# Patient Record
Sex: FEMALE | Race: WHITE | NOT HISPANIC OR LATINO | Employment: OTHER | URBAN - METROPOLITAN AREA
[De-identification: names, ages, dates, MRNs, and addresses within clinical notes are randomized per-mention and may not be internally consistent; named-entity substitution may affect disease eponyms.]

---

## 2020-07-15 DIAGNOSIS — N18.9 CHRONIC KIDNEY DISEASE, UNSPECIFIED: Primary | ICD-10-CM

## 2020-09-30 ENCOUNTER — HOSPITAL ENCOUNTER (OUTPATIENT)
Dept: RADIOLOGY | Facility: HOSPITAL | Age: 85
Discharge: HOME | End: 2020-09-30
Attending: INTERNAL MEDICINE
Payer: MEDICARE

## 2020-09-30 DIAGNOSIS — N18.9 CHRONIC KIDNEY DISEASE, UNSPECIFIED: ICD-10-CM

## 2020-09-30 PROCEDURE — 76770 US EXAM ABDO BACK WALL COMP: CPT | Mod: TC,PO

## 2020-10-05 ENCOUNTER — TELEPHONE (OUTPATIENT)
Dept: CARDIOLOGY | Facility: CLINIC | Age: 85
End: 2020-10-05

## 2020-10-05 NOTE — TELEPHONE ENCOUNTER
----- Message from Kamille Garrido sent at 10/5/2020  2:54 PM CDT -----  Regarding: medication refill  Patient said Arlen fax over form to get prescription refill and nobody responded patient is out of medicine please get in contact with patient [phone] or [facility] pharmacy [phone] regarding this

## 2020-10-30 ENCOUNTER — TELEPHONE (OUTPATIENT)
Dept: CARDIOLOGY | Facility: CLINIC | Age: 85
End: 2020-10-30

## 2020-10-30 NOTE — TELEPHONE ENCOUNTER
----- Message from Cristin Jean MA sent at 10/29/2020  3:20 PM CDT -----  PT is requesting a call back to have her appts R/S to December . Pt is without power   Call back # [phone]  Echo test / 4 month follow up

## 2020-10-30 NOTE — TELEPHONE ENCOUNTER
Left message for patient to call back to reschedule to first available, which will be in January.

## 2020-12-23 RX ORDER — METOPROLOL SUCCINATE 25 MG/1
TABLET, EXTENDED RELEASE ORAL
COMMUNITY
Start: 2020-09-24 | End: 2020-12-23 | Stop reason: SDUPTHER

## 2020-12-23 NOTE — TELEPHONE ENCOUNTER
----- Message from Steve Cabrera sent at 12/23/2020  2:41 PM CST -----  Regarding: refill  metoprolol succinate (TOPROL-XL) 25 MG 24 hr tablet    [facility] #[id number] - [place], [place] - [address] AT [facility] [address]

## 2020-12-23 NOTE — TELEPHONE ENCOUNTER
----- Message from Saadia Sadler MA sent at 12/23/2020  1:26 PM CST -----  Type: Needs Medical Advice  Who Called:  Vickie Nam Call Back Number: [phone]  Additional Information: patient is requesting a refill of her Toprol xl - 25mg tablet.  She has one pill left.  She is requesting a same day refill.  Please call patient when called in.

## 2020-12-25 RX ORDER — METOPROLOL SUCCINATE 25 MG/1
25 TABLET, EXTENDED RELEASE ORAL DAILY
Qty: 45 TABLET | Refills: 2 | Status: SHIPPED | OUTPATIENT
Start: 2020-12-25 | End: 2021-04-12 | Stop reason: SDUPTHER

## 2021-02-18 ENCOUNTER — TELEPHONE (OUTPATIENT)
Dept: CARDIOLOGY | Facility: CLINIC | Age: 86
End: 2021-02-18

## 2021-02-18 DIAGNOSIS — R21 RASH: Primary | ICD-10-CM

## 2021-04-08 LAB
BNP SERPL-MCNC: 75.5 PG/ML (ref 0–100)
BUN SERPL-MCNC: 37 MG/DL (ref 8–27)
BUN/CREAT SERPL: 25 (ref 12–28)
CALCIUM SERPL-MCNC: 9.8 MG/DL (ref 8.7–10.3)
CHLORIDE SERPL-SCNC: 103 MMOL/L (ref 96–106)
CO2 SERPL-SCNC: 25 MMOL/L (ref 20–29)
CREAT SERPL-MCNC: 1.48 MG/DL (ref 0.57–1)
GLUCOSE SERPL-MCNC: 127 MG/DL (ref 65–99)
MAGNESIUM SERPL-MCNC: 2 MG/DL (ref 1.6–2.3)
POTASSIUM SERPL-SCNC: 4.6 MMOL/L (ref 3.5–5.2)
SODIUM SERPL-SCNC: 140 MMOL/L (ref 134–144)

## 2021-04-09 RX ORDER — PROPAFENONE HYDROCHLORIDE 225 MG/1
225 TABLET, FILM COATED ORAL 2 TIMES DAILY
COMMUNITY
Start: 2021-01-12 | End: 2021-04-09 | Stop reason: SDUPTHER

## 2021-04-09 RX ORDER — PROPAFENONE HYDROCHLORIDE 225 MG/1
225 TABLET, FILM COATED ORAL 2 TIMES DAILY
Qty: 180 TABLET | Refills: 3 | Status: SHIPPED | OUTPATIENT
Start: 2021-04-09 | End: 2022-04-09 | Stop reason: SDUPTHER

## 2021-04-12 ENCOUNTER — OFFICE VISIT (OUTPATIENT)
Dept: CARDIOLOGY | Facility: CLINIC | Age: 86
End: 2021-04-12
Payer: MEDICARE

## 2021-04-12 VITALS
SYSTOLIC BLOOD PRESSURE: 120 MMHG | DIASTOLIC BLOOD PRESSURE: 70 MMHG | HEART RATE: 54 BPM | WEIGHT: 181 LBS | BODY MASS INDEX: 33.31 KG/M2 | HEIGHT: 62 IN | OXYGEN SATURATION: 92 %

## 2021-04-12 DIAGNOSIS — Z86.79 HISTORY OF ATRIAL FIBRILLATION: Chronic | ICD-10-CM

## 2021-04-12 DIAGNOSIS — I10 ESSENTIAL HYPERTENSION: Chronic | ICD-10-CM

## 2021-04-12 DIAGNOSIS — E89.0 POSTABLATIVE HYPOTHYROIDISM: Chronic | ICD-10-CM

## 2021-04-12 DIAGNOSIS — L40.9 PSORIASIS: Chronic | ICD-10-CM

## 2021-04-12 DIAGNOSIS — N18.32 STAGE 3B CHRONIC KIDNEY DISEASE: Chronic | ICD-10-CM

## 2021-04-12 PROCEDURE — 99213 OFFICE O/P EST LOW 20 MIN: CPT | Mod: S$GLB,,, | Performed by: INTERNAL MEDICINE

## 2021-04-12 PROCEDURE — 99213 PR OFFICE/OUTPT VISIT, EST, LEVL III, 20-29 MIN: ICD-10-PCS | Mod: S$GLB,,, | Performed by: INTERNAL MEDICINE

## 2021-04-12 RX ORDER — TRIAMCINOLONE ACETONIDE 1 MG/G
CREAM TOPICAL
COMMUNITY
Start: 2021-04-08 | End: 2021-06-02 | Stop reason: SDUPTHER

## 2021-04-12 RX ORDER — BETAXOLOL HYDROCHLORIDE 5 MG/ML
1 SOLUTION/ DROPS OPHTHALMIC 2 TIMES DAILY
COMMUNITY
Start: 2021-01-12 | End: 2023-08-21

## 2021-04-12 RX ORDER — LATANOPROST 50 UG/ML
1 SOLUTION/ DROPS OPHTHALMIC NIGHTLY
COMMUNITY
Start: 2021-03-15 | End: 2023-08-21

## 2021-04-21 ENCOUNTER — TELEPHONE (OUTPATIENT)
Dept: FAMILY MEDICINE | Facility: CLINIC | Age: 86
End: 2021-04-21

## 2021-04-28 PROBLEM — E89.0 POSTABLATIVE HYPOTHYROIDISM: Chronic | Status: ACTIVE | Noted: 2021-04-28

## 2021-04-28 PROBLEM — I10 ESSENTIAL HYPERTENSION: Chronic | Status: ACTIVE | Noted: 2021-04-28

## 2021-04-28 PROBLEM — Z86.79 HISTORY OF ATRIAL FIBRILLATION: Chronic | Status: ACTIVE | Noted: 2021-04-28

## 2021-04-28 PROBLEM — L40.9 PSORIASIS: Chronic | Status: ACTIVE | Noted: 2021-04-28

## 2021-05-29 LAB
BASOPHILS # BLD AUTO: 0.1 X10E3/UL (ref 0–0.2)
BASOPHILS NFR BLD AUTO: 1 %
BUN SERPL-MCNC: 31 MG/DL (ref 8–27)
BUN/CREAT SERPL: 20 (ref 12–28)
CALCIUM SERPL-MCNC: 9.6 MG/DL (ref 8.7–10.3)
CHLORIDE SERPL-SCNC: 102 MMOL/L (ref 96–106)
CO2 SERPL-SCNC: 26 MMOL/L (ref 20–29)
CREAT SERPL-MCNC: 1.54 MG/DL (ref 0.57–1)
EOSINOPHIL # BLD AUTO: 0.3 X10E3/UL (ref 0–0.4)
EOSINOPHIL NFR BLD AUTO: 4 %
ERYTHROCYTE [DISTWIDTH] IN BLOOD BY AUTOMATED COUNT: 12.6 % (ref 11.7–15.4)
GLUCOSE SERPL-MCNC: 113 MG/DL (ref 65–99)
HCT VFR BLD AUTO: 39.9 % (ref 34–46.6)
HGB BLD-MCNC: 12.6 G/DL (ref 11.1–15.9)
IMM GRANULOCYTES # BLD AUTO: 0 X10E3/UL (ref 0–0.1)
IMM GRANULOCYTES NFR BLD AUTO: 0 %
LYMPHOCYTES # BLD AUTO: 2.1 X10E3/UL (ref 0.7–3.1)
LYMPHOCYTES NFR BLD AUTO: 28 %
MCH RBC QN AUTO: 31.6 PG (ref 26.6–33)
MCHC RBC AUTO-ENTMCNC: 31.6 G/DL (ref 31.5–35.7)
MCV RBC AUTO: 100 FL (ref 79–97)
MONOCYTES # BLD AUTO: 0.7 X10E3/UL (ref 0.1–0.9)
MONOCYTES NFR BLD AUTO: 10 %
NEUTROPHILS # BLD AUTO: 4.2 X10E3/UL (ref 1.4–7)
NEUTROPHILS NFR BLD AUTO: 57 %
PLATELET # BLD AUTO: 241 X10E3/UL (ref 150–450)
POTASSIUM SERPL-SCNC: 5.2 MMOL/L (ref 3.5–5.2)
RBC # BLD AUTO: 3.99 X10E6/UL (ref 3.77–5.28)
SODIUM SERPL-SCNC: 141 MMOL/L (ref 134–144)
WBC # BLD AUTO: 7.5 X10E3/UL (ref 3.4–10.8)

## 2021-06-02 ENCOUNTER — OFFICE VISIT (OUTPATIENT)
Dept: CARDIOLOGY | Facility: CLINIC | Age: 86
End: 2021-06-02
Payer: MEDICARE

## 2021-06-02 VITALS
BODY MASS INDEX: 33.68 KG/M2 | HEART RATE: 68 BPM | DIASTOLIC BLOOD PRESSURE: 70 MMHG | WEIGHT: 183 LBS | SYSTOLIC BLOOD PRESSURE: 130 MMHG | OXYGEN SATURATION: 90 % | HEIGHT: 62 IN

## 2021-06-02 DIAGNOSIS — N18.32 STAGE 3B CHRONIC KIDNEY DISEASE: ICD-10-CM

## 2021-06-02 DIAGNOSIS — I10 ESSENTIAL HYPERTENSION: Primary | ICD-10-CM

## 2021-06-02 DIAGNOSIS — L40.9 PSORIASIS: ICD-10-CM

## 2021-06-02 DIAGNOSIS — Z86.79 HISTORY OF ATRIAL FIBRILLATION: Chronic | ICD-10-CM

## 2021-06-02 PROCEDURE — 99213 OFFICE O/P EST LOW 20 MIN: CPT | Mod: S$GLB,,, | Performed by: INTERNAL MEDICINE

## 2021-06-02 PROCEDURE — 99213 PR OFFICE/OUTPT VISIT, EST, LEVL III, 20-29 MIN: ICD-10-PCS | Mod: S$GLB,,, | Performed by: INTERNAL MEDICINE

## 2021-06-02 RX ORDER — TRIAMCINOLONE ACETONIDE 1 MG/G
CREAM TOPICAL 2 TIMES DAILY
Qty: 80 G | Refills: 1 | Status: SHIPPED | OUTPATIENT
Start: 2021-06-02 | End: 2021-08-12

## 2021-12-06 ENCOUNTER — TELEPHONE (OUTPATIENT)
Dept: CARDIOLOGY | Facility: CLINIC | Age: 86
End: 2021-12-06

## 2021-12-06 ENCOUNTER — OFFICE VISIT (OUTPATIENT)
Dept: CARDIOLOGY | Facility: CLINIC | Age: 86
End: 2021-12-06
Payer: MEDICARE

## 2021-12-06 VITALS
SYSTOLIC BLOOD PRESSURE: 140 MMHG | WEIGHT: 178 LBS | DIASTOLIC BLOOD PRESSURE: 76 MMHG | HEIGHT: 62 IN | BODY MASS INDEX: 32.76 KG/M2 | OXYGEN SATURATION: 89 % | HEART RATE: 65 BPM

## 2021-12-06 DIAGNOSIS — E89.0 POSTABLATIVE HYPOTHYROIDISM: ICD-10-CM

## 2021-12-06 DIAGNOSIS — N18.32 STAGE 3B CHRONIC KIDNEY DISEASE: Chronic | ICD-10-CM

## 2021-12-06 DIAGNOSIS — Z86.79 HISTORY OF ATRIAL FIBRILLATION: ICD-10-CM

## 2021-12-06 DIAGNOSIS — I10 ESSENTIAL HYPERTENSION: Primary | ICD-10-CM

## 2021-12-06 PROCEDURE — 99213 OFFICE O/P EST LOW 20 MIN: CPT | Mod: S$GLB,,, | Performed by: INTERNAL MEDICINE

## 2021-12-06 PROCEDURE — 99213 PR OFFICE/OUTPT VISIT, EST, LEVL III, 20-29 MIN: ICD-10-PCS | Mod: S$GLB,,, | Performed by: INTERNAL MEDICINE

## 2022-06-03 LAB
ALBUMIN SERPL-MCNC: 4.3 G/DL (ref 3.6–4.6)
ALBUMIN/GLOB SERPL: 1.3 {RATIO} (ref 1.2–2.2)
ALP SERPL-CCNC: 81 IU/L (ref 44–121)
ALT SERPL-CCNC: 10 IU/L (ref 0–32)
AST SERPL-CCNC: 14 IU/L (ref 0–40)
BILIRUB SERPL-MCNC: 0.4 MG/DL (ref 0–1.2)
BUN SERPL-MCNC: 30 MG/DL (ref 8–27)
BUN/CREAT SERPL: 23 (ref 12–28)
CALCIUM SERPL-MCNC: 9.8 MG/DL (ref 8.7–10.3)
CHLORIDE SERPL-SCNC: 103 MMOL/L (ref 96–106)
CHOLEST SERPL-MCNC: 152 MG/DL (ref 100–199)
CO2 SERPL-SCNC: 20 MMOL/L (ref 20–29)
CREAT SERPL-MCNC: 1.3 MG/DL (ref 0.57–1)
ERYTHROCYTE [DISTWIDTH] IN BLOOD BY AUTOMATED COUNT: 12.3 % (ref 11.7–15.4)
EST. GFR  (NO RACE VARIABLE): 40 ML/MIN/1.73
GLOBULIN SER CALC-MCNC: 3.3 G/DL (ref 1.5–4.5)
GLUCOSE SERPL-MCNC: 136 MG/DL (ref 65–99)
HCT VFR BLD AUTO: 40.2 % (ref 34–46.6)
HDLC SERPL-MCNC: 51 MG/DL
HGB BLD-MCNC: 13.3 G/DL (ref 11.1–15.9)
LDLC SERPL CALC-MCNC: 88 MG/DL (ref 0–99)
MCH RBC QN AUTO: 33.3 PG (ref 26.6–33)
MCHC RBC AUTO-ENTMCNC: 33.1 G/DL (ref 31.5–35.7)
MCV RBC AUTO: 101 FL (ref 79–97)
PLATELET # BLD AUTO: 214 X10E3/UL (ref 150–450)
POTASSIUM SERPL-SCNC: 5 MMOL/L (ref 3.5–5.2)
PROT SERPL-MCNC: 7.6 G/DL (ref 6–8.5)
RBC # BLD AUTO: 4 X10E6/UL (ref 3.77–5.28)
SODIUM SERPL-SCNC: 139 MMOL/L (ref 134–144)
TRIGL SERPL-MCNC: 66 MG/DL (ref 0–149)
TSH SERPL DL<=0.005 MIU/L-ACNC: 2.87 UIU/ML (ref 0.45–4.5)
VLDLC SERPL CALC-MCNC: 13 MG/DL (ref 5–40)
WBC # BLD AUTO: 6.8 X10E3/UL (ref 3.4–10.8)

## 2022-07-20 ENCOUNTER — TELEPHONE (OUTPATIENT)
Dept: CARDIOLOGY | Facility: CLINIC | Age: 87
End: 2022-07-20
Payer: MEDICAID

## 2022-07-20 NOTE — TELEPHONE ENCOUNTER
----- Message from Shirlene Oneal sent at 7/20/2022  2:11 PM CDT -----  Type: Needs Medical Advice  Who Called: Pt   Symptoms (please be specific):   How long has patient had these symptoms:    Pharmacy name and phone #:    Best Call Back Number: [phone]  Additional Information: Pt requesting a call back to schedule an appt.

## 2022-07-21 ENCOUNTER — TELEPHONE (OUTPATIENT)
Dept: CARDIOLOGY | Facility: CLINIC | Age: 87
End: 2022-07-21
Payer: MEDICAID

## 2022-07-21 NOTE — TELEPHONE ENCOUNTER
----- Message from Haydee Kearns sent at 7/21/2022  1:03 PM CDT -----  Regarding: sooner appointment  Contact: Gabino Clayton  Type:  Patient Returning Call    Who Called:  Patient   Who Left Message for Patient:  Nurse  Does the patient know what this is regarding?:  appointment  Best Call Back Number:  [phone] (home)       Patient only want to see Dr Morocho please call so back at [phone]

## 2022-07-21 NOTE — TELEPHONE ENCOUNTER
Spoke with patient son and advised next available with Dr. Morocho is October and at this time we cannot schedule due to not knowing call schedule. Pt son understood was a little agitated. Asked pt if she would see NATALIA she refused and would wait til October. Did tell them will send 2 month recall and soon as they receive it to give us a call to schedule. Pt son understood.

## 2022-07-21 NOTE — TELEPHONE ENCOUNTER
Appt made for NATALIA on 8/8 9:20am. Did call and leave vm with son that appt is made and to let us know if time and date doesn't work.

## 2022-09-15 RX ORDER — TRIAMTERENE/HYDROCHLOROTHIAZID 37.5-25 MG
1 TABLET ORAL DAILY
Qty: 90 TABLET | Refills: 3 | Status: SHIPPED | OUTPATIENT
Start: 2022-09-15 | End: 2023-04-03

## 2022-10-04 RX ORDER — AMLODIPINE BESYLATE 10 MG/1
10 TABLET ORAL DAILY
Qty: 90 TABLET | Refills: 3 | Status: SHIPPED | OUTPATIENT
Start: 2022-10-04 | End: 2023-04-10 | Stop reason: SDUPTHER

## 2022-10-04 NOTE — TELEPHONE ENCOUNTER
----- Message from Otilio Anderson sent at 10/4/2022  3:47 PM CDT -----  Type:  RX Refill Request    Who Called:  Pt  Refill or New Rx:  refill  RX Name and Strength:  amLODIPine (NORVASC) 10 MG tablet    How is the patient currently taking it? (ex. 1XDay):  as directed  Is this a 30 day or 90 day RX:  30  Preferred Pharmacy with phone number:    [facility] #[id number] - [place], [place] - [address] [facility] [address]  [address] [address]  Phone: [phone] Fax: [phone]      Local or Mail Order:  Local  Ordering Provider:  Rashawn Nam Call Back Number:  [phone]  Additional Information:  Please advise -- Thank you

## 2022-10-04 NOTE — TELEPHONE ENCOUNTER
----- Message from Otilio Anderson sent at 10/4/2022  3:45 PM CDT -----  Type:  Sooner Appointment Request    Caller is requesting a sooner appointment.  Caller declined first available appointment listed below.  Caller will not accept being placed on the waitlist and is requesting a message be sent to doctor.    Name of Caller:  Pt  When is the first available appointment?      Symptoms:  letter for follow up    Best Call Back Number:  [phone] Adarsh son    Additional Information:  sts shes been waiting since June. Please advise == Thank you

## 2023-03-06 ENCOUNTER — TELEPHONE (OUTPATIENT)
Dept: CARDIOLOGY | Facility: CLINIC | Age: 88
End: 2023-03-06
Payer: MEDICARE

## 2023-03-06 NOTE — TELEPHONE ENCOUNTER
----- Message from Pietro Brooke sent at 3/6/2023  3:04 PM CST -----  Regarding: sooner appt  Type:  Sooner Appointment Request    Caller is requesting a sooner appointment.  Caller declined first available appointment listed below.  Caller will not accept being placed on the waitlist and is requesting a message be sent to doctor.    Name of Caller:  Vickie  When is the first available appointment?  None through end of cal // ins  Symptoms:  f/u  Best Call Back Number:  [phone] // son tara  Additional Information:

## 2023-03-07 NOTE — TELEPHONE ENCOUNTER
Patient son was very rude when I gave appt date and time. Said patient has been trying for a year and finally gets an appt. Appt made.

## 2023-04-03 ENCOUNTER — OFFICE VISIT (OUTPATIENT)
Dept: CARDIOLOGY | Facility: CLINIC | Age: 88
End: 2023-04-03
Payer: MEDICARE

## 2023-04-03 VITALS
BODY MASS INDEX: 30.91 KG/M2 | OXYGEN SATURATION: 91 % | DIASTOLIC BLOOD PRESSURE: 70 MMHG | HEIGHT: 62 IN | WEIGHT: 168 LBS | SYSTOLIC BLOOD PRESSURE: 122 MMHG | HEART RATE: 66 BPM

## 2023-04-03 DIAGNOSIS — E89.0 POSTABLATIVE HYPOTHYROIDISM: ICD-10-CM

## 2023-04-03 DIAGNOSIS — N18.32 STAGE 3B CHRONIC KIDNEY DISEASE: ICD-10-CM

## 2023-04-03 DIAGNOSIS — I10 ESSENTIAL HYPERTENSION: Primary | ICD-10-CM

## 2023-04-03 DIAGNOSIS — Z86.79 HISTORY OF ATRIAL FIBRILLATION: ICD-10-CM

## 2023-04-03 PROCEDURE — 1101F PT FALLS ASSESS-DOCD LE1/YR: CPT | Mod: CPTII,S$GLB,, | Performed by: INTERNAL MEDICINE

## 2023-04-03 PROCEDURE — 99213 PR OFFICE/OUTPT VISIT, EST, LEVL III, 20-29 MIN: ICD-10-PCS | Mod: S$GLB,,, | Performed by: INTERNAL MEDICINE

## 2023-04-03 PROCEDURE — 99213 OFFICE O/P EST LOW 20 MIN: CPT | Mod: PBBFAC,PN | Performed by: INTERNAL MEDICINE

## 2023-04-03 PROCEDURE — 1126F AMNT PAIN NOTED NONE PRSNT: CPT | Mod: CPTII,S$GLB,, | Performed by: INTERNAL MEDICINE

## 2023-04-03 PROCEDURE — 1159F PR MEDICATION LIST DOCUMENTED IN MEDICAL RECORD: ICD-10-PCS | Mod: CPTII,S$GLB,, | Performed by: INTERNAL MEDICINE

## 2023-04-03 PROCEDURE — 1101F PR PT FALLS ASSESS DOC 0-1 FALLS W/OUT INJ PAST YR: ICD-10-PCS | Mod: CPTII,S$GLB,, | Performed by: INTERNAL MEDICINE

## 2023-04-03 PROCEDURE — 3288F FALL RISK ASSESSMENT DOCD: CPT | Mod: CPTII,S$GLB,, | Performed by: INTERNAL MEDICINE

## 2023-04-03 PROCEDURE — 99213 OFFICE O/P EST LOW 20 MIN: CPT | Mod: S$GLB,,, | Performed by: INTERNAL MEDICINE

## 2023-04-03 PROCEDURE — 1159F MED LIST DOCD IN RCRD: CPT | Mod: CPTII,S$GLB,, | Performed by: INTERNAL MEDICINE

## 2023-04-03 PROCEDURE — 3288F PR FALLS RISK ASSESSMENT DOCUMENTED: ICD-10-PCS | Mod: CPTII,S$GLB,, | Performed by: INTERNAL MEDICINE

## 2023-04-03 PROCEDURE — 99999 PR PBB SHADOW E&M-EST. PATIENT-LVL III: ICD-10-PCS | Mod: PBBFAC,,, | Performed by: INTERNAL MEDICINE

## 2023-04-03 PROCEDURE — 1160F RVW MEDS BY RX/DR IN RCRD: CPT | Mod: CPTII,S$GLB,, | Performed by: INTERNAL MEDICINE

## 2023-04-03 PROCEDURE — 1126F PR PAIN SEVERITY QUANTIFIED, NO PAIN PRESENT: ICD-10-PCS | Mod: CPTII,S$GLB,, | Performed by: INTERNAL MEDICINE

## 2023-04-03 PROCEDURE — 1160F PR REVIEW ALL MEDS BY PRESCRIBER/CLIN PHARMACIST DOCUMENTED: ICD-10-PCS | Mod: CPTII,S$GLB,, | Performed by: INTERNAL MEDICINE

## 2023-04-03 PROCEDURE — 99999 PR PBB SHADOW E&M-EST. PATIENT-LVL III: CPT | Mod: PBBFAC,,, | Performed by: INTERNAL MEDICINE

## 2023-04-03 NOTE — PROGRESS NOTES
Patient ID:  Vickie Benedict is a 88 y.o. female who presents for follow-up of Hypertension      She has been doing okay.  She has been sleeping a lot she has been trying to get an appointment for over a year.  Finally she was able to come on min and see me.  She has been living with her son.  She takes care of a cat and a dog.  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      Past Medical History:   Diagnosis Date    Atrial fibrillation     Bradycardia     Carotid bruit     CKD (chronic kidney disease), stage III     Hyperlipidemia     OA (osteoarthritis)     Thyroid disease         Past Surgical History:   Procedure Laterality Date    HYSTERECTOMY      KNEE SURGERY Right     SHOULDER SURGERY Right           Current Outpatient Medications   Medication Instructions    amLODIPine (NORVASC) 10 MG tablet TAKE 1 TABLET BY MOUTH EVERY DAY    amLODIPine (NORVASC) 10 mg, Oral, Daily    betaxolol 0.5% (BETOPTIC-S) 0.5 % Drop 1 drop, Both Eyes, 2 times daily    latanoprost 0.005 % ophthalmic solution 1 drop, Both Eyes, Nightly    levothyroxine (SYNTHROID) 100 MCG tablet TAKE 1 TABLET BY MOUTH EVERY DAY    lisinopriL (PRINIVIL,ZESTRIL) 20 MG tablet TAKE 1 TABLET BY MOUTH TWICE DAILY    metoprolol succinate (TOPROL-XL) 25 MG 24 hr tablet TAKE ONE-HALF TABLET BY MOUTH EVERY MORNING. DISCONTINUE METOPROLOL TARTARATE AND TAKE THIS ONE INSTEAD    propafenone (RYTHMOL) 225 MG Tab TAKE 1 TABLET BY MOUTH TWICE DAILY    triamcinolone acetonide 0.1% (KENALOG) 0.1 % cream APPLY TOPICALLY TO THE AFFECTED AREA TWICE DAILY        Review of patient's allergies indicates:   Allergen Reactions    Penicillins     Shellfish containing products         Review of Systems   Cardiovascular:  Negative for chest pain, dyspnea on exertion and palpitations.   Respiratory:  Negative for cough and shortness of breath.    Neurological:  Positive for dizziness.      Objective:     Vitals:     "04/03/23 1301   BP: 122/70   BP Location: Left arm   Patient Position: Sitting   BP Method: Medium (Manual)   Pulse: 66   SpO2: (!) 91%   Weight: 76.2 kg (167 lb 15.9 oz)   Height: 5' 2" (1.575 m)       Physical Exam  Vitals and nursing note reviewed.   Constitutional:       Appearance: She is well-developed.   HENT:      Head: Normocephalic and atraumatic.   Eyes:      Conjunctiva/sclera: Conjunctivae normal.   Neck:      Vascular: Carotid bruit present.   Cardiovascular:      Rate and Rhythm: Normal rate and regular rhythm.      Heart sounds: Murmur (Six systolic murmur at the base) heard.   Pulmonary:      Effort: Pulmonary effort is normal.      Breath sounds: Normal breath sounds.   Abdominal:      General: Bowel sounds are normal.      Palpations: Abdomen is soft.   Musculoskeletal:         General: Normal range of motion.   Skin:     General: Skin is warm and dry.   Neurological:      Mental Status: She is alert and oriented to person, place, and time.   Psychiatric:         Behavior: Behavior normal.         Thought Content: Thought content normal.         Judgment: Judgment normal.     CMP  Sodium   Date Value Ref Range Status   06/02/2022 139 134 - 144 mmol/L Final     Potassium   Date Value Ref Range Status   06/02/2022 5.0 3.5 - 5.2 mmol/L Final     Chloride   Date Value Ref Range Status   06/02/2022 103 96 - 106 mmol/L Final     CO2   Date Value Ref Range Status   06/02/2022 20 20 - 29 mmol/L Final     Glucose   Date Value Ref Range Status   06/02/2022 136 (H) 65 - 99 mg/dL Final     BUN   Date Value Ref Range Status   06/02/2022 30 (H) 8 - 27 mg/dL Final     Creatinine   Date Value Ref Range Status   06/02/2022 1.30 (H) 0.57 - 1.00 mg/dL Final     Calcium   Date Value Ref Range Status   06/02/2022 9.8 8.7 - 10.3 mg/dL Final     Albumin   Date Value Ref Range Status   06/02/2022 4.3 3.6 - 4.6 g/dL Final     Total Bilirubin   Date Value Ref Range Status   06/02/2022 0.4 0.0 - 1.2 mg/dL Final     AST "   Date Value Ref Range Status   06/02/2022 14 0 - 40 IU/L Final     ALT   Date Value Ref Range Status   06/02/2022 10 0 - 32 IU/L Final     eGFR if non    Date Value Ref Range Status   05/28/2021 30 (L) >59 mL/min/1.73 Final      BMP  Lab Results   Component Value Date     06/02/2022    K 5.0 06/02/2022     06/02/2022    CO2 20 06/02/2022    BUN 30 (H) 06/02/2022    CREATININE 1.30 (H) 06/02/2022    CALCIUM 9.8 06/02/2022    EGFRNONAA 30 (L) 05/28/2021      BNP  @LABRCNTIP(BNP,BNPTRIAGEBLO)@   Lab Results   Component Value Date    CHOL 152 06/02/2022     Lab Results   Component Value Date    HDL 51 06/02/2022     Lab Results   Component Value Date    LDLCALC 88 06/02/2022     Lab Results   Component Value Date    TRIG 66 06/02/2022     No results found for: CHOLHDL   Lab Results   Component Value Date    TSH 2.870 06/02/2022     No results found for: LABA1C, HGBA1C  Lab Results   Component Value Date    WBC 6.8 06/02/2022    HGB 13.3 06/02/2022    HCT 40.2 06/02/2022     (H) 06/02/2022     06/02/2022         No results found for this or any previous visit.     No results found for this or any previous visit.         Assessment:       History of atrial fibrillation  Controlled on Rythmol    Essential hypertension  Controlled on amlodipine, lisinopril, metoprolol    Postablative hypothyroidism  On thyroid replacement       Plan:       Continue the current medical therapy return to the office in 2 months with an echocardiogram lipid CMP TSH and CBC.

## 2023-04-11 RX ORDER — METOPROLOL SUCCINATE 25 MG/1
12.5 TABLET, EXTENDED RELEASE ORAL DAILY
Qty: 45 TABLET | Refills: 4 | Status: ON HOLD | OUTPATIENT
Start: 2023-04-11 | End: 2023-08-29 | Stop reason: HOSPADM

## 2023-04-11 RX ORDER — AMLODIPINE BESYLATE 10 MG/1
10 TABLET ORAL DAILY
Qty: 90 TABLET | Refills: 3 | Status: ON HOLD | OUTPATIENT
Start: 2023-04-11 | End: 2023-08-29 | Stop reason: HOSPADM

## 2023-04-11 RX ORDER — LISINOPRIL 20 MG/1
20 TABLET ORAL 2 TIMES DAILY
Qty: 180 TABLET | Refills: 3 | Status: ON HOLD | OUTPATIENT
Start: 2023-04-11 | End: 2023-08-29 | Stop reason: HOSPADM

## 2023-04-11 RX ORDER — PROPAFENONE HYDROCHLORIDE 225 MG/1
225 TABLET, FILM COATED ORAL 2 TIMES DAILY
Qty: 180 TABLET | Refills: 3 | Status: SHIPPED | OUTPATIENT
Start: 2023-04-11 | End: 2023-07-19

## 2023-07-19 ENCOUNTER — CLINICAL SUPPORT (OUTPATIENT)
Dept: CARDIOLOGY | Facility: HOSPITAL | Age: 88
DRG: 291 | End: 2023-07-19
Attending: STUDENT IN AN ORGANIZED HEALTH CARE EDUCATION/TRAINING PROGRAM
Payer: MEDICARE

## 2023-07-19 ENCOUNTER — HOSPITAL ENCOUNTER (INPATIENT)
Facility: HOSPITAL | Age: 88
LOS: 3 days | Discharge: HOME | DRG: 291 | End: 2023-07-22
Attending: EMERGENCY MEDICINE | Admitting: STUDENT IN AN ORGANIZED HEALTH CARE EDUCATION/TRAINING PROGRAM
Payer: MEDICARE

## 2023-07-19 VITALS — WEIGHT: 149.94 LBS | HEIGHT: 62 IN | BODY MASS INDEX: 27.59 KG/M2

## 2023-07-19 DIAGNOSIS — I50.9 CONGESTIVE HEART FAILURE, UNSPECIFIED HF CHRONICITY, UNSPECIFIED HEART FAILURE TYPE: Primary | ICD-10-CM

## 2023-07-19 DIAGNOSIS — I50.9 CHF (CONGESTIVE HEART FAILURE): ICD-10-CM

## 2023-07-19 DIAGNOSIS — R06.02 SHORTNESS OF BREATH: ICD-10-CM

## 2023-07-19 DIAGNOSIS — R06.02 SOB (SHORTNESS OF BREATH): ICD-10-CM

## 2023-07-19 DIAGNOSIS — R00.9 ABNORMAL HEART RATE: ICD-10-CM

## 2023-07-19 DIAGNOSIS — R09.02 HYPOXIA: ICD-10-CM

## 2023-07-19 PROBLEM — J96.01 ACUTE HYPOXEMIC RESPIRATORY FAILURE: Status: ACTIVE | Noted: 2023-07-19

## 2023-07-19 LAB
ALBUMIN SERPL BCP-MCNC: 3.7 G/DL (ref 3.5–5.2)
ALBUMIN SERPL BCP-MCNC: 3.8 G/DL (ref 3.5–5.2)
ALLENS TEST: ABNORMAL
ALP SERPL-CCNC: 64 U/L (ref 55–135)
ALP SERPL-CCNC: 71 U/L (ref 55–135)
ALT SERPL W/O P-5'-P-CCNC: 24 U/L (ref 10–44)
ALT SERPL W/O P-5'-P-CCNC: 27 U/L (ref 10–44)
ANION GAP SERPL CALC-SCNC: 13 MMOL/L (ref 8–16)
ANION GAP SERPL CALC-SCNC: 6 MMOL/L (ref 8–16)
AORTIC ROOT ANNULUS: 3 CM
AORTIC VALVE CUSP SEPERATION: 1.1 CM
AST SERPL-CCNC: 25 U/L (ref 10–40)
AST SERPL-CCNC: 30 U/L (ref 10–40)
AV INDEX (PROSTH): 0.56
AV MEAN GRADIENT: 10 MMHG
AV PEAK GRADIENT: 19 MMHG
AV REGURGITATION PRESSURE HALF TIME: 744 MS
AV VALVE AREA: 1.75 CM2
AV VELOCITY RATIO: 0.54
BASOPHILS # BLD AUTO: 0.08 K/UL (ref 0–0.2)
BASOPHILS NFR BLD: 0.6 % (ref 0–1.9)
BILIRUB SERPL-MCNC: 0.8 MG/DL (ref 0.1–1)
BILIRUB SERPL-MCNC: 0.8 MG/DL (ref 0.1–1)
BNP SERPL-MCNC: 603 PG/ML (ref 0–99)
BSA FOR ECHO PROCEDURE: 1.72 M2
BUN SERPL-MCNC: 22 MG/DL (ref 8–23)
BUN SERPL-MCNC: 24 MG/DL (ref 8–23)
CALCIUM SERPL-MCNC: 9 MG/DL (ref 8.7–10.5)
CALCIUM SERPL-MCNC: 9.4 MG/DL (ref 8.7–10.5)
CHLORIDE SERPL-SCNC: 102 MMOL/L (ref 95–110)
CHLORIDE SERPL-SCNC: 104 MMOL/L (ref 95–110)
CO2 SERPL-SCNC: 25 MMOL/L (ref 23–29)
CO2 SERPL-SCNC: 28 MMOL/L (ref 23–29)
CREAT SERPL-MCNC: 1.3 MG/DL (ref 0.5–1.4)
CREAT SERPL-MCNC: 1.5 MG/DL (ref 0.5–1.4)
CV ECHO LV RWT: 0.42 CM
DELSYS: ABNORMAL
DIFFERENTIAL METHOD: ABNORMAL
DOP CALC AO PEAK VEL: 2.2 M/S
DOP CALC AO VTI: 57.5 CM
DOP CALC LVOT AREA: 3.1 CM2
DOP CALC LVOT DIAMETER: 2 CM
DOP CALC LVOT PEAK VEL: 1.19 M/S
DOP CALC LVOT STROKE VOLUME: 100.79 CM3
DOP CALCLVOT PEAK VEL VTI: 32.1 CM
E WAVE DECELERATION TIME: 246 MSEC
E/A RATIO: 1.22
E/E' RATIO: 14.5 M/S
ECHO LV POSTERIOR WALL: 1.21 CM (ref 0.6–1.1)
EJECTION FRACTION: 55 %
EOSINOPHIL # BLD AUTO: 0.3 K/UL (ref 0–0.5)
EOSINOPHIL NFR BLD: 2.3 % (ref 0–8)
EP: 8
ERYTHROCYTE [DISTWIDTH] IN BLOOD BY AUTOMATED COUNT: 14.3 % (ref 11.5–14.5)
ERYTHROCYTE [SEDIMENTATION RATE] IN BLOOD BY WESTERGREN METHOD: 12 MM/H
EST. GFR  (NO RACE VARIABLE): 33.3 ML/MIN/1.73 M^2
EST. GFR  (NO RACE VARIABLE): 39.6 ML/MIN/1.73 M^2
FIO2: 50
FRACTIONAL SHORTENING: 28 % (ref 28–44)
GLUCOSE SERPL-MCNC: 176 MG/DL (ref 70–110)
GLUCOSE SERPL-MCNC: 221 MG/DL (ref 70–110)
HCO3 UR-SCNC: 27.1 MMOL/L (ref 24–28)
HCT VFR BLD AUTO: 40.3 % (ref 37–48.5)
HGB BLD-MCNC: 12.9 G/DL (ref 12–16)
IMM GRANULOCYTES # BLD AUTO: 0.03 K/UL (ref 0–0.04)
IMM GRANULOCYTES NFR BLD AUTO: 0.2 % (ref 0–0.5)
INTERVENTRICULAR SEPTUM: 0.98 CM (ref 0.6–1.1)
IP: 18
IVC DIAMETER: 2.34 CM
LACTATE SERPL-SCNC: 1 MMOL/L (ref 0.5–1.9)
LEFT INTERNAL DIMENSION IN SYSTOLE: 4.1 CM (ref 2.1–4)
LEFT VENTRICLE DIASTOLIC VOLUME INDEX: 95.27 ML/M2
LEFT VENTRICLE DIASTOLIC VOLUME: 161 ML
LEFT VENTRICLE MASS INDEX: 152 G/M2
LEFT VENTRICLE SYSTOLIC VOLUME INDEX: 43.9 ML/M2
LEFT VENTRICLE SYSTOLIC VOLUME: 74.2 ML
LEFT VENTRICULAR INTERNAL DIMENSION IN DIASTOLE: 5.72 CM (ref 3.5–6)
LEFT VENTRICULAR MASS: 256.66 G
LV LATERAL E/E' RATIO: 14.5 M/S
LV SEPTAL E/E' RATIO: 14.5 M/S
LVOT MG: 3 MMHG
LVOT MV: 0.77 CM/S
LYMPHOCYTES # BLD AUTO: 2.2 K/UL (ref 1–4.8)
LYMPHOCYTES NFR BLD: 17.2 % (ref 18–48)
MAGNESIUM SERPL-MCNC: 1.8 MG/DL (ref 1.6–2.6)
MAGNESIUM SERPL-MCNC: 2.5 MG/DL (ref 1.6–2.6)
MCH RBC QN AUTO: 33.7 PG (ref 27–31)
MCHC RBC AUTO-ENTMCNC: 32 G/DL (ref 32–36)
MCV RBC AUTO: 105 FL (ref 82–98)
MODE: ABNORMAL
MONOCYTES # BLD AUTO: 0.9 K/UL (ref 0.3–1)
MONOCYTES NFR BLD: 6.8 % (ref 4–15)
MV PEAK A VEL: 1.19 M/S
MV PEAK E VEL: 1.45 M/S
NEUTROPHILS # BLD AUTO: 9.4 K/UL (ref 1.8–7.7)
NEUTROPHILS NFR BLD: 72.9 % (ref 38–73)
NRBC BLD-RTO: 0 /100 WBC
PCO2 BLDA: 51.5 MMHG (ref 35–45)
PH SMN: 7.33 [PH] (ref 7.35–7.45)
PISA AR MAX VEL: 3.07 M/S
PISA MRMAX VEL: 5.87 M/S
PISA TR MAX VEL: 3.41 M/S
PLATELET # BLD AUTO: 281 K/UL (ref 150–450)
PMV BLD AUTO: 9.8 FL (ref 9.2–12.9)
PO2 BLDA: 84 MMHG (ref 80–100)
POC BE: 1 MMOL/L
POC SATURATED O2: 95 % (ref 95–100)
POC TCO2: 29 MMOL/L (ref 23–27)
POTASSIUM SERPL-SCNC: 4.1 MMOL/L (ref 3.5–5.1)
POTASSIUM SERPL-SCNC: 4.2 MMOL/L (ref 3.5–5.1)
PROT SERPL-MCNC: 7.3 G/DL (ref 6–8.4)
PROT SERPL-MCNC: 7.8 G/DL (ref 6–8.4)
PV MV: 0.93 M/S
PV PEAK VELOCITY: 1.46 CM/S
RA PRESSURE: 8 MMHG
RBC # BLD AUTO: 3.83 M/UL (ref 4–5.4)
RV TISSUE DOPPLER FREE WALL SYSTOLIC VELOCITY 1 (APICAL 4 CHAMBER VIEW): 18.3 CM/S
SAMPLE: ABNORMAL
SITE: ABNORMAL
SODIUM SERPL-SCNC: 138 MMOL/L (ref 136–145)
SODIUM SERPL-SCNC: 140 MMOL/L (ref 136–145)
TDI LATERAL: 0.1 M/S
TDI SEPTAL: 0.1 M/S
TDI: 0.1 M/S
TR MAX PG: 47 MMHG
TRICUSPID ANNULAR PLANE SYSTOLIC EXCURSION: 3.04 CM
TROPONIN I SERPL HS-MCNC: 19.3 PG/ML (ref 0–14.9)
TROPONIN I SERPL HS-MCNC: 23.9 PG/ML (ref 0–14.9)
TROPONIN I SERPL HS-MCNC: 27.2 PG/ML (ref 0–14.9)
TROPONIN I SERPL HS-MCNC: 30.1 PG/ML (ref 0–14.9)
TSH SERPL DL<=0.005 MIU/L-ACNC: 3.08 UIU/ML (ref 0.34–5.6)
TV REST PULMONARY ARTERY PRESSURE: 55 MMHG
WBC # BLD AUTO: 12.88 K/UL (ref 3.9–12.7)

## 2023-07-19 PROCEDURE — 99291 CRITICAL CARE FIRST HOUR: CPT

## 2023-07-19 PROCEDURE — 63600175 PHARM REV CODE 636 W HCPCS: Performed by: EMERGENCY MEDICINE

## 2023-07-19 PROCEDURE — 99900031 HC PATIENT EDUCATION (STAT)

## 2023-07-19 PROCEDURE — 94799 UNLISTED PULMONARY SVC/PX: CPT

## 2023-07-19 PROCEDURE — 93010 EKG 12-LEAD: ICD-10-PCS | Mod: ,,, | Performed by: SPECIALIST

## 2023-07-19 PROCEDURE — 83735 ASSAY OF MAGNESIUM: CPT | Mod: 91 | Performed by: STUDENT IN AN ORGANIZED HEALTH CARE EDUCATION/TRAINING PROGRAM

## 2023-07-19 PROCEDURE — 84443 ASSAY THYROID STIM HORMONE: CPT | Performed by: EMERGENCY MEDICINE

## 2023-07-19 PROCEDURE — 94640 AIRWAY INHALATION TREATMENT: CPT

## 2023-07-19 PROCEDURE — 93005 ELECTROCARDIOGRAM TRACING: CPT | Performed by: SPECIALIST

## 2023-07-19 PROCEDURE — 80053 COMPREHEN METABOLIC PANEL: CPT | Performed by: EMERGENCY MEDICINE

## 2023-07-19 PROCEDURE — 83735 ASSAY OF MAGNESIUM: CPT | Performed by: EMERGENCY MEDICINE

## 2023-07-19 PROCEDURE — 84484 ASSAY OF TROPONIN QUANT: CPT | Mod: 91 | Performed by: STUDENT IN AN ORGANIZED HEALTH CARE EDUCATION/TRAINING PROGRAM

## 2023-07-19 PROCEDURE — 25000242 PHARM REV CODE 250 ALT 637 W/ HCPCS: Performed by: STUDENT IN AN ORGANIZED HEALTH CARE EDUCATION/TRAINING PROGRAM

## 2023-07-19 PROCEDURE — 96374 THER/PROPH/DIAG INJ IV PUSH: CPT

## 2023-07-19 PROCEDURE — 99900035 HC TECH TIME PER 15 MIN (STAT)

## 2023-07-19 PROCEDURE — 27000221 HC OXYGEN, UP TO 24 HOURS

## 2023-07-19 PROCEDURE — 36415 COLL VENOUS BLD VENIPUNCTURE: CPT | Performed by: STUDENT IN AN ORGANIZED HEALTH CARE EDUCATION/TRAINING PROGRAM

## 2023-07-19 PROCEDURE — 21000000 HC CCU ICU ROOM CHARGE

## 2023-07-19 PROCEDURE — 96375 TX/PRO/DX INJ NEW DRUG ADDON: CPT

## 2023-07-19 PROCEDURE — 93306 TTE W/DOPPLER COMPLETE: CPT | Mod: 26,,, | Performed by: INTERNAL MEDICINE

## 2023-07-19 PROCEDURE — 93306 ECHO (CUPID ONLY): ICD-10-PCS | Mod: 26,,, | Performed by: INTERNAL MEDICINE

## 2023-07-19 PROCEDURE — 87040 BLOOD CULTURE FOR BACTERIA: CPT | Mod: 59 | Performed by: EMERGENCY MEDICINE

## 2023-07-19 PROCEDURE — 94644 CONT INHLJ TX 1ST HOUR: CPT

## 2023-07-19 PROCEDURE — 84484 ASSAY OF TROPONIN QUANT: CPT | Performed by: EMERGENCY MEDICINE

## 2023-07-19 PROCEDURE — 83605 ASSAY OF LACTIC ACID: CPT | Performed by: EMERGENCY MEDICINE

## 2023-07-19 PROCEDURE — 25000003 PHARM REV CODE 250: Performed by: EMERGENCY MEDICINE

## 2023-07-19 PROCEDURE — 36415 COLL VENOUS BLD VENIPUNCTURE: CPT | Performed by: EMERGENCY MEDICINE

## 2023-07-19 PROCEDURE — 94761 N-INVAS EAR/PLS OXIMETRY MLT: CPT

## 2023-07-19 PROCEDURE — 82803 BLOOD GASES ANY COMBINATION: CPT

## 2023-07-19 PROCEDURE — 25000003 PHARM REV CODE 250: Performed by: STUDENT IN AN ORGANIZED HEALTH CARE EDUCATION/TRAINING PROGRAM

## 2023-07-19 PROCEDURE — 93306 TTE W/DOPPLER COMPLETE: CPT

## 2023-07-19 PROCEDURE — 93010 ELECTROCARDIOGRAM REPORT: CPT | Mod: ,,, | Performed by: SPECIALIST

## 2023-07-19 PROCEDURE — 36600 WITHDRAWAL OF ARTERIAL BLOOD: CPT

## 2023-07-19 PROCEDURE — 63600175 PHARM REV CODE 636 W HCPCS: Performed by: STUDENT IN AN ORGANIZED HEALTH CARE EDUCATION/TRAINING PROGRAM

## 2023-07-19 PROCEDURE — 85025 COMPLETE CBC W/AUTO DIFF WBC: CPT | Performed by: EMERGENCY MEDICINE

## 2023-07-19 PROCEDURE — 25000242 PHARM REV CODE 250 ALT 637 W/ HCPCS: Performed by: EMERGENCY MEDICINE

## 2023-07-19 PROCEDURE — 83880 ASSAY OF NATRIURETIC PEPTIDE: CPT | Performed by: EMERGENCY MEDICINE

## 2023-07-19 PROCEDURE — 80053 COMPREHEN METABOLIC PANEL: CPT | Mod: 91 | Performed by: STUDENT IN AN ORGANIZED HEALTH CARE EDUCATION/TRAINING PROGRAM

## 2023-07-19 RX ORDER — CALCIUM GLUCONATE 20 MG/ML
3 INJECTION, SOLUTION INTRAVENOUS
Status: DISCONTINUED | OUTPATIENT
Start: 2023-07-19 | End: 2023-07-22 | Stop reason: HOSPADM

## 2023-07-19 RX ORDER — SODIUM,POTASSIUM PHOSPHATES 280-250MG
2 POWDER IN PACKET (EA) ORAL
Status: DISCONTINUED | OUTPATIENT
Start: 2023-07-19 | End: 2023-07-22 | Stop reason: HOSPADM

## 2023-07-19 RX ORDER — POTASSIUM CHLORIDE 7.45 MG/ML
60 INJECTION INTRAVENOUS
Status: DISCONTINUED | OUTPATIENT
Start: 2023-07-19 | End: 2023-07-22 | Stop reason: HOSPADM

## 2023-07-19 RX ORDER — METHYLPREDNISOLONE SOD SUCC 125 MG
125 VIAL (EA) INJECTION
Status: COMPLETED | OUTPATIENT
Start: 2023-07-19 | End: 2023-07-19

## 2023-07-19 RX ORDER — FUROSEMIDE 10 MG/ML
40 INJECTION INTRAMUSCULAR; INTRAVENOUS
Status: COMPLETED | OUTPATIENT
Start: 2023-07-19 | End: 2023-07-19

## 2023-07-19 RX ORDER — CALCIUM GLUCONATE 20 MG/ML
1 INJECTION, SOLUTION INTRAVENOUS
Status: DISCONTINUED | OUTPATIENT
Start: 2023-07-19 | End: 2023-07-22 | Stop reason: HOSPADM

## 2023-07-19 RX ORDER — IPRATROPIUM BROMIDE 0.5 MG/2.5ML
1 SOLUTION RESPIRATORY (INHALATION)
Status: COMPLETED | OUTPATIENT
Start: 2023-07-19 | End: 2023-07-19

## 2023-07-19 RX ORDER — LEVOTHYROXINE SODIUM 100 UG/1
100 TABLET ORAL
Status: DISCONTINUED | OUTPATIENT
Start: 2023-07-20 | End: 2023-07-22 | Stop reason: HOSPADM

## 2023-07-19 RX ORDER — FUROSEMIDE 10 MG/ML
40 INJECTION INTRAMUSCULAR; INTRAVENOUS
Status: DISCONTINUED | OUTPATIENT
Start: 2023-07-19 | End: 2023-07-19

## 2023-07-19 RX ORDER — LEVALBUTEROL INHALATION SOLUTION 0.63 MG/3ML
3.75 SOLUTION RESPIRATORY (INHALATION)
Status: COMPLETED | OUTPATIENT
Start: 2023-07-19 | End: 2023-07-19

## 2023-07-19 RX ORDER — POTASSIUM CHLORIDE 7.45 MG/ML
80 INJECTION INTRAVENOUS
Status: DISCONTINUED | OUTPATIENT
Start: 2023-07-19 | End: 2023-07-22 | Stop reason: HOSPADM

## 2023-07-19 RX ORDER — ACETAMINOPHEN 325 MG/1
650 TABLET ORAL EVERY 4 HOURS PRN
Status: DISCONTINUED | OUTPATIENT
Start: 2023-07-19 | End: 2023-07-22 | Stop reason: HOSPADM

## 2023-07-19 RX ORDER — ONDANSETRON 2 MG/ML
4 INJECTION INTRAMUSCULAR; INTRAVENOUS EVERY 8 HOURS PRN
Status: DISCONTINUED | OUTPATIENT
Start: 2023-07-19 | End: 2023-07-22 | Stop reason: HOSPADM

## 2023-07-19 RX ORDER — HYDROCODONE BITARTRATE AND ACETAMINOPHEN 5; 325 MG/1; MG/1
1 TABLET ORAL EVERY 4 HOURS PRN
Status: DISCONTINUED | OUTPATIENT
Start: 2023-07-19 | End: 2023-07-22 | Stop reason: HOSPADM

## 2023-07-19 RX ORDER — SODIUM CHLORIDE 0.9 % (FLUSH) 0.9 %
10 SYRINGE (ML) INJECTION
Status: DISCONTINUED | OUTPATIENT
Start: 2023-07-19 | End: 2023-07-22 | Stop reason: HOSPADM

## 2023-07-19 RX ORDER — AMLODIPINE BESYLATE 5 MG/1
10 TABLET ORAL DAILY
Status: DISCONTINUED | OUTPATIENT
Start: 2023-07-19 | End: 2023-07-22 | Stop reason: HOSPADM

## 2023-07-19 RX ORDER — POTASSIUM CHLORIDE 7.45 MG/ML
40 INJECTION INTRAVENOUS
Status: DISCONTINUED | OUTPATIENT
Start: 2023-07-19 | End: 2023-07-22 | Stop reason: HOSPADM

## 2023-07-19 RX ORDER — CALCIUM GLUCONATE 20 MG/ML
2 INJECTION, SOLUTION INTRAVENOUS
Status: DISCONTINUED | OUTPATIENT
Start: 2023-07-19 | End: 2023-07-22 | Stop reason: HOSPADM

## 2023-07-19 RX ORDER — IPRATROPIUM BROMIDE AND ALBUTEROL SULFATE 2.5; .5 MG/3ML; MG/3ML
3 SOLUTION RESPIRATORY (INHALATION) EVERY 6 HOURS
Status: DISCONTINUED | OUTPATIENT
Start: 2023-07-19 | End: 2023-07-22 | Stop reason: HOSPADM

## 2023-07-19 RX ORDER — LANOLIN ALCOHOL/MO/W.PET/CERES
800 CREAM (GRAM) TOPICAL
Status: DISCONTINUED | OUTPATIENT
Start: 2023-07-19 | End: 2023-07-22 | Stop reason: HOSPADM

## 2023-07-19 RX ORDER — MAGNESIUM SULFATE HEPTAHYDRATE 40 MG/ML
2 INJECTION, SOLUTION INTRAVENOUS ONCE
Status: COMPLETED | OUTPATIENT
Start: 2023-07-19 | End: 2023-07-19

## 2023-07-19 RX ORDER — BETAXOLOL HYDROCHLORIDE 5 MG/ML
1 SOLUTION/ DROPS OPHTHALMIC 2 TIMES DAILY
Status: DISCONTINUED | OUTPATIENT
Start: 2023-07-19 | End: 2023-07-22 | Stop reason: HOSPADM

## 2023-07-19 RX ORDER — PROCHLORPERAZINE EDISYLATE 5 MG/ML
5 INJECTION INTRAMUSCULAR; INTRAVENOUS EVERY 6 HOURS PRN
Status: DISCONTINUED | OUTPATIENT
Start: 2023-07-19 | End: 2023-07-22 | Stop reason: HOSPADM

## 2023-07-19 RX ORDER — MAGNESIUM SULFATE HEPTAHYDRATE 40 MG/ML
4 INJECTION, SOLUTION INTRAVENOUS
Status: DISCONTINUED | OUTPATIENT
Start: 2023-07-19 | End: 2023-07-22 | Stop reason: HOSPADM

## 2023-07-19 RX ORDER — LISINOPRIL 10 MG/1
20 TABLET ORAL 2 TIMES DAILY
Status: DISCONTINUED | OUTPATIENT
Start: 2023-07-19 | End: 2023-07-20

## 2023-07-19 RX ORDER — MAGNESIUM SULFATE HEPTAHYDRATE 40 MG/ML
2 INJECTION, SOLUTION INTRAVENOUS
Status: DISCONTINUED | OUTPATIENT
Start: 2023-07-19 | End: 2023-07-22 | Stop reason: HOSPADM

## 2023-07-19 RX ORDER — LATANOPROST 50 UG/ML
1 SOLUTION/ DROPS OPHTHALMIC NIGHTLY
Status: DISCONTINUED | OUTPATIENT
Start: 2023-07-19 | End: 2023-07-22 | Stop reason: HOSPADM

## 2023-07-19 RX ADMIN — MAGNESIUM SULFATE IN WATER 2 G: 40 INJECTION, SOLUTION INTRAVENOUS at 02:07

## 2023-07-19 RX ADMIN — NITROGLYCERIN 0.5 INCH: 20 OINTMENT TOPICAL at 09:07

## 2023-07-19 RX ADMIN — LEVALBUTEROL HYDROCHLORIDE 3.75 MG: 0.63 SOLUTION RESPIRATORY (INHALATION) at 09:07

## 2023-07-19 RX ADMIN — AMLODIPINE BESYLATE 10 MG: 5 TABLET ORAL at 05:07

## 2023-07-19 RX ADMIN — AZTREONAM 1000 MG: 1 INJECTION, POWDER, LYOPHILIZED, FOR SOLUTION INTRAMUSCULAR; INTRAVENOUS at 09:07

## 2023-07-19 RX ADMIN — FUROSEMIDE 40 MG: 10 INJECTION, SOLUTION INTRAMUSCULAR; INTRAVENOUS at 09:07

## 2023-07-19 RX ADMIN — IPRATROPIUM BROMIDE AND ALBUTEROL SULFATE 3 ML: 2.5; .5 SOLUTION RESPIRATORY (INHALATION) at 01:07

## 2023-07-19 RX ADMIN — IPRATROPIUM BROMIDE AND ALBUTEROL SULFATE 3 ML: 2.5; .5 SOLUTION RESPIRATORY (INHALATION) at 08:07

## 2023-07-19 RX ADMIN — FUROSEMIDE 40 MG: 10 INJECTION, SOLUTION INTRAMUSCULAR; INTRAVENOUS at 02:07

## 2023-07-19 RX ADMIN — IPRATROPIUM BROMIDE 1 MG: 0.5 SOLUTION RESPIRATORY (INHALATION) at 09:07

## 2023-07-19 RX ADMIN — PROPAFENONE HYDROCHLORIDE 225 MG: 150 TABLET, FILM COATED ORAL at 10:07

## 2023-07-19 RX ADMIN — LISINOPRIL 20 MG: 10 TABLET ORAL at 09:07

## 2023-07-19 RX ADMIN — DOXYCYCLINE 100 MG: 100 INJECTION, POWDER, LYOPHILIZED, FOR SOLUTION INTRAVENOUS at 07:07

## 2023-07-19 RX ADMIN — LATANOPROST 1 DROP: 50 SOLUTION OPHTHALMIC at 09:07

## 2023-07-19 RX ADMIN — METHYLPREDNISOLONE SODIUM SUCCINATE 125 MG: 125 INJECTION, POWDER, FOR SOLUTION INTRAMUSCULAR; INTRAVENOUS at 09:07

## 2023-07-19 NOTE — ASSESSMENT & PLAN NOTE
Appears to have acute CHF exacerbation with bilateral lower extremity swelling, pulmonary edema on chest x-ray, BNP of 603.  No previous history of CHF.  No echo in our system.  - continue IV diuresis Lasix 40 mg b.i.d.  - check echocardiogram  - hold cardiology consult for now pending further workup  - daily weights and strict I&Os  - already on some goal-directed medical therapy with lisinopril  - blood pressure management

## 2023-07-19 NOTE — ASSESSMENT & PLAN NOTE
Patient with acute hypoxia, O2 sats of 75% on room air at home.  Placed on 6 L of oxygen by EMS and subsequently requiring BiPAP in the ER here.  Appears much more comfortable on BiPAP at this time and saturating well.  Blood gas with mild hypercarbia but oxygenating well  - continuing Lasix and BiPAP as noted elsewhere for treatment of CHF and hypoxia  - wean oxygen and BiPAP as tolerated with treatment

## 2023-07-19 NOTE — PHARMACY MED REC
"Admission Medication History     The home medication history was taken by Sapna Harris.    You may go to "Admission" then "Reconcile Home Medications" tabs to review and/or act upon these items.     The home medication list has been updated by the Pharmacy department.   Please read ALL comments highlighted in yellow.   Please address this information as you see fit.    Feel free to contact us if you have any questions or require assistance.          Medications listed below were obtained from: Patient/family and Analytic software- [facility]  No current facility-administered medications on file prior to encounter.     Current Outpatient Medications on File Prior to Encounter   Medication Sig Dispense Refill    amLODIPine (NORVASC) 10 MG tablet Take 1 tablet (10 mg total) by mouth once daily. 90 tablet 3    betaxolol 0.5% (BETOPTIC-S) 0.5 % Drop Place 1 drop into both eyes 2 (two) times daily.      latanoprost 0.005 % ophthalmic solution Place 1 drop into both eyes nightly.      levothyroxine (SYNTHROID) 100 MCG tablet TAKE 1 TABLET BY MOUTH EVERY DAY (Patient taking differently: Take 100 mcg by mouth before breakfast.) 90 tablet 4    lisinopriL (PRINIVIL,ZESTRIL) 20 MG tablet Take 1 tablet (20 mg total) by mouth 2 (two) times daily. (Patient taking differently: Take 20 mg by mouth once daily.) 180 tablet 3    metoprolol succinate (TOPROL-XL) 25 MG 24 hr tablet Take 0.5 tablets (12.5 mg total) by mouth once daily. 45 tablet 4    propafenone (RYTHMOL) 225 MG Tab TAKE 1 TABLET BY MOUTH TWICE DAILY (Patient taking differently: Take 225 mg by mouth 2 (two) times a day.) 180 tablet 3    [DISCONTINUED] amLODIPine (NORVASC) 10 MG tablet TAKE 1 TABLET BY MOUTH EVERY DAY 90 tablet 3    [DISCONTINUED] propafenone (RYTHMOL) 225 MG Tab Take 1 tablet (225 mg total) by mouth 2 (two) times daily. 180 tablet 3    [DISCONTINUED] triamcinolone acetonide 0.1% (KENALOG) 0.1 % cream APPLY TOPICALLY TO THE AFFECTED AREA TWICE DAILY 80 g 1 "         Sapna Harris  [id number]                 .

## 2023-07-19 NOTE — SUBJECTIVE & OBJECTIVE
Past Medical History:   Diagnosis Date    Atrial fibrillation     Bradycardia     Carotid bruit     CKD (chronic kidney disease), stage III     Hyperlipidemia     OA (osteoarthritis)     Thyroid disease        Past Surgical History:   Procedure Laterality Date    HYSTERECTOMY      KNEE SURGERY Right     SHOULDER SURGERY Right        Review of patient's allergies indicates:   Allergen Reactions    Penicillins     Shellfish containing products        No current facility-administered medications on file prior to encounter.     Current Outpatient Medications on File Prior to Encounter   Medication Sig    amLODIPine (NORVASC) 10 MG tablet Take 1 tablet (10 mg total) by mouth once daily.    betaxolol 0.5% (BETOPTIC-S) 0.5 % Drop Place 1 drop into both eyes 2 (two) times daily.    latanoprost 0.005 % ophthalmic solution Place 1 drop into both eyes nightly.    levothyroxine (SYNTHROID) 100 MCG tablet TAKE 1 TABLET BY MOUTH EVERY DAY    lisinopriL (PRINIVIL,ZESTRIL) 20 MG tablet Take 1 tablet (20 mg total) by mouth 2 (two) times daily.    metoprolol succinate (TOPROL-XL) 25 MG 24 hr tablet Take 0.5 tablets (12.5 mg total) by mouth once daily.    propafenone (RYTHMOL) 225 MG Tab TAKE 1 TABLET BY MOUTH TWICE DAILY    triamcinolone acetonide 0.1% (KENALOG) 0.1 % cream APPLY TOPICALLY TO THE AFFECTED AREA TWICE DAILY    [DISCONTINUED] amLODIPine (NORVASC) 10 MG tablet TAKE 1 TABLET BY MOUTH EVERY DAY    [DISCONTINUED] propafenone (RYTHMOL) 225 MG Tab Take 1 tablet (225 mg total) by mouth 2 (two) times daily.     Family History       Problem Relation (Age of Onset)    Heart disease Father          Tobacco Use    Smoking status: Never    Smokeless tobacco: Never   Substance and Sexual Activity    Alcohol use: Never    Drug use: Never    Sexual activity: Not on file     Review of Systems   All other systems reviewed and are negative.  Objective:     Vital Signs (Most Recent):  Pulse: (!) 55 (07/19/23 1216)  Resp: 18 (07/19/23  1216)  BP: (!) 152/67 (07/19/23 1216)  SpO2: 97 % (07/19/23 1216) Vital Signs (24h Range):  Pulse:  [48-84] 55  Resp:  [16-40] 18  SpO2:  [84 %-98 %] 97 %  BP: (133-164)/() 152/67     Weight: 68 kg (150 lb)  Body mass index is 27.44 kg/m².     Physical Exam  Constitutional:       Appearance: Normal appearance. She is normal weight.   HENT:      Head: Normocephalic and atraumatic.      Nose: Nose normal.      Mouth/Throat:      Mouth: Mucous membranes are moist.   Eyes:      Conjunctiva/sclera: Conjunctivae normal.   Cardiovascular:      Rate and Rhythm: Regular rhythm. Tachycardia present.      Pulses: Normal pulses.      Heart sounds: Normal heart sounds. No murmur heard.    No friction rub. No gallop.   Pulmonary:      Effort: Pulmonary effort is normal.      Breath sounds: Rhonchi and rales present. No wheezing.      Comments: On BiPAP, bilateral soft crackles.  Breathing comfortably on BiPAP  Abdominal:      General: Abdomen is flat. Bowel sounds are normal. There is no distension.      Palpations: Abdomen is soft.      Tenderness: There is no abdominal tenderness. There is no guarding.   Musculoskeletal:         General: No swelling. Normal range of motion.      Cervical back: Normal range of motion and neck supple.      Right lower leg: Edema present.      Left lower leg: Edema present.   Skin:     General: Skin is warm and dry.   Neurological:      General: No focal deficit present.      Mental Status: She is alert.   Psychiatric:         Mood and Affect: Mood normal.         Thought Content: Thought content normal.         Judgment: Judgment normal.              Significant Labs: All pertinent labs within the past 24 hours have been reviewed.    Significant Imaging: I have reviewed all pertinent imaging results/findings within the past 24 hours.

## 2023-07-19 NOTE — ED PROVIDER NOTES
Encounter Date: 7/19/2023       History     Chief Complaint   Patient presents with    Shortness of Breath     Last 3 days SOB no history of lung problems. 75% on room air at home.      Patient presents emergency department reported shortness of breath here via EMS with reported 75% room air saturations at their arrival they gave patient a breathing treatment with improvement in her oxygenation but dropped back down to the 80s after completion of the breathing treatment she was placed on 6 L and still saturating only 84% arrival emergency department she is very tachypneic states that over last 3 days she is had increased cough shortness of breath she denies any history of lung problems she does have a history of atrial fibrillation on it kidney disease she received no other medications EN route      Review of patient's allergies indicates:   Allergen Reactions    Penicillins     Shellfish containing products      Past Medical History:   Diagnosis Date    Atrial fibrillation     Bradycardia     Carotid bruit     CKD (chronic kidney disease), stage III     Hyperlipidemia     OA (osteoarthritis)     Thyroid disease      Past Surgical History:   Procedure Laterality Date    HYSTERECTOMY      KNEE SURGERY Right     SHOULDER SURGERY Right      Family History   Problem Relation Age of Onset    Heart disease Father      Social History     Tobacco Use    Smoking status: Never    Smokeless tobacco: Never   Substance Use Topics    Alcohol use: Never    Drug use: Never     Review of Systems   Constitutional:  Positive for fatigue. Negative for chills and fever.   Respiratory:  Positive for cough, shortness of breath and wheezing.    Cardiovascular:  Positive for leg swelling. Negative for chest pain.   Gastrointestinal:  Negative for abdominal pain, nausea and vomiting.   Genitourinary:  Negative for dysuria.   All other systems reviewed and are negative.    Physical Exam     Initial Vitals [07/19/23 0921]   BP Pulse Resp Temp  SpO2   (!) 164/92 84 (!) 40 -- (!) 84 %      MAP       --         Physical Exam    Constitutional: She appears well-developed and well-nourished. She appears distressed.   HENT:   Head: Normocephalic and atraumatic.   Right Ear: External ear normal.   Left Ear: External ear normal.   Mouth/Throat: Oropharynx is clear and moist.   Eyes: Conjunctivae and EOM are normal. Pupils are equal, round, and reactive to light.   Neck: Neck supple.   Normal range of motion.  Cardiovascular:  Normal rate, regular rhythm, normal heart sounds and intact distal pulses.           Pulmonary/Chest: She is in respiratory distress. She has wheezes. She has rales.   Abdominal: Abdomen is soft. Bowel sounds are normal. There is no abdominal tenderness.   Musculoskeletal:         General: Edema present. Normal range of motion.      Cervical back: Normal range of motion and neck supple.     Neurological: She is alert and oriented to person, place, and time. She has normal strength. GCS score is 15. GCS eye subscore is 4. GCS verbal subscore is 5. GCS motor subscore is 6.   Skin: Skin is warm and dry. Capillary refill takes less than 2 seconds. No rash noted.   Psychiatric: She has a normal mood and affect. Her behavior is normal.       ED Course   Procedures  Labs Reviewed   CBC W/ AUTO DIFFERENTIAL - Abnormal; Notable for the following components:       Result Value    WBC 12.88 (*)     RBC 3.83 (*)      (*)     MCH 33.7 (*)     Gran # (ANC) 9.4 (*)     Lymph % 17.2 (*)     All other components within normal limits   COMPREHENSIVE METABOLIC PANEL - Abnormal; Notable for the following components:    Glucose 221 (*)     eGFR 39.6 (*)     Anion Gap 6 (*)     All other components within normal limits   TROPONIN I HIGH SENSITIVITY - Abnormal; Notable for the following components:    Troponin I High Sensitivity 19.3 (*)     All other components within normal limits   B-TYPE NATRIURETIC PEPTIDE - Abnormal; Notable for the following  components:     (*)     All other components within normal limits   ISTAT PROCEDURE - Abnormal; Notable for the following components:    POC PH 7.330 (*)     POC PCO2 51.5 (*)     POC TCO2 29 (*)     All other components within normal limits   CULTURE, BLOOD   CULTURE, BLOOD   MAGNESIUM   LACTIC ACID, PLASMA   TSH   PROCALCITONIN   TSH   TROPONIN I HIGH SENSITIVITY        ECG Results              EKG 12-lead (In process)  Result time 07/19/23 09:46:16      In process by Interface, Lab In [facility] (07/19/23 09:46:16)                   Narrative:    Test Reason : R06.02,    Vent. Rate : 082 BPM     Atrial Rate : 082 BPM     P-R Int : 144 ms          QRS Dur : 164 ms      QT Int : 472 ms       P-R-T Axes : 042 073 -06 degrees     QTc Int : 551 ms    Normal sinus rhythm  Right bundle branch block  Septal infarct ,age undetermined  T wave abnormality, consider inferior ischemia  Abnormal ECG  When compared with ECG of 18-DEC-1990 15:22,  Right bundle branch block has replaced Nonspecific intraventricular block  Septal infarct is now Present  Minimal criteria for Inferior infarct are no longer Present    Referred By: [email or web address]   SELF           Confirmed By:                                   Imaging Results              X-Ray Chest AP Portable (Final result)  Result time 07/19/23 09:47:04      Final result by Olaf Nelson MD (07/19/23 09:47:04)                   Narrative:    Reason: Dyspnea.    FINDINGS:    Portable chest with comparison chest x-ray August 10, 2017 show mildly enlarged cardiomediastinal silhouette.  There are bilateral perihilar interstitial lung opacities, more so on the right. There is a hazy ill-defined opacities of the right mid to lower lung. Left lung base hazy opacities noted. Pulmonary vasculature is normal. No acute osseous abnormality.    IMPRESSION:    1.  Bilateral interstitial and hazy lung opacities could reflect pulmonary edema.  2.  Enlarged cardiomediastinal silhouette. Correlate  for CHF.    Electronically signed by:  Olaf Rodriguezduke BERG  7/19/2023 9:47 AM CDT Workstation: [id number]                                     Medications   furosemide injection 40 mg (has no administration in time range)   albuterol-ipratropium 2.5 mg-0.5 mg/3 mL nebulizer solution 3 mL (has no administration in time range)   levalbuterol nebulizer solution 3.7506 mg (3.7506 mg Nebulization Given 7/19/23 0949)   ipratropium 0.02 % nebulizer solution 1 mg (1 mg Nebulization Given 7/19/23 0951)   methylPREDNISolone sodium succinate injection 125 mg (125 mg Intravenous Given 7/19/23 0951)   furosemide injection 40 mg (40 mg Intravenous Given 7/19/23 0952)   nitroGLYCERIN 2% TD oint ointment 0.5 inch (0.5 inches Topical (Top) Given 7/19/23 0952)     Medical Decision Making:   Clinical Tests:   Lab Tests: Ordered and Reviewed  Radiological Study: Ordered and Reviewed  Medical Tests: Ordered and Reviewed  ED Management:  Patient placed on BiPAP arrival emergency department with improvement in her oxygen saturations chest x-ray shows evidence of heart failure Laboratory evaluation reviewed is consistent with heart failure as well she received IV Lasix nebulized therapy Solu-Medrol in the emergency department I did place some nitroglycerin on her chest also help with volume overload patient's symptoms are improving steadily in the emergency department I have discussed patient's findings with  who will evaluate patient in the ER for admission          Attending Attestation:         Attending Critical Care:   Critical Care Times:   Direct Patient Care (initial evaluation, reassessments, and time considering the case)................................................................12 minutes.   Additional History from reviewing old medical records or taking additional history from the family, EMS, PCP, etc.......................5 minutes.   Ordering, Reviewing, and Interpreting Diagnostic  Studies...............................................................................................................8 minutes.   Documentation..................................................................................................................................................................................10 minutes.   Consultation with other Physicians. .................................................................................................................................................5 minutes.   ==============================================================  Total Critical Care Time - exclusive of procedural time: 40 minutes.  ==============================================================                     Clinical Impression:   Final diagnoses:  [R06.02] SOB (shortness of breath)  [R06.02] Shortness of breath  [I50.9] Congestive heart failure, unspecified HF chronicity, unspecified heart failure type (Primary)  [R09.02] Hypoxia        ED Disposition Condition    Admit Stable                Lj Weiner MD  07/19/23 1300

## 2023-07-19 NOTE — H&P
[facility] - Emergency Dept  Hospital Medicine  History & Physical    Patient Name: Vickie Benedict  MRN: [id number]  Patient Class: IP- Inpatient  Admission Date: 7/19/2023  Attending Physician: Avery Brown MD   Primary Care Provider: Aureliano Morocho MD         Patient information was obtained from patient and ER records.     Subjective:     Principal Problem:Acute hypoxemic respiratory failure    Chief Complaint:   Chief Complaint   Patient presents with    Shortness of Breath     Last 3 days SOB no history of lung problems. 75% on room air at home.         HPI: Open 88-year-old female with a history of atrial fibrillation not currently on anticoagulation, hypertension, hypothyroidism who presents via EMS with acute worsening of shortness of breath.  She says that she has had a head cold over the past 2 weeks and has had 2 incidents of severe shortness of breath.  She developed worsening shortness of breath over the last 3 days.  She has had lower extremity swelling.  She denies orthopnea but has been sleeping propped up on pillows for the past couple of years.  She has had a productive cough.  She denies fever or chills.  She denies chest pain or palpitations.  She is compliant with her medication regimen.  She has never had symptoms like this before.  She was on a diuretic pill in the past but she says it made her feel poorly so she stopped it.  She is a full code at this time and wants to discuss his code status with her son before determining whether she wants to be DNR in the future.  She has no other updates her past medical, family, surgical, social history.    In the ED:  She arrived on 6 L of oxygen and was hypoxic, significant respiratory distress and was placed on BiPAP.  Pulse 84, initial respiratory rate 40 now 16, /90 to, O2 sat 84% now 97% on BiPAP.  WBC 12.8, hemoglobin 12.9, platelets 281.  Sodium 138, creatinine 1.3 at her baseline, , troponin 19.3  initial, 2nd is pending.  Lactic acid 1, blood cultures taken in the ED. blood gas with pH of 7.3 on admission pCO2 51.  CXR with bilateral pulmonary infiltrates consistent with edema.  Small right lower lung effusion.  EKG without acute ischemic changes.  She was treated with IV Lasix as well as Solu-Medrol and nebulizers in the ED.  Had significant improvement in her breathing.  Placed on BiPAP with improvement as well.  She is now comfortable and able to talk easily.  Will admit for what appears to be acute CHF exacerbation, possibility of underlying pneumonia.  Step-down level of care for BiPAP      Past Medical History:   Diagnosis Date    Atrial fibrillation     Bradycardia     Carotid bruit     CKD (chronic kidney disease), stage III     Hyperlipidemia     OA (osteoarthritis)     Thyroid disease        Past Surgical History:   Procedure Laterality Date    HYSTERECTOMY      KNEE SURGERY Right     SHOULDER SURGERY Right        Review of patient's allergies indicates:   Allergen Reactions    Penicillins     Shellfish containing products        No current facility-administered medications on file prior to encounter.     Current Outpatient Medications on File Prior to Encounter   Medication Sig    amLODIPine (NORVASC) 10 MG tablet Take 1 tablet (10 mg total) by mouth once daily.    betaxolol 0.5% (BETOPTIC-S) 0.5 % Drop Place 1 drop into both eyes 2 (two) times daily.    latanoprost 0.005 % ophthalmic solution Place 1 drop into both eyes nightly.    levothyroxine (SYNTHROID) 100 MCG tablet TAKE 1 TABLET BY MOUTH EVERY DAY    lisinopriL (PRINIVIL,ZESTRIL) 20 MG tablet Take 1 tablet (20 mg total) by mouth 2 (two) times daily.    metoprolol succinate (TOPROL-XL) 25 MG 24 hr tablet Take 0.5 tablets (12.5 mg total) by mouth once daily.    propafenone (RYTHMOL) 225 MG Tab TAKE 1 TABLET BY MOUTH TWICE DAILY    triamcinolone acetonide 0.1% (KENALOG) 0.1 % cream APPLY TOPICALLY TO THE AFFECTED AREA  TWICE DAILY    [DISCONTINUED] amLODIPine (NORVASC) 10 MG tablet TAKE 1 TABLET BY MOUTH EVERY DAY    [DISCONTINUED] propafenone (RYTHMOL) 225 MG Tab Take 1 tablet (225 mg total) by mouth 2 (two) times daily.     Family History       Problem Relation (Age of Onset)    Heart disease Father          Tobacco Use    Smoking status: Never    Smokeless tobacco: Never   Substance and Sexual Activity    Alcohol use: Never    Drug use: Never    Sexual activity: Not on file     Review of Systems   All other systems reviewed and are negative.  Objective:     Vital Signs (Most Recent):  Pulse: (!) 55 (07/19/23 1216)  Resp: 18 (07/19/23 1216)  BP: (!) 152/67 (07/19/23 1216)  SpO2: 97 % (07/19/23 1216) Vital Signs (24h Range):  Pulse:  [48-84] 55  Resp:  [16-40] 18  SpO2:  [84 %-98 %] 97 %  BP: (133-164)/() 152/67     Weight: 68 kg (150 lb)  Body mass index is 27.44 kg/m².     Physical Exam  Constitutional:       Appearance: Normal appearance. She is normal weight.   HENT:      Head: Normocephalic and atraumatic.      Nose: Nose normal.      Mouth/Throat:      Mouth: Mucous membranes are moist.   Eyes:      Conjunctiva/sclera: Conjunctivae normal.   Cardiovascular:      Rate and Rhythm: Regular rhythm. Tachycardia present.      Pulses: Normal pulses.      Heart sounds: Normal heart sounds. No murmur heard.    No friction rub. No gallop.   Pulmonary:      Effort: Pulmonary effort is normal.      Breath sounds: Rhonchi and rales present. No wheezing.      Comments: On BiPAP, bilateral soft crackles.  Breathing comfortably on BiPAP  Abdominal:      General: Abdomen is flat. Bowel sounds are normal. There is no distension.      Palpations: Abdomen is soft.      Tenderness: There is no abdominal tenderness. There is no guarding.   Musculoskeletal:         General: No swelling. Normal range of motion.      Cervical back: Normal range of motion and neck supple.      Right lower leg: Edema present.      Left lower leg: Edema  present.   Skin:     General: Skin is warm and dry.   Neurological:      General: No focal deficit present.      Mental Status: She is alert.   Psychiatric:         Mood and Affect: Mood normal.         Thought Content: Thought content normal.         Judgment: Judgment normal.              Significant Labs: All pertinent labs within the past 24 hours have been reviewed.    Significant Imaging: I have reviewed all pertinent imaging results/findings within the past 24 hours.    Assessment/Plan:     * Acute hypoxemic respiratory failure  Patient with acute hypoxia, O2 sats of 75% on room air at home.  Placed on 6 L of oxygen by EMS and subsequently requiring BiPAP in the ER here.  Appears much more comfortable on BiPAP at this time and saturating well.  Blood gas with mild hypercarbia but oxygenating well  - continuing Lasix and BiPAP as noted elsewhere for treatment of CHF and hypoxia  - wean oxygen and BiPAP as tolerated with treatment    CHF exacerbation  Appears to have acute CHF exacerbation with bilateral lower extremity swelling, pulmonary edema on chest x-ray, BNP of 603.  No previous history of CHF.  No echo in our system.  - continue IV diuresis Lasix 40 mg b.i.d.  - check echocardiogram  - hold cardiology consult for now pending further workup  - daily weights and strict I&Os  - already on some goal-directed medical therapy with lisinopril  - blood pressure management    Postablative hypothyroidism  Check TSH  Continue Synthroid 100 mcg      History of atrial fibrillation  Was on metoprolol and propafenone  Not on anticoagulation, unclear why.  She says she has never been on anticoagulation.  Chads Vasc = 5   Dr. Morocho is her cardiologist, will discuss with them whether she should be on anticoagulation      Essential hypertension  Was on amlodipine, lisinopril, metoprolol at home  Continue home regimen for now      CKD (chronic kidney disease), stage III  Trend creatinine daily        VTE Risk  Mitigation (From admission, onward)    None                     Avery Brown MD  Department of Hospital Medicine  [facility] - Emergency Dept

## 2023-07-19 NOTE — NURSING
Patient requiring continuous BiPAP (50%, 18/8) and admitted to stepdown for monitoring. Administered 40 mg Lasix IV & 2 G mag sulfate upon arrival to unit. 450 cc output post lasix dose with small rise in creatinine noted post dose, as well. Lopressor d/c for now due to bradycardia. Amlodipine given, pt able to swallow pills whole. Weaned to 4-5 L HFNC with humidification & diet ordered. Personal belongings (cane, clothes, medication bag) put in the closet. SCDs applied as pt is not on anticoagulation but has hx of Afib. Card on board.    Safety parameters in place. Bed wheels locked in lowest position with call light in reach and bed alarm set.

## 2023-07-19 NOTE — CARE UPDATE
07/19/23 0951   Patient Assessment/Suction   Level of Consciousness (AVPU) alert   Respiratory Effort Short of breath;Labored;Moderate   Expansion/Accessory Muscles/Retractions abdominal muscle use;retractions minimal;expansion symmetric   All Lung Fields Breath Sounds diminished   Rhythm/Pattern, Respiratory assisted mechanically   Cough Frequency no cough   PRE-TX-O2   Device (Oxygen Therapy) BIPAP   Oxygen Concentration (%) 50   SpO2 96 %   Pulse (!) 57   Resp 20   BP (!) 150/104   Aerosol Therapy   $ Aerosol Therapy Charges Aerosol Treatment;Initial Continuous   Daily Review of Necessity (SVN) completed   Respiratory Treatment Status (SVN) given   Treatment Route (SVN) in-line  (bipap)   Patient Position (SVN) HOB elevated   Post Treatment Assessment (SVN) breath sounds unchanged   Signs of Intolerance (SVN) none   Ready to Wean/Extubation Screen   FIO2<=50 (chart decimal) 0.5   Preset CPAP/BiPAP Settings   Mode Of Delivery BiPAP S/T   $ Initial CPAP/BiPAP Setup? Yes   $ Is patient using? Yes   Size of Mask Small/Medium   Sized Appropriately? Yes   Equipment Type V60   Airway Device Type medium full face mask   Ipap 18   EPAP (cm H2O) 8   Pressure Support (cm H2O) 10   Set Rate (Breaths/Min) 12   ITime (sec) 1   Rise Time (sec) 3   Patient CPAP/BiPAP Settings   RR Total (Breaths/Min) 30   Tidal Volume (mL) 552   VE Minute Ventilation (L/min) 16.7 L/min   Peak Inspiratory Pressure (cm H2O) 18   TiTOT (%) 37   Total Leak (L/Min) 34   Patient Trigger - ST Mode Only (%) 53   Education   $ Education BiPAP;Bronchodilator;15 min   Respiratory Evaluation   $ Care Plan Tech Time 15 min   $ Eval/Re-eval Charges Evaluation   Evaluation For New Orders

## 2023-07-19 NOTE — HPI
Open 88-year-old female with a history of atrial fibrillation not currently on anticoagulation, hypertension, hypothyroidism who presents via EMS with acute worsening of shortness of breath.  She says that she has had a head cold over the past 2 weeks and has had 2 incidents of severe shortness of breath.  She developed worsening shortness of breath over the last 3 days.  She has had lower extremity swelling.  She denies orthopnea but has been sleeping propped up on pillows for the past couple of years.  She has had a productive cough.  She denies fever or chills.  She denies chest pain or palpitations.  She is compliant with her medication regimen.  She has never had symptoms like this before.  She was on a diuretic pill in the past but she says it made her feel poorly so she stopped it.  She is a full code at this time and wants to discuss his code status with her son before determining whether she wants to be DNR in the future.  She has no other updates her past medical, family, surgical, social history.    In the ED:  She arrived on 6 L of oxygen and was hypoxic, significant respiratory distress and was placed on BiPAP.  Pulse 84, initial respiratory rate 40 now 16, /90 to, O2 sat 84% now 97% on BiPAP.  WBC 12.8, hemoglobin 12.9, platelets 281.  Sodium 138, creatinine 1.3 at her baseline, , troponin 19.3 initial, 2nd is pending.  Lactic acid 1, blood cultures taken in the ED. blood gas with pH of 7.3 on admission pCO2 51.  CXR with bilateral pulmonary infiltrates consistent with edema.  Small right lower lung effusion.  EKG without acute ischemic changes.  She was treated with IV Lasix as well as Solu-Medrol and nebulizers in the ED.  Had significant improvement in her breathing.  Placed on BiPAP with improvement as well.  She is now comfortable and able to talk easily.  Will admit for what appears to be acute CHF exacerbation, possibility of underlying pneumonia.  Step-down level of care for  BiPAP

## 2023-07-19 NOTE — ASSESSMENT & PLAN NOTE
Was on metoprolol and propafenone  Not on anticoagulation, unclear why.  She says she has never been on anticoagulation.  Chads Vasc = 5   Dr. Morocho is her cardiologist, will discuss with them whether she should be on anticoagulation     Detail Level: Detailed Detail Level: Generalized Detail Level: Simple

## 2023-07-20 PROBLEM — J18.9 CAP (COMMUNITY ACQUIRED PNEUMONIA): Status: ACTIVE | Noted: 2023-07-20

## 2023-07-20 LAB
ANION GAP SERPL CALC-SCNC: 9 MMOL/L (ref 8–16)
BASOPHILS # BLD AUTO: 0.01 K/UL (ref 0–0.2)
BASOPHILS NFR BLD: 0.1 % (ref 0–1.9)
BUN SERPL-MCNC: 29 MG/DL (ref 8–23)
CALCIUM SERPL-MCNC: 9.2 MG/DL (ref 8.7–10.5)
CHLORIDE SERPL-SCNC: 101 MMOL/L (ref 95–110)
CO2 SERPL-SCNC: 28 MMOL/L (ref 23–29)
CREAT SERPL-MCNC: 1.5 MG/DL (ref 0.5–1.4)
CREAT UR-MCNC: 61 MG/DL (ref 15–325)
DIFFERENTIAL METHOD: ABNORMAL
EOSINOPHIL # BLD AUTO: 0 K/UL (ref 0–0.5)
EOSINOPHIL NFR BLD: 0 % (ref 0–8)
ERYTHROCYTE [DISTWIDTH] IN BLOOD BY AUTOMATED COUNT: 14.1 % (ref 11.5–14.5)
EST. GFR  (NO RACE VARIABLE): 33.3 ML/MIN/1.73 M^2
GLUCOSE SERPL-MCNC: 167 MG/DL (ref 70–110)
HCT VFR BLD AUTO: 35.2 % (ref 37–48.5)
HGB BLD-MCNC: 11.3 G/DL (ref 12–16)
IMM GRANULOCYTES # BLD AUTO: 0.02 K/UL (ref 0–0.04)
IMM GRANULOCYTES NFR BLD AUTO: 0.2 % (ref 0–0.5)
LYMPHOCYTES # BLD AUTO: 0.8 K/UL (ref 1–4.8)
LYMPHOCYTES NFR BLD: 8.7 % (ref 18–48)
MAGNESIUM SERPL-MCNC: 2.1 MG/DL (ref 1.6–2.6)
MCH RBC QN AUTO: 33 PG (ref 27–31)
MCHC RBC AUTO-ENTMCNC: 32.1 G/DL (ref 32–36)
MCV RBC AUTO: 103 FL (ref 82–98)
MONOCYTES # BLD AUTO: 0.6 K/UL (ref 0.3–1)
MONOCYTES NFR BLD: 7.1 % (ref 4–15)
NEUTROPHILS # BLD AUTO: 7.5 K/UL (ref 1.8–7.7)
NEUTROPHILS NFR BLD: 83.9 % (ref 38–73)
NRBC BLD-RTO: 0 /100 WBC
OSMOLALITY SERPL: 314 MOSM/KG (ref 275–295)
OSMOLALITY UR: 381 MOSM/KG (ref 50–1200)
PHOSPHATE SERPL-MCNC: 4.2 MG/DL (ref 2.7–4.5)
PLATELET # BLD AUTO: 227 K/UL (ref 150–450)
PMV BLD AUTO: 10.1 FL (ref 9.2–12.9)
POTASSIUM SERPL-SCNC: 4.4 MMOL/L (ref 3.5–5.1)
PROCALCITONIN SERPL IA-MCNC: 0.23 NG/ML (ref 0–0.5)
RBC # BLD AUTO: 3.42 M/UL (ref 4–5.4)
SODIUM SERPL-SCNC: 138 MMOL/L (ref 136–145)
SODIUM UR-SCNC: 70 MMOL/L (ref 20–250)
TROPONIN I SERPL HS-MCNC: 22.6 PG/ML (ref 0–14.9)
WBC # BLD AUTO: 8.97 K/UL (ref 3.9–12.7)

## 2023-07-20 PROCEDURE — 85025 COMPLETE CBC W/AUTO DIFF WBC: CPT | Performed by: STUDENT IN AN ORGANIZED HEALTH CARE EDUCATION/TRAINING PROGRAM

## 2023-07-20 PROCEDURE — 25000003 PHARM REV CODE 250: Performed by: STUDENT IN AN ORGANIZED HEALTH CARE EDUCATION/TRAINING PROGRAM

## 2023-07-20 PROCEDURE — 84300 ASSAY OF URINE SODIUM: CPT | Performed by: STUDENT IN AN ORGANIZED HEALTH CARE EDUCATION/TRAINING PROGRAM

## 2023-07-20 PROCEDURE — 25000242 PHARM REV CODE 250 ALT 637 W/ HCPCS: Performed by: STUDENT IN AN ORGANIZED HEALTH CARE EDUCATION/TRAINING PROGRAM

## 2023-07-20 PROCEDURE — 83935 ASSAY OF URINE OSMOLALITY: CPT | Performed by: STUDENT IN AN ORGANIZED HEALTH CARE EDUCATION/TRAINING PROGRAM

## 2023-07-20 PROCEDURE — 94799 UNLISTED PULMONARY SVC/PX: CPT

## 2023-07-20 PROCEDURE — 27000221 HC OXYGEN, UP TO 24 HOURS

## 2023-07-20 PROCEDURE — 83735 ASSAY OF MAGNESIUM: CPT | Performed by: STUDENT IN AN ORGANIZED HEALTH CARE EDUCATION/TRAINING PROGRAM

## 2023-07-20 PROCEDURE — 84484 ASSAY OF TROPONIN QUANT: CPT | Performed by: STUDENT IN AN ORGANIZED HEALTH CARE EDUCATION/TRAINING PROGRAM

## 2023-07-20 PROCEDURE — 80048 BASIC METABOLIC PNL TOTAL CA: CPT | Performed by: STUDENT IN AN ORGANIZED HEALTH CARE EDUCATION/TRAINING PROGRAM

## 2023-07-20 PROCEDURE — 21000000 HC CCU ICU ROOM CHARGE

## 2023-07-20 PROCEDURE — 84145 PROCALCITONIN (PCT): CPT | Performed by: STUDENT IN AN ORGANIZED HEALTH CARE EDUCATION/TRAINING PROGRAM

## 2023-07-20 PROCEDURE — 82570 ASSAY OF URINE CREATININE: CPT | Performed by: STUDENT IN AN ORGANIZED HEALTH CARE EDUCATION/TRAINING PROGRAM

## 2023-07-20 PROCEDURE — 94660 CPAP INITIATION&MGMT: CPT

## 2023-07-20 PROCEDURE — 99900031 HC PATIENT EDUCATION (STAT)

## 2023-07-20 PROCEDURE — 94640 AIRWAY INHALATION TREATMENT: CPT

## 2023-07-20 PROCEDURE — 63600175 PHARM REV CODE 636 W HCPCS: Performed by: STUDENT IN AN ORGANIZED HEALTH CARE EDUCATION/TRAINING PROGRAM

## 2023-07-20 PROCEDURE — 84100 ASSAY OF PHOSPHORUS: CPT | Performed by: STUDENT IN AN ORGANIZED HEALTH CARE EDUCATION/TRAINING PROGRAM

## 2023-07-20 PROCEDURE — 99900035 HC TECH TIME PER 15 MIN (STAT)

## 2023-07-20 PROCEDURE — 94761 N-INVAS EAR/PLS OXIMETRY MLT: CPT

## 2023-07-20 PROCEDURE — 83930 ASSAY OF BLOOD OSMOLALITY: CPT | Performed by: STUDENT IN AN ORGANIZED HEALTH CARE EDUCATION/TRAINING PROGRAM

## 2023-07-20 PROCEDURE — 36415 COLL VENOUS BLD VENIPUNCTURE: CPT | Performed by: STUDENT IN AN ORGANIZED HEALTH CARE EDUCATION/TRAINING PROGRAM

## 2023-07-20 RX ORDER — TALC
6 POWDER (GRAM) TOPICAL NIGHTLY PRN
Status: DISCONTINUED | OUTPATIENT
Start: 2023-07-20 | End: 2023-07-22 | Stop reason: HOSPADM

## 2023-07-20 RX ORDER — METOPROLOL TARTRATE 25 MG/1
12.5 TABLET ORAL 2 TIMES DAILY
Status: DISCONTINUED | OUTPATIENT
Start: 2023-07-20 | End: 2023-07-22 | Stop reason: HOSPADM

## 2023-07-20 RX ORDER — SODIUM CHLORIDE 9 MG/ML
INJECTION, SOLUTION INTRAVENOUS CONTINUOUS
Status: ACTIVE | OUTPATIENT
Start: 2023-07-20 | End: 2023-07-20

## 2023-07-20 RX ORDER — LORAZEPAM 0.5 MG/1
0.5 TABLET ORAL 2 TIMES DAILY PRN
Status: DISCONTINUED | OUTPATIENT
Start: 2023-07-20 | End: 2023-07-22 | Stop reason: HOSPADM

## 2023-07-20 RX ADMIN — PROPAFENONE HYDROCHLORIDE 225 MG: 150 TABLET, FILM COATED ORAL at 08:07

## 2023-07-20 RX ADMIN — IPRATROPIUM BROMIDE AND ALBUTEROL SULFATE 3 ML: 2.5; .5 SOLUTION RESPIRATORY (INHALATION) at 01:07

## 2023-07-20 RX ADMIN — AMLODIPINE BESYLATE 10 MG: 5 TABLET ORAL at 09:07

## 2023-07-20 RX ADMIN — AZTREONAM 1000 MG: 1 INJECTION, POWDER, LYOPHILIZED, FOR SOLUTION INTRAMUSCULAR; INTRAVENOUS at 05:07

## 2023-07-20 RX ADMIN — IPRATROPIUM BROMIDE AND ALBUTEROL SULFATE 3 ML: 2.5; .5 SOLUTION RESPIRATORY (INHALATION) at 07:07

## 2023-07-20 RX ADMIN — DOXYCYCLINE 100 MG: 100 INJECTION, POWDER, LYOPHILIZED, FOR SOLUTION INTRAVENOUS at 08:07

## 2023-07-20 RX ADMIN — AZTREONAM 1000 MG: 1 INJECTION, POWDER, LYOPHILIZED, FOR SOLUTION INTRAMUSCULAR; INTRAVENOUS at 01:07

## 2023-07-20 RX ADMIN — AZTREONAM 1000 MG: 1 INJECTION, POWDER, LYOPHILIZED, FOR SOLUTION INTRAMUSCULAR; INTRAVENOUS at 08:07

## 2023-07-20 RX ADMIN — LATANOPROST 1 DROP: 50 SOLUTION OPHTHALMIC at 08:07

## 2023-07-20 RX ADMIN — SODIUM CHLORIDE: 0.9 INJECTION, SOLUTION INTRAVENOUS at 01:07

## 2023-07-20 RX ADMIN — IPRATROPIUM BROMIDE AND ALBUTEROL SULFATE 3 ML: 2.5; .5 SOLUTION RESPIRATORY (INHALATION) at 08:07

## 2023-07-20 RX ADMIN — DOXYCYCLINE 100 MG: 100 INJECTION, POWDER, LYOPHILIZED, FOR SOLUTION INTRAVENOUS at 06:07

## 2023-07-20 RX ADMIN — LEVOTHYROXINE SODIUM 100 MCG: 0.1 TABLET ORAL at 05:07

## 2023-07-20 RX ADMIN — LISINOPRIL 20 MG: 10 TABLET ORAL at 09:07

## 2023-07-20 RX ADMIN — PROPAFENONE HYDROCHLORIDE 225 MG: 150 TABLET, FILM COATED ORAL at 09:07

## 2023-07-20 RX ADMIN — METOPROLOL TARTRATE 12.5 MG: 25 TABLET, FILM COATED ORAL at 08:07

## 2023-07-20 RX ADMIN — BETAXOLOL HYDROCHLORIDE 1 DROP: 5 SOLUTION/ DROPS OPHTHALMIC at 09:07

## 2023-07-20 RX ADMIN — IPRATROPIUM BROMIDE AND ALBUTEROL SULFATE 3 ML: 2.5; .5 SOLUTION RESPIRATORY (INHALATION) at 12:07

## 2023-07-20 NOTE — PT/OT/SLP PROGRESS
Physical Therapy      Patient Name:  Vickie Benedict   MRN:  [id number]    Patient not seen today secondary to Patient unwilling to participate despite max encouragement. Will follow-up 7/21/23.

## 2023-07-20 NOTE — PLAN OF CARE
07/20/23 0987   Post-Acute Status   Hospital Resources/Appts/Education Provided Community resources provided     Pt asking about meals on wheels, cm provided pt with [facility]AST information at bedside.

## 2023-07-20 NOTE — ASSESSMENT & PLAN NOTE
Possible community-acquired pneumonia  Check procalcitonin  Continue Rocephin and doxycycline  Monitor for improvement in breathing

## 2023-07-20 NOTE — PROGRESS NOTES
[facility]  Progress Note    Patient Name: Vickie Benedict  MRN: [id number]  Patient Class: IP- Inpatient   Admission Date: 7/19/2023  Length of Stay: 1 days  Attending Physician: Avery Brown MD  Primary Care Provider: Aureliano Morocho MD        Subjective:     Principal Problem:Acute hypoxemic respiratory failure        HPI:  Open 88-year-old female with a history of atrial fibrillation not currently on anticoagulation, hypertension, hypothyroidism who presents via EMS with acute worsening of shortness of breath.  She says that she has had a head cold over the past 2 weeks and has had 2 incidents of severe shortness of breath.  She developed worsening shortness of breath over the last 3 days.  She has had lower extremity swelling.  She denies orthopnea but has been sleeping propped up on pillows for the past couple of years.  She has had a productive cough.  She denies fever or chills.  She denies chest pain or palpitations.  She is compliant with her medication regimen.  She has never had symptoms like this before.  She was on a diuretic pill in the past but she says it made her feel poorly so she stopped it.  She is a full code at this time and wants to discuss his code status with her son before determining whether she wants to be DNR in the future.  She has no other updates her past medical, family, surgical, social history.    In the ED:  She arrived on 6 L of oxygen and was hypoxic, significant respiratory distress and was placed on BiPAP.  Pulse 84, initial respiratory rate 40 now 16, /90 to, O2 sat 84% now 97% on BiPAP.  WBC 12.8, hemoglobin 12.9, platelets 281.  Sodium 138, creatinine 1.3 at her baseline, , troponin 19.3 initial, 2nd is pending.  Lactic acid 1, blood cultures taken in the ED. blood gas with pH of 7.3 on admission pCO2 51.  CXR with bilateral pulmonary infiltrates consistent with edema.  Small right lower lung effusion.  EKG without  acute ischemic changes.  She was treated with IV Lasix as well as Solu-Medrol and nebulizers in the ED.  Had significant improvement in her breathing.  Placed on BiPAP with improvement as well.  She is now comfortable and able to talk easily.  Will admit for what appears to be acute CHF exacerbation, possibility of underlying pneumonia.  Step-down level of care for BiPAP      Overview/Hospital Course:  No notes on file    Interval History:  Patient is feeling better today.  Breathing seems to be improving.  Pulmonary edema seems to be much improved on x-ray.  Creatinine slightly elevated.  Holding her lisinopril and will give a little bit of fluid back.  Monitor closely for worsening respiratory symptoms.  Continue IV antibiotics for possible pneumonia.  Check procalcitonin.  She has no fever chills, overall feeling much better.  Pending PT evaluation    Review of Systems   All other systems reviewed and are negative.  Objective:     Vital Signs (Most Recent):  Temp: 97.6 °F (36.4 °C) (07/20/23 1101)  Pulse: 76 (07/20/23 1100)  Resp: 20 (07/20/23 1100)  BP: 135/62 (07/20/23 1100)  SpO2: 96 % (07/20/23 1100) Vital Signs (24h Range):  Temp:  [97.6 °F (36.4 °C)-99 °F (37.2 °C)] 97.6 °F (36.4 °C)  Pulse:  [48-98] 76  Resp:  [15-48] 20  SpO2:  [89 %-98 %] 96 %  BP: (123-163)/() 135/62     Weight: 68 kg (150 lb)  Body mass index is 27.44 kg/m².    Intake/Output Summary (Last 24 hours) at 7/20/2023 1242  Last data filed at 7/20/2023 1136  Gross per 24 hour   Intake 450 ml   Output 1600 ml   Net -1150 ml         Physical Exam  Constitutional:       Appearance: Normal appearance. She is normal weight.   HENT:      Head: Normocephalic and atraumatic.      Nose: Nose normal.      Mouth/Throat:      Mouth: Mucous membranes are moist.   Eyes:      Conjunctiva/sclera: Conjunctivae normal.   Cardiovascular:      Rate and Rhythm: Normal rate and regular rhythm.      Pulses: Normal pulses.      Heart sounds: Normal heart  sounds. No murmur heard.    No friction rub. No gallop.   Pulmonary:      Effort: Pulmonary effort is normal.      Breath sounds: No wheezing, rhonchi or rales.      Comments: Largely clear breath sounds today on 4 L nasal cannula  Abdominal:      General: Abdomen is flat. Bowel sounds are normal. There is no distension.      Palpations: Abdomen is soft.      Tenderness: There is no abdominal tenderness. There is no guarding.   Musculoskeletal:         General: No swelling. Normal range of motion.      Cervical back: Normal range of motion and neck supple.      Right lower leg: Edema present.      Left lower leg: Edema present.   Skin:     General: Skin is warm and dry.   Neurological:      General: No focal deficit present.      Mental Status: She is alert.   Psychiatric:         Mood and Affect: Mood normal.         Thought Content: Thought content normal.         Judgment: Judgment normal.           Significant Labs: All pertinent labs within the past 24 hours have been reviewed.    Significant Imaging: I have reviewed all pertinent imaging results/findings within the past 24 hours.      Assessment/Plan:      * Acute hypoxemic respiratory failure  Patient with acute hypoxia, O2 sats of 75% on room air at home.  Placed on 6 L of oxygen by EMS and subsequently requiring BiPAP in the ER here.  She was able to wean down to 4 L nasal cannula and has been doing well since  - holding further Lasix with ALTON  - hypoxemia improving  - wean oxygen as tolerated      CHF exacerbation  Appears to have acute CHF exacerbation with bilateral lower extremity swelling, pulmonary edema on chest x-ray, BNP of 603.  No previous history of CHF.  No echo in our system.  - hold further diuresis for now  - likely heart failure with preserved ejection fraction, EF is normal on echocardiogram  - moderate mitral regurgitation is likely playing a role  - will need outpatient for Cardiology follow-up  - daily weights and strict I&Os  - resume  metoprolol and hold lisinopril given ALTON  - blood pressure management    CAP (community acquired pneumonia)  Possible community-acquired pneumonia  Check procalcitonin  Continue Rocephin and doxycycline  Monitor for improvement in breathing      Postablative hypothyroidism  Check TSH  Continue Synthroid 100 mcg      History of atrial fibrillation  Was on metoprolol and propafenone  Not on anticoagulation, unclear why.  She says she has never been on anticoagulation.  Chads Vasc = 5   Dr. Morocho is her cardiologist, will discuss with them whether she should be on anticoagulation      Essential hypertension  Was on amlodipine, lisinopril, metoprolol at home  Continue home regimen for now      CKD (chronic kidney disease), stage III  Trend creatinine daily        VTE Risk Mitigation (From admission, onward)         Ordered     IP VTE HIGH RISK PATIENT  Once         07/19/23 1403     Place sequential compression device  Until discontinued         07/19/23 1403                Discharge Planning   HEMANTH: 7/23/2023     Code Status: Full Code   Is the patient medically ready for discharge?:     Reason for patient still in hospital (select all that apply): Treatment  Discharge Plan A: Home, Home with family                  Avery Brown MD  Department of Hospital Medicine   [facility]

## 2023-07-20 NOTE — SUBJECTIVE & OBJECTIVE
Interval History:  Patient is feeling better today.  Breathing seems to be improving.  Pulmonary edema seems to be much improved on x-ray.  Creatinine slightly elevated.  Holding her lisinopril and will give a little bit of fluid back.  Monitor closely for worsening respiratory symptoms.  Continue IV antibiotics for possible pneumonia.  Check procalcitonin.  She has no fever chills, overall feeling much better.  Pending PT evaluation    Review of Systems   All other systems reviewed and are negative.  Objective:     Vital Signs (Most Recent):  Temp: 97.6 °F (36.4 °C) (07/20/23 1101)  Pulse: 76 (07/20/23 1100)  Resp: 20 (07/20/23 1100)  BP: 135/62 (07/20/23 1100)  SpO2: 96 % (07/20/23 1100) Vital Signs (24h Range):  Temp:  [97.6 °F (36.4 °C)-99 °F (37.2 °C)] 97.6 °F (36.4 °C)  Pulse:  [48-98] 76  Resp:  [15-48] 20  SpO2:  [89 %-98 %] 96 %  BP: (123-163)/() 135/62     Weight: 68 kg (150 lb)  Body mass index is 27.44 kg/m².    Intake/Output Summary (Last 24 hours) at 7/20/2023 1242  Last data filed at 7/20/2023 1136  Gross per 24 hour   Intake 450 ml   Output 1600 ml   Net -1150 ml         Physical Exam  Constitutional:       Appearance: Normal appearance. She is normal weight.   HENT:      Head: Normocephalic and atraumatic.      Nose: Nose normal.      Mouth/Throat:      Mouth: Mucous membranes are moist.   Eyes:      Conjunctiva/sclera: Conjunctivae normal.   Cardiovascular:      Rate and Rhythm: Normal rate and regular rhythm.      Pulses: Normal pulses.      Heart sounds: Normal heart sounds. No murmur heard.    No friction rub. No gallop.   Pulmonary:      Effort: Pulmonary effort is normal.      Breath sounds: No wheezing, rhonchi or rales.      Comments: Largely clear breath sounds today on 4 L nasal cannula  Abdominal:      General: Abdomen is flat. Bowel sounds are normal. There is no distension.      Palpations: Abdomen is soft.      Tenderness: There is no abdominal tenderness. There is no guarding.    Musculoskeletal:         General: No swelling. Normal range of motion.      Cervical back: Normal range of motion and neck supple.      Right lower leg: Edema present.      Left lower leg: Edema present.   Skin:     General: Skin is warm and dry.   Neurological:      General: No focal deficit present.      Mental Status: She is alert.   Psychiatric:         Mood and Affect: Mood normal.         Thought Content: Thought content normal.         Judgment: Judgment normal.           Significant Labs: All pertinent labs within the past 24 hours have been reviewed.    Significant Imaging: I have reviewed all pertinent imaging results/findings within the past 24 hours.

## 2023-07-20 NOTE — CARE UPDATE
07/19/23 2013   Patient Assessment/Suction   Level of Consciousness (AVPU) alert   Respiratory Effort Unlabored   Expansion/Accessory Muscles/Retractions no use of accessory muscles   All Lung Fields Breath Sounds diminished   Rhythm/Pattern, Respiratory no shortness of breath reported   Cough Frequency infrequent   Cough Type no productive sputum   PRE-TX-O2   Device (Oxygen Therapy) nasal cannula with humidification   $ Is the patient on Low Flow Oxygen? Yes   Flow (L/min) 5   SpO2 96 %   Pulse Oximetry Type Continuous   $ Pulse Oximetry - Multiple Charge Pulse Oximetry - Multiple   Pulse 77   Resp 15   Positioning   Head of Bed (HOB) Positioning HOB elevated;HOB at 30 degrees   Aerosol Therapy   $ Aerosol Therapy Charges Aerosol Treatment   Daily Review of Necessity (SVN) completed   Respiratory Treatment Status (SVN) given   Treatment Route (SVN) mouthpiece   Patient Position (SVN) HOB elevated;semi-Stafford's   Post Treatment Assessment (SVN) breath sounds improved   Signs of Intolerance (SVN) none   Breath Sounds Post-Respiratory Treatment   Throughout All Fields Post-Treatment All Fields   Throughout All Fields Post-Treatment aeration increased   Post-treatment Heart Rate (beats/min) 77   Post-treatment Resp Rate (breaths/min) 15   Education   $ Education Bronchodilator;15 min   Respiratory Evaluation   $ Care Plan Tech Time 15 min   $ Eval/Re-eval Charges Re-evaluation

## 2023-07-20 NOTE — PT/OT/SLP PROGRESS
Occupational Therapy      Patient Name:  Vickie Benedict   MRN:  [id number]    Patient not seen today secondary to Patient unwilling to participate. Will follow-up tomorrow.    7/20/2023

## 2023-07-20 NOTE — PLAN OF CARE
[facility]  Initial Discharge Assessment       Primary Care Provider: Aureliano Morocho MD    Admission Diagnosis: Congestive heart failure, unspecified HF chronicity, unspecified heart failure type [I50.9]    Admission Date: 7/19/2023  Expected Discharge Date: 7/23/2023    Transition of Care Barriers: None    Assessment completed at bedside.  Patient intends to discharge home where he adult son lives with her, pt may require home health and is requesting info on [facility] [employer] [facility].    Payor: [employer] MANAGED MEDICARE / Plan: [employer] MEDICARE PLAN PPO / Product Type: Medicare Advantage /     Extended Emergency Contact Information  Primary Emergency Contact: BAL ERICKSON  Mobile Phone: [phone]  Relation: Son   needed? No    Discharge Plan A: Home, Home with family  Discharge Plan B: [facility]      [id number] - [place] - [address] AT [facility] [address]  [address]  [place] [address]  Phone: [phone] Fax: [phone]      Initial Assessment (most recent)       Adult Discharge Assessment - 07/20/23 1130          Discharge Assessment    Assessment Type Discharge Planning Assessment     Confirmed/corrected address, phone number and insurance Yes     Confirmed Demographics Correct on Facesheet     Source of Information patient     Communicated HEMANTH with patient/caregiver No     Reason For Admission ARF     People in Home child(nicolás), adult     Facility Arrived From: home     Do you expect to return to your current living situation? Yes     Do you have help at home or someone to help you manage your care at home? No     Prior to hospitilization cognitive status: Alert/Oriented     Current cognitive status: Alert/Oriented     Equipment Currently Used at Home crutches     Readmission within 30 days? No     Patient currently being followed by outpatient case management? No     Do you currently have service(s) that help you manage your care at home?  No     Do you take prescription medications? Yes     Do you have prescription coverage? Yes     Coverage [employer]     Do you have any problems affording any of your prescribed medications? No     Is the patient taking medications as prescribed? yes     Who is going to help you get home at discharge? BAL ERICKSON (Son)   [phone]     How do you get to doctors appointments? car, drives self;family or friend will provide     Are you on dialysis? No     Do you take coumadin? No     Discharge Plan A Home;Home with family     Discharge Plan B Home Health     DME Needed Upon Discharge  none     Discharge Plan discussed with: Patient     Transition of Care Barriers None

## 2023-07-20 NOTE — PLAN OF CARE
07/20/23 0727   Patient Assessment/Suction   Level of Consciousness (AVPU) alert   Respiratory Effort Normal;Unlabored   Expansion/Accessory Muscles/Retractions no use of accessory muscles;no retractions   All Lung Fields Breath Sounds diminished   Rhythm/Pattern, Respiratory unlabored;pattern regular;depth regular   PRE-TX-O2   Device (Oxygen Therapy) nasal cannula with humidification   $ Is the patient on Low Flow Oxygen? Yes   Flow (L/min) 4   SpO2 (!) 94 %   Pulse Oximetry Type Continuous   $ Pulse Oximetry - Multiple Charge Pulse Oximetry - Multiple   Pulse 77   Resp (!) 21   Aerosol Therapy   $ Aerosol Therapy Charges Aerosol Treatment   Daily Review of Necessity (SVN) completed   Respiratory Treatment Status (SVN) given   Treatment Route (SVN) mouthpiece;oxygen   Patient Position (SVN) semi-Stafford's   Post Treatment Assessment (SVN) breath sounds unchanged   Signs of Intolerance (SVN) none   Breath Sounds Post-Respiratory Treatment   Post-treatment Heart Rate (beats/min) 78   Post-treatment Resp Rate (breaths/min) 13   Education   $ Education Bronchodilator;15 min

## 2023-07-20 NOTE — ASSESSMENT & PLAN NOTE
Appears to have acute CHF exacerbation with bilateral lower extremity swelling, pulmonary edema on chest x-ray, BNP of 603.  No previous history of CHF.  No echo in our system.  - hold further diuresis for now  - likely heart failure with preserved ejection fraction, EF is normal on echocardiogram  - moderate mitral regurgitation is likely playing a role  - will need outpatient for Cardiology follow-up  - daily weights and strict I&Os  - resume metoprolol and hold lisinopril given ALTON  - blood pressure management

## 2023-07-20 NOTE — ASSESSMENT & PLAN NOTE
Patient with acute hypoxia, O2 sats of 75% on room air at home.  Placed on 6 L of oxygen by EMS and subsequently requiring BiPAP in the ER here.  She was able to wean down to 4 L nasal cannula and has been doing well since  - holding further Lasix with ALTON  - hypoxemia improving  - wean oxygen as tolerated

## 2023-07-21 LAB
ANION GAP SERPL CALC-SCNC: 6 MMOL/L (ref 8–16)
BASOPHILS # BLD AUTO: 0.05 K/UL (ref 0–0.2)
BASOPHILS NFR BLD: 0.5 % (ref 0–1.9)
BNP SERPL-MCNC: 475 PG/ML (ref 0–99)
BUN SERPL-MCNC: 33 MG/DL (ref 8–23)
CALCIUM SERPL-MCNC: 9.3 MG/DL (ref 8.7–10.5)
CHLORIDE SERPL-SCNC: 103 MMOL/L (ref 95–110)
CO2 SERPL-SCNC: 29 MMOL/L (ref 23–29)
CREAT SERPL-MCNC: 1.4 MG/DL (ref 0.5–1.4)
DIFFERENTIAL METHOD: ABNORMAL
EOSINOPHIL # BLD AUTO: 0.2 K/UL (ref 0–0.5)
EOSINOPHIL NFR BLD: 1.5 % (ref 0–8)
ERYTHROCYTE [DISTWIDTH] IN BLOOD BY AUTOMATED COUNT: 14.3 % (ref 11.5–14.5)
EST. GFR  (NO RACE VARIABLE): 36.2 ML/MIN/1.73 M^2
GLUCOSE SERPL-MCNC: 130 MG/DL (ref 70–110)
HCT VFR BLD AUTO: 35.9 % (ref 37–48.5)
HGB BLD-MCNC: 11.8 G/DL (ref 12–16)
IMM GRANULOCYTES # BLD AUTO: 0.03 K/UL (ref 0–0.04)
IMM GRANULOCYTES NFR BLD AUTO: 0.3 % (ref 0–0.5)
LYMPHOCYTES # BLD AUTO: 1.3 K/UL (ref 1–4.8)
LYMPHOCYTES NFR BLD: 11.5 % (ref 18–48)
MAGNESIUM SERPL-MCNC: 2 MG/DL (ref 1.6–2.6)
MCH RBC QN AUTO: 33.9 PG (ref 27–31)
MCHC RBC AUTO-ENTMCNC: 32.9 G/DL (ref 32–36)
MCV RBC AUTO: 103 FL (ref 82–98)
MONOCYTES # BLD AUTO: 0.9 K/UL (ref 0.3–1)
MONOCYTES NFR BLD: 8.2 % (ref 4–15)
NEUTROPHILS # BLD AUTO: 8.6 K/UL (ref 1.8–7.7)
NEUTROPHILS NFR BLD: 78 % (ref 38–73)
NRBC BLD-RTO: 0 /100 WBC
PHOSPHATE SERPL-MCNC: 3.4 MG/DL (ref 2.7–4.5)
PLATELET # BLD AUTO: 220 K/UL (ref 150–450)
PMV BLD AUTO: 9.7 FL (ref 9.2–12.9)
POTASSIUM SERPL-SCNC: 4.4 MMOL/L (ref 3.5–5.1)
RBC # BLD AUTO: 3.48 M/UL (ref 4–5.4)
SODIUM SERPL-SCNC: 138 MMOL/L (ref 136–145)
WBC # BLD AUTO: 11.05 K/UL (ref 3.9–12.7)

## 2023-07-21 PROCEDURE — 94660 CPAP INITIATION&MGMT: CPT

## 2023-07-21 PROCEDURE — 97165 OT EVAL LOW COMPLEX 30 MIN: CPT

## 2023-07-21 PROCEDURE — 80048 BASIC METABOLIC PNL TOTAL CA: CPT | Performed by: STUDENT IN AN ORGANIZED HEALTH CARE EDUCATION/TRAINING PROGRAM

## 2023-07-21 PROCEDURE — 94761 N-INVAS EAR/PLS OXIMETRY MLT: CPT

## 2023-07-21 PROCEDURE — 94640 AIRWAY INHALATION TREATMENT: CPT

## 2023-07-21 PROCEDURE — 63600175 PHARM REV CODE 636 W HCPCS: Performed by: HOSPITALIST

## 2023-07-21 PROCEDURE — 25000003 PHARM REV CODE 250: Performed by: STUDENT IN AN ORGANIZED HEALTH CARE EDUCATION/TRAINING PROGRAM

## 2023-07-21 PROCEDURE — 36415 COLL VENOUS BLD VENIPUNCTURE: CPT | Performed by: STUDENT IN AN ORGANIZED HEALTH CARE EDUCATION/TRAINING PROGRAM

## 2023-07-21 PROCEDURE — 85025 COMPLETE CBC W/AUTO DIFF WBC: CPT | Performed by: STUDENT IN AN ORGANIZED HEALTH CARE EDUCATION/TRAINING PROGRAM

## 2023-07-21 PROCEDURE — 27000221 HC OXYGEN, UP TO 24 HOURS

## 2023-07-21 PROCEDURE — 99900031 HC PATIENT EDUCATION (STAT)

## 2023-07-21 PROCEDURE — 97530 THERAPEUTIC ACTIVITIES: CPT

## 2023-07-21 PROCEDURE — 84100 ASSAY OF PHOSPHORUS: CPT | Performed by: STUDENT IN AN ORGANIZED HEALTH CARE EDUCATION/TRAINING PROGRAM

## 2023-07-21 PROCEDURE — 94799 UNLISTED PULMONARY SVC/PX: CPT

## 2023-07-21 PROCEDURE — 63600175 PHARM REV CODE 636 W HCPCS: Performed by: STUDENT IN AN ORGANIZED HEALTH CARE EDUCATION/TRAINING PROGRAM

## 2023-07-21 PROCEDURE — 25000242 PHARM REV CODE 250 ALT 637 W/ HCPCS: Performed by: STUDENT IN AN ORGANIZED HEALTH CARE EDUCATION/TRAINING PROGRAM

## 2023-07-21 PROCEDURE — 97161 PT EVAL LOW COMPLEX 20 MIN: CPT

## 2023-07-21 PROCEDURE — 83735 ASSAY OF MAGNESIUM: CPT | Performed by: STUDENT IN AN ORGANIZED HEALTH CARE EDUCATION/TRAINING PROGRAM

## 2023-07-21 PROCEDURE — 97535 SELF CARE MNGMENT TRAINING: CPT

## 2023-07-21 PROCEDURE — 99900035 HC TECH TIME PER 15 MIN (STAT)

## 2023-07-21 PROCEDURE — 83880 ASSAY OF NATRIURETIC PEPTIDE: CPT | Performed by: STUDENT IN AN ORGANIZED HEALTH CARE EDUCATION/TRAINING PROGRAM

## 2023-07-21 PROCEDURE — 21000000 HC CCU ICU ROOM CHARGE

## 2023-07-21 RX ORDER — FUROSEMIDE 10 MG/ML
40 INJECTION INTRAMUSCULAR; INTRAVENOUS DAILY
Status: DISCONTINUED | OUTPATIENT
Start: 2023-07-21 | End: 2023-07-22 | Stop reason: HOSPADM

## 2023-07-21 RX ADMIN — AMLODIPINE BESYLATE 10 MG: 5 TABLET ORAL at 09:07

## 2023-07-21 RX ADMIN — FUROSEMIDE 40 MG: 10 INJECTION, SOLUTION INTRAMUSCULAR; INTRAVENOUS at 09:07

## 2023-07-21 RX ADMIN — DOXYCYCLINE 100 MG: 100 INJECTION, POWDER, LYOPHILIZED, FOR SOLUTION INTRAVENOUS at 08:07

## 2023-07-21 RX ADMIN — LATANOPROST 1 DROP: 50 SOLUTION OPHTHALMIC at 08:07

## 2023-07-21 RX ADMIN — METOPROLOL TARTRATE 12.5 MG: 25 TABLET, FILM COATED ORAL at 09:07

## 2023-07-21 RX ADMIN — BETAXOLOL HYDROCHLORIDE 1 DROP: 5 SOLUTION/ DROPS OPHTHALMIC at 09:07

## 2023-07-21 RX ADMIN — IPRATROPIUM BROMIDE AND ALBUTEROL SULFATE 3 ML: 2.5; .5 SOLUTION RESPIRATORY (INHALATION) at 02:07

## 2023-07-21 RX ADMIN — LEVOTHYROXINE SODIUM 100 MCG: 0.1 TABLET ORAL at 07:07

## 2023-07-21 RX ADMIN — PROPAFENONE HYDROCHLORIDE 225 MG: 150 TABLET, FILM COATED ORAL at 09:07

## 2023-07-21 RX ADMIN — IPRATROPIUM BROMIDE AND ALBUTEROL SULFATE 3 ML: 2.5; .5 SOLUTION RESPIRATORY (INHALATION) at 07:07

## 2023-07-21 RX ADMIN — PROPAFENONE HYDROCHLORIDE 225 MG: 150 TABLET, FILM COATED ORAL at 08:07

## 2023-07-21 RX ADMIN — AZTREONAM 1000 MG: 1 INJECTION, POWDER, LYOPHILIZED, FOR SOLUTION INTRAMUSCULAR; INTRAVENOUS at 05:07

## 2023-07-21 RX ADMIN — IPRATROPIUM BROMIDE AND ALBUTEROL SULFATE 3 ML: 2.5; .5 SOLUTION RESPIRATORY (INHALATION) at 08:07

## 2023-07-21 NOTE — ASSESSMENT & PLAN NOTE
Possible community-acquired pneumonia  Procalcitonin not elevated  Continue doxycycline  Monitor for improvement in breathing

## 2023-07-21 NOTE — PT/OT/SLP EVAL
Occupational Therapy   Evaluation    Name: Vickie Benedict  MRN: [id number]  Admitting Diagnosis: Acute hypoxemic respiratory failure  Recent Surgery: * No surgery found *      Recommendations:     Discharge Recommendations: home health OT  Discharge Equipment Recommendations:  bedside commode, grab bar, bath bench, dressing device  Barriers to discharge:  Decreased caregiver support, Inaccessible home environment    Assessment:     Vickie Benedict is a 88 y.o. female with a medical diagnosis of Acute hypoxemic respiratory failure. Performance deficits affecting function: weakness, impaired endurance, impaired self care skills, impaired functional mobility, gait instability, impaired balance, decreased lower extremity function, decreased safety awareness, decreased ROM.      Pt upright in bed and agreeable to evaluation today. Pt reports eating breakfast with her hands this morning due to difficulty reaching the tray. Pt is active but admits having difficulty with bathing, dressing and reports a history of falls. Today she demonstrated poor balance and unsteady gait while ambulating to the bedside commode and back to bed. She reported agreement for home health OT and a home evaluation.     Rehab Prognosis: Good; patient would benefit from acute skilled OT services to address these deficits and reach maximum level of function.       Plan:     Patient to be seen 5 x/week to address the above listed problems via self-care/home management, therapeutic activities, therapeutic exercises  Plan of Care Expires: 08/21/23  Plan of Care Reviewed with: patient    Subjective     Chief Complaint: difficulty bathing and dressing  Patient/Family Comments/goals: to be independent with ADLs at home    Occupational Profile:  Living Environment: lives with son and grandson in [facility] with one large step to enter and no grab rail  Previous level of function: Mod Ind with forearm crutch   Roles and Routines: active blogger, virtual  , writer, and cat owner  Equipment Used at Home: grab bar, crutches, forearm  Assistance upon Discharge: son and grandson, but reports she is modest and does not ask for help with ADLs    Pain/Comfort:  Pain Rating 1: 0/10  Pain Rating Post-Intervention 1: 0/10    Patients cultural, spiritual, Buddhist conflicts given the current situation: no    Objective:     Communicated with: nurse prior to session.  Patient found HOB elevated with telemetry, PureWick, pulse ox (continuous), oxygen upon OT entry to room.    General Precautions: Standard, fall  Orthopedic Precautions: N/A  Braces: N/A  Respiratory Status: Nasal cannula, flow 3 L/min    Occupational Performance:    Bed Mobility:    Patient completed Rolling/Turning to Left with  contact guard assistance  Patient completed Supine to Sit with contact guard assistance    Functional Mobility/Transfers:  Patient completed Sit <> Stand Transfer with contact guard assistance  with  [place] crutches   Patient completed Toilet Transfer Step Transfer technique with contact guard assistance with  bedside commode and [place] crutches  Functional Mobility: unsteady and unbalanced gait to walk to bedside commode and back to bed, mild loss of balance/stumbling while backwards stepping to bed    Activities of Daily Living:  Feeding:  independence with hands  Lower Body Dressing: Independent per patient report to don stretchy sweat pants and slip on shoes due to limited range of motion/difficulty dressing   Toileting: contact guard assistance for hygiene    Cognitive/Visual Perceptual:  Cognitive/Psychosocial Skills:     -       Oriented to: Person, Place, Time, and Situation   -       Follows Commands/attention:Follows multistep  commands  -       Communication: clear/fluent with accent  -       Memory: No Deficits noted  -       Safety awareness/insight to disability: impaired   -       Mood/Affect/Coping skills/emotional control: Appropriate to situation and  Pleasant    Physical Exam:  Upper Extremity Range of Motion:     -       Right Upper Extremity: WFL  -       Left Upper Extremity: WFL  Upper Extremity Strength:    -       Right Upper Extremity: WFL  -       Left Upper Extremity: WFL   Strength:    -       Right Upper Extremity: WFL  -       Left Upper Extremity: WFL  AMPAC 6 Click ADL:  AMPAC Total Score: 19    Treatment & Education:  Pt completed bed mobility and strength assessment. She ambulated to the bedside commode to toilet and back to bed.     Patient left HOB elevated with all lines intact, call button in reach, and bed alarm on    GOALS:   Multidisciplinary Problems       Occupational Therapy Goals          Problem: Occupational Therapy    Goal Priority Disciplines Outcome Interventions   Occupational Therapy Goal     OT, PT/OT     Description: Goals to be met by: 8/21/2023     Patient will increase functional independence with ADLs by performing:    LE Dressing with Modified [place] and assistive device as needed.  Grooming while standing at sink with Modified [place].  Toileting from toilet with Modified [place] for hygiene and clothing management.   Step transfer with Modified [place]  Toilet transfer to toilet with Modified [place].                         History:     Past Medical History:   Diagnosis Date    Atrial fibrillation     Bradycardia     Carotid bruit     CKD (chronic kidney disease), stage III     Hyperlipidemia     OA (osteoarthritis)     Thyroid disease          Past Surgical History:   Procedure Laterality Date    HYSTERECTOMY      KNEE SURGERY Right     SHOULDER SURGERY Right        Time Tracking:     OT Date of Treatment: 07/21/23  OT Start Time: 0840  OT Stop Time: 0918  OT Total Time (min): 38 min    Billable Minutes:Evaluation 15  Self Care/Home Management 15  Therapeutic Activity 8    7/21/2023

## 2023-07-21 NOTE — PLAN OF CARE
07/21/23 1430   Post-Acute Status   Post-Acute Authorization HME   HME Status Referrals Sent     Order for O2 noted and sent to [facility].  [facility] notified of referral and expected dc of 7/22.      [facility] Alert: YES response from [employer]. - [place] re: Referral [id number] for patient in [facility] [id number]: Yes, willing to accept patient    Will deliver to pts room.

## 2023-07-21 NOTE — CARE UPDATE
07/20/23 2046   Patient Assessment/Suction   Level of Consciousness (AVPU) alert   Respiratory Effort Unlabored   Expansion/Accessory Muscles/Retractions no use of accessory muscles   All Lung Fields Breath Sounds coarse   Rhythm/Pattern, Respiratory no shortness of breath reported   Cough Frequency no cough   PRE-TX-O2   Device (Oxygen Therapy) nasal cannula with humidification   $ Is the patient on Low Flow Oxygen? Yes   Flow (L/min) 1.5   SpO2 96 %   Pulse Oximetry Type Continuous   $ Pulse Oximetry - Multiple Charge Pulse Oximetry - Multiple   Pulse 77   Resp (!) 21   Positioning   Head of Bed (HOB) Positioning HOB elevated;HOB at 30-45 degrees   Aerosol Therapy   $ Aerosol Therapy Charges Aerosol Treatment   Daily Review of Necessity (SVN) completed   Respiratory Treatment Status (SVN) given   Treatment Route (SVN) mouthpiece   Patient Position (SVN) semi-Stafford's   Post Treatment Assessment (SVN) breath sounds improved   Signs of Intolerance (SVN) none   Breath Sounds Post-Respiratory Treatment   Throughout All Fields Post-Treatment All Fields   Throughout All Fields Post-Treatment aeration increased   Post-treatment Heart Rate (beats/min) 71   Post-treatment Resp Rate (breaths/min) 15   Education   $ Education Bronchodilator;15 min   Respiratory Evaluation   $ Care Plan Tech Time 15 min   $ Eval/Re-eval Charges Re-evaluation

## 2023-07-21 NOTE — CARE UPDATE
07/21/23 1101   PRE-TX-O2   SpO2 95 %   Pulse 62   Resp (!) 33   Temp 97.9 °F (36.6 °C)   BP (!) 140/65   Home Oxygen Qualification   $ Home O2 Qualification Tech time 15 minutes   Room Air SpO2 At Rest (!) 86 %   Room Air SpO2 During Ambulation (!) 86 %   SpO2 During Ambulation on O2 92 %   Heart Rate on O2 61 bpm   Ambulation O2 LPM 4 LPM   SpO2 Post Ambulation 93 %   Post Ambulation Heart Rate 75 bpm   Post Ambulation O2 LPM 4 LPM   Home O2 Eval Comments needs home o2

## 2023-07-21 NOTE — PT/OT/SLP EVAL
Physical Therapy Evaluation    Patient Name:  Vickie Benedict   MRN:  [id number]    Recommendations:     Discharge Recommendations: home health PT   Discharge Equipment Recommendations: bedside commode, grab bar, bath bench   Barriers to discharge:  high fall risk, decrease activity tolerance,     Assessment:     Vickie Benedict is a 88 y.o. female admitted with a medical diagnosis of Acute hypoxemic respiratory failure.  She presents with the following impairments/functional limitations: weakness, impaired endurance, impaired self care skills, impaired functional mobility, gait instability, impaired balance, decreased lower extremity function, pain, decreased safety awareness, impaired cardiopulmonary response to activity.    Pt found in bed with HOB elevated. RN present throughout session. Pt agreeable to visit. Pt required CGA for all mobility.     Rehab Prognosis: Fair; patient would benefit from acute skilled PT services to address these deficits and reach maximum level of function.    Recent Surgery: * No surgery found *      Plan:     During this hospitalization, patient to be seen 6 x/week to address the identified rehab impairments via gait training, therapeutic activities, therapeutic exercises, neuromuscular re-education and progress toward the following goals:    Plan of Care Expires:  08/21/23    Subjective     Chief Complaint: none stated  Patient/Family Comments/goals: return home with son  Pain/Comfort:  Pain Rating 1: 0/10    Patients cultural, spiritual, [employer] conflicts given the current situation: no    Living Environment:  Pt lives with her son and grandson in a [facility] with one large step to enter  Prior to admission, patients level of function was MI forearm crutches.  Equipment used at home: grab bar, crutches, forearm.  DME owned (not currently used): none.  Upon discharge, patient will have assistance from son/grandson.    Objective:     Communicated with RN prior to session.   Patient found HOB elevated with telemetry, PureWick, pulse ox (continuous), blood pressure cuff, peripheral IV  upon PT entry to room.    General Precautions: Standard, fall  Orthopedic Precautions:N/A   Braces: N/A  Respiratory Status: Nasal cannula, flow 3 L/min    Exams:  RLE ROM: WFL  RLE Strength: 4/5 throughout  LLE ROM: WFL  LLE Strength: 4/5 throughout    Functional Mobility:  Bed Mobility:     Supine to Sit: contact guard assistance  Transfers:     Sit to Stand:  contact guard assistance with Lofstrand crutches  Bed to Chair: contact guard assistance with  Lofstrand crutches  using  Stand Pivot      AM-PAC 6 CLICK MOBILITY  Total Score:16       Treatment & Education:  Pt educated on POC, discharge recommendation, need for assist with mobility, importance of time OOB, proper form with mobility, use of call bell to seek assistance as needed and fall prevention      Patient left up in chair with all lines intact, call button in reach, chair alarm on, and RN present.    GOALS:   Multidisciplinary Problems       Physical Therapy Goals          Problem: Physical Therapy    Goal Priority Disciplines Outcome Goal Variances Interventions   Physical Therapy Goal     PT, PT/OT Ongoing, Progressing     Description: Goals to be met by: 23     Patient will increase functional independence with mobility by performin. Supine to sit with Supervision  2. Sit to stand transfer with Supervision  3. Bed to chair transfer with Supervision using loftstrand crutch  4. Gait  x 50 feet with Supervision using loftstrand crutch                             History:     Past Medical History:   Diagnosis Date    Atrial fibrillation     Bradycardia     Carotid bruit     CKD (chronic kidney disease), stage III     Hyperlipidemia     OA (osteoarthritis)     Thyroid disease        Past Surgical History:   Procedure Laterality Date    HYSTERECTOMY      KNEE SURGERY Right     SHOULDER SURGERY Right        Time Tracking:     PT  Received On: 07/21/23  PT Start Time: 1020     PT Stop Time: 1030  PT Total Time (min): 10 min     Billable Minutes: Evaluation 10      07/21/2023

## 2023-07-21 NOTE — PROGRESS NOTES
[facility] Medicine  Progress Note    Patient Name: Vickie Benedict  MRN: [id number]  Patient Class: IP- Inpatient   Admission Date: 7/19/2023  Length of Stay: 2 days  Attending Physician: Carmen Salter MD  Primary Care Provider: Aureliano Morocho MD        Subjective:     Principal Problem:Acute hypoxemic respiratory failure         HPI:  Open 88-year-old female with a history of atrial fibrillation not currently on anticoagulation, hypertension, hypothyroidism who presents via EMS with acute worsening of shortness of breath.  She says that she has had a head cold over the past 2 weeks and has had 2 incidents of severe shortness of breath.  She developed worsening shortness of breath over the last 3 days.  She has had lower extremity swelling.  She denies orthopnea but has been sleeping propped up on pillows for the past couple of years.  She has had a productive cough.  She denies fever or chills.  She denies chest pain or palpitations.  She is compliant with her medication regimen.  She has never had symptoms like this before.  She was on a diuretic pill in the past but she says it made her feel poorly so she stopped it.  She is a full code at this time and wants to discuss his code status with her son before determining whether she wants to be DNR in the future.  She has no other updates her past medical, family, surgical, social history.    In the ED:  She arrived on 6 L of oxygen and was hypoxic, significant respiratory distress and was placed on BiPAP.  Pulse 84, initial respiratory rate 40 now 16, /90 to, O2 sat 84% now 97% on BiPAP.  WBC 12.8, hemoglobin 12.9, platelets 281.  Sodium 138, creatinine 1.3 at her baseline, , troponin 19.3 initial, 2nd is pending.  Lactic acid 1, blood cultures taken in the ED. blood gas with pH of 7.3 on admission pCO2 51.  CXR with bilateral pulmonary infiltrates consistent with edema.  Small right lower lung effusion.  EKG without  acute ischemic changes.  She was treated with IV Lasix as well as Solu-Medrol and nebulizers in the ED.  Had significant improvement in her breathing.  Placed on BiPAP with improvement as well.  She is now comfortable and able to talk easily.  Will admit for what appears to be acute CHF exacerbation, possibility of underlying pneumonia.  Step-down level of care for BiPAP      Overview/Hospital Course:  No notes on file    Interval History:  She has not yet gotten out of bed.  Feels better at rest.  Urinating well.  No chest pain or shortness to breath currently.  Down to 3 L nasal cannula    Review of Systems Complete ROS otherwise negative other than stated in HPI.   Objective:     Vital Signs (Most Recent):  Temp: 97.9 °F (36.6 °C) (07/21/23 1101)  Pulse: 62 (07/21/23 1101)  Resp: (!) 33 (07/21/23 1101)  BP: (!) 140/65 (07/21/23 1101)  SpO2: 95 % (07/21/23 1101) Vital Signs (24h Range):  Temp:  [97.6 °F (36.4 °C)-98.6 °F (37 °C)] 97.9 °F (36.6 °C)  Pulse:  [59-78] 62  Resp:  [15-36] 33  SpO2:  [89 %-98 %] 95 %  BP: (125-181)/(60-73) 140/65     Weight: 67.8 kg (149 lb 7.6 oz)  Body mass index is 27.34 kg/m².    Intake/Output Summary (Last 24 hours) at 7/21/2023 1314  Last data filed at 7/21/2023 0928  Gross per 24 hour   Intake 400 ml   Output 1725 ml   Net -1325 ml         Physical Exam  GENERAL:  Alert and oriented x 3  HEENT:  EOMI. Conjunctivae intact. Posterior pharynx clear, oral mucosa moist  NECK:  Supple   LUNGS:  No respiratory distress.  Decreased breath sounds in bases, otherwise Clear to auscultation bilaterally with good air movement  CARDIAC:  RRR without murmur, rub or gallop  ABDOMEN:  Soft,  Nontender and nondistended, no rebound or guarding, bowel sounds present   EXTREMITIES:  Peripheral pulses are 2+. Hands and feet are warm. Good capillary refill in fingers (< 2 seconds).  1+ pitting edema bilateral lower extremities  SKIN:  Skin color, texture and turgor normal. No rashes, ulcerations or  nodules noted          Significant Labs: All pertinent labs within the past 24 hours have been reviewed.  CBC:   Recent Labs   Lab 07/20/23  0548 07/21/23  0521   WBC 8.97 11.05   HGB 11.3* 11.8*   HCT 35.2* 35.9*    220     CMP:   Recent Labs   Lab 07/19/23  1551 07/20/23  0548 07/21/23  0521    138 138   K 4.2 4.4 4.4    101 103   CO2 25 28 29   * 167* 130*   BUN 24* 29* 33*   CREATININE 1.5* 1.5* 1.4   CALCIUM 9.4 9.2 9.3   PROT 7.3  --   --    ALBUMIN 3.7  --   --    BILITOT 0.8  --   --    ALKPHOS 64  --   --    AST 25  --   --    ALT 24  --   --    ANIONGAP 13 9 6*     Cardiac Markers:   Recent Labs   Lab 07/21/23  0521   *     Coagulation: No results for input(s): PT, INR, APTT in the last 48 hours.    Significant Imaging: I have reviewed all pertinent imaging results/findings within the past 24 hours.      Assessment/Plan:      * Acute hypoxemic respiratory failure  Patient with acute hypoxia, O2 sats of 75% on room air at home.  Placed on 6 L of oxygen by EMS and subsequently required BiPAP in the ER here.  She is able to wean down to 3L nasal cannula currently  - resume IV Lasix  - hypoxemia improving, wean oxygen as able  - ambulatory desat study to qualify for home oxygen, anticipate discharge home tomorrow  - increase activity, ambulate in hassan today      CAP (community acquired pneumonia)  Possible community-acquired pneumonia  Procalcitonin not elevated  Continue doxycycline  Monitor for improvement in breathing      CHF exacerbation  Appears to have acute CHF exacerbation with bilateral lower extremity swelling, pulmonary edema on chest x-ray, BNP of 603.  No previous history of CHF.  No echo in our system.  -diuresis  - likely heart failure with preserved ejection fraction, EF is normal on echocardiogram  - moderate mitral regurgitation is likely playing a role  - will need outpatient for Cardiology follow-up  - daily weights and strict I&Os  - resumed metoprolol.  holding lisinopril for ALTON  - blood pressure management    Postablative hypothyroidism  Check TSH  Continue Synthroid 100 mcg      History of atrial fibrillation  Was on metoprolol and propafenone  Not on anticoagulation, unclear why.  She says she has never been on anticoagulation.  Chads Vasc = 5   Dr. Morocho is her cardiologist, will discuss with them whether she should be on anticoagulation      Essential hypertension  Was on amlodipine, lisinopril, metoprolol at home  Continue home regimen for now      CKD (chronic kidney disease), stage III  Trend creatinine daily        VTE Risk Mitigation (From admission, onward)         Ordered     IP VTE HIGH RISK PATIENT  Once         07/19/23 1403     Place sequential compression device  Until discontinued         07/19/23 1403                Discharge Planning   HEMANTH: 7/23/2023     Code Status: Full Code   Is the patient medically ready for discharge?:     Reason for patient still in hospital (select all that apply): Treatment  Discharge Plan A: Home, Home with family                  Carmen Salter MD  Department of Hospital Medicine   [facility]

## 2023-07-21 NOTE — ASSESSMENT & PLAN NOTE
Appears to have acute CHF exacerbation with bilateral lower extremity swelling, pulmonary edema on chest x-ray, BNP of 603.  No previous history of CHF.  No echo in our system.  -diuresis  - likely heart failure with preserved ejection fraction, EF is normal on echocardiogram  - moderate mitral regurgitation is likely playing a role  - will need outpatient for Cardiology follow-up  - daily weights and strict I&Os  - resumed metoprolol. holding lisinopril for ALTON  - blood pressure management

## 2023-07-21 NOTE — SUBJECTIVE & OBJECTIVE
Interval History:  She has not yet gotten out of bed.  Feels better at rest.  Urinating well.  No chest pain or shortness to breath currently.  Down to 3 L nasal cannula    Review of Systems Complete ROS otherwise negative other than stated in HPI.   Objective:     Vital Signs (Most Recent):  Temp: 97.9 °F (36.6 °C) (07/21/23 1101)  Pulse: 62 (07/21/23 1101)  Resp: (!) 33 (07/21/23 1101)  BP: (!) 140/65 (07/21/23 1101)  SpO2: 95 % (07/21/23 1101) Vital Signs (24h Range):  Temp:  [97.6 °F (36.4 °C)-98.6 °F (37 °C)] 97.9 °F (36.6 °C)  Pulse:  [59-78] 62  Resp:  [15-36] 33  SpO2:  [89 %-98 %] 95 %  BP: (125-181)/(60-73) 140/65     Weight: 67.8 kg (149 lb 7.6 oz)  Body mass index is 27.34 kg/m².    Intake/Output Summary (Last 24 hours) at 7/21/2023 1314  Last data filed at 7/21/2023 0928  Gross per 24 hour   Intake 400 ml   Output 1725 ml   Net -1325 ml         Physical Exam  GENERAL:  Alert and oriented x 3  HEENT:  EOMI. Conjunctivae intact. Posterior pharynx clear, oral mucosa moist  NECK:  Supple   LUNGS:  No respiratory distress.  Decreased breath sounds in bases, otherwise Clear to auscultation bilaterally with good air movement  CARDIAC:  RRR without murmur, rub or gallop  ABDOMEN:  Soft,  Nontender and nondistended, no rebound or guarding, bowel sounds present   EXTREMITIES:  Peripheral pulses are 2+. Hands and feet are warm. Good capillary refill in fingers (< 2 seconds).  1+ pitting edema bilateral lower extremities  SKIN:  Skin color, texture and turgor normal. No rashes, ulcerations or nodules noted          Significant Labs: All pertinent labs within the past 24 hours have been reviewed.  CBC:   Recent Labs   Lab 07/20/23  0548 07/21/23  0521   WBC 8.97 11.05   HGB 11.3* 11.8*   HCT 35.2* 35.9*    220     CMP:   Recent Labs   Lab 07/19/23  1551 07/20/23  0548 07/21/23  0521    138 138   K 4.2 4.4 4.4    101 103   CO2 25 28 29   * 167* 130*   BUN 24* 29* 33*   CREATININE 1.5* 1.5*  1.4   CALCIUM 9.4 9.2 9.3   PROT 7.3  --   --    ALBUMIN 3.7  --   --    BILITOT 0.8  --   --    ALKPHOS 64  --   --    AST 25  --   --    ALT 24  --   --    ANIONGAP 13 9 6*     Cardiac Markers:   Recent Labs   Lab 07/21/23  0521   *     Coagulation: No results for input(s): PT, INR, APTT in the last 48 hours.    Significant Imaging: I have reviewed all pertinent imaging results/findings within the past 24 hours.

## 2023-07-21 NOTE — CARE UPDATE
07/21/23 0733   Patient Assessment/Suction   Level of Consciousness (AVPU) alert   Respiratory Effort Normal;Unlabored   Expansion/Accessory Muscles/Retractions no use of accessory muscles;no retractions;expansion symmetric   All Lung Fields Breath Sounds Anterior:;Lateral:;coarse   Rhythm/Pattern, Respiratory unlabored;pattern regular;depth regular   Skin Integrity   $ Wound Care Tech Time 15 min   Area Observed Left;Right;Behind ear;Cheek   Skin Appearance without discoloration   PRE-TX-O2   Device (Oxygen Therapy) nasal cannula with humidification   $ Is the patient on Low Flow Oxygen? Yes   Flow (L/min) 3   SpO2 96 %   Pulse Oximetry Type Continuous   $ Pulse Oximetry - Multiple Charge Pulse Oximetry - Multiple   Pulse 61   Resp 16   BP (!) 181/73   Aerosol Therapy   $ Aerosol Therapy Charges Aerosol Treatment   Daily Review of Necessity (SVN) completed   Respiratory Treatment Status (SVN) given   Treatment Route (SVN) mouthpiece;oxygen   Patient Position (SVN) HOB elevated   Post Treatment Assessment (SVN) breath sounds unchanged   Signs of Intolerance (SVN) none   Preset CPAP/BiPAP Settings   Mode Of Delivery BiPAP;Standby   $ CPAP/BiPAP Daily Charge BiPAP/CPAP Daily   Education   $ Education Bronchodilator;BiPAP;30 min

## 2023-07-21 NOTE — ASSESSMENT & PLAN NOTE
Patient with acute hypoxia, O2 sats of 75% on room air at home.  Placed on 6 L of oxygen by EMS and subsequently required BiPAP in the ER here.  She is able to wean down to 3L nasal cannula currently  - resume IV Lasix  - hypoxemia improving, wean oxygen as able  - ambulatory desat study to qualify for home oxygen, anticipate discharge home tomorrow  - increase activity, ambulate in hassan today

## 2023-07-22 VITALS
WEIGHT: 149.5 LBS | OXYGEN SATURATION: 96 % | HEART RATE: 60 BPM | HEIGHT: 62 IN | TEMPERATURE: 98 F | SYSTOLIC BLOOD PRESSURE: 179 MMHG | RESPIRATION RATE: 12 BRPM | BODY MASS INDEX: 27.51 KG/M2 | DIASTOLIC BLOOD PRESSURE: 71 MMHG

## 2023-07-22 LAB
ANION GAP SERPL CALC-SCNC: 5 MMOL/L (ref 8–16)
BASOPHILS # BLD AUTO: 0.05 K/UL (ref 0–0.2)
BASOPHILS NFR BLD: 0.5 % (ref 0–1.9)
BUN SERPL-MCNC: 39 MG/DL (ref 8–23)
CALCIUM SERPL-MCNC: 9.4 MG/DL (ref 8.7–10.5)
CHLORIDE SERPL-SCNC: 102 MMOL/L (ref 95–110)
CO2 SERPL-SCNC: 32 MMOL/L (ref 23–29)
CREAT SERPL-MCNC: 1.3 MG/DL (ref 0.5–1.4)
DIFFERENTIAL METHOD: ABNORMAL
EOSINOPHIL # BLD AUTO: 0.3 K/UL (ref 0–0.5)
EOSINOPHIL NFR BLD: 3.6 % (ref 0–8)
ERYTHROCYTE [DISTWIDTH] IN BLOOD BY AUTOMATED COUNT: 14.1 % (ref 11.5–14.5)
EST. GFR  (NO RACE VARIABLE): 39.6 ML/MIN/1.73 M^2
GLUCOSE SERPL-MCNC: 102 MG/DL (ref 70–110)
HCT VFR BLD AUTO: 36.8 % (ref 37–48.5)
HGB BLD-MCNC: 11.9 G/DL (ref 12–16)
IMM GRANULOCYTES # BLD AUTO: 0.02 K/UL (ref 0–0.04)
IMM GRANULOCYTES NFR BLD AUTO: 0.2 % (ref 0–0.5)
LYMPHOCYTES # BLD AUTO: 1.3 K/UL (ref 1–4.8)
LYMPHOCYTES NFR BLD: 14 % (ref 18–48)
MAGNESIUM SERPL-MCNC: 1.8 MG/DL (ref 1.6–2.6)
MCH RBC QN AUTO: 33.5 PG (ref 27–31)
MCHC RBC AUTO-ENTMCNC: 32.3 G/DL (ref 32–36)
MCV RBC AUTO: 104 FL (ref 82–98)
MONOCYTES # BLD AUTO: 1.1 K/UL (ref 0.3–1)
MONOCYTES NFR BLD: 11.7 % (ref 4–15)
NEUTROPHILS # BLD AUTO: 6.4 K/UL (ref 1.8–7.7)
NEUTROPHILS NFR BLD: 70 % (ref 38–73)
NRBC BLD-RTO: 0 /100 WBC
PHOSPHATE SERPL-MCNC: 3.5 MG/DL (ref 2.7–4.5)
PLATELET # BLD AUTO: 239 K/UL (ref 150–450)
PMV BLD AUTO: 9.8 FL (ref 9.2–12.9)
POTASSIUM SERPL-SCNC: 4.4 MMOL/L (ref 3.5–5.1)
RBC # BLD AUTO: 3.55 M/UL (ref 4–5.4)
SODIUM SERPL-SCNC: 139 MMOL/L (ref 136–145)
WBC # BLD AUTO: 9.13 K/UL (ref 3.9–12.7)

## 2023-07-22 PROCEDURE — 80048 BASIC METABOLIC PNL TOTAL CA: CPT | Performed by: STUDENT IN AN ORGANIZED HEALTH CARE EDUCATION/TRAINING PROGRAM

## 2023-07-22 PROCEDURE — 25000003 PHARM REV CODE 250: Performed by: STUDENT IN AN ORGANIZED HEALTH CARE EDUCATION/TRAINING PROGRAM

## 2023-07-22 PROCEDURE — 94761 N-INVAS EAR/PLS OXIMETRY MLT: CPT

## 2023-07-22 PROCEDURE — 36415 COLL VENOUS BLD VENIPUNCTURE: CPT | Performed by: STUDENT IN AN ORGANIZED HEALTH CARE EDUCATION/TRAINING PROGRAM

## 2023-07-22 PROCEDURE — 27000221 HC OXYGEN, UP TO 24 HOURS

## 2023-07-22 PROCEDURE — 99900035 HC TECH TIME PER 15 MIN (STAT)

## 2023-07-22 PROCEDURE — 25000242 PHARM REV CODE 250 ALT 637 W/ HCPCS: Performed by: STUDENT IN AN ORGANIZED HEALTH CARE EDUCATION/TRAINING PROGRAM

## 2023-07-22 PROCEDURE — 93005 ELECTROCARDIOGRAM TRACING: CPT | Performed by: SPECIALIST

## 2023-07-22 PROCEDURE — 84100 ASSAY OF PHOSPHORUS: CPT | Performed by: STUDENT IN AN ORGANIZED HEALTH CARE EDUCATION/TRAINING PROGRAM

## 2023-07-22 PROCEDURE — 94640 AIRWAY INHALATION TREATMENT: CPT

## 2023-07-22 PROCEDURE — 85025 COMPLETE CBC W/AUTO DIFF WBC: CPT | Performed by: STUDENT IN AN ORGANIZED HEALTH CARE EDUCATION/TRAINING PROGRAM

## 2023-07-22 PROCEDURE — 25000003 PHARM REV CODE 250: Performed by: HOSPITALIST

## 2023-07-22 PROCEDURE — 63600175 PHARM REV CODE 636 W HCPCS: Performed by: HOSPITALIST

## 2023-07-22 PROCEDURE — 83735 ASSAY OF MAGNESIUM: CPT | Performed by: STUDENT IN AN ORGANIZED HEALTH CARE EDUCATION/TRAINING PROGRAM

## 2023-07-22 RX ORDER — DOXYCYCLINE 100 MG/1
100 CAPSULE ORAL EVERY 12 HOURS
Qty: 6 CAPSULE | Refills: 0 | Status: SHIPPED | OUTPATIENT
Start: 2023-07-22 | End: 2023-07-25

## 2023-07-22 RX ORDER — DOXYCYCLINE 100 MG/1
100 CAPSULE ORAL EVERY 12 HOURS
Status: DISCONTINUED | OUTPATIENT
Start: 2023-07-22 | End: 2023-07-22 | Stop reason: HOSPADM

## 2023-07-22 RX ORDER — FUROSEMIDE 20 MG/1
20 TABLET ORAL DAILY PRN
Qty: 30 TABLET | Refills: 11 | Status: ON HOLD | OUTPATIENT
Start: 2023-07-22 | End: 2023-08-29 | Stop reason: HOSPADM

## 2023-07-22 RX ADMIN — PROPAFENONE HYDROCHLORIDE 225 MG: 150 TABLET, FILM COATED ORAL at 09:07

## 2023-07-22 RX ADMIN — IPRATROPIUM BROMIDE AND ALBUTEROL SULFATE 3 ML: 2.5; .5 SOLUTION RESPIRATORY (INHALATION) at 01:07

## 2023-07-22 RX ADMIN — DOXYCYCLINE HYCLATE 100 MG: 100 CAPSULE ORAL at 09:07

## 2023-07-22 RX ADMIN — LEVOTHYROXINE SODIUM 100 MCG: 0.1 TABLET ORAL at 05:07

## 2023-07-22 RX ADMIN — AMLODIPINE BESYLATE 10 MG: 5 TABLET ORAL at 09:07

## 2023-07-22 RX ADMIN — METOPROLOL TARTRATE 12.5 MG: 25 TABLET, FILM COATED ORAL at 09:07

## 2023-07-22 RX ADMIN — BETAXOLOL HYDROCHLORIDE 1 DROP: 5 SOLUTION/ DROPS OPHTHALMIC at 09:07

## 2023-07-22 RX ADMIN — IPRATROPIUM BROMIDE AND ALBUTEROL SULFATE 3 ML: 2.5; .5 SOLUTION RESPIRATORY (INHALATION) at 07:07

## 2023-07-22 RX ADMIN — FUROSEMIDE 40 MG: 10 INJECTION, SOLUTION INTRAMUSCULAR; INTRAVENOUS at 09:07

## 2023-07-22 RX ADMIN — IPRATROPIUM BROMIDE AND ALBUTEROL SULFATE 3 ML: 2.5; .5 SOLUTION RESPIRATORY (INHALATION) at 12:07

## 2023-07-22 NOTE — PT/OT/SLP PROGRESS
Physical Therapy      Patient Name:  Vickie Benedict   MRN:  [id number]    Patient not seen today secondary to prepping for d/c. Will follow-up 7/23/23.

## 2023-07-22 NOTE — DISCHARGE SUMMARY
[facility]  Discharge Summary      Patient Name: Vickie Benedict  MRN: [id number]  [facility]: [id number]  Patient Class: IP- Inpatient  Admission Date: 7/19/2023  Hospital Length of Stay: 3 days  Discharge Date and Time:  07/22/2023 3:14 PM  Attending Physician: Carmen Salter MD   Discharging Provider: Carmen Salter MD  Primary Care Provider: Aureliano Morocho MD    Primary Care Team: Networked reference to record PCT     HPI:   Open 88-year-old female with a history of atrial fibrillation not currently on anticoagulation, hypertension, hypothyroidism who presents via EMS with acute worsening of shortness of breath.  She says that she has had a head cold over the past 2 weeks and has had 2 incidents of severe shortness of breath.  She developed worsening shortness of breath over the last 3 days.  She has had lower extremity swelling.  She denies orthopnea but has been sleeping propped up on pillows for the past couple of years.  She has had a productive cough.  She denies fever or chills.  She denies chest pain or palpitations.  She is compliant with her medication regimen.  She has never had symptoms like this before.  She was on a diuretic pill in the past but she says it made her feel poorly so she stopped it.  She is a full code at this time and wants to discuss his code status with her son before determining whether she wants to be DNR in the future.  She has no other updates her past medical, family, surgical, social history.    In the ED:  She arrived on 6 L of oxygen and was hypoxic, significant respiratory distress and was placed on BiPAP.  Pulse 84, initial respiratory rate 40 now 16, /90 to, O2 sat 84% now 97% on BiPAP.  WBC 12.8, hemoglobin 12.9, platelets 281.  Sodium 138, creatinine 1.3 at her baseline, , troponin 19.3 initial, 2nd is pending.  Lactic acid 1, blood cultures taken in the ED. blood gas with pH of 7.3 on admission pCO2 51.  CXR with bilateral  pulmonary infiltrates consistent with edema.  Small right lower lung effusion.  EKG without acute ischemic changes.  She was treated with IV Lasix as well as Solu-Medrol and nebulizers in the ED.  Had significant improvement in her breathing.  Placed on BiPAP with improvement as well.  She is now comfortable and able to talk easily.  Will admit for what appears to be acute CHF exacerbation, possibility of underlying pneumonia.  Step-down level of care for BiPAP      * No surgery found *      Hospital Course:   Patient was admitted with acute hypoxic respiratory failure requiring BiPAP related to CHF exacerbation.  She was treated with IV Lasix for diuresis, IV steroids for possible COPD component, empiric antibiotics to cover pneumonia and oxygen.  Respiratory status improved and oxygen was weaned.  She remained afebrile with normal procalcitonin level and no evidence of pneumonia, was continued on doxycycline for bronchitis for 5 day course.  She continued to require supplemental oxygen on discharge.  She was discharged home in stable condition with oral Lasix and will follow up with Cardiology and her PCP.       Goals of Care Treatment Preferences:  Code Status: Full Code      Consults:     Pulmonary  * Acute hypoxemic respiratory failure          Final Active Diagnoses:    Diagnosis Date Noted POA    PRINCIPAL PROBLEM:  Acute hypoxemic respiratory failure [J96.01] 07/19/2023 Unknown    CAP (community acquired pneumonia) [J18.9] 07/20/2023 Yes    CHF exacerbation [I50.9] 07/19/2023 Unknown    Postablative hypothyroidism [E89.0] 04/28/2021 Yes     Chronic    History of atrial fibrillation [Z86.79] 04/28/2021 Not Applicable     Chronic    Essential hypertension [I10] 04/28/2021 Yes     Chronic    CKD (chronic kidney disease), stage III [N18.30]  Yes     Chronic      Problems Resolved During this Admission:       Discharged Condition: good    Disposition: Home or Self Care    Follow Up:   Follow-up Information  "    Aureliano Morocho MD Follow up in 1 week(s).    Specialties: Interventional Cardiology, Cardiovascular Disease, Cardiology  Contact information:  [address]  [address]  [facility]  [place] [address]  [phone]                       Patient Instructions:      OXYGEN FOR HOME USE     Order Specific Question Answer Comments   Liter Flow 4    Duration Continuous    Qualifying Test Performed at: Activity    Oxygen saturation at rest 86    Oxygen saturation with activity 86    Oxygen saturation with activity on oxygen 95    Portable mode: continuous    Route nasal cannula    Device: home concentrator with portable tanks    Length of need (in months): 12 mos    Patient condition with qualifying saturation CHF    Height: 5' 2" (1.575 m)    Weight: 67.8 kg (149 lb 7.6 oz)    Alternative treatment measures have been tried or considered and deemed clinically ineffective. Yes      Ambulatory referral/consult to Cardiology   Standing Status: Future   Referral Priority: Routine Referral Type: Consultation   Referral Reason: Specialty Services Required   Requested Specialty: Cardiology   Number of Visits Requested: 1     Diet Adult Regular     Activity as tolerated       Significant Diagnostic Studies: N/A    Pending Diagnostic Studies:     Procedure Component Value Units Date/Time    EKG 12-lead [[id number]]     Order Status: Sent Lab Status: No result          Medications:  Reconciled Home Medications:      Medication List      START taking these medications    doxycycline 100 MG Cap  Commonly known as: VIBRAMYCIN  Take 1 capsule (100 mg total) by mouth every 12 (twelve) hours. for 3 days     furosemide 20 MG tablet  Commonly known as: LASIX  Take 1 tablet (20 mg total) by mouth daily as needed. As needed for leg swelling        CHANGE how you take these medications    levothyroxine 100 MCG tablet  Commonly known as: SYNTHROID  TAKE 1 TABLET BY MOUTH EVERY DAY  What changed: when to take this   "   propafenone 225 MG Tab  Commonly known as: RYTHMOL  TAKE 1 TABLET BY MOUTH TWICE DAILY  What changed: when to take this        CONTINUE taking these medications    amLODIPine 10 MG tablet  Commonly known as: NORVASC  Take 1 tablet (10 mg total) by mouth once daily.     betaxolol 0.5% 0.5 % Drop  Commonly known as: BETOPTIC-S  Place 1 drop into both eyes 2 (two) times daily.     latanoprost 0.005 % ophthalmic solution  Place 1 drop into both eyes nightly.     lisinopriL 20 MG tablet  Commonly known as: PRINIVIL,ZESTRIL  Take 1 tablet (20 mg total) by mouth 2 (two) times daily.     metoprolol succinate 25 MG 24 hr tablet  Commonly known as: TOPROL-XL  Take 0.5 tablets (12.5 mg total) by mouth once daily.            Indwelling Lines/Drains at time of discharge:   Lines/Drains/Airways     Drain  Duration           Female External Urinary Catheter 07/20/23 1132 2 days                Time spent on the discharge of patient: 34 minutes         Carmen Salter MD  Department of Hospital Medicine  Count includes the [facility]

## 2023-07-22 NOTE — CARE UPDATE
07/21/23 2006   Patient Assessment/Suction   Level of Consciousness (AVPU) alert   Respiratory Effort Normal;Unlabored   Expansion/Accessory Muscles/Retractions no use of accessory muscles   All Lung Fields Breath Sounds equal bilaterally;clear   Rhythm/Pattern, Respiratory unlabored   Cough Frequency infrequent   Skin Integrity   $ Wound Care Tech Time 15 min   Area Observed Left;Right;Behind ear   Skin Appearance without discoloration   PRE-TX-O2   Device (Oxygen Therapy) nasal cannula with humidification   Flow (L/min) 4   SpO2 (!) 94 %   Pulse Oximetry Type Continuous   $ Pulse Oximetry - Multiple Charge Pulse Oximetry - Multiple   Pulse 63   Resp 18   Aerosol Therapy   $ Aerosol Therapy Charges Aerosol Treatment   Daily Review of Necessity (SVN) completed   Respiratory Treatment Status (SVN) given   Treatment Route (SVN) mouthpiece   Patient Position (SVN) sitting in chair   Post Treatment Assessment (SVN) breath sounds unchanged   Signs of Intolerance (SVN) none   Breath Sounds Post-Respiratory Treatment   Throughout All Fields Post-Treatment All Fields   Throughout All Fields Post-Treatment no change   Post-treatment Heart Rate (beats/min) 66   Post-treatment Resp Rate (breaths/min) 18   Education   $ Education Bronchodilator;15 min   Respiratory Evaluation   $ Care Plan Tech Time 15 min   $ Eval/Re-eval Charges Re-evaluation   Evaluation For Re-Eval 3 day

## 2023-07-22 NOTE — HOSPITAL COURSE
Patient was admitted with acute hypoxic respiratory failure requiring BiPAP related to CHF exacerbation.  She was treated with IV Lasix for diuresis, IV steroids for possible COPD component, empiric antibiotics to cover pneumonia and oxygen.  Respiratory status improved and oxygen was weaned.  She remained afebrile with normal procalcitonin level and no evidence of pneumonia, was continued on doxycycline for bronchitis for 5 day course.  She continued to require supplemental oxygen on discharge.  She was discharged home in stable condition with oral Lasix and will follow up with Cardiology and her PCP.

## 2023-07-22 NOTE — PROGRESS NOTES
Pharmacist Intervention IV to PO Note    Vickie Benedict is a 88 y.o. female being treated with IV medication doxycycline    Patient Data:    Vital Signs (Most Recent):  Temp: 98 °F (36.7 °C) (07/22/23 0300)  Pulse: (!) 52 (07/22/23 0600)  Resp: 19 (07/22/23 0600)  BP: (!) 177/71 (07/22/23 0600)  SpO2: 95 % (07/22/23 0600) Vital Signs (72h Range):  Temp:  [97.6 °F (36.4 °C)-99 °F (37.2 °C)]   Pulse:  [46-98]   Resp:  [15-48]   BP: (123-181)/()   SpO2:  [84 %-98 %]      CBC:  Recent Labs   Lab 07/20/23  0548 07/21/23  0521 07/22/23  0521   WBC 8.97 11.05 9.13   RBC 3.42* 3.48* 3.55*   HGB 11.3* 11.8* 11.9*   HCT 35.2* 35.9* 36.8*    220 239   * 103* 104*   MCH 33.0* 33.9* 33.5*   MCHC 32.1 32.9 32.3     CMP:     Recent Labs   Lab 07/19/23  0934 07/19/23  1551 07/20/23  0548 07/21/23  0521 07/22/23  0521   * 176* 167* 130* 102   CALCIUM 9.0 9.4 9.2 9.3 9.4   ALBUMIN 3.8 3.7  --   --   --    PROT 7.8 7.3  --   --   --     140 138 138 139   K 4.1 4.2 4.4 4.4 4.4   CO2 28 25 28 29 32*    102 101 103 102   BUN 22 24* 29* 33* 39*   CREATININE 1.3 1.5* 1.5* 1.4 1.3   ALKPHOS 71 64  --   --   --    ALT 27 24  --   --   --    AST 30 25  --   --   --    BILITOT 0.8 0.8  --   --   --        Dietary Orders:  Diet Orders            Diet Cardiac [facility]; Standard Tray: Cardiac starting at 07/19 1744            Based on the following criteria, this patient qualifies for intravenous to oral conversion:  [x] The patients gastrointestinal tract is functioning (tolerating medications via oral or enteral route for 24 hours and tolerating food or enteral feeds for 24 hours).  [x] The patient is hemodynamically stable for 24 hours (heart rate <100 beats per minute, systolic blood pressure >99 mm Hg, and respiratory rate <20 breaths per minute).  [x] The patient shows clinical improvement (afebrile for at least 24 hours and white blood cell count downtrending or normalized). Additionally, the  patient must be non-neutropenic (absolute neutrophil count >500 cells/mm3).  [x] For antimicrobials, the patient has received IV therapy for at least 24 hours.    IV medication doxycycline 100 mg BID will be changed to oral medication doxycycline 100 mg BID    Pharmacist's Name: Jeffrey Cohn  Pharmacist's Extension: [phone]

## 2023-07-22 NOTE — RESPIRATORY THERAPY
07/22/23 3276   Patient Assessment/Suction   Level of Consciousness (AVPU) alert   Respiratory Effort Normal;Unlabored   Expansion/Accessory Muscles/Retractions no retractions;no use of accessory muscles   All Lung Fields Breath Sounds Posterior:;Lateral:;crackles, fine;equal bilaterally   Rhythm/Pattern, Respiratory no shortness of breath reported;depth regular;pattern regular;unlabored   Cough Frequency no cough   Skin Integrity   $ Wound Care Tech Time 15 min   Area Observed Behind ear;Cheek;Upper lip;Nares   Skin Appearance without discoloration   PRE-TX-O2   Device (Oxygen Therapy) nasal cannula with humidification   $ Is the patient on Low Flow Oxygen? Yes   Flow (L/min) 3   Oxygen Concentration (%) 32   SpO2 96 %   Pulse Oximetry Type Continuous   $ Pulse Oximetry - Multiple Charge Pulse Oximetry - Multiple   Pulse 60   Resp 12   Aerosol Therapy   $ Aerosol Therapy Charges Aerosol Treatment   Daily Review of Necessity (SVN) completed   Respiratory Treatment Status (SVN) given   Treatment Route (SVN) mouthpiece   Patient Position (SVN) HOB elevated   Post Treatment Assessment (SVN) breath sounds improved   Signs of Intolerance (SVN) none   Breath Sounds Post-Respiratory Treatment   Throughout All Fields Post-Treatment All Fields   Throughout All Fields Post-Treatment Anterior:;Posterior:;Lateral:;aeration increased   Post-treatment Heart Rate (beats/min) 56   Post-treatment Resp Rate (breaths/min) 13

## 2023-07-22 NOTE — PLAN OF CARE
07/22/23 1036   Final Note   Assessment Type Final Discharge Note   Anticipated Discharge Disposition Home   What phone number can be called within the next 1-3 days to see how you are doing after discharge? [phone]   Post-Acute Status   Discharge Delays None known at this time     Patient cleared for discharge from case management standpoint.  Chart and discharge orders reviewed.  Patient discharged home with no further case management needs.

## 2023-07-22 NOTE — RESPIRATORY THERAPY
07/22/23 0737   Patient Assessment/Suction   Level of Consciousness (AVPU) alert   Respiratory Effort Normal;Unlabored   Expansion/Accessory Muscles/Retractions no retractions;no use of accessory muscles   All Lung Fields Breath Sounds Posterior:;Lateral:;crackles, fine;equal bilaterally   Rhythm/Pattern, Respiratory no shortness of breath reported;depth regular;unlabored   Cough Frequency no cough   Skin Integrity   $ Wound Care Tech Time 15 min   Area Observed Cheek;Behind ear;Upper lip;Nares   Skin Appearance without discoloration   PRE-TX-O2   Device (Oxygen Therapy) nasal cannula with humidification   $ Is the patient on Low Flow Oxygen? Yes   Flow (L/min) 3   Oxygen Concentration (%) 32   SpO2 (!) 94 %   Pulse Oximetry Type Continuous   $ Pulse Oximetry - Multiple Charge Pulse Oximetry - Multiple   Pulse 68   Resp 20   Aerosol Therapy   $ Aerosol Therapy Charges Aerosol Treatment   Daily Review of Necessity (SVN) completed   Respiratory Treatment Status (SVN) given   Treatment Route (SVN) mouthpiece   Post Treatment Assessment (SVN) breath sounds improved   Signs of Intolerance (SVN) none   Breath Sounds Post-Respiratory Treatment   Throughout All Fields Post-Treatment All Fields   Throughout All Fields Post-Treatment Anterior:;Posterior:;Lateral:;aeration increased   Post-treatment Heart Rate (beats/min) 63   Post-treatment Resp Rate (breaths/min) 18

## 2023-07-24 LAB
BACTERIA BLD CULT: NORMAL
BACTERIA BLD CULT: NORMAL

## 2023-07-25 ENCOUNTER — PATIENT OUTREACH (OUTPATIENT)
Dept: ADMINISTRATIVE | Facility: CLINIC | Age: 88
End: 2023-07-25
Payer: MEDICARE

## 2023-07-25 NOTE — PROGRESS NOTES
C3 nurse spoke with Vickie Benedict, son, Mr. Flores, for a TCC post hospital discharge follow up call. The patient does not have a scheduled HOSFU appointment with Aureliano Morocho MD  within 5-7 days post hospital discharge date 07/22/2023. C3 nurse was unable to schedule HOSFU appointment in [facility].    Message sent to PCP staff requesting they contact patient and schedule follow up appointment.

## 2023-07-26 NOTE — PHYSICIAN QUERY
[facility]     PT Name: Vickie Benedict  MR #: [id number]     DOCUMENTATION CLARIFICATION     CDS/: Aarti Morton               Contact information:  This form is a permanent document in the medical record.     Query Date: July 26, 2023    By submitting this query, we are merely seeking further clarification of documentation.  Please utilize your independent clinical judgment when addressing the question(s) below.    The Medical Record contains the following   Indicators Supporting Clinical Findings Location in Medical Record    Heart Failure documented Appears to have acute CHF exacerbation with bilateral lower extremity swelling, pulmonary edema on chest x-ray, BNP of 603. No previous history of CHF. No echo in our system.  - continue IV diuresis Lasix 40 mg b.i.d.    Appears to have acute CHF exacerbation  - likely heart failure with preserved ejection fraction, EF is normal on echocardiogram  - moderate mitral regurgitation is likely playing a role Assessment & Plan Note by Avery Brown MD7/20/2023        Assessment & Plan Note by Carmen Salter MD7/21/2023     Lab Results    EF/Echo · The left ventricle is mildly enlarged with normal systolic function.  · Indeterminate left ventricular diastolic function.  · The estimated ejection fraction is 55%.  · Normal right ventricular size with normal right ventricular systolic function.  · Mild left atrial enlargement.  · Mild right atrial enlargement.  · Moderate mitral regurgitation.  · Moderate tricuspid regurgitation.  · Mild aortic regurgitation.  · Intermediate central venous pressure (8 mmHg).  · The estimated PA systolic pressure is 55 mmHg.  · There is moderate pulmonary hypertension.  · Trivial circumferential pericardial effusion. Echo 7/19    Radiology findings 1.  Bilateral interstitial and hazy lung opacities could reflect pulmonary edema.   2.  Enlarged cardiomediastinal silhouette. Correlate for CHF.  Chest Xray 7/19    Diuretics/Meds She was treated  with IV Lasix as well as Solu-Medrol and nebulizers in the ED Discharge Summary         Provider, please specify the diagnosis associated with the above clinical findings.    [   ]  Acute Systolic Heart Failure    [   x]  Acute Diastolic Heart Failure    [   ]  Acute Combined Systolic & Diastolic Heart Failure   [   ]  Heart Failure Ruled Out   [   ]  Other (please specify):           Present on admission (POA) status:  [  x ]  Yes (Y)   [   ]  No (N)   [   ]  Documentation insufficient to determine if condition is POA (U)   [  ]  Clinically Undetermined (W)     Please document in your progress notes daily for the duration of treatment until resolved and include in your discharge summary.    References:  American Heart Association editorial staff. (2017, May). Ejection Fraction Heart Failure Measurement. American Heart Association. https://www.heart.org/en/health-topics/heart-failure/diagnosing-heart-failure/ejection-fraction-heart-failure-measurement#:~:text=Ejection%20fraction%20(EF)%20is%20a,pushed%20out%20with%20each%20heartbeat  BERTIN Richey (2020, December 15). Heart failure with preserved ejection fraction: Clinical manifestations and diagnosis. Diagnostic [employer]. https://www.[employer].com/contents/heart-failure-with-preserved-ejection-fraction-clinical-manifestations-and-diagnosis.  ICD-10-CM/PCS Coding Clinic Third Quarter ICD-10, Effective with discharges: September 8, 2020 [facility] § Heart failure with mid-range or mildly reduced ejection fraction (2020).  ICD-10-CM/PCS Coding Clinic Third Quarter ICD-10, Effective with discharges: September 8, 2020 [facility] § Heart failure with recovered ejection fraction (2020).  Form No. 78083

## 2023-08-21 ENCOUNTER — HOSPITAL ENCOUNTER (INPATIENT)
Facility: HOSPITAL | Age: 88
LOS: 6 days | Discharge: HOME HEALTH | DRG: 291 | End: 2023-08-29
Attending: STUDENT IN AN ORGANIZED HEALTH CARE EDUCATION/TRAINING PROGRAM | Admitting: STUDENT IN AN ORGANIZED HEALTH CARE EDUCATION/TRAINING PROGRAM
Payer: MEDICARE

## 2023-08-21 DIAGNOSIS — N18.32 STAGE 3B CHRONIC KIDNEY DISEASE: Primary | Chronic | ICD-10-CM

## 2023-08-21 DIAGNOSIS — R06.02 SHORTNESS OF BREATH: ICD-10-CM

## 2023-08-21 DIAGNOSIS — I25.10 ASCVD (ARTERIOSCLEROTIC CARDIOVASCULAR DISEASE): ICD-10-CM

## 2023-08-21 DIAGNOSIS — R00.1 BRADYCARDIA: ICD-10-CM

## 2023-08-21 DIAGNOSIS — I31.39 PERICARDIAL EFFUSION: ICD-10-CM

## 2023-08-21 DIAGNOSIS — I50.9 CHF (CONGESTIVE HEART FAILURE): ICD-10-CM

## 2023-08-21 DIAGNOSIS — I48.91 A-FIB: ICD-10-CM

## 2023-08-21 DIAGNOSIS — I50.43 ACUTE ON CHRONIC COMBINED SYSTOLIC AND DIASTOLIC CONGESTIVE HEART FAILURE: ICD-10-CM

## 2023-08-21 DIAGNOSIS — I48.0 PAROXYSMAL ATRIAL FIBRILLATION: ICD-10-CM

## 2023-08-21 DIAGNOSIS — I48.91 AFIB: ICD-10-CM

## 2023-08-21 DIAGNOSIS — R53.81 DEBILITY: ICD-10-CM

## 2023-08-21 DIAGNOSIS — I49.9 ARRHYTHMIA: ICD-10-CM

## 2023-08-21 LAB
ALBUMIN SERPL BCP-MCNC: 3.9 G/DL (ref 3.5–5.2)
ALP SERPL-CCNC: 70 U/L (ref 55–135)
ALT SERPL W/O P-5'-P-CCNC: 18 U/L (ref 10–44)
ANION GAP SERPL CALC-SCNC: 7 MMOL/L (ref 8–16)
AST SERPL-CCNC: 22 U/L (ref 10–40)
BASOPHILS # BLD AUTO: 0.06 K/UL (ref 0–0.2)
BASOPHILS NFR BLD: 0.5 % (ref 0–1.9)
BILIRUB SERPL-MCNC: 1 MG/DL (ref 0.1–1)
BNP SERPL-MCNC: 595 PG/ML (ref 0–99)
BUN SERPL-MCNC: 36 MG/DL (ref 8–23)
CALCIUM SERPL-MCNC: 9.4 MG/DL (ref 8.7–10.5)
CHLORIDE SERPL-SCNC: 104 MMOL/L (ref 95–110)
CO2 SERPL-SCNC: 26 MMOL/L (ref 23–29)
CREAT SERPL-MCNC: 1.7 MG/DL (ref 0.5–1.4)
DIFFERENTIAL METHOD: ABNORMAL
EOSINOPHIL # BLD AUTO: 0.2 K/UL (ref 0–0.5)
EOSINOPHIL NFR BLD: 1.9 % (ref 0–8)
ERYTHROCYTE [DISTWIDTH] IN BLOOD BY AUTOMATED COUNT: 14.3 % (ref 11.5–14.5)
EST. GFR  (NO RACE VARIABLE): 28.7 ML/MIN/1.73 M^2
GLUCOSE SERPL-MCNC: 185 MG/DL (ref 70–110)
HCT VFR BLD AUTO: 37.2 % (ref 37–48.5)
HGB BLD-MCNC: 11.6 G/DL (ref 12–16)
IMM GRANULOCYTES # BLD AUTO: 0.04 K/UL (ref 0–0.04)
IMM GRANULOCYTES NFR BLD AUTO: 0.3 % (ref 0–0.5)
LYMPHOCYTES # BLD AUTO: 1.3 K/UL (ref 1–4.8)
LYMPHOCYTES NFR BLD: 11.1 % (ref 18–48)
MCH RBC QN AUTO: 32.9 PG (ref 27–31)
MCHC RBC AUTO-ENTMCNC: 31.2 G/DL (ref 32–36)
MCV RBC AUTO: 105 FL (ref 82–98)
MONOCYTES # BLD AUTO: 0.7 K/UL (ref 0.3–1)
MONOCYTES NFR BLD: 5.7 % (ref 4–15)
NEUTROPHILS # BLD AUTO: 9.5 K/UL (ref 1.8–7.7)
NEUTROPHILS NFR BLD: 80.5 % (ref 38–73)
NRBC BLD-RTO: 0 /100 WBC
PLATELET # BLD AUTO: 242 K/UL (ref 150–450)
PMV BLD AUTO: 10.1 FL (ref 9.2–12.9)
POTASSIUM SERPL-SCNC: 4.9 MMOL/L (ref 3.5–5.1)
PROT SERPL-MCNC: 8 G/DL (ref 6–8.4)
RBC # BLD AUTO: 3.53 M/UL (ref 4–5.4)
SODIUM SERPL-SCNC: 137 MMOL/L (ref 136–145)
TROPONIN I SERPL HS-MCNC: 17.5 PG/ML (ref 0–14.9)
TROPONIN I SERPL HS-MCNC: 27.8 PG/ML (ref 0–14.9)
WBC # BLD AUTO: 11.85 K/UL (ref 3.9–12.7)

## 2023-08-21 PROCEDURE — 63600175 PHARM REV CODE 636 W HCPCS: Performed by: STUDENT IN AN ORGANIZED HEALTH CARE EDUCATION/TRAINING PROGRAM

## 2023-08-21 PROCEDURE — 84484 ASSAY OF TROPONIN QUANT: CPT | Mod: 91 | Performed by: STUDENT IN AN ORGANIZED HEALTH CARE EDUCATION/TRAINING PROGRAM

## 2023-08-21 PROCEDURE — G0378 HOSPITAL OBSERVATION PER HR: HCPCS

## 2023-08-21 PROCEDURE — 83880 ASSAY OF NATRIURETIC PEPTIDE: CPT | Performed by: STUDENT IN AN ORGANIZED HEALTH CARE EDUCATION/TRAINING PROGRAM

## 2023-08-21 PROCEDURE — 96365 THER/PROPH/DIAG IV INF INIT: CPT

## 2023-08-21 PROCEDURE — 84484 ASSAY OF TROPONIN QUANT: CPT | Performed by: STUDENT IN AN ORGANIZED HEALTH CARE EDUCATION/TRAINING PROGRAM

## 2023-08-21 PROCEDURE — 94640 AIRWAY INHALATION TREATMENT: CPT

## 2023-08-21 PROCEDURE — 99285 EMERGENCY DEPT VISIT HI MDM: CPT | Mod: 25

## 2023-08-21 PROCEDURE — 99900035 HC TECH TIME PER 15 MIN (STAT)

## 2023-08-21 PROCEDURE — 87040 BLOOD CULTURE FOR BACTERIA: CPT | Performed by: STUDENT IN AN ORGANIZED HEALTH CARE EDUCATION/TRAINING PROGRAM

## 2023-08-21 PROCEDURE — 94660 CPAP INITIATION&MGMT: CPT

## 2023-08-21 PROCEDURE — 96375 TX/PRO/DX INJ NEW DRUG ADDON: CPT

## 2023-08-21 PROCEDURE — 99900031 HC PATIENT EDUCATION (STAT)

## 2023-08-21 PROCEDURE — 94761 N-INVAS EAR/PLS OXIMETRY MLT: CPT

## 2023-08-21 PROCEDURE — 36415 COLL VENOUS BLD VENIPUNCTURE: CPT | Performed by: STUDENT IN AN ORGANIZED HEALTH CARE EDUCATION/TRAINING PROGRAM

## 2023-08-21 PROCEDURE — 25000003 PHARM REV CODE 250: Performed by: STUDENT IN AN ORGANIZED HEALTH CARE EDUCATION/TRAINING PROGRAM

## 2023-08-21 PROCEDURE — 27000221 HC OXYGEN, UP TO 24 HOURS

## 2023-08-21 PROCEDURE — 93010 EKG 12-LEAD: ICD-10-PCS | Mod: ,,, | Performed by: SPECIALIST

## 2023-08-21 PROCEDURE — 25000242 PHARM REV CODE 250 ALT 637 W/ HCPCS: Performed by: STUDENT IN AN ORGANIZED HEALTH CARE EDUCATION/TRAINING PROGRAM

## 2023-08-21 PROCEDURE — 94799 UNLISTED PULMONARY SVC/PX: CPT

## 2023-08-21 PROCEDURE — 93005 ELECTROCARDIOGRAM TRACING: CPT | Performed by: SPECIALIST

## 2023-08-21 PROCEDURE — 85025 COMPLETE CBC W/AUTO DIFF WBC: CPT | Performed by: STUDENT IN AN ORGANIZED HEALTH CARE EDUCATION/TRAINING PROGRAM

## 2023-08-21 PROCEDURE — 80053 COMPREHEN METABOLIC PANEL: CPT | Performed by: STUDENT IN AN ORGANIZED HEALTH CARE EDUCATION/TRAINING PROGRAM

## 2023-08-21 PROCEDURE — 93010 ELECTROCARDIOGRAM REPORT: CPT | Mod: ,,, | Performed by: SPECIALIST

## 2023-08-21 RX ORDER — FUROSEMIDE 10 MG/ML
20 INJECTION INTRAMUSCULAR; INTRAVENOUS 2 TIMES DAILY
Status: DISCONTINUED | OUTPATIENT
Start: 2023-08-22 | End: 2023-08-23

## 2023-08-21 RX ORDER — METHYLPREDNISOLONE SOD SUCC 125 MG
125 VIAL (EA) INJECTION
Status: COMPLETED | OUTPATIENT
Start: 2023-08-21 | End: 2023-08-21

## 2023-08-21 RX ORDER — EPINEPHRINE 0.22MG
100 AEROSOL WITH ADAPTER (ML) INHALATION DAILY
COMMUNITY

## 2023-08-21 RX ORDER — IPRATROPIUM BROMIDE AND ALBUTEROL SULFATE 2.5; .5 MG/3ML; MG/3ML
3 SOLUTION RESPIRATORY (INHALATION)
Status: COMPLETED | OUTPATIENT
Start: 2023-08-21 | End: 2023-08-21

## 2023-08-21 RX ORDER — LEVOTHYROXINE SODIUM 100 UG/1
100 TABLET ORAL DAILY
Status: DISCONTINUED | OUTPATIENT
Start: 2023-08-22 | End: 2023-08-24

## 2023-08-21 RX ORDER — LISINOPRIL 20 MG/1
20 TABLET ORAL DAILY
Status: DISCONTINUED | OUTPATIENT
Start: 2023-08-22 | End: 2023-08-24

## 2023-08-21 RX ORDER — AMLODIPINE BESYLATE 5 MG/1
10 TABLET ORAL DAILY
Status: DISCONTINUED | OUTPATIENT
Start: 2023-08-22 | End: 2023-08-24

## 2023-08-21 RX ORDER — LEVOFLOXACIN 5 MG/ML
750 INJECTION, SOLUTION INTRAVENOUS
Status: COMPLETED | OUTPATIENT
Start: 2023-08-21 | End: 2023-08-21

## 2023-08-21 RX ORDER — TALC
6 POWDER (GRAM) TOPICAL NIGHTLY PRN
Status: DISCONTINUED | OUTPATIENT
Start: 2023-08-21 | End: 2023-08-29 | Stop reason: HOSPADM

## 2023-08-21 RX ORDER — SODIUM CHLORIDE 0.9 % (FLUSH) 0.9 %
10 SYRINGE (ML) INJECTION
Status: DISCONTINUED | OUTPATIENT
Start: 2023-08-21 | End: 2023-08-29 | Stop reason: HOSPADM

## 2023-08-21 RX ORDER — ASPIRIN 325 MG
325 TABLET ORAL
Status: DISCONTINUED | OUTPATIENT
Start: 2023-08-21 | End: 2023-08-21

## 2023-08-21 RX ORDER — FUROSEMIDE 10 MG/ML
20 INJECTION INTRAMUSCULAR; INTRAVENOUS
Status: COMPLETED | OUTPATIENT
Start: 2023-08-21 | End: 2023-08-21

## 2023-08-21 RX ORDER — MULTIVITAMIN
1 TABLET ORAL DAILY
COMMUNITY

## 2023-08-21 RX ADMIN — FUROSEMIDE 20 MG: 20 INJECTION, SOLUTION INTRAMUSCULAR; INTRAVENOUS at 04:08

## 2023-08-21 RX ADMIN — IPRATROPIUM BROMIDE AND ALBUTEROL SULFATE 3 ML: .5; 3 SOLUTION RESPIRATORY (INHALATION) at 04:08

## 2023-08-21 RX ADMIN — PROPAFENONE HYDROCHLORIDE 225 MG: 150 TABLET, FILM COATED ORAL at 10:08

## 2023-08-21 RX ADMIN — LEVOFLOXACIN 750 MG: 5 INJECTION, SOLUTION INTRAVENOUS at 05:08

## 2023-08-21 RX ADMIN — METHYLPREDNISOLONE SODIUM SUCCINATE 125 MG: 125 INJECTION, POWDER, FOR SOLUTION INTRAMUSCULAR; INTRAVENOUS at 04:08

## 2023-08-21 NOTE — ASSESSMENT & PLAN NOTE
Patient is identified as having Diastolic (HFpEF) heart failure that is Acute. CHF is currently uncontrolled due to Rales/crackles on pulmonary exam. Latest ECHO performed and demonstrates- Results for orders placed during the hospital encounter of 07/19/23    Echo    Interpretation Summary  · The left ventricle is mildly enlarged with normal systolic function.  · Indeterminate left ventricular diastolic function.  · The estimated ejection fraction is 55%.  · Normal right ventricular size with normal right ventricular systolic function.  · Mild left atrial enlargement.  · Mild right atrial enlargement.  · Moderate mitral regurgitation.  · Moderate tricuspid regurgitation.  · Mild aortic regurgitation.  · Intermediate central venous pressure (8 mmHg).  · The estimated PA systolic pressure is 55 mmHg.  · There is moderate pulmonary hypertension.  · Trivial circumferential pericardial effusion.  . Continue Beta Blocker, ACE/ARB and Furosemide and monitor clinical status closely. Monitor on telemetry. Patient is on CHF pathway.  Monitor strict Is&Os and daily weights.  Place on fluid restriction of 1.5 L. Cardiology has not been consulted. Continue to stress to patient importance of self efficacy and  on diet for CHF. Last BNP reviewed- and noted below   Recent Labs   Lab 08/21/23  1610   *   .    Strict I/O  Daily weight   Fluid restriction

## 2023-08-21 NOTE — SUBJECTIVE & OBJECTIVE
Past Medical History:   Diagnosis Date    Atrial fibrillation     Bradycardia     Carotid bruit     CKD (chronic kidney disease), stage III     Hyperlipidemia     OA (osteoarthritis)     Thyroid disease        Past Surgical History:   Procedure Laterality Date    HYSTERECTOMY      KNEE SURGERY Right     SHOULDER SURGERY Right        Review of patient's allergies indicates:   Allergen Reactions    Penicillins Anaphylaxis    Shellfish containing products        No current facility-administered medications on file prior to encounter.     Current Outpatient Medications on File Prior to Encounter   Medication Sig    amLODIPine (NORVASC) 10 MG tablet Take 1 tablet (10 mg total) by mouth once daily.    coenzyme Q10 (CO Q-10) 100 mg capsule Take 100 mg by mouth once daily.    docosahexaenoic acid/epa (FISH OIL ORAL) Take 1 capsule by mouth Daily.    FLAXSEED OIL ORAL Take 1 capsule by mouth Daily.    furosemide (LASIX) 20 MG tablet Take 1 tablet (20 mg total) by mouth daily as needed. As needed for leg swelling (Patient taking differently: Take 10 mg by mouth daily as needed (edema). As needed for leg swelling)    glucosam/msm/chond/taw201/hyal (GLUCOS-CHOND-MSM, WITH ANTIOX, ORAL) Take 1 capsule by mouth Daily.    levothyroxine (SYNTHROID) 100 MCG tablet TAKE 1 TABLET BY MOUTH EVERY DAY (Patient taking differently: Take 100 mcg by mouth before breakfast.)    lisinopriL (PRINIVIL,ZESTRIL) 20 MG tablet Take 1 tablet (20 mg total) by mouth 2 (two) times daily. (Patient taking differently: Take 20 mg by mouth once daily.)    metoprolol succinate (TOPROL-XL) 25 MG 24 hr tablet Take 0.5 tablets (12.5 mg total) by mouth once daily.    multivitamin (THERAGRAN) per tablet Take 1 tablet by mouth once daily.    propafenone (RYTHMOL) 225 MG Tab TAKE 1 TABLET BY MOUTH TWICE DAILY (Patient taking differently: Take 225 mg by mouth 2 (two) times a day.)    [DISCONTINUED] betaxolol 0.5% (BETOPTIC-S) 0.5 % Drop Place 1 drop into both eyes 2  (two) times daily.    [DISCONTINUED] latanoprost 0.005 % ophthalmic solution Place 1 drop into both eyes nightly.     Family History       Problem Relation (Age of Onset)    Heart disease Father          Tobacco Use    Smoking status: Never    Smokeless tobacco: Never   Substance and Sexual Activity    Alcohol use: Never    Drug use: Never    Sexual activity: Not on file     Review of Systems  Objective:     Vital Signs (Most Recent):  Temp: 98.6 °F (37 °C) (08/21/23 1549)  Pulse: 60 (08/21/23 1839)  Resp: (!) 21 (08/21/23 1809)  BP: (!) 151/65 (08/21/23 1829)  SpO2: 97 % (08/21/23 1839) Vital Signs (24h Range):  Temp:  [98.6 °F (37 °C)] 98.6 °F (37 °C)  Pulse:  [43-82] 60  Resp:  [0-30] 21  SpO2:  [94 %-99 %] 97 %  BP: (123-151)/(53-83) 151/65     Weight: 68 kg (150 lb)  Body mass index is 27.44 kg/m².     Physical Exam  Constitutional:       Appearance: Normal appearance.   HENT:      Head: Normocephalic and atraumatic.      Right Ear: External ear normal.      Left Ear: External ear normal.      Nose: Nose normal.      Mouth/Throat:      Mouth: Mucous membranes are moist.   Eyes:      Extraocular Movements: Extraocular movements intact.   Cardiovascular:      Rate and Rhythm: Normal rate. Rhythm irregular.      Heart sounds: No murmur heard.  Pulmonary:      Effort: Pulmonary effort is normal. No respiratory distress.      Breath sounds: Rales present.      Comments: On 10L NC  Abdominal:      General: Abdomen is flat. There is no distension.      Tenderness: There is no abdominal tenderness.   Musculoskeletal:      Comments: 1+ bilateral pitting edema    Skin:     Findings: No lesion or rash.   Neurological:      General: No focal deficit present.      Mental Status: She is alert and oriented to person, place, and time.   Psychiatric:         Mood and Affect: Mood normal.         Behavior: Behavior normal.                Significant Labs: All pertinent labs within the past 24 hours have been  reviewed.    Significant Imaging: I have reviewed all pertinent imaging results/findings within the past 24 hours.

## 2023-08-21 NOTE — H&P
"[facility] - Emergency Dept  Hospital Medicine  History & Physical    Patient Name: Vickie Benedict  MRN: [id number]  Patient Class: OP- Observation  Admission Date: 8/21/2023  Attending Physician: Ahmet Simeon MD   Primary Care Provider: Aureliano Morocho MD         Patient information was obtained from patient and ER records.     Subjective:     Principal Problem:CHF exacerbation    Chief Complaint:   Chief Complaint   Patient presents with    Shortness of Breath     SOB X 3 HOURS, 85% ON ROOM AIR, HOME O2        HPI: Ms. Benedict is a 87 yo w/pmhx of hypothyroidism, HFpEF on 4L NC at home, HTN, afib who presents w/SOB. Patient was discharged 1 month ago w/HF exacerbation and also treated for bronchitis. However, patient reports since discharge she has been feeling weak. Reports in the last week she has progressively gotten more SOB w/ exertion. She also reports 2 pillow orthopnea, PNA and LE edema up to calf. She reports she was discharged with 20mg lasix daily but did not much of a difference in her LE edema and stopped taking it. She then started taking a diuretic her cardiologist prescribed but does not remember the name or the dose. Believes it starts w/a "t". She took 1/2 a pill and noticed some improvement in her edema but not her breathing. Per chart review, cannot find name/dose of diuretic.     ED labs notable for trop 17, . TTE from 7/2023 w/normal EF and diastolic fxn. Cr 1.7 (baseline 1.3-1.5). Was given 20mg IV lasix w/500 cc output w/in 30 minutes.       Past Medical History:   Diagnosis Date    Atrial fibrillation     Bradycardia     Carotid bruit     CKD (chronic kidney disease), stage III     Hyperlipidemia     OA (osteoarthritis)     Thyroid disease        Past Surgical History:   Procedure Laterality Date    HYSTERECTOMY      KNEE SURGERY Right     SHOULDER SURGERY Right        Review of patient's allergies indicates:   Allergen Reactions    Penicillins " Anaphylaxis    Shellfish containing products        No current facility-administered medications on file prior to encounter.     Current Outpatient Medications on File Prior to Encounter   Medication Sig    amLODIPine (NORVASC) 10 MG tablet Take 1 tablet (10 mg total) by mouth once daily.    coenzyme Q10 (CO Q-10) 100 mg capsule Take 100 mg by mouth once daily.    docosahexaenoic acid/epa (FISH OIL ORAL) Take 1 capsule by mouth Daily.    FLAXSEED OIL ORAL Take 1 capsule by mouth Daily.    furosemide (LASIX) 20 MG tablet Take 1 tablet (20 mg total) by mouth daily as needed. As needed for leg swelling (Patient taking differently: Take 10 mg by mouth daily as needed (edema). As needed for leg swelling)    glucosam/msm/chond/wmk994/hyal (GLUCOS-CHOND-MSM, WITH ANTIOX, ORAL) Take 1 capsule by mouth Daily.    levothyroxine (SYNTHROID) 100 MCG tablet TAKE 1 TABLET BY MOUTH EVERY DAY (Patient taking differently: Take 100 mcg by mouth before breakfast.)    lisinopriL (PRINIVIL,ZESTRIL) 20 MG tablet Take 1 tablet (20 mg total) by mouth 2 (two) times daily. (Patient taking differently: Take 20 mg by mouth once daily.)    metoprolol succinate (TOPROL-XL) 25 MG 24 hr tablet Take 0.5 tablets (12.5 mg total) by mouth once daily.    multivitamin (THERAGRAN) per tablet Take 1 tablet by mouth once daily.    propafenone (RYTHMOL) 225 MG Tab TAKE 1 TABLET BY MOUTH TWICE DAILY (Patient taking differently: Take 225 mg by mouth 2 (two) times a day.)    [DISCONTINUED] betaxolol 0.5% (BETOPTIC-S) 0.5 % Drop Place 1 drop into both eyes 2 (two) times daily.    [DISCONTINUED] latanoprost 0.005 % ophthalmic solution Place 1 drop into both eyes nightly.     Family History       Problem Relation (Age of Onset)    Heart disease Father          Tobacco Use    Smoking status: Never    Smokeless tobacco: Never   Substance and Sexual Activity    Alcohol use: Never    Drug use: Never    Sexual activity: Not on file     Review of  Systems  Objective:     Vital Signs (Most Recent):  Temp: 98.6 °F (37 °C) (08/21/23 1549)  Pulse: 60 (08/21/23 1839)  Resp: (!) 21 (08/21/23 1809)  BP: (!) 151/65 (08/21/23 1829)  SpO2: 97 % (08/21/23 1839) Vital Signs (24h Range):  Temp:  [98.6 °F (37 °C)] 98.6 °F (37 °C)  Pulse:  [43-82] 60  Resp:  [0-30] 21  SpO2:  [94 %-99 %] 97 %  BP: (123-151)/(53-83) 151/65     Weight: 68 kg (150 lb)  Body mass index is 27.44 kg/m².     Physical Exam  Constitutional:       Appearance: Normal appearance.   HENT:      Head: Normocephalic and atraumatic.      Right Ear: External ear normal.      Left Ear: External ear normal.      Nose: Nose normal.      Mouth/Throat:      Mouth: Mucous membranes are moist.   Eyes:      Extraocular Movements: Extraocular movements intact.   Cardiovascular:      Rate and Rhythm: Normal rate. Rhythm irregular.      Heart sounds: No murmur heard.  Pulmonary:      Effort: Pulmonary effort is normal. No respiratory distress.      Breath sounds: Rales present.      Comments: On 10L NC  Abdominal:      General: Abdomen is flat. There is no distension.      Tenderness: There is no abdominal tenderness.   Musculoskeletal:      Comments: 1+ bilateral pitting edema    Skin:     Findings: No lesion or rash.   Neurological:      General: No focal deficit present.      Mental Status: She is alert and oriented to person, place, and time.   Psychiatric:         Mood and Affect: Mood normal.         Behavior: Behavior normal.                Significant Labs: All pertinent labs within the past 24 hours have been reviewed.    Significant Imaging: I have reviewed all pertinent imaging results/findings within the past 24 hours.    Assessment/Plan:     * CHF exacerbation  Patient is identified as having Diastolic (HFpEF) heart failure that is Acute. CHF is currently uncontrolled due to Rales/crackles on pulmonary exam. Latest ECHO performed and demonstrates- Results for orders placed during the hospital encounter of  07/19/23    Echo    Interpretation Summary  · The left ventricle is mildly enlarged with normal systolic function.  · Indeterminate left ventricular diastolic function.  · The estimated ejection fraction is 55%.  · Normal right ventricular size with normal right ventricular systolic function.  · Mild left atrial enlargement.  · Mild right atrial enlargement.  · Moderate mitral regurgitation.  · Moderate tricuspid regurgitation.  · Mild aortic regurgitation.  · Intermediate central venous pressure (8 mmHg).  · The estimated PA systolic pressure is 55 mmHg.  · There is moderate pulmonary hypertension.  · Trivial circumferential pericardial effusion.  . Continue Beta Blocker, ACE/ARB and Furosemide and monitor clinical status closely. Monitor on telemetry. Patient is on CHF pathway.  Monitor strict Is&Os and daily weights.  Place on fluid restriction of 1.5 L. Cardiology has not been consulted. Continue to stress to patient importance of self efficacy and  on diet for CHF. Last BNP reviewed- and noted below   Recent Labs   Lab 08/21/23  1610   *   .    Strict I/O  Daily weight   Fluid restriction     Postablative hypothyroidism  Continue synthroid       History of atrial fibrillation  Not on AC, unclear why   Cont rhytmol   Cont metoprolol       Essential hypertension  amlo   Lisinopril     CKD (chronic kidney disease), stage III  Baseline cr 1.3-1.5  Avoid nephrotoxic agents         VTE Risk Mitigation (From admission, onward)    None             On 08/21/2023, patient should be placed in hospital observation services under my care.        Ahmet Simeon MD  Department of Hospital Medicine  [facility] - Emergency Dept

## 2023-08-21 NOTE — HPI
"Ms. Benedict is a 89 yo w/pmhx of hypothyroidism, HFpEF on 4L NC at home, HTN, afib who presents w/SOB. Patient was discharged 1 month ago w/HF exacerbation and also treated for bronchitis. However, patient reports since discharge she has been feeling weak. Reports in the last week she has progressively gotten more SOB w/ exertion. She also reports 2 pillow orthopnea, PNA and LE edema up to calf. She reports she was discharged with 20mg lasix daily but did not much of a difference in her LE edema and stopped taking it. She then started taking a diuretic her cardiologist prescribed but does not remember the name or the dose. Believes it starts w/a "t". She took 1/2 a pill and noticed some improvement in her edema but not her breathing. Per chart review, cannot find name/dose of diuretic.     ED labs notable for trop 17, . TTE from 7/2023 w/normal EF and diastolic fxn. Cr 1.7 (baseline 1.3-1.5). Was given 20mg IV lasix w/500 cc output w/in 30 minutes.   "

## 2023-08-21 NOTE — PHARMACY MED REC
"Admission Medication History     The home medication history was taken by Kamar Vasquez.    You may go to "Admission" then "Reconcile Home Medications" tabs to review and/or act upon these items.     The home medication list has been updated by the Pharmacy department.   Please read ALL comments highlighted in yellow.   Please address this information as you see fit.    Feel free to contact us if you have any questions or require assistance.      Medications listed below were obtained from: Patient/family and Analytic software- [employer]  No current facility-administered medications on file prior to encounter.     Current Outpatient Medications on File Prior to Encounter   Medication Sig Dispense Refill    amLODIPine (NORVASC) 10 MG tablet Take 1 tablet (10 mg total) by mouth once daily. 90 tablet 3    coenzyme Q10 (CO Q-10) 100 mg capsule Take 100 mg by mouth once daily.      docosahexaenoic acid/epa (FISH OIL ORAL) Take 1 capsule by mouth Daily.      FLAXSEED OIL ORAL Take 1 capsule by mouth Daily.      furosemide (LASIX) 20 MG tablet Take 1 tablet (20 mg total) by mouth daily as needed. As needed for leg swelling (Patient taking differently: Take 10 mg by mouth daily as needed (edema). As needed for leg swelling) 30 tablet 11    glucosam/msm/chond/cvo651/hyal (GLUCOS-CHOND-MSM, WITH ANTIOX, ORAL) Take 1 capsule by mouth Daily.      levothyroxine (SYNTHROID) 100 MCG tablet TAKE 1 TABLET BY MOUTH EVERY DAY (Patient taking differently: Take 100 mcg by mouth before breakfast.) 90 tablet 4    lisinopriL (PRINIVIL,ZESTRIL) 20 MG tablet Take 1 tablet (20 mg total) by mouth 2 (two) times daily. (Patient taking differently: Take 20 mg by mouth once daily.) 180 tablet 3    metoprolol succinate (TOPROL-XL) 25 MG 24 hr tablet Take 0.5 tablets (12.5 mg total) by mouth once daily. 45 tablet 4    multivitamin (THERAGRAN) per tablet Take 1 tablet by mouth once daily.      propafenone (RYTHMOL) 225 MG Tab TAKE 1 TABLET BY MOUTH " TWICE DAILY (Patient taking differently: Take 225 mg by mouth 2 (two) times a day.) 180 tablet 3    [DISCONTINUED] betaxolol 0.5% (BETOPTIC-S) 0.5 % Drop Place 1 drop into both eyes 2 (two) times daily.      [DISCONTINUED] latanoprost 0.005 % ophthalmic solution Place 1 drop into both eyes nightly.               Kamar Vasquez  EXT 1924                 .

## 2023-08-21 NOTE — ED PROVIDER NOTES
Encounter Date: 8/21/2023       History     Chief Complaint   Patient presents with    Shortness of Breath     SOB X 3 HOURS, 85% ON ROOM AIR, HOME O2     HPI  80-year-old presenting with shortness of breath.  Was recently hospitalized for CHF and COPD exacerbation 3 weeks ago.  Reports that since then she still has not fell Man herself.  A few days ago started having cough again and worsening swelling of her legs and shortness of breath that has gradually worsened.  Takes 10 mg of Lasix per day.  Review of patient's allergies indicates:   Allergen Reactions    Penicillins Anaphylaxis    Shellfish containing products      Past Medical History:   Diagnosis Date    Atrial fibrillation     Bradycardia     Carotid bruit     CKD (chronic kidney disease), stage III     Hyperlipidemia     OA (osteoarthritis)     Thyroid disease      Past Surgical History:   Procedure Laterality Date    HYSTERECTOMY      KNEE SURGERY Right     SHOULDER SURGERY Right      Family History   Problem Relation Age of Onset    Heart disease Father      Social History     Tobacco Use    Smoking status: Never    Smokeless tobacco: Never   Substance Use Topics    Alcohol use: Never    Drug use: Never     Review of Systems   Respiratory:  Positive for shortness of breath.    Cardiovascular:  Positive for leg swelling.       Physical Exam     Initial Vitals [08/21/23 1549]   BP Pulse Resp Temp SpO2   132/83 82 (!) 30 98.6 °F (37 °C) 98 %      MAP       --         Physical Exam    Nursing note and vitals reviewed.  Constitutional: She appears well-developed. She is not diaphoretic.   HENT:   Head: Normocephalic.   Eyes: Right eye exhibits no discharge. Left eye exhibits no discharge. No scleral icterus.   Neck: Neck supple. No tracheal deviation present.   Cardiovascular:  Normal rate and regular rhythm.           Pulmonary/Chest: No stridor. She has wheezes (bilat). She has rales (bilat).   Abdominal: Abdomen is soft. She exhibits no distension.  There is no abdominal tenderness. There is no rebound and no guarding.   Musculoskeletal:         General: Edema (bilat lower legs to knees 2+) present.      Cervical back: Neck supple.     Neurological: She is alert and oriented to person, place, and time.   Skin: Skin is warm and dry.         ED Course   Procedures  Labs Reviewed   CBC W/ AUTO DIFFERENTIAL - Abnormal; Notable for the following components:       Result Value    RBC 3.53 (*)     Hemoglobin 11.6 (*)      (*)     MCH 32.9 (*)     MCHC 31.2 (*)     Gran # (ANC) 9.5 (*)     Gran % 80.5 (*)     Lymph % 11.1 (*)     All other components within normal limits   COMPREHENSIVE METABOLIC PANEL - Abnormal; Notable for the following components:    Glucose 185 (*)     BUN 36 (*)     Creatinine 1.7 (*)     eGFR 28.7 (*)     Anion Gap 7 (*)     All other components within normal limits   TROPONIN I HIGH SENSITIVITY - Abnormal; Notable for the following components:    Troponin I High Sensitivity 17.5 (*)     All other components within normal limits   B-TYPE NATRIURETIC PEPTIDE - Abnormal; Notable for the following components:     (*)     All other components within normal limits        ECG Results              EKG 12-lead (In process)  Result time 08/21/23 16:06:00      In process by Interface, Lab In [facility] (08/21/23 16:06:00)                   Narrative:    Test Reason : R06.02,    Vent. Rate : 070 BPM     Atrial Rate : 070 BPM     P-R Int : 222 ms          QRS Dur : 156 ms      QT Int : 442 ms       P-R-T Axes : 036 086 039 degrees     QTc Int : 477 ms    Sinus rhythm with 1st degree A-V block  Nonspecific intraventricular block  Abnormal ECG  When compared with ECG of 19-JUL-2023 10:37,  Premature atrial complexes are no longer Present  Nonspecific intraventricular block has replaced Right bundle branch block    Referred By: [email or web address]   SELF           Confirmed By:                                   Imaging Results              X-Ray Chest AP  Portable (Final result)  Result time 08/21/23 16:45:36      Final result by Temo Jones MD (08/21/23 16:45:36)                   Narrative:      EXAM: XR CHEST AP PORTABLE    HISTORY: CHF    COMPARISON:Portable chest x-ray dated 7/20/2023    FINDINGS: The heart is moderately enlarged and unchanged. There are extensive airspace opacities throughout both lungs, worse on the right than the left that are essentially new since the preceding chest x-ray and are most likely due to extensive pulmonary edema. Superimposed pneumonia cannot be excluded. No significant pleural effusions are identified.    IMPRESSION:   Severe CHF.    Electronically signed by:  Otilio Jones MD  8/21/2023 4:45 PM CDT Workstation: [id number]                                     Medications   amLODIPine tablet 10 mg (has no administration in time range)   levothyroxine tablet 100 mcg (has no administration in time range)   lisinopriL tablet 20 mg (has no administration in time range)   metoprolol succinate (TOPROL-XL) 24 hr split tablet 12.5 mg (has no administration in time range)   propafenone split tablet 225 mg (225 mg Oral Given by Other 8/21/23 2225)   sodium chloride 0.9% flush 10 mL (has no administration in time range)   melatonin tablet 6 mg (has no administration in time range)   furosemide injection 20 mg (has no administration in time range)   furosemide injection 20 mg (20 mg Intravenous Given 8/21/23 1646)   methylPREDNISolone sodium succinate injection 125 mg (125 mg Intravenous Given 8/21/23 1647)   albuterol-ipratropium 2.5 mg-0.5 mg/3 mL nebulizer solution 3 mL (3 mLs Nebulization Given 8/21/23 1614)   levoFLOXacin 750 mg/150 mL IVPB 750 mg (0 mg Intravenous Stopped 8/21/23 1832)     Medical Decision Making  Amount and/or Complexity of Data Reviewed  Labs: ordered.  Radiology: ordered.    Risk  OTC drugs.  Prescription drug management.       87 yo presenting with sob. Hx of chf, copd    Ddx:  CHF exacerbation, COPD  exacerbation, MI, pneumonia, PE    Vitals within acceptable limits except for respiratory rate in the upper 20s and hypoxia to the 70's per EMS, improved to 96% with 15L NRB.     Clinical presentation most consistent with mixed picture of CHF and COPD exacerbation.  Patient reports she is been taking 10 mg of Lasix daily but says that her bilateral leg swelling and shortness of breath has not improved since she was discharged 3 weeks ago.  Slowly worsening until she felt that she could not breathe well today. Bipap to help pt with work of breathing while treating for chf with 20mg lasix and solumedrol/albuterol for copd exacerbation. Also for copd exacerbation started on levofloxacin (has pseudomonas risk factors of recent hospitalization, pt reports being on abx frequently). On levofloxacin as pt has penicillin allergy of anaphylaxis (therefore not giving ceftriaxone/cefepime etc). Anticipate being able to wean pt from bipap and admit to medicine. Pending cardiac workup. Pt reports no chest pain now or recently. EKG with no stemi but poor quality as pt moving with increased wob. Will repeat when effort improved.   Pao Zee MD  Emergency Medicine Staff Physician  4:10 PM     UPDATE:   Spoke to hospital medicine who has agreed to admit pt.   Pao Zee MD  Emergency Medicine Staff Physician  5:48 PM                             Clinical Impression:   Final diagnoses:  [R06.02] Shortness of breath  [I50.9] CHF (congestive heart failure)        ED Disposition Condition    Observation                 Pao Zee MD  08/22/23 0050

## 2023-08-22 PROBLEM — M79.605 LEFT LEG PAIN: Status: ACTIVE | Noted: 2023-08-22

## 2023-08-22 LAB
ANION GAP SERPL CALC-SCNC: 5 MMOL/L (ref 8–16)
BASOPHILS # BLD AUTO: 0 K/UL (ref 0–0.2)
BASOPHILS NFR BLD: 0 % (ref 0–1.9)
BUN SERPL-MCNC: 38 MG/DL (ref 8–23)
CALCIUM SERPL-MCNC: 9.1 MG/DL (ref 8.7–10.5)
CHLORIDE SERPL-SCNC: 104 MMOL/L (ref 95–110)
CO2 SERPL-SCNC: 28 MMOL/L (ref 23–29)
CREAT SERPL-MCNC: 1.5 MG/DL (ref 0.5–1.4)
DIFFERENTIAL METHOD: ABNORMAL
EOSINOPHIL # BLD AUTO: 0 K/UL (ref 0–0.5)
EOSINOPHIL NFR BLD: 0.2 % (ref 0–8)
ERYTHROCYTE [DISTWIDTH] IN BLOOD BY AUTOMATED COUNT: 13.9 % (ref 11.5–14.5)
EST. GFR  (NO RACE VARIABLE): 33.3 ML/MIN/1.73 M^2
GLUCOSE SERPL-MCNC: 179 MG/DL (ref 70–110)
HCT VFR BLD AUTO: 33.9 % (ref 37–48.5)
HGB BLD-MCNC: 10.7 G/DL (ref 12–16)
IMM GRANULOCYTES # BLD AUTO: 0.03 K/UL (ref 0–0.04)
IMM GRANULOCYTES NFR BLD AUTO: 0.5 % (ref 0–0.5)
LYMPHOCYTES # BLD AUTO: 0.4 K/UL (ref 1–4.8)
LYMPHOCYTES NFR BLD: 7.1 % (ref 18–48)
MAGNESIUM SERPL-MCNC: 1.9 MG/DL (ref 1.6–2.6)
MCH RBC QN AUTO: 33.1 PG (ref 27–31)
MCHC RBC AUTO-ENTMCNC: 31.6 G/DL (ref 32–36)
MCV RBC AUTO: 105 FL (ref 82–98)
MONOCYTES # BLD AUTO: 0.1 K/UL (ref 0.3–1)
MONOCYTES NFR BLD: 1.2 % (ref 4–15)
NEUTROPHILS # BLD AUTO: 5.5 K/UL (ref 1.8–7.7)
NEUTROPHILS NFR BLD: 91 % (ref 38–73)
NRBC BLD-RTO: 0 /100 WBC
PHOSPHATE SERPL-MCNC: 4 MG/DL (ref 2.7–4.5)
PLATELET # BLD AUTO: 219 K/UL (ref 150–450)
PMV BLD AUTO: 10.1 FL (ref 9.2–12.9)
POTASSIUM SERPL-SCNC: 4.8 MMOL/L (ref 3.5–5.1)
RBC # BLD AUTO: 3.23 M/UL (ref 4–5.4)
SODIUM SERPL-SCNC: 137 MMOL/L (ref 136–145)
TROPONIN I SERPL HS-MCNC: 25.3 PG/ML (ref 0–14.9)
WBC # BLD AUTO: 6.06 K/UL (ref 3.9–12.7)

## 2023-08-22 PROCEDURE — 36415 COLL VENOUS BLD VENIPUNCTURE: CPT | Performed by: STUDENT IN AN ORGANIZED HEALTH CARE EDUCATION/TRAINING PROGRAM

## 2023-08-22 PROCEDURE — 27000221 HC OXYGEN, UP TO 24 HOURS

## 2023-08-22 PROCEDURE — 84484 ASSAY OF TROPONIN QUANT: CPT | Performed by: STUDENT IN AN ORGANIZED HEALTH CARE EDUCATION/TRAINING PROGRAM

## 2023-08-22 PROCEDURE — 99900035 HC TECH TIME PER 15 MIN (STAT)

## 2023-08-22 PROCEDURE — 84100 ASSAY OF PHOSPHORUS: CPT | Performed by: STUDENT IN AN ORGANIZED HEALTH CARE EDUCATION/TRAINING PROGRAM

## 2023-08-22 PROCEDURE — 80048 BASIC METABOLIC PNL TOTAL CA: CPT | Performed by: STUDENT IN AN ORGANIZED HEALTH CARE EDUCATION/TRAINING PROGRAM

## 2023-08-22 PROCEDURE — 85025 COMPLETE CBC W/AUTO DIFF WBC: CPT | Performed by: STUDENT IN AN ORGANIZED HEALTH CARE EDUCATION/TRAINING PROGRAM

## 2023-08-22 PROCEDURE — 94761 N-INVAS EAR/PLS OXIMETRY MLT: CPT

## 2023-08-22 PROCEDURE — 96376 TX/PRO/DX INJ SAME DRUG ADON: CPT

## 2023-08-22 PROCEDURE — G0378 HOSPITAL OBSERVATION PER HR: HCPCS

## 2023-08-22 PROCEDURE — 63600175 PHARM REV CODE 636 W HCPCS: Performed by: STUDENT IN AN ORGANIZED HEALTH CARE EDUCATION/TRAINING PROGRAM

## 2023-08-22 PROCEDURE — 25000003 PHARM REV CODE 250: Performed by: STUDENT IN AN ORGANIZED HEALTH CARE EDUCATION/TRAINING PROGRAM

## 2023-08-22 PROCEDURE — 96372 THER/PROPH/DIAG INJ SC/IM: CPT | Performed by: STUDENT IN AN ORGANIZED HEALTH CARE EDUCATION/TRAINING PROGRAM

## 2023-08-22 PROCEDURE — 83735 ASSAY OF MAGNESIUM: CPT | Performed by: STUDENT IN AN ORGANIZED HEALTH CARE EDUCATION/TRAINING PROGRAM

## 2023-08-22 RX ORDER — GUAIFENESIN 100 MG/5ML
200 SOLUTION ORAL EVERY 4 HOURS PRN
Status: DISCONTINUED | OUTPATIENT
Start: 2023-08-22 | End: 2023-08-29 | Stop reason: HOSPADM

## 2023-08-22 RX ORDER — ENOXAPARIN SODIUM 100 MG/ML
40 INJECTION SUBCUTANEOUS EVERY 24 HOURS
Status: DISCONTINUED | OUTPATIENT
Start: 2023-08-22 | End: 2023-08-22

## 2023-08-22 RX ORDER — ENOXAPARIN SODIUM 100 MG/ML
30 INJECTION SUBCUTANEOUS EVERY 24 HOURS
Status: DISCONTINUED | OUTPATIENT
Start: 2023-08-22 | End: 2023-08-24

## 2023-08-22 RX ADMIN — PROPAFENONE HYDROCHLORIDE 225 MG: 150 TABLET, FILM COATED ORAL at 09:08

## 2023-08-22 RX ADMIN — LISINOPRIL 20 MG: 20 TABLET ORAL at 09:08

## 2023-08-22 RX ADMIN — FUROSEMIDE 20 MG: 10 INJECTION, SOLUTION INTRAMUSCULAR; INTRAVENOUS at 10:08

## 2023-08-22 RX ADMIN — FUROSEMIDE 20 MG: 10 INJECTION, SOLUTION INTRAMUSCULAR; INTRAVENOUS at 09:08

## 2023-08-22 RX ADMIN — ENOXAPARIN SODIUM 30 MG: 30 INJECTION SUBCUTANEOUS at 04:08

## 2023-08-22 RX ADMIN — LEVOTHYROXINE SODIUM 100 MCG: 100 TABLET ORAL at 05:08

## 2023-08-22 RX ADMIN — METOPROLOL SUCCINATE 12.5 MG: 25 TABLET, EXTENDED RELEASE ORAL at 09:08

## 2023-08-22 RX ADMIN — AMLODIPINE BESYLATE 10 MG: 5 TABLET ORAL at 09:08

## 2023-08-22 NOTE — PROGRESS NOTES
"[facility]  Progress Note    Patient Name: Vickie Benedict  MRN: [id number]  Patient Class: OP- Observation   Admission Date: 8/21/2023  Length of Stay: 0 days  Attending Physician: Ahmet Simeon MD  Primary Care Provider: Aureliano Morocho MD        Subjective:     Principal Problem:CHF exacerbation        HPI:  Ms. Benedict is a 89 yo w/pmhx of hypothyroidism, HFpEF on 4L NC at home, HTN, afib who presents w/SOB. Patient was discharged 1 month ago w/HF exacerbation and also treated for bronchitis. However, patient reports since discharge she has been feeling weak. Reports in the last week she has progressively gotten more SOB w/ exertion. She also reports 2 pillow orthopnea, PNA and LE edema up to calf. She reports she was discharged with 20mg lasix daily but did not much of a difference in her LE edema and stopped taking it. She then started taking a diuretic her cardiologist prescribed but does not remember the name or the dose. Believes it starts w/a "t". She took 1/2 a pill and noticed some improvement in her edema but not her breathing. Per chart review, cannot find name/dose of diuretic.     ED labs notable for trop 17, . TTE from 7/2023 w/normal EF and diastolic fxn. Cr 1.7 (baseline 1.3-1.5). Was given 20mg IV lasix w/500 cc output w/in 30 minutes.       Overview/Hospital Course:  Ms. Benedict is an 89 yo w/pmhx of HFpEF on 4L NC at home, afib, HTN who presented with HF exacerbation. Was recently discharged with lasix 20mg daily but reports not taking for the last week as she did not not any improvement with it ans was instead taking triamterine/HCTZ. Diuresing with IV lasix. Does have unilateral pain in LE. DVT US ordered. Will likely need to increase home lasix dose to 40mg daily.       Interval History: Put out 900cc this AM. SOB improving.     Review of Systems  Objective:     Vital Signs (Most Recent):  Temp: 98 °F (36.7 °C) (08/22/23 1154)  Pulse: 64 " (08/22/23 1154)  Resp: 20 (08/22/23 1154)  BP: (!) 131/59 (08/22/23 1154)  SpO2: 96 % (08/22/23 1154) Vital Signs (24h Range):  Temp:  [97.3 °F (36.3 °C)-98.6 °F (37 °C)] 98 °F (36.7 °C)  Pulse:  [43-82] 64  Resp:  [0-30] 20  SpO2:  [94 %-99 %] 96 %  BP: (123-156)/(53-83) 131/59     Weight: 68 kg (149 lb 14.6 oz)  Body mass index is 27.42 kg/m².    Intake/Output Summary (Last 24 hours) at 8/22/2023 1451  Last data filed at 8/22/2023 1158  Gross per 24 hour   Intake 590 ml   Output 1680 ml   Net -1090 ml         Physical Exam  Constitutional:       General: She is not in acute distress.     Appearance: She is not toxic-appearing.   HENT:      Head: Normocephalic and atraumatic.      Nose: Nose normal.      Mouth/Throat:      Mouth: Mucous membranes are moist.   Eyes:      Extraocular Movements: Extraocular movements intact.   Neck:      Comments: Left anterior cervical nodule   Cardiovascular:      Rate and Rhythm: Normal rate and regular rhythm.      Heart sounds: No murmur heard.  Pulmonary:      Effort: No respiratory distress.      Breath sounds: Rales present. No wheezing.      Comments: On 4L NC   Abdominal:      General: Abdomen is flat. Bowel sounds are normal. There is no distension.      Tenderness: There is no abdominal tenderness.   Musculoskeletal:         General: Tenderness (left leg ttp) present.      Right lower leg: Edema present.      Left lower leg: Edema present.   Skin:     General: Skin is warm and dry.      Comments: Lipoma on back   Neurological:      General: No focal deficit present.      Mental Status: She is alert and oriented to person, place, and time.   Psychiatric:         Mood and Affect: Mood normal.         Behavior: Behavior normal.             Significant Labs: All pertinent labs within the past 24 hours have been reviewed.    Significant Imaging: I have reviewed all pertinent imaging results/findings within the past 24 hours.      Assessment/Plan:      * CHF  exacerbation  Patient is identified as having Diastolic (HFpEF) heart failure that is Acute. CHF is currently uncontrolled due to Rales/crackles on pulmonary exam. Latest ECHO performed and demonstrates- Results for orders placed during the hospital encounter of 07/19/23    Echo    Interpretation Summary  · The left ventricle is mildly enlarged with normal systolic function.  · Indeterminate left ventricular diastolic function.  · The estimated ejection fraction is 55%.  · Normal right ventricular size with normal right ventricular systolic function.  · Mild left atrial enlargement.  · Mild right atrial enlargement.  · Moderate mitral regurgitation.  · Moderate tricuspid regurgitation.  · Mild aortic regurgitation.  · Intermediate central venous pressure (8 mmHg).  · The estimated PA systolic pressure is 55 mmHg.  · There is moderate pulmonary hypertension.  · Trivial circumferential pericardial effusion.  . Continue Beta Blocker, ACE/ARB and Furosemide and monitor clinical status closely. Monitor on telemetry. Patient is on CHF pathway.  Monitor strict Is&Os and daily weights.  Place on fluid restriction of 1.5 L. Cardiology has not been consulted. Continue to stress to patient importance of self efficacy and  on diet for CHF. Last BNP reviewed- and noted below   Recent Labs   Lab 08/21/23  1610   *   .    Strict I/O  Daily weight   Fluid restriction   Cont IV lasix BID - can likely switch to PO in AM      Left leg pain  DVT US ordered      Postablative hypothyroidism  Continue synthroid       History of atrial fibrillation  Not on AC, unclear why, defer to outpatient cardiologist    Cont rhytmol   Cont metoprolol       Essential hypertension  amlo   Lisinopril     CKD (chronic kidney disease), stage III  Baseline cr 1.3-1.5  Avoid nephrotoxic agents         VTE Risk Mitigation (From admission, onward)           Ordered     enoxaparin injection 30 mg  Every 24 hours         08/22/23 1210     IP VTE  HIGH RISK PATIENT  Once         08/21/23 2040     Place sequential compression device  Until discontinued         08/21/23 2040                    Discharge Planning   HEMANTH: 8/23/2023     Code Status: Full Code   Is the patient medically ready for discharge?:     Reason for patient still in hospital (select all that apply): Treatment and Imaging  Discharge Plan A: Home Health                  Ahmet Simeon MD  Department of Hospital Medicine   [facility]

## 2023-08-22 NOTE — PLAN OF CARE
Pt requests home health with no company preference. Referral sent to [facility] [place] [facility] via My[facility]. Will need  orders       08/22/23 1117   Post-Acute Status   Post-Acute Authorization Home Health   Home Health Status Referrals Sent

## 2023-08-22 NOTE — SUBJECTIVE & OBJECTIVE
Interval History: Put out 900cc this AM. SOB improving.     Review of Systems  Objective:     Vital Signs (Most Recent):  Temp: 98 °F (36.7 °C) (08/22/23 1154)  Pulse: 64 (08/22/23 1154)  Resp: 20 (08/22/23 1154)  BP: (!) 131/59 (08/22/23 1154)  SpO2: 96 % (08/22/23 1154) Vital Signs (24h Range):  Temp:  [97.3 °F (36.3 °C)-98.6 °F (37 °C)] 98 °F (36.7 °C)  Pulse:  [43-82] 64  Resp:  [0-30] 20  SpO2:  [94 %-99 %] 96 %  BP: (123-156)/(53-83) 131/59     Weight: 68 kg (149 lb 14.6 oz)  Body mass index is 27.42 kg/m².    Intake/Output Summary (Last 24 hours) at 8/22/2023 1451  Last data filed at 8/22/2023 1158  Gross per 24 hour   Intake 590 ml   Output 1680 ml   Net -1090 ml         Physical Exam  Constitutional:       General: She is not in acute distress.     Appearance: She is not toxic-appearing.   HENT:      Head: Normocephalic and atraumatic.      Nose: Nose normal.      Mouth/Throat:      Mouth: Mucous membranes are moist.   Eyes:      Extraocular Movements: Extraocular movements intact.   Neck:      Comments: Left anterior cervical nodule   Cardiovascular:      Rate and Rhythm: Normal rate and regular rhythm.      Heart sounds: No murmur heard.  Pulmonary:      Effort: No respiratory distress.      Breath sounds: Rales present. No wheezing.      Comments: On 4L NC   Abdominal:      General: Abdomen is flat. Bowel sounds are normal. There is no distension.      Tenderness: There is no abdominal tenderness.   Musculoskeletal:         General: Tenderness (left leg ttp) present.      Right lower leg: Edema present.      Left lower leg: Edema present.   Skin:     General: Skin is warm and dry.      Comments: Lipoma on back   Neurological:      General: No focal deficit present.      Mental Status: She is alert and oriented to person, place, and time.   Psychiatric:         Mood and Affect: Mood normal.         Behavior: Behavior normal.             Significant Labs: All pertinent labs within the past 24 hours have  been reviewed.    Significant Imaging: I have reviewed all pertinent imaging results/findings within the past 24 hours.

## 2023-08-22 NOTE — NURSING
Nurses Note -- 4 Eyes      8/22/2023   2:05 AM      Skin assessed during: Admit      [x] No Altered Skin Integrity Present    []Prevention Measures Documented      [] Yes- Altered Skin Integrity Present or Discovered   [] LDA Added if Not in Epic (Describe Wound)   [] New Altered Skin Integrity was Present on Admit and Documented in LDA   [] Wound Image Taken    Wound Care Consulted? No    Attending Nurse:  Breana Castano RN/Staff Member:  [id number]

## 2023-08-22 NOTE — PROGRESS NOTES
Pharmacist Renal Dose Adjustment Note    Vickie Benedict is a 88 y.o. female being treated with the medication lovenox    Patient Data:    Vital Signs (Most Recent):  Temp: 98 °F (36.7 °C) (08/22/23 1154)  Pulse: 64 (08/22/23 1154)  Resp: 20 (08/22/23 1154)  BP: (!) 131/59 (08/22/23 1154)  SpO2: 96 % (08/22/23 1154) Vital Signs (72h Range):  Temp:  [97.3 °F (36.3 °C)-98.6 °F (37 °C)]   Pulse:  [43-82]   Resp:  [0-30]   BP: (123-156)/(53-83)   SpO2:  [94 %-99 %]      Recent Labs   Lab 08/21/23  1610 08/22/23  0419   CREATININE 1.7* 1.5*     Serum creatinine: 1.5 mg/dL (H) 08/22/23 0419  Estimated creatinine clearance: 23.5 mL/min (A)    Medication:lovenox dose: 40 mg frequency Q24H will be changed to medication:lovenox dose:30 mg frequency:Q24H  Reason: CrCl < 30 mL/min    Pharmacist's Name: Jeffrey Cohn  Pharmacist's Extension: [phone]

## 2023-08-22 NOTE — ASSESSMENT & PLAN NOTE
Patient is identified as having Diastolic (HFpEF) heart failure that is Acute. CHF is currently uncontrolled due to Rales/crackles on pulmonary exam. Latest ECHO performed and demonstrates- Results for orders placed during the hospital encounter of 07/19/23    Echo    Interpretation Summary  · The left ventricle is mildly enlarged with normal systolic function.  · Indeterminate left ventricular diastolic function.  · The estimated ejection fraction is 55%.  · Normal right ventricular size with normal right ventricular systolic function.  · Mild left atrial enlargement.  · Mild right atrial enlargement.  · Moderate mitral regurgitation.  · Moderate tricuspid regurgitation.  · Mild aortic regurgitation.  · Intermediate central venous pressure (8 mmHg).  · The estimated PA systolic pressure is 55 mmHg.  · There is moderate pulmonary hypertension.  · Trivial circumferential pericardial effusion.  . Continue Beta Blocker, ACE/ARB and Furosemide and monitor clinical status closely. Monitor on telemetry. Patient is on CHF pathway.  Monitor strict Is&Os and daily weights.  Place on fluid restriction of 1.5 L. Cardiology has not been consulted. Continue to stress to patient importance of self efficacy and  on diet for CHF. Last BNP reviewed- and noted below   Recent Labs   Lab 08/21/23  1610   *   .    Strict I/O  Daily weight   Fluid restriction   Cont IV lasix BID - can likely switch to PO in AM     no rashes , no jaundice present , good turgor , no masses , no tenderness on palpation , no rashes , no jaundice present , good turgor , no masses , no tenderness on palpation

## 2023-08-22 NOTE — PLAN OF CARE
08/22/23 1111   FRANK Message   Medicare Outpatient and Observation Notification regarding financial responsibility Explained to patient/caregiver;Signed/date by patient/caregiver   Date FRANK was signed 08/22/23   Time FRANK was signed [id number]

## 2023-08-22 NOTE — PT/OT/SLP PROGRESS
Occupational Therapy      Patient Name:  Vickie Benedict   MRN:  [id number]    Patient not seen today secondary to Other (Comment) (Pt having ultrasound to r/o LLE blood clot.). Will follow-up next service date.    8/22/2023

## 2023-08-22 NOTE — CARE UPDATE
08/22/23 0758   PRE-TX-O2   Device (Oxygen Therapy) nasal cannula   $ Is the patient on Low Flow Oxygen? Yes   SpO2 96 %   Pulse Oximetry Type Intermittent   $ Pulse Oximetry - Multiple Charge Pulse Oximetry - Multiple   Pulse 72   Resp 15   Respiratory Evaluation   $ Care Plan Tech Time 15 min

## 2023-08-22 NOTE — HOSPITAL COURSE
Pt got admitted with  Acute on chronic combined systolic and diastolic congestive heart failure  Pt was started on iv lasix which was later put on hold due to hypotension   Pt went into Atrial fibrillation with RVR  (Normally pt  On rhythmol and lopressor at home)  Pt was started on iv amiodarone gtt  and eventually had ISAURO/cardioversion on 08/27  Pt was found to have low grade fever and all investigations/cultures done were normal   Later pt was discharged to home with    Med reconciliation at the time of discharge as below

## 2023-08-22 NOTE — PLAN OF CARE
[facility]  Initial Discharge Assessment       Primary Care Provider: Aureliano Morocho MD    Admission Diagnosis: Shortness of breath [R06.02]    Admission Date: 8/21/2023  Expected Discharge Date:     DC assessment completed with pt at bedside. Verified info on facesheet as correct. Lives with sonAdarsh. Needs PCP. [facility]acy [address] DME rw. Denies hd/dm/hh. Pt does not take coumadin. Pt without recent admission. Family to provide ride home. CM to follow for discharge needs. Pt requests HH- no company preference.       Transition of Care Barriers: Mobility    Payor: [employer] MANAGED MEDICARE / Plan: [employer] MEDICARE PLAN PPO / Product Type: Medicare Advantage /     Extended Emergency Contact Information  Primary Emergency Contact: ERICKSONADARSH ENAMORADO  Mobile Phone: [phone]  Relation: Son   needed? No    Discharge Plan A: Home Health  Discharge Plan B: Home with family      [id number] [place], [place] - [address] AT [place] [address]  [address]  [place] [address]  Phone: [phone] Fax: [phone]      Initial Assessment (most recent)       Adult Discharge Assessment - 08/22/23 1112          Discharge Assessment    Assessment Type Discharge Planning Assessment     Confirmed/corrected address, phone number and insurance Yes     Confirmed Demographics Correct on Facesheet     Source of Information patient     Does patient/caregiver understand observation status Yes     People in Home child(nicolás), adult     Do you expect to return to your current living situation? Yes     Prior to hospitilization cognitive status: Alert/Oriented     Current cognitive status: Alert/Oriented     Walking or Climbing Stairs ambulation difficulty, requires equipment     Dressing/Bathing bathing difficulty, requires equipment     Equipment Currently Used at Home walker, rolling     Readmission within 30 days? No     Patient currently being followed by  outpatient case management? No     Do you currently have service(s) that help you manage your care at home? No     Do you take prescription medications? Yes     Do you have prescription coverage? Yes     Do you have any problems affording any of your prescribed medications? No     Is the patient taking medications as prescribed? yes     How do you get to doctors appointments? family or friend will provide     Are you on dialysis? No     Do you take coumadin? No     DME Needed Upon Discharge  none     Discharge Plan discussed with: Patient     Transition of Care Barriers Mobility     Discharge Plan A Home Health     Discharge Plan B Home with family

## 2023-08-22 NOTE — CARE UPDATE
08/21/23 2036   Patient Assessment/Suction   Level of Consciousness (AVPU) alert   Respiratory Effort Unlabored;Normal   PRE-TX-O2   Device (Oxygen Therapy) nasal cannula   $ Is the patient on Low Flow Oxygen? Yes   Flow (L/min) 4   SpO2 97 %  (titrated O2 Sats 98)

## 2023-08-22 NOTE — H&P
"[facility] - Emergency Dept  Hospital Medicine  History & Physical    Patient Name: Vickie Benedict  MRN: [id number]  Patient Class: OP- Observation  Admission Date: 8/21/2023  Attending Physician: Ahmet Simeon MD   Primary Care Provider: Aureliano Morocho MD         Patient information was obtained from patient and ER records.     Subjective:     Principal Problem:CHF exacerbation    Chief Complaint:   Chief Complaint   Patient presents with    Shortness of Breath     SOB X 3 HOURS, 85% ON ROOM AIR, HOME O2        HPI: Ms. Benedict is a 89 yo w/pmhx of hypothyroidism, HFpEF on 4L NC at home, HTN, afib who presents w/SOB. Patient was discharged 1 month ago w/HF exacerbation and also treated for bronchitis. However, patient reports since discharge she has been feeling weak. Reports in the last week she has progressively gotten more SOB w/ exertion. She also reports 2 pillow orthopnea, PNA and LE edema up to calf. She reports she was discharged with 20mg lasix daily but did not much of a difference in her LE edema and stopped taking it. She then started taking a diuretic her cardiologist prescribed but does not remember the name or the dose. Believes it starts w/a "t". She took 1/2 a pill and noticed some improvement in her edema but not her breathing. Per chart review, cannot find name/dose of diuretic.     ED labs notable for trop 17, . TTE from 7/2023 w/normal EF and diastolic fxn. Cr 1.7 (baseline 1.3-1.5). Was given 20mg IV lasix w/500 cc output w/in 30 minutes.       Past Medical History:   Diagnosis Date    Atrial fibrillation     Bradycardia     Carotid bruit     CKD (chronic kidney disease), stage III     Hyperlipidemia     OA (osteoarthritis)     Thyroid disease        Past Surgical History:   Procedure Laterality Date    HYSTERECTOMY      KNEE SURGERY Right     SHOULDER SURGERY Right        Review of patient's allergies indicates:   Allergen Reactions    Penicillins " Anaphylaxis    Shellfish containing products        No current facility-administered medications on file prior to encounter.     Current Outpatient Medications on File Prior to Encounter   Medication Sig    amLODIPine (NORVASC) 10 MG tablet Take 1 tablet (10 mg total) by mouth once daily.    coenzyme Q10 (CO Q-10) 100 mg capsule Take 100 mg by mouth once daily.    docosahexaenoic acid/epa (FISH OIL ORAL) Take 1 capsule by mouth Daily.    FLAXSEED OIL ORAL Take 1 capsule by mouth Daily.    furosemide (LASIX) 20 MG tablet Take 1 tablet (20 mg total) by mouth daily as needed. As needed for leg swelling (Patient taking differently: Take 10 mg by mouth daily as needed (edema). As needed for leg swelling)    glucosam/msm/chond/fqx845/hyal (GLUCOS-CHOND-MSM, WITH ANTIOX, ORAL) Take 1 capsule by mouth Daily.    levothyroxine (SYNTHROID) 100 MCG tablet TAKE 1 TABLET BY MOUTH EVERY DAY (Patient taking differently: Take 100 mcg by mouth before breakfast.)    lisinopriL (PRINIVIL,ZESTRIL) 20 MG tablet Take 1 tablet (20 mg total) by mouth 2 (two) times daily. (Patient taking differently: Take 20 mg by mouth once daily.)    metoprolol succinate (TOPROL-XL) 25 MG 24 hr tablet Take 0.5 tablets (12.5 mg total) by mouth once daily.    multivitamin (THERAGRAN) per tablet Take 1 tablet by mouth once daily.    propafenone (RYTHMOL) 225 MG Tab TAKE 1 TABLET BY MOUTH TWICE DAILY (Patient taking differently: Take 225 mg by mouth 2 (two) times a day.)    [DISCONTINUED] betaxolol 0.5% (BETOPTIC-S) 0.5 % Drop Place 1 drop into both eyes 2 (two) times daily.    [DISCONTINUED] latanoprost 0.005 % ophthalmic solution Place 1 drop into both eyes nightly.     Family History       Problem Relation (Age of Onset)    Heart disease Father          Tobacco Use    Smoking status: Never    Smokeless tobacco: Never   Substance and Sexual Activity    Alcohol use: Never    Drug use: Never    Sexual activity: Not on file     Review of  Systems  Objective:     Vital Signs (Most Recent):  Temp: 98.6 °F (37 °C) (08/21/23 1549)  Pulse: 60 (08/21/23 1839)  Resp: (!) 21 (08/21/23 1809)  BP: (!) 151/65 (08/21/23 1829)  SpO2: 97 % (08/21/23 1839) Vital Signs (24h Range):  Temp:  [98.6 °F (37 °C)] 98.6 °F (37 °C)  Pulse:  [43-82] 60  Resp:  [0-30] 21  SpO2:  [94 %-99 %] 97 %  BP: (123-151)/(53-83) 151/65     Weight: 68 kg (150 lb)  Body mass index is 27.44 kg/m².     Physical Exam  Constitutional:       Appearance: Normal appearance.   HENT:      Head: Normocephalic and atraumatic.      Right Ear: External ear normal.      Left Ear: External ear normal.      Nose: Nose normal.      Mouth/Throat:      Mouth: Mucous membranes are moist.   Eyes:      Extraocular Movements: Extraocular movements intact.   Cardiovascular:      Rate and Rhythm: Normal rate. Rhythm irregular.      Heart sounds: No murmur heard.  Pulmonary:      Effort: Pulmonary effort is normal. No respiratory distress.      Breath sounds: Rales present.      Comments: On 10L NC  Abdominal:      General: Abdomen is flat. There is no distension.      Tenderness: There is no abdominal tenderness.   Musculoskeletal:      Comments: 1+ bilateral pitting edema    Skin:     Findings: No lesion or rash.   Neurological:      General: No focal deficit present.      Mental Status: She is alert and oriented to person, place, and time.   Psychiatric:         Mood and Affect: Mood normal.         Behavior: Behavior normal.                Significant Labs: All pertinent labs within the past 24 hours have been reviewed.    Significant Imaging: I have reviewed all pertinent imaging results/findings within the past 24 hours.    Assessment/Plan:     * CHF exacerbation  Patient is identified as having Diastolic (HFpEF) heart failure that is Acute. CHF is currently uncontrolled due to Rales/crackles on pulmonary exam. Latest ECHO performed and demonstrates- Results for orders placed during the hospital encounter of  07/19/23    Echo    Interpretation Summary  · The left ventricle is mildly enlarged with normal systolic function.  · Indeterminate left ventricular diastolic function.  · The estimated ejection fraction is 55%.  · Normal right ventricular size with normal right ventricular systolic function.  · Mild left atrial enlargement.  · Mild right atrial enlargement.  · Moderate mitral regurgitation.  · Moderate tricuspid regurgitation.  · Mild aortic regurgitation.  · Intermediate central venous pressure (8 mmHg).  · The estimated PA systolic pressure is 55 mmHg.  · There is moderate pulmonary hypertension.  · Trivial circumferential pericardial effusion.  . Continue Beta Blocker, ACE/ARB and Furosemide and monitor clinical status closely. Monitor on telemetry. Patient is on CHF pathway.  Monitor strict Is&Os and daily weights.  Place on fluid restriction of 1.5 L. Cardiology has not been consulted. Continue to stress to patient importance of self efficacy and  on diet for CHF. Last BNP reviewed- and noted below   Recent Labs   Lab 08/21/23  1610   *   .    Strict I/O  Daily weight   Fluid restriction     Postablative hypothyroidism  Continue synthroid       History of atrial fibrillation  Not on AC, unclear why   Cont rhytmol   Cont metoprolol       Essential hypertension  amlo   Lisinopril     CKD (chronic kidney disease), stage III  Baseline cr 1.3-1.5  Avoid nephrotoxic agents         VTE Risk Mitigation (From admission, onward)    None             On 08/21/2023, patient should be placed in hospital observation services under my care.        Ahmet Simeon MD  Department of Hospital Medicine  [facility] - Emergency Dept

## 2023-08-23 LAB
ANION GAP SERPL CALC-SCNC: 6 MMOL/L (ref 8–16)
BASOPHILS # BLD AUTO: 0.04 K/UL (ref 0–0.2)
BASOPHILS NFR BLD: 0.4 % (ref 0–1.9)
BUN SERPL-MCNC: 44 MG/DL (ref 8–23)
CALCIUM SERPL-MCNC: 9.3 MG/DL (ref 8.7–10.5)
CHLORIDE SERPL-SCNC: 103 MMOL/L (ref 95–110)
CO2 SERPL-SCNC: 31 MMOL/L (ref 23–29)
CREAT SERPL-MCNC: 1.7 MG/DL (ref 0.5–1.4)
DIFFERENTIAL METHOD: ABNORMAL
EOSINOPHIL # BLD AUTO: 0.2 K/UL (ref 0–0.5)
EOSINOPHIL NFR BLD: 1.4 % (ref 0–8)
ERYTHROCYTE [DISTWIDTH] IN BLOOD BY AUTOMATED COUNT: 14.4 % (ref 11.5–14.5)
EST. GFR  (NO RACE VARIABLE): 28.7 ML/MIN/1.73 M^2
GLUCOSE SERPL-MCNC: 115 MG/DL (ref 70–110)
HCT VFR BLD AUTO: 33.4 % (ref 37–48.5)
HGB BLD-MCNC: 10.5 G/DL (ref 12–16)
IMM GRANULOCYTES # BLD AUTO: 0.03 K/UL (ref 0–0.04)
IMM GRANULOCYTES NFR BLD AUTO: 0.3 % (ref 0–0.5)
LYMPHOCYTES # BLD AUTO: 1.5 K/UL (ref 1–4.8)
LYMPHOCYTES NFR BLD: 13.7 % (ref 18–48)
MAGNESIUM SERPL-MCNC: 1.8 MG/DL (ref 1.6–2.6)
MCH RBC QN AUTO: 33.2 PG (ref 27–31)
MCHC RBC AUTO-ENTMCNC: 31.4 G/DL (ref 32–36)
MCV RBC AUTO: 106 FL (ref 82–98)
MONOCYTES # BLD AUTO: 1.1 K/UL (ref 0.3–1)
MONOCYTES NFR BLD: 9.7 % (ref 4–15)
NEUTROPHILS # BLD AUTO: 8.3 K/UL (ref 1.8–7.7)
NEUTROPHILS NFR BLD: 74.5 % (ref 38–73)
NRBC BLD-RTO: 0 /100 WBC
PHOSPHATE SERPL-MCNC: 3.4 MG/DL (ref 2.7–4.5)
PLATELET # BLD AUTO: 220 K/UL (ref 150–450)
PMV BLD AUTO: 10 FL (ref 9.2–12.9)
POTASSIUM SERPL-SCNC: 3.9 MMOL/L (ref 3.5–5.1)
RBC # BLD AUTO: 3.16 M/UL (ref 4–5.4)
SODIUM SERPL-SCNC: 140 MMOL/L (ref 136–145)
WBC # BLD AUTO: 11.08 K/UL (ref 3.9–12.7)

## 2023-08-23 PROCEDURE — 97162 PT EVAL MOD COMPLEX 30 MIN: CPT

## 2023-08-23 PROCEDURE — 94799 UNLISTED PULMONARY SVC/PX: CPT

## 2023-08-23 PROCEDURE — 93005 ELECTROCARDIOGRAM TRACING: CPT | Performed by: SPECIALIST

## 2023-08-23 PROCEDURE — 25000003 PHARM REV CODE 250: Performed by: STUDENT IN AN ORGANIZED HEALTH CARE EDUCATION/TRAINING PROGRAM

## 2023-08-23 PROCEDURE — 84100 ASSAY OF PHOSPHORUS: CPT | Performed by: STUDENT IN AN ORGANIZED HEALTH CARE EDUCATION/TRAINING PROGRAM

## 2023-08-23 PROCEDURE — 85025 COMPLETE CBC W/AUTO DIFF WBC: CPT | Performed by: STUDENT IN AN ORGANIZED HEALTH CARE EDUCATION/TRAINING PROGRAM

## 2023-08-23 PROCEDURE — 27000221 HC OXYGEN, UP TO 24 HOURS

## 2023-08-23 PROCEDURE — 97535 SELF CARE MNGMENT TRAINING: CPT

## 2023-08-23 PROCEDURE — 99900035 HC TECH TIME PER 15 MIN (STAT)

## 2023-08-23 PROCEDURE — 21400001 HC TELEMETRY ROOM

## 2023-08-23 PROCEDURE — 96376 TX/PRO/DX INJ SAME DRUG ADON: CPT

## 2023-08-23 PROCEDURE — 12000002 HC ACUTE/MED SURGE SEMI-PRIVATE ROOM

## 2023-08-23 PROCEDURE — 83735 ASSAY OF MAGNESIUM: CPT | Performed by: STUDENT IN AN ORGANIZED HEALTH CARE EDUCATION/TRAINING PROGRAM

## 2023-08-23 PROCEDURE — 93010 ELECTROCARDIOGRAM REPORT: CPT | Mod: ,,, | Performed by: SPECIALIST

## 2023-08-23 PROCEDURE — 97116 GAIT TRAINING THERAPY: CPT

## 2023-08-23 PROCEDURE — 94761 N-INVAS EAR/PLS OXIMETRY MLT: CPT

## 2023-08-23 PROCEDURE — 80048 BASIC METABOLIC PNL TOTAL CA: CPT | Performed by: STUDENT IN AN ORGANIZED HEALTH CARE EDUCATION/TRAINING PROGRAM

## 2023-08-23 PROCEDURE — 63600175 PHARM REV CODE 636 W HCPCS: Performed by: STUDENT IN AN ORGANIZED HEALTH CARE EDUCATION/TRAINING PROGRAM

## 2023-08-23 PROCEDURE — 36415 COLL VENOUS BLD VENIPUNCTURE: CPT | Performed by: STUDENT IN AN ORGANIZED HEALTH CARE EDUCATION/TRAINING PROGRAM

## 2023-08-23 PROCEDURE — 97165 OT EVAL LOW COMPLEX 30 MIN: CPT

## 2023-08-23 PROCEDURE — 93010 EKG 12-LEAD: ICD-10-PCS | Mod: ,,, | Performed by: SPECIALIST

## 2023-08-23 RX ORDER — FUROSEMIDE 10 MG/ML
40 INJECTION INTRAMUSCULAR; INTRAVENOUS ONCE
Status: COMPLETED | OUTPATIENT
Start: 2023-08-23 | End: 2023-08-23

## 2023-08-23 RX ORDER — FUROSEMIDE 10 MG/ML
40 INJECTION INTRAMUSCULAR; INTRAVENOUS DAILY
Status: DISCONTINUED | OUTPATIENT
Start: 2023-08-24 | End: 2023-08-25

## 2023-08-23 RX ADMIN — ENOXAPARIN SODIUM 30 MG: 30 INJECTION SUBCUTANEOUS at 05:08

## 2023-08-23 RX ADMIN — PROPAFENONE HYDROCHLORIDE 225 MG: 150 TABLET, FILM COATED ORAL at 09:08

## 2023-08-23 RX ADMIN — LEVOTHYROXINE SODIUM 100 MCG: 100 TABLET ORAL at 05:08

## 2023-08-23 RX ADMIN — FUROSEMIDE 40 MG: 10 INJECTION, SOLUTION INTRAMUSCULAR; INTRAVENOUS at 11:08

## 2023-08-23 RX ADMIN — LISINOPRIL 20 MG: 20 TABLET ORAL at 09:08

## 2023-08-23 RX ADMIN — AMLODIPINE BESYLATE 10 MG: 5 TABLET ORAL at 09:08

## 2023-08-23 RX ADMIN — METOPROLOL SUCCINATE 12.5 MG: 25 TABLET, EXTENDED RELEASE ORAL at 09:08

## 2023-08-23 NOTE — PT/OT/SLP EVAL
Physical Therapy Evaluation    Patient Name:  Vickie Benedict   MRN:  [id number]    Recommendations:     Discharge Recommendations: home health PT   Discharge Equipment Recommendations:   has DME  Barriers to discharge: None    Assessment:     Vickie Benedict is a 88 y.o. female admitted with a medical diagnosis of CHF exacerbation.  She presents with the following impairments/functional limitations: weakness, impaired cardiopulmonary response to activity, impaired endurance, edema, impaired functional mobility Pt found awake in bed. Agreeable to PT. Reports mod I with household mobility PTA. Today she ambulated 25 ft with RW CGA on 2 l NC. O2 sat down to 78 % from 90% baseline.Pt will benefit from ongoing PT to maximize safety and functional independence.    Rehab Prognosis: Good; patient would benefit from acute skilled PT services to address these deficits and reach maximum level of function.    Recent Surgery: * No surgery found *      Plan:     During this hospitalization, patient to be seen 6 x/week to address the identified rehab impairments via gait training, therapeutic activities, therapeutic exercises and progress toward the following goals:    Plan of Care Expires:  09/23/23    Subjective     Chief Complaint: weakness  Patient/Family Comments/goals: return to PLF  Pain/Comfort:  Pain Rating 1: 0/10  Pain Rating Post-Intervention 1: 0/10    Patients cultural, spiritual, Yarsani conflicts given the current situation: no    Living Environment:  Lives w son in [facility]. One step to enter, O2 dep  Prior to admission, patients level of function was mod I at household level.  Equipment used at home: walker, rolling.  DME owned (not currently used): none.  Upon discharge, patient will have assistance from family.    Objective:     Communicated with nurse prior to session.  Patient found HOB elevated with oxygen, telemetry  upon PT entry to room.    General Precautions: Standard, fall  Orthopedic  Precautions:N/A   Braces: N/A  Respiratory Status: Nasal cannula, flow 2 L/min    Exams:  Cognitive Exam:  Patient is oriented to Person, Place, Time, and Situation  Gross Motor Coordination:  WFL  Skin Integrity/Edema:      -       Edema: Mild B ankles  RLE ROM: WFL  RLE Strength: WFL  LLE ROM: WFL  LLE Strength: WFL    Functional Mobility:  Bed Mobility:     Supine to Sit: stand by assistance  Transfers:     Sit to Stand:  contact guard assistance with rolling walker  Gait: amb 25 ft with RW CGA/SBA, desats on 2 l NC      AM-PAC 6 CLICK MOBILITY  Total Score:20       Treatment & Education:  Pt educated in PT roles and goals, fall prevention, use of call light, DC recommendations    Patient left up in chair with all lines intact, call button in reach, and OT notified.    GOALS:   Multidisciplinary Problems       Physical Therapy Goals          Problem: Physical Therapy    Goal Priority Disciplines Outcome Goal Variances Interventions   Physical Therapy Goal     PT, PT/OT Ongoing, Progressing     Description: Goals to be met by: 23     Patient will increase functional independence with mobility by performin. Supine to sit with Modified [place]  2. Sit to supine with Modified [place]  3. Sit to stand transfer with Modified [place]  4. Bed to chair transfer with Modified [place] using Rolling Walker  5. Gait  x 100 feet with Modified [place] using Rolling Walker.                          History:     Past Medical History:   Diagnosis Date    Atrial fibrillation     Bradycardia     Carotid bruit     CKD (chronic kidney disease), stage III     Hyperlipidemia     OA (osteoarthritis)     Thyroid disease        Past Surgical History:   Procedure Laterality Date    HYSTERECTOMY      KNEE SURGERY Right     SHOULDER SURGERY Right        Time Tracking:     PT Received On: 23  PT Start Time: 09     PT Stop Time: 1000  PT Total Time (min): 19 min     Billable Minutes: Evaluation 11  and Gait Training 8      08/23/2023

## 2023-08-23 NOTE — PLAN OF CARE
Patient up[ in chair for a few hours today. Ambulated in room with therapy. HR dropped into the low 40s , MD aware and orders received.      Problem: Adult Inpatient Plan of Care  Goal: Plan of Care Review  Outcome: Ongoing, Progressing  Flowsheets (Taken 8/23/2023 1858)  Plan of Care Reviewed With: patient  Goal: Optimal Comfort and Wellbeing  Outcome: Ongoing, Progressing  Intervention: Provide Person-Centered Care  Flowsheets (Taken 8/23/2023 1858)  Trust Relationship/Rapport:   care explained   choices provided   thoughts/feelings acknowledged   reassurance provided     Problem: Fluid Imbalance (Pneumonia)  Goal: Fluid Balance  Outcome: Ongoing, Progressing     Problem: Fall Injury Risk  Goal: Absence of Fall and Fall-Related Injury  Outcome: Ongoing, Progressing

## 2023-08-23 NOTE — PT/OT/SLP EVAL
Occupational Therapy   Evaluation    Name: Vickie Benedict  MRN: [id number]  Admitting Diagnosis: CHF exacerbation  Recent Surgery: * No surgery found *      Recommendations:     Discharge Recommendations: home health OT  Discharge Equipment Recommendations:  walker, rolling, bedside commode  Barriers to discharge:  None    Assessment:     Vickie Benedict is a 88 y.o. female with a medical diagnosis of CHF exacerbation.   Performance deficits affecting function: weakness, impaired endurance, impaired self care skills, impaired functional mobility, gait instability, impaired balance, impaired cardiopulmonary response to activity.      Pt participated in functional mobility/ADLs on 2L02; pt noted to desat to low 80's on 2L with activity requiring increased 02 flow to 4L.     Rehab Prognosis: Good; patient would benefit from acute skilled OT services to address these deficits and reach maximum level of function.       Plan:     Patient to be seen 5 x/week to address the above listed problems via self-care/home management, therapeutic activities, therapeutic exercises  Plan of Care Expires: 09/22/23  Plan of Care Reviewed with: patient    Subjective     Chief Complaint: SOB with activity   Patient/Family Comments/goals: return home    Occupational Profile:  Living Environment: Lives with her son VICTORINO REDDY (step is steep per pt); tub/shower combination  Previous level of function: Mod (I) with 02 and walking stick or RW; pt reports mostly using the walking stick due to feeling like her 02 tubes and walker are too much to manage simultaneously.    Roles and Routines: Home bound   Equipment Used at Home:  (walking stick; pt owns RW but reports it is in poor condition)  Assistance upon Discharge: son    Pain/Comfort:  Pain Rating 1: 0/10  Pain Rating Post-Intervention 1: 0/10    Objective:     Communicated with: nurse prior to session.  Patient found up in chair with oxygen, pulse ox (continuous), telemetry upon OT  entry to room.    General Precautions: Standard, fall, respiratory  Respiratory Status: Nasal cannula, flow 2 L/min    Occupational Performance:    Functional Mobility/Transfers:  Patient completed Sit <> Stand Transfer with contact guard assistance  with  no assistive device   Patient completed Toilet Transfer Stand Pivot technique with contact guard assistance with  grab bars  Functional Mobility: CGA no AD (noted to reach to support self of furniture; pt would likely do better with an AD in future sessions).      Activities of Daily Living:  Grooming: contact guard assistance standing at sink  Bathing: minimum assistance seated/standing at sink (assist for lower legs/feet)  Upper Body Dressing: stand by assistance    Toileting: minimum assistance for clothing management    Cognitive/Visual Perceptual:  Cognitive/Psychosocial Skills:     -       Oriented to: Person, Place, Time, and Situation   -       Follows Commands/attention:Follows multistep  commands  -       Safety awareness/insight to disability: intact     Physical Exam:  Upper Extremity Range of Motion:     -       Right Upper Extremity: WFL  -       Left Upper Extremity: WFL  Upper Extremity Strength:    -       Right Upper Extremity: WFL  -       Left Upper Extremity: WFL   Strength:    -       Right Upper Extremity: WFL  -       Left Upper Extremity: WFL  Fine Motor Coordination:    -       Intact  Gross motor coordination:   WFL    AMPAC 6 Click ADL:  AMPAC Total Score: 21    Treatment & Education:  Pt educated on energy conservation techniques with ADLs  Pt educated on pursed lip breathing techniques and pt return demonstrated understanding.  Pt educated on DME (transfer tub bench) that could improve safety and provide energy conservation when bathing at home.      Patient left up in chair with all lines intact and call button in reach    GOALS:   Multidisciplinary Problems       Occupational Therapy Goals          Problem: Occupational Therapy     Goal Priority Disciplines Outcome Interventions   Occupational Therapy Goal     OT, PT/OT     Description: Goals to be met by: 9/23/23     Patient will increase functional independence with ADLs by performing:    UE Dressing with Modified [place].  LE Dressing with Modified [place].  Grooming while standing at sink with Modified [place].  Toileting from toilet with Modified [place] for hygiene and clothing management.   Toilet transfer to toilet with Modified [place].                         History:     Past Medical History:   Diagnosis Date    Atrial fibrillation     Bradycardia     Carotid bruit     CKD (chronic kidney disease), stage III     Hyperlipidemia     OA (osteoarthritis)     Thyroid disease          Past Surgical History:   Procedure Laterality Date    HYSTERECTOMY      KNEE SURGERY Right     SHOULDER SURGERY Right        Time Tracking:     OT Date of Treatment: 08/23/23  OT Start Time: 1019  OT Stop Time: 1103  OT Total Time (min): 44 min    Billable Minutes:Evaluation 06  Self Care/Home Management 38    8/23/2023

## 2023-08-23 NOTE — SUBJECTIVE & OBJECTIVE
Interval History: Continues to have crackles and feeling weak this AM. Will give 40 IV lasix this AM and increase fluid restriction.     Review of Systems  Objective:     Vital Signs (Most Recent):  Temp: 97.8 °F (36.6 °C) (08/23/23 1202)  Pulse: (!) 58 (08/23/23 1202)  Resp: 18 (08/23/23 1202)  BP: (!) 114/56 (nurse amarilis notified) (08/23/23 1202)  SpO2: (!) 93 % (08/23/23 1202) Vital Signs (24h Range):  Temp:  [97.7 °F (36.5 °C)-98.1 °F (36.7 °C)] 97.8 °F (36.6 °C)  Pulse:  [58-93] 58  Resp:  [18-20] 18  SpO2:  [93 %-97 %] 93 %  BP: (114-140)/(56-64) 114/56     Weight: 68 kg (149 lb 14.6 oz)  Body mass index is 27.42 kg/m².    Intake/Output Summary (Last 24 hours) at 8/23/2023 1419  Last data filed at 8/23/2023 1256  Gross per 24 hour   Intake 1480 ml   Output 850 ml   Net 630 ml           Physical Exam  Constitutional:       General: She is not in acute distress.     Appearance: She is not toxic-appearing.   HENT:      Head: Normocephalic and atraumatic.      Nose: Nose normal.      Mouth/Throat:      Mouth: Mucous membranes are moist.   Eyes:      Extraocular Movements: Extraocular movements intact.   Neck:      Comments: Left anterior cervical nodule   Cardiovascular:      Rate and Rhythm: Normal rate and regular rhythm.      Heart sounds: No murmur heard.  Pulmonary:      Effort: No respiratory distress.      Breath sounds: Rales present. No wheezing.      Comments: On 4L NC   Abdominal:      General: Abdomen is flat. Bowel sounds are normal. There is no distension.      Tenderness: There is no abdominal tenderness.   Musculoskeletal:      Right lower leg: Edema (1+) present.      Left lower leg: Edema (1+) present.   Skin:     General: Skin is warm and dry.      Comments: Lipoma on back   Neurological:      General: No focal deficit present.      Mental Status: She is alert and oriented to person, place, and time.   Psychiatric:         Mood and Affect: Mood normal.         Behavior: Behavior normal.              Significant Labs: All pertinent labs within the past 24 hours have been reviewed.    Significant Imaging: I have reviewed all pertinent imaging results/findings within the past 24 hours.

## 2023-08-23 NOTE — ASSESSMENT & PLAN NOTE
Patient is identified as having Diastolic (HFpEF) heart failure that is Acute. CHF is currently uncontrolled due to Rales/crackles on pulmonary exam. Latest ECHO performed and demonstrates- Results for orders placed during the hospital encounter of 07/19/23    Echo    Interpretation Summary  · The left ventricle is mildly enlarged with normal systolic function.  · Indeterminate left ventricular diastolic function.  · The estimated ejection fraction is 55%.  · Normal right ventricular size with normal right ventricular systolic function.  · Mild left atrial enlargement.  · Mild right atrial enlargement.  · Moderate mitral regurgitation.  · Moderate tricuspid regurgitation.  · Mild aortic regurgitation.  · Intermediate central venous pressure (8 mmHg).  · The estimated PA systolic pressure is 55 mmHg.  · There is moderate pulmonary hypertension.  · Trivial circumferential pericardial effusion.  . Continue Beta Blocker, ACE/ARB and Furosemide and monitor clinical status closely. Monitor on telemetry. Patient is on CHF pathway.  Monitor strict Is&Os and daily weights.  Place on fluid restriction of 1.5 L. Cardiology has not been consulted. Continue to stress to patient importance of self efficacy and  on diet for CHF. Last BNP reviewed- and noted below   Recent Labs   Lab 08/21/23  1610   *   .  On 4L NC at home   Strict I/O  Daily weight   Fluid restriction   Cont IV lasix

## 2023-08-23 NOTE — PROGRESS NOTES
"[facility] Medicine  Progress Note    Patient Name: Vickie Benedict  MRN: [id number]  Patient Class: OP- Observation   Admission Date: 8/21/2023  Length of Stay: 0 days  Attending Physician: Ahmet Simeon MD  Primary Care Provider: Aureliano Morocho MD        Subjective:     Principal Problem:CHF exacerbation        HPI:  Ms. Benedict is a 89 yo w/pmhx of hypothyroidism, HFpEF on 4L NC at home, HTN, afib who presents w/SOB. Patient was discharged 1 month ago w/HF exacerbation and also treated for bronchitis. However, patient reports since discharge she has been feeling weak. Reports in the last week she has progressively gotten more SOB w/ exertion. She also reports 2 pillow orthopnea, PNA and LE edema up to calf. She reports she was discharged with 20mg lasix daily but did not much of a difference in her LE edema and stopped taking it. She then started taking a diuretic her cardiologist prescribed but does not remember the name or the dose. Believes it starts w/a "t". She took 1/2 a pill and noticed some improvement in her edema but not her breathing. Per chart review, cannot find name/dose of diuretic.     ED labs notable for trop 17, . TTE from 7/2023 w/normal EF and diastolic fxn. Cr 1.7 (baseline 1.3-1.5). Was given 20mg IV lasix w/500 cc output w/in 30 minutes.       Overview/Hospital Course:  Ms. Benedict is an 89 yo w/pmhx of HFpEF on 4L NC at home, afib, HTN who presented with HF exacerbation. Was recently discharged with lasix 20mg daily but reports not taking for the last week as she did not not any improvement with it ans was instead taking triamterine/HCTZ. Diuresing with IV lasix. Does have unilateral pain in LE. DVT US negative. Did have episode of bradycardia while sleeping. Will hold home metoprolol.       Interval History: Continues to have crackles and feeling weak this AM. Will give 40 IV lasix this AM and increase fluid restriction.     Review of " Systems  Objective:     Vital Signs (Most Recent):  Temp: 97.8 °F (36.6 °C) (08/23/23 1202)  Pulse: (!) 58 (08/23/23 1202)  Resp: 18 (08/23/23 1202)  BP: (!) 114/56 (nurse amarilis notified) (08/23/23 1202)  SpO2: (!) 93 % (08/23/23 1202) Vital Signs (24h Range):  Temp:  [97.7 °F (36.5 °C)-98.1 °F (36.7 °C)] 97.8 °F (36.6 °C)  Pulse:  [58-93] 58  Resp:  [18-20] 18  SpO2:  [93 %-97 %] 93 %  BP: (114-140)/(56-64) 114/56     Weight: 68 kg (149 lb 14.6 oz)  Body mass index is 27.42 kg/m².    Intake/Output Summary (Last 24 hours) at 8/23/2023 1419  Last data filed at 8/23/2023 1256  Gross per 24 hour   Intake 1480 ml   Output 850 ml   Net 630 ml           Physical Exam  Constitutional:       General: She is not in acute distress.     Appearance: She is not toxic-appearing.   HENT:      Head: Normocephalic and atraumatic.      Nose: Nose normal.      Mouth/Throat:      Mouth: Mucous membranes are moist.   Eyes:      Extraocular Movements: Extraocular movements intact.   Neck:      Comments: Left anterior cervical nodule   Cardiovascular:      Rate and Rhythm: Normal rate and regular rhythm.      Heart sounds: No murmur heard.  Pulmonary:      Effort: No respiratory distress.      Breath sounds: Rales present. No wheezing.      Comments: On 4L NC   Abdominal:      General: Abdomen is flat. Bowel sounds are normal. There is no distension.      Tenderness: There is no abdominal tenderness.   Musculoskeletal:      Right lower leg: Edema (1+) present.      Left lower leg: Edema (1+) present.   Skin:     General: Skin is warm and dry.      Comments: Lipoma on back   Neurological:      General: No focal deficit present.      Mental Status: She is alert and oriented to person, place, and time.   Psychiatric:         Mood and Affect: Mood normal.         Behavior: Behavior normal.             Significant Labs: All pertinent labs within the past 24 hours have been reviewed.    Significant Imaging: I have reviewed all pertinent  imaging results/findings within the past 24 hours.      Assessment/Plan:      * CHF exacerbation  Patient is identified as having Diastolic (HFpEF) heart failure that is Acute. CHF is currently uncontrolled due to Rales/crackles on pulmonary exam. Latest ECHO performed and demonstrates- Results for orders placed during the hospital encounter of 07/19/23    Echo    Interpretation Summary  · The left ventricle is mildly enlarged with normal systolic function.  · Indeterminate left ventricular diastolic function.  · The estimated ejection fraction is 55%.  · Normal right ventricular size with normal right ventricular systolic function.  · Mild left atrial enlargement.  · Mild right atrial enlargement.  · Moderate mitral regurgitation.  · Moderate tricuspid regurgitation.  · Mild aortic regurgitation.  · Intermediate central venous pressure (8 mmHg).  · The estimated PA systolic pressure is 55 mmHg.  · There is moderate pulmonary hypertension.  · Trivial circumferential pericardial effusion.  . Continue Beta Blocker, ACE/ARB and Furosemide and monitor clinical status closely. Monitor on telemetry. Patient is on CHF pathway.  Monitor strict Is&Os and daily weights.  Place on fluid restriction of 1.5 L. Cardiology has not been consulted. Continue to stress to patient importance of self efficacy and  on diet for CHF. Last BNP reviewed- and noted below   Recent Labs   Lab 08/21/23  1610   *   .  On 4L NC at home   Strict I/O  Daily weight   Fluid restriction   Cont IV lasix     Left leg pain  DVT US negative       Postablative hypothyroidism  Continue synthroid   Last TSH in 7/2023 wnl     History of atrial fibrillation  Not on AC, unclear why, defer to outpatient cardiologist    Hold home propafenone and metoprolol as patient now bradycardic to the 40s   EKG ordered     Essential hypertension  amlo   Lisinopril     CKD (chronic kidney disease), stage III  Baseline cr 1.3-1.5  Avoid nephrotoxic agents          VTE Risk Mitigation (From admission, onward)         Ordered     enoxaparin injection 30 mg  Every 24 hours         08/22/23 1210     IP VTE HIGH RISK PATIENT  Once         08/21/23 2040     Place sequential compression device  Until discontinued         08/21/23 2040                Discharge Planning   HEMANTH: 8/24/2023     Code Status: Full Code   Is the patient medically ready for discharge?:     Reason for patient still in hospital (select all that apply): Treatment  Discharge Plan A: Home Health                  Ahmet Simeon MD  Department of Hospital Medicine   [facility]

## 2023-08-23 NOTE — PLAN OF CARE
Problem: Physical Therapy  Goal: Physical Therapy Goal  Description: Goals to be met by: 23     Patient will increase functional independence with mobility by performin. Supine to sit with Modified [place]  2. Sit to supine with Modified [place]  3. Sit to stand transfer with Modified [place]  4. Bed to chair transfer with Modified [place] using Rolling Walker  5. Gait  x 100 feet with Modified [place] using Rolling Walker.     Outcome: Ongoing, Progressing

## 2023-08-23 NOTE — PLAN OF CARE
Pt left prior to CM clearance and MD appointments were not scheduled.    Chart and discharge orders reviewed.  Patient discharged home with no further case management needs       08/23/23 3250   Final Note   Assessment Type Final Discharge Note   Anticipated Discharge Disposition Home   Hospital Resources/Appts/Education Provided Provided patient/caregiver with written discharge plan information

## 2023-08-23 NOTE — PLAN OF CARE
Problem: Adult Inpatient Plan of Care  Goal: Plan of Care Review  Outcome: Ongoing, Progressing  Goal: Absence of Hospital-Acquired Illness or Injury  Outcome: Ongoing, Progressing     Problem: Fluid Imbalance (Pneumonia)  Goal: Fluid Balance  Outcome: Ongoing, Progressing     Problem: Respiratory Compromise (Pneumonia)  Goal: Effective Oxygenation and Ventilation  Outcome: Ongoing, Progressing     Problem: Fall Injury Risk  Goal: Absence of Fall and Fall-Related Injury  Outcome: Ongoing, Progressing

## 2023-08-23 NOTE — ASSESSMENT & PLAN NOTE
Not on AC, unclear why, defer to outpatient cardiologist    Hold home propafenone and metoprolol as patient now bradycardic to the 40s   EKG ordered

## 2023-08-24 PROBLEM — I48.91 ATRIAL FIBRILLATION WITH RVR: Status: ACTIVE | Noted: 2023-08-24

## 2023-08-24 PROBLEM — I50.33 ACUTE ON CHRONIC DIASTOLIC (CONGESTIVE) HEART FAILURE: Status: ACTIVE | Noted: 2023-08-24

## 2023-08-24 LAB
ANION GAP SERPL CALC-SCNC: 6 MMOL/L (ref 8–16)
BASOPHILS # BLD AUTO: 0.05 K/UL (ref 0–0.2)
BASOPHILS NFR BLD: 0.6 % (ref 0–1.9)
BUN SERPL-MCNC: 45 MG/DL (ref 8–23)
CALCIUM SERPL-MCNC: 9.1 MG/DL (ref 8.7–10.5)
CHLORIDE SERPL-SCNC: 99 MMOL/L (ref 95–110)
CO2 SERPL-SCNC: 33 MMOL/L (ref 23–29)
CREAT SERPL-MCNC: 1.4 MG/DL (ref 0.5–1.4)
DIFFERENTIAL METHOD: ABNORMAL
EOSINOPHIL # BLD AUTO: 0.4 K/UL (ref 0–0.5)
EOSINOPHIL NFR BLD: 4.5 % (ref 0–8)
ERYTHROCYTE [DISTWIDTH] IN BLOOD BY AUTOMATED COUNT: 14.2 % (ref 11.5–14.5)
EST. GFR  (NO RACE VARIABLE): 36.2 ML/MIN/1.73 M^2
GLUCOSE SERPL-MCNC: 104 MG/DL (ref 70–110)
HCT VFR BLD AUTO: 35 % (ref 37–48.5)
HGB BLD-MCNC: 11.1 G/DL (ref 12–16)
IMM GRANULOCYTES # BLD AUTO: 0.02 K/UL (ref 0–0.04)
IMM GRANULOCYTES NFR BLD AUTO: 0.2 % (ref 0–0.5)
LYMPHOCYTES # BLD AUTO: 1.6 K/UL (ref 1–4.8)
LYMPHOCYTES NFR BLD: 18.3 % (ref 18–48)
MAGNESIUM SERPL-MCNC: 1.9 MG/DL (ref 1.6–2.6)
MCH RBC QN AUTO: 33.5 PG (ref 27–31)
MCHC RBC AUTO-ENTMCNC: 31.7 G/DL (ref 32–36)
MCV RBC AUTO: 106 FL (ref 82–98)
MONOCYTES # BLD AUTO: 1 K/UL (ref 0.3–1)
MONOCYTES NFR BLD: 11.2 % (ref 4–15)
NEUTROPHILS # BLD AUTO: 5.7 K/UL (ref 1.8–7.7)
NEUTROPHILS NFR BLD: 65.2 % (ref 38–73)
NRBC BLD-RTO: 0 /100 WBC
PHOSPHATE SERPL-MCNC: 3.4 MG/DL (ref 2.7–4.5)
PLATELET # BLD AUTO: 226 K/UL (ref 150–450)
PMV BLD AUTO: 10.4 FL (ref 9.2–12.9)
POTASSIUM SERPL-SCNC: 4 MMOL/L (ref 3.5–5.1)
RBC # BLD AUTO: 3.31 M/UL (ref 4–5.4)
SODIUM SERPL-SCNC: 138 MMOL/L (ref 136–145)
WBC # BLD AUTO: 8.81 K/UL (ref 3.9–12.7)

## 2023-08-24 PROCEDURE — 36415 COLL VENOUS BLD VENIPUNCTURE: CPT | Performed by: STUDENT IN AN ORGANIZED HEALTH CARE EDUCATION/TRAINING PROGRAM

## 2023-08-24 PROCEDURE — 80048 BASIC METABOLIC PNL TOTAL CA: CPT | Performed by: STUDENT IN AN ORGANIZED HEALTH CARE EDUCATION/TRAINING PROGRAM

## 2023-08-24 PROCEDURE — 25000003 PHARM REV CODE 250: Performed by: SPECIALIST

## 2023-08-24 PROCEDURE — 99223 PR INITIAL HOSPITAL CARE,LEVL III: ICD-10-PCS | Mod: ,,, | Performed by: SPECIALIST

## 2023-08-24 PROCEDURE — 93010 ELECTROCARDIOGRAM REPORT: CPT | Mod: 76,,, | Performed by: SPECIALIST

## 2023-08-24 PROCEDURE — 63600175 PHARM REV CODE 636 W HCPCS: Performed by: STUDENT IN AN ORGANIZED HEALTH CARE EDUCATION/TRAINING PROGRAM

## 2023-08-24 PROCEDURE — 84100 ASSAY OF PHOSPHORUS: CPT | Performed by: STUDENT IN AN ORGANIZED HEALTH CARE EDUCATION/TRAINING PROGRAM

## 2023-08-24 PROCEDURE — 93005 ELECTROCARDIOGRAM TRACING: CPT | Performed by: SPECIALIST

## 2023-08-24 PROCEDURE — 85025 COMPLETE CBC W/AUTO DIFF WBC: CPT | Performed by: STUDENT IN AN ORGANIZED HEALTH CARE EDUCATION/TRAINING PROGRAM

## 2023-08-24 PROCEDURE — 94761 N-INVAS EAR/PLS OXIMETRY MLT: CPT

## 2023-08-24 PROCEDURE — 93010 EKG 12-LEAD: ICD-10-PCS | Mod: 76,,, | Performed by: SPECIALIST

## 2023-08-24 PROCEDURE — 21400001 HC TELEMETRY ROOM

## 2023-08-24 PROCEDURE — 25000003 PHARM REV CODE 250: Performed by: INTERNAL MEDICINE

## 2023-08-24 PROCEDURE — 99900035 HC TECH TIME PER 15 MIN (STAT)

## 2023-08-24 PROCEDURE — 63600175 PHARM REV CODE 636 W HCPCS: Performed by: SPECIALIST

## 2023-08-24 PROCEDURE — 12000002 HC ACUTE/MED SURGE SEMI-PRIVATE ROOM

## 2023-08-24 PROCEDURE — 93010 ELECTROCARDIOGRAM REPORT: CPT | Mod: ,,, | Performed by: SPECIALIST

## 2023-08-24 PROCEDURE — 97116 GAIT TRAINING THERAPY: CPT | Mod: CQ

## 2023-08-24 PROCEDURE — 83735 ASSAY OF MAGNESIUM: CPT | Performed by: STUDENT IN AN ORGANIZED HEALTH CARE EDUCATION/TRAINING PROGRAM

## 2023-08-24 PROCEDURE — 27000221 HC OXYGEN, UP TO 24 HOURS

## 2023-08-24 PROCEDURE — 99900031 HC PATIENT EDUCATION (STAT)

## 2023-08-24 PROCEDURE — 99223 1ST HOSP IP/OBS HIGH 75: CPT | Mod: ,,, | Performed by: SPECIALIST

## 2023-08-24 PROCEDURE — 25000003 PHARM REV CODE 250: Performed by: STUDENT IN AN ORGANIZED HEALTH CARE EDUCATION/TRAINING PROGRAM

## 2023-08-24 PROCEDURE — 93010 EKG 12-LEAD: ICD-10-PCS | Mod: ,,, | Performed by: SPECIALIST

## 2023-08-24 RX ORDER — METOPROLOL TARTRATE 25 MG/1
12.5 TABLET ORAL 2 TIMES DAILY
Status: DISCONTINUED | OUTPATIENT
Start: 2023-08-24 | End: 2023-08-26

## 2023-08-24 RX ORDER — METOPROLOL TARTRATE 1 MG/ML
5 INJECTION, SOLUTION INTRAVENOUS EVERY 5 MIN PRN
Status: DISCONTINUED | OUTPATIENT
Start: 2023-08-24 | End: 2023-08-29 | Stop reason: HOSPADM

## 2023-08-24 RX ORDER — ENOXAPARIN SODIUM 100 MG/ML
1 INJECTION SUBCUTANEOUS EVERY 12 HOURS
Status: DISCONTINUED | OUTPATIENT
Start: 2023-08-24 | End: 2023-08-24

## 2023-08-24 RX ORDER — ENOXAPARIN SODIUM 100 MG/ML
1 INJECTION SUBCUTANEOUS EVERY 24 HOURS
Status: DISCONTINUED | OUTPATIENT
Start: 2023-08-24 | End: 2023-08-26

## 2023-08-24 RX ORDER — METOPROLOL TARTRATE 1 MG/ML
2.5 INJECTION, SOLUTION INTRAVENOUS ONCE
Status: COMPLETED | OUTPATIENT
Start: 2023-08-24 | End: 2023-08-24

## 2023-08-24 RX ORDER — METOPROLOL TARTRATE 25 MG/1
25 TABLET, FILM COATED ORAL 2 TIMES DAILY
Status: DISCONTINUED | OUTPATIENT
Start: 2023-08-24 | End: 2023-08-24

## 2023-08-24 RX ORDER — DIGOXIN 0.25 MG/ML
375 INJECTION INTRAMUSCULAR; INTRAVENOUS ONCE
Status: COMPLETED | OUTPATIENT
Start: 2023-08-24 | End: 2023-08-24

## 2023-08-24 RX ORDER — LISINOPRIL 5 MG/1
5 TABLET ORAL DAILY
Status: DISCONTINUED | OUTPATIENT
Start: 2023-08-25 | End: 2023-08-24

## 2023-08-24 RX ADMIN — PROPAFENONE HYDROCHLORIDE 225 MG: 150 TABLET, FILM COATED ORAL at 07:08

## 2023-08-24 RX ADMIN — LISINOPRIL 20 MG: 20 TABLET ORAL at 09:08

## 2023-08-24 RX ADMIN — METOPROLOL TARTRATE 12.5 MG: 25 TABLET, FILM COATED ORAL at 08:08

## 2023-08-24 RX ADMIN — METOPROLOL TARTRATE 5 MG: 1 INJECTION, SOLUTION INTRAVENOUS at 05:08

## 2023-08-24 RX ADMIN — FUROSEMIDE 40 MG: 10 INJECTION, SOLUTION INTRAMUSCULAR; INTRAVENOUS at 09:08

## 2023-08-24 RX ADMIN — DIGOXIN 375 MCG: 0.25 INJECTION INTRAMUSCULAR; INTRAVENOUS at 01:08

## 2023-08-24 RX ADMIN — PROPAFENONE HYDROCHLORIDE 225 MG: 150 TABLET, FILM COATED ORAL at 08:08

## 2023-08-24 RX ADMIN — ENOXAPARIN SODIUM 70 MG: 100 INJECTION SUBCUTANEOUS at 06:08

## 2023-08-24 RX ADMIN — AMLODIPINE BESYLATE 10 MG: 5 TABLET ORAL at 09:08

## 2023-08-24 RX ADMIN — LEVOTHYROXINE SODIUM 100 MCG: 100 TABLET ORAL at 05:08

## 2023-08-24 RX ADMIN — METOPROLOL TARTRATE 2.5 MG: 1 INJECTION, SOLUTION INTRAVENOUS at 07:08

## 2023-08-24 NOTE — PT/OT/SLP PROGRESS
Occupational Therapy      Patient Name:  Vickie Benedict   MRN:  [id number]    Patient not seen today secondary to elevated resting heart rate at time of attempt.     8/24/2023

## 2023-08-24 NOTE — PLAN OF CARE
Problem: Physical Therapy  Goal: Physical Therapy Goal  Description: Goals to be met by: 23     Patient will increase functional independence with mobility by performin. Supine to sit with Modified [place]  2. Sit to supine with Modified [place]  3. Sit to stand transfer with Modified [place]  4. Bed to chair transfer with Modified [place] using Rolling Walker  5. Gait  x 100 feet with Modified [place] using Rolling Walker.     Outcome: Ongoing, Progressing

## 2023-08-24 NOTE — CARE UPDATE
08/23/23 2146   Patient Assessment/Suction   Level of Consciousness (AVPU) alert   Respiratory Effort Unlabored   Expansion/Accessory Muscles/Retractions no use of accessory muscles   All Lung Fields Breath Sounds clear;diminished   Rhythm/Pattern, Respiratory no shortness of breath reported   Cough Frequency no cough   PRE-TX-O2   Device (Oxygen Therapy) nasal cannula with humidification   $ Is the patient on Low Flow Oxygen? Yes   Flow (L/min) 2   SpO2 96 %   Pulse Oximetry Type Intermittent   $ Pulse Oximetry - Multiple Charge Pulse Oximetry - Multiple   Pulse 66   Resp 17   Positioning   Head of Bed (HOB) Positioning HOB elevated;HOB at 30-45 degrees   Respiratory Evaluation   $ Care Plan Tech Time 15 min   $ Eval/Re-eval Charges Re-evaluation

## 2023-08-24 NOTE — PROGRESS NOTES
Pharmacist Renal Dose Adjustment Note    Vickie Benedict is a 88 y.o. female being treated with the medication lovenox    Patient Data:    Vital Signs (Most Recent):  Temp: 97.6 °F (36.4 °C) (08/24/23 1100)  Pulse: 107 (08/24/23 1100)  Resp: 18 (08/24/23 1100)  BP: (!) 97/59 (08/24/23 1100)  SpO2: 96 % (08/24/23 1100) Vital Signs (72h Range):  Temp:  [97 °F (36.1 °C)-98.6 °F (37 °C)]   Pulse:  []   Resp:  [0-30]   BP: ()/(53-90)   SpO2:  [91 %-99 %]      Recent Labs   Lab 08/22/23  0419 08/23/23  0454 08/24/23  0438   CREATININE 1.5* 1.7* 1.4     Serum creatinine: 1.4 mg/dL 08/24/23 0438  Estimated creatinine clearance: 25.1 mL/min    Medication:lovenox dose: 1 mg/kg frequency BID will be changed to medication:lovenox dose:1 mg/kg frequency:Q24H  Reason CrCl < 30 mL/min    Pharmacist's Name: Jeffrey Cohn  Pharmacist's Extension: [id number]

## 2023-08-24 NOTE — PT/OT/SLP PROGRESS
Physical Therapy Treatment    Patient Name:  Vickie Benedict   MRN:  [id number]    Recommendations:     Discharge Recommendations: home health PT  Discharge Equipment Recommendations:    Barriers to discharge:  medical status, respiratory deficits, decreased activity tolerance, increased assist with mobility    Assessment:     Vickie Benedict is a 88 y.o. female admitted with a medical diagnosis of CHF exacerbation.  She presents with the following impairments/functional limitations: weakness, impaired endurance, impaired self care skills, impaired functional mobility, gait instability, impaired balance, impaired cardiopulmonary response to activity.    Pt agreeable to visit. Pt reports that her HR was in the 140s earlier this morning and that the nurse gave her something to help bring it down. Pt tolerated session fairly and required contact guard assist for safe mobilization during session today. Pt ambulated 30' with RW and CGA on 2 L O2 via NC with pt desatting to 86-87% requiring seated rest break and verbal cuing for pursed lip breathing to recover > 90%. Pt's -120 during mobility. RN informed.      Rehab Prognosis: Fair; patient would benefit from acute skilled PT services to address these deficits and reach maximum level of function.    Recent Surgery: * No surgery found *      Plan:     During this hospitalization, patient to be seen 6 x/week to address the identified rehab impairments via gait training, therapeutic activities, therapeutic exercises and progress toward the following goals:    Plan of Care Expires:  09/23/23    Subjective     Chief Complaint: pt reports that her HR was elevated this morning  Patient/Family Comments/goals: to get better  Pain/Comfort:  Pain Rating 1: 0/10      Objective:     Communicated with RN prior to session.  Patient found HOB elevated with oxygen, pulse ox (continuous), telemetry upon PT entry to room.     General Precautions: Standard, fall  Orthopedic  Precautions: N/A  Braces: N/A  Respiratory Status: Nasal cannula, flow 2 L/min     Functional Mobility:  Bed Mobility:     Scooting: moderate assistance  Supine to Sit: contact guard assistance  Sit to Supine: contact guard assistance  Transfers:     Sit to Stand:  contact guard assistance with rolling walker  Gait: x 30' with RW and CGA, 86-87% on 2 L O2 required seated rest break and PLB to recover > 90%, -120      AM-PAC 6 CLICK MOBILITY          Treatment & Education:  Pt educated on importance of time OOB, importance of intermittent mobility, safe techniques for transfers/ambulation, discharge recommendations/options, and use of call light for assistance and fall prevention.      Patient left HOB elevated with all lines intact, call button in reach, bed alarm on, and RN notified..    GOALS:   Multidisciplinary Problems       Physical Therapy Goals          Problem: Physical Therapy    Goal Priority Disciplines Outcome Goal Variances Interventions   Physical Therapy Goal     PT, PT/OT Ongoing, Progressing     Description: Goals to be met by: 23     Patient will increase functional independence with mobility by performin. Supine to sit with Modified [place]  2. Sit to supine with Modified [place]  3. Sit to stand transfer with Modified [place]  4. Bed to chair transfer with Modified [place] using Rolling Walker  5. Gait  x 100 feet with Modified [place] using Rolling Walker.                          Time Tracking:     PT Received On: 23  PT Start Time: 906     PT Stop Time: 922  PT Total Time (min): 16 min     Billable Minutes: Gait Training 16    Treatment Type: Treatment  PT/PTA: PTA     Number of PTA visits since last PT visit: 2023

## 2023-08-24 NOTE — PLAN OF CARE
Problem: Adult Inpatient Plan of Care  Goal: Plan of Care Review  Outcome: Ongoing, Progressing  Goal: Patient-Specific Goal (Individualized)  Outcome: Ongoing, Progressing     Problem: Fluid Imbalance (Pneumonia)  Goal: Fluid Balance  Outcome: Ongoing, Progressing     Problem: Infection (Pneumonia)  Goal: Resolution of Infection Signs and Symptoms  Outcome: Ongoing, Progressing     Problem: Fall Injury Risk  Goal: Absence of Fall and Fall-Related Injury  Outcome: Ongoing, Progressing

## 2023-08-24 NOTE — CONSULTS
Critical access hospital  Cardiology  Consult Note    Patient Name: Vickie Benedict  MRN: [id number]  Admission Date: 8/21/2023  Hospital Length of Stay: 1 days  Code Status: Full Code   Attending Provider: Miguel Pickett MD   Consulting Provider: Sean Cantu MD  Primary Care Physician: Aureliano Morocho MD  Principal Problem:CHF exacerbation    Patient information was obtained from patient, past medical records, and ER records.     Consults  Subjective:     Chief Complaint:  SHORTNESS OF BREATH     HPI:  Patient was admitted to the hospital this occasion short of breath and was found to have heart failure with elevated BNP and chest x-ray showing pulmonary vascular congestion  On admission she was in sinus rhythm but has converted to atrial fibrillation today  She is done on blood thinners but she is on propafenone  She denies any chest pain or revascularization procedures  She is on amlodipine thyroid lisinopril 20 mg a day metoprolol and propafenone  She is not using a lot of salt    She was in the hospital in July with shortness of breath and BNP was elevated at that time but she was not sent home on diuretics.  She is on Lasix p.r.n.      Past Medical History:   Diagnosis Date    Atrial fibrillation     Bradycardia     Carotid bruit     CKD (chronic kidney disease), stage III     Hyperlipidemia     OA (osteoarthritis)     Thyroid disease        Past Surgical History:   Procedure Laterality Date    HYSTERECTOMY      KNEE SURGERY Right     SHOULDER SURGERY Right        Review of patient's allergies indicates:   Allergen Reactions    Penicillins Anaphylaxis    Shellfish containing products        No current facility-administered medications on file prior to encounter.     Current Outpatient Medications on File Prior to Encounter   Medication Sig    amLODIPine (NORVASC) 10 MG tablet Take 1 tablet (10 mg total) by mouth once daily.    coenzyme Q10 (CO Q-10) 100 mg capsule Take 100 mg by mouth once daily.     docosahexaenoic acid/epa (FISH OIL ORAL) Take 1 capsule by mouth Daily.    FLAXSEED OIL ORAL Take 1 capsule by mouth Daily.    furosemide (LASIX) 20 MG tablet Take 1 tablet (20 mg total) by mouth daily as needed. As needed for leg swelling (Patient taking differently: Take 10 mg by mouth daily as needed (edema). As needed for leg swelling)    glucosam/msm/chond/cyj659/hyal (GLUCOS-CHOND-MSM, WITH ANTIOX, ORAL) Take 1 capsule by mouth Daily.    levothyroxine (SYNTHROID) 100 MCG tablet TAKE 1 TABLET BY MOUTH EVERY DAY (Patient taking differently: Take 100 mcg by mouth before breakfast.)    lisinopriL (PRINIVIL,ZESTRIL) 20 MG tablet Take 1 tablet (20 mg total) by mouth 2 (two) times daily. (Patient taking differently: Take 20 mg by mouth once daily.)    metoprolol succinate (TOPROL-XL) 25 MG 24 hr tablet Take 0.5 tablets (12.5 mg total) by mouth once daily.    multivitamin (THERAGRAN) per tablet Take 1 tablet by mouth once daily.    propafenone (RYTHMOL) 225 MG Tab TAKE 1 TABLET BY MOUTH TWICE DAILY (Patient taking differently: Take 225 mg by mouth 2 (two) times a day.)     Family History       Problem Relation (Age of Onset)    Heart disease Father          Tobacco Use    Smoking status: Never    Smokeless tobacco: Never   Substance and Sexual Activity    Alcohol use: Never    Drug use: Never    Sexual activity: Not on file     Review of Systems   Constitutional: Positive for weight gain.   HENT:  Positive for hearing loss.    Cardiovascular:  Positive for dyspnea on exertion, irregular heartbeat, leg swelling and orthopnea.   Respiratory:  Positive for shortness of breath, sleep disturbances due to breathing and wheezing.    Skin:  Positive for color change.   Musculoskeletal:  Positive for arthritis and back pain.   Gastrointestinal:  Positive for bloating and heartburn.     Objective:     Vital Signs (Most Recent):  Temp: 97.6 °F (36.4 °C) (08/24/23 1100)  Pulse: 107 (08/24/23 1100)  Resp: 18 (08/24/23 1100)  BP:  "(!) 97/59 (08/24/23 1100)  SpO2: 96 % (08/24/23 1100) Vital Signs (24h Range):  Temp:  [97 °F (36.1 °C)-98.1 °F (36.7 °C)] 97.6 °F (36.4 °C)  Pulse:  [] 107  Resp:  [16-19] 18  SpO2:  [91 %-98 %] 96 %  BP: ()/(56-90) 97/59     Weight: 68 kg (149 lb 14.6 oz)  Body mass index is 27.42 kg/m².    SpO2: 96 %         Intake/Output Summary (Last 24 hours) at 8/24/2023 1218  Last data filed at 8/24/2023 0526  Gross per 24 hour   Intake 360 ml   Output 2000 ml   Net -1640 ml       Lines/Drains/Airways       Peripheral Intravenous Line  Duration                  Peripheral IV - Single Lumen 08/21/23 1606 22 G Posterior;Right Forearm 2 days                    Physical Exam Pulse rates 110  Blood pressure 115/80  Lungs few crackles posteriorly on the right  Cardiac irregular irregular  Abdomen no masses  Extremities minimal edema with slight decreased pulses    Significant Labs: CMP   Recent Labs   Lab 08/23/23  0454 08/24/23  0438    138   K 3.9 4.0    99   CO2 31* 33*   * 104   BUN 44* 45*   CREATININE 1.7* 1.4   CALCIUM 9.3 9.1   ANIONGAP 6* 6*   , CBC   Recent Labs   Lab 08/23/23  0454 08/24/23  0438   WBC 11.08 8.81   HGB 10.5* 11.1*   HCT 33.4* 35.0*    226   , and Troponin No results for input(s): "TROPONINI" in the last 48 hours.    Significant Imaging: IMPRESSION:   Severe CHF.  Assessment and Plan:   Heart failure and paroxysmal atrial fibrillation with the patient having gone into atrial fib RVR  Of lungs relatively clear few crackles posteriorly on the right  Plan hold propafenone in the morning  If she is in atrial fib tomorrow Will do ISAURO cardioversion  With loading dose of amiodarone  She has chronic kidney disease  Rx Lovenox full dose for now  Active Diagnoses:    Diagnosis Date Noted POA    PRINCIPAL PROBLEM:  CHF exacerbation [I50.9] 07/19/2023 Yes    Left leg pain [M79.605] 08/22/2023 Yes    Essential hypertension [I10] 04/28/2021 Yes     Chronic    History of atrial " fibrillation [Z86.79] 04/28/2021 Not Applicable     Chronic    Postablative hypothyroidism [E89.0] 04/28/2021 Yes     Chronic    CKD (chronic kidney disease), stage III [N18.30]  Yes     Chronic      Problems Resolved During this Admission:     I substantially and  personally reviewed old and new ecg's, lab reports,, xray reports  and  other cardiovascular studies including  echo's, stress tests, angiogram reports, holters,and vascular studies .  In addition I evaluated original cardiac cath  _x__echo  review of recent echo demonstrates concentric remodeling pulmonary hypertension and mean left atrial pressure 14 _x___cxr _ significant pulmonary edema _____ct ____scan on [employer] or [employer] or other viewing platforms and  __x __EKG's .  Sinus rhythm and atrial fib  I reviewed  office and hospital notes Yes ____ of  referring providers and other providers.  I reviewed personally old hospital and office notes   Time spent evaluating and managing this patient:________min.     VTE Risk Mitigation (From admission, onward)           Ordered     enoxaparin injection 30 mg  Every 24 hours         08/22/23 1210     IP VTE HIGH RISK PATIENT  Once         08/21/23 2040     Place sequential compression device  Until discontinued         08/21/23 2040                    Thank you for your consult.     Sean Cantu MD  Cardiology   [facility]

## 2023-08-24 NOTE — PLAN OF CARE
SW sent updated home health referral packet via [employer] to [facility]. SW let them know Pt will be discharging tomorrow and inquired about start of care date. SW/CM will continue to follow.      08/24/23 1217   Post-Acute Status   Post-Acute Authorization Home Health   Home Health Status Set-up Complete/Auth obtained   Patient choice form signed by patient/caregiver List from [employer]   Discharge Delays None known at this time   Discharge Plan   Discharge Plan A Home Health   Discharge Plan B Home Health

## 2023-08-25 ENCOUNTER — CLINICAL SUPPORT (OUTPATIENT)
Dept: CARDIOLOGY | Facility: HOSPITAL | Age: 88
DRG: 291 | End: 2023-08-25
Attending: SPECIALIST
Payer: MEDICARE

## 2023-08-25 VITALS — HEIGHT: 62 IN | WEIGHT: 149.94 LBS | BODY MASS INDEX: 27.59 KG/M2

## 2023-08-25 LAB
ANION GAP SERPL CALC-SCNC: 7 MMOL/L (ref 8–16)
BASOPHILS # BLD AUTO: 0.05 K/UL (ref 0–0.2)
BASOPHILS NFR BLD: 0.5 % (ref 0–1.9)
BNP SERPL-MCNC: 1617 PG/ML (ref 0–99)
BSA FOR ECHO PROCEDURE: 1.72 M2
BUN SERPL-MCNC: 43 MG/DL (ref 8–23)
CALCIUM SERPL-MCNC: 9.4 MG/DL (ref 8.7–10.5)
CHLORIDE SERPL-SCNC: 99 MMOL/L (ref 95–110)
CO2 SERPL-SCNC: 34 MMOL/L (ref 23–29)
CREAT SERPL-MCNC: 1.6 MG/DL (ref 0.5–1.4)
CV ECHO LV RWT: 0.49 CM
DIFFERENTIAL METHOD: ABNORMAL
E/E' RATIO: 17.18 M/S
ECHO LV POSTERIOR WALL: 1.24 CM (ref 0.6–1.1)
EOSINOPHIL # BLD AUTO: 0.4 K/UL (ref 0–0.5)
EOSINOPHIL NFR BLD: 3.6 % (ref 0–8)
ERYTHROCYTE [DISTWIDTH] IN BLOOD BY AUTOMATED COUNT: 13.9 % (ref 11.5–14.5)
EST. GFR  (NO RACE VARIABLE): 30.8 ML/MIN/1.73 M^2
FRACTIONAL SHORTENING: 18 % (ref 28–44)
GLUCOSE SERPL-MCNC: 119 MG/DL (ref 70–110)
HCT VFR BLD AUTO: 35.6 % (ref 37–48.5)
HGB BLD-MCNC: 11.4 G/DL (ref 12–16)
IMM GRANULOCYTES # BLD AUTO: 0.02 K/UL (ref 0–0.04)
IMM GRANULOCYTES NFR BLD AUTO: 0.2 % (ref 0–0.5)
INTERVENTRICULAR SEPTUM: 1.01 CM (ref 0.6–1.1)
IVC DIAMETER: 2.4 CM
LEFT INTERNAL DIMENSION IN SYSTOLE: 4.17 CM (ref 2.1–4)
LEFT VENTRICLE DIASTOLIC VOLUME INDEX: 72.78 ML/M2
LEFT VENTRICLE DIASTOLIC VOLUME: 123 ML
LEFT VENTRICLE MASS INDEX: 130 G/M2
LEFT VENTRICLE SYSTOLIC VOLUME INDEX: 45.7 ML/M2
LEFT VENTRICLE SYSTOLIC VOLUME: 77.3 ML
LEFT VENTRICULAR INTERNAL DIMENSION IN DIASTOLE: 5.09 CM (ref 3.5–6)
LEFT VENTRICULAR MASS: 219.89 G
LV LATERAL E/E' RATIO: 16.22 M/S
LV SEPTAL E/E' RATIO: 18.25 M/S
LYMPHOCYTES # BLD AUTO: 1.6 K/UL (ref 1–4.8)
LYMPHOCYTES NFR BLD: 15.4 % (ref 18–48)
MAGNESIUM SERPL-MCNC: 1.7 MG/DL (ref 1.6–2.6)
MCH RBC QN AUTO: 33.7 PG (ref 27–31)
MCHC RBC AUTO-ENTMCNC: 32 G/DL (ref 32–36)
MCV RBC AUTO: 105 FL (ref 82–98)
MONOCYTES # BLD AUTO: 1.4 K/UL (ref 0.3–1)
MONOCYTES NFR BLD: 13.3 % (ref 4–15)
MV PEAK E VEL: 1.46 M/S
NEUTROPHILS # BLD AUTO: 6.9 K/UL (ref 1.8–7.7)
NEUTROPHILS NFR BLD: 67 % (ref 38–73)
NRBC BLD-RTO: 0 /100 WBC
PHOSPHATE SERPL-MCNC: 3.5 MG/DL (ref 2.7–4.5)
PISA TR MAX VEL: 2.95 M/S
PLATELET # BLD AUTO: 228 K/UL (ref 150–450)
PMV BLD AUTO: 10.3 FL (ref 9.2–12.9)
POTASSIUM SERPL-SCNC: 4 MMOL/L (ref 3.5–5.1)
RBC # BLD AUTO: 3.38 M/UL (ref 4–5.4)
RIGHT VENTRICULAR END-DIASTOLIC DIMENSION: 2.81 CM
RV TISSUE DOPPLER FREE WALL SYSTOLIC VELOCITY 1 (APICAL 4 CHAMBER VIEW): 9.9 CM/S
SODIUM SERPL-SCNC: 140 MMOL/L (ref 136–145)
TDI LATERAL: 0.09 M/S
TDI SEPTAL: 0.08 M/S
TDI: 0.09 M/S
TR MAX PG: 35 MMHG
WBC # BLD AUTO: 10.28 K/UL (ref 3.9–12.7)
Z-SCORE OF LEFT VENTRICULAR DIMENSION IN END DIASTOLE: 0.78
Z-SCORE OF LEFT VENTRICULAR DIMENSION IN END SYSTOLE: 2.83

## 2023-08-25 PROCEDURE — 84100 ASSAY OF PHOSPHORUS: CPT | Performed by: STUDENT IN AN ORGANIZED HEALTH CARE EDUCATION/TRAINING PROGRAM

## 2023-08-25 PROCEDURE — 63600175 PHARM REV CODE 636 W HCPCS: Performed by: SPECIALIST

## 2023-08-25 PROCEDURE — 93308 TTE F-UP OR LMTD: CPT | Mod: 26,,, | Performed by: SPECIALIST

## 2023-08-25 PROCEDURE — 93308 TTE F-UP OR LMTD: CPT

## 2023-08-25 PROCEDURE — 99232 SBSQ HOSP IP/OBS MODERATE 35: CPT | Mod: ,,, | Performed by: SPECIALIST

## 2023-08-25 PROCEDURE — 80048 BASIC METABOLIC PNL TOTAL CA: CPT | Performed by: STUDENT IN AN ORGANIZED HEALTH CARE EDUCATION/TRAINING PROGRAM

## 2023-08-25 PROCEDURE — 93308 ECHO (CUPID ONLY): ICD-10-PCS | Mod: 26,,, | Performed by: SPECIALIST

## 2023-08-25 PROCEDURE — 85025 COMPLETE CBC W/AUTO DIFF WBC: CPT | Performed by: STUDENT IN AN ORGANIZED HEALTH CARE EDUCATION/TRAINING PROGRAM

## 2023-08-25 PROCEDURE — 36415 COLL VENOUS BLD VENIPUNCTURE: CPT | Performed by: SPECIALIST

## 2023-08-25 PROCEDURE — 97530 THERAPEUTIC ACTIVITIES: CPT

## 2023-08-25 PROCEDURE — 36415 COLL VENOUS BLD VENIPUNCTURE: CPT | Performed by: STUDENT IN AN ORGANIZED HEALTH CARE EDUCATION/TRAINING PROGRAM

## 2023-08-25 PROCEDURE — 99232 PR SUBSEQUENT HOSPITAL CARE,LEVL II: ICD-10-PCS | Mod: ,,, | Performed by: SPECIALIST

## 2023-08-25 PROCEDURE — 27000221 HC OXYGEN, UP TO 24 HOURS

## 2023-08-25 PROCEDURE — 83880 ASSAY OF NATRIURETIC PEPTIDE: CPT | Performed by: SPECIALIST

## 2023-08-25 PROCEDURE — 25000003 PHARM REV CODE 250: Performed by: INTERNAL MEDICINE

## 2023-08-25 PROCEDURE — 94761 N-INVAS EAR/PLS OXIMETRY MLT: CPT

## 2023-08-25 PROCEDURE — 97116 GAIT TRAINING THERAPY: CPT | Mod: CQ

## 2023-08-25 PROCEDURE — 83735 ASSAY OF MAGNESIUM: CPT | Performed by: STUDENT IN AN ORGANIZED HEALTH CARE EDUCATION/TRAINING PROGRAM

## 2023-08-25 PROCEDURE — 12000002 HC ACUTE/MED SURGE SEMI-PRIVATE ROOM

## 2023-08-25 PROCEDURE — 97530 THERAPEUTIC ACTIVITIES: CPT | Mod: CQ

## 2023-08-25 RX ORDER — FUROSEMIDE 10 MG/ML
20 INJECTION INTRAMUSCULAR; INTRAVENOUS
Status: DISCONTINUED | OUTPATIENT
Start: 2023-08-25 | End: 2023-08-26

## 2023-08-25 RX ADMIN — ENOXAPARIN SODIUM 70 MG: 100 INJECTION SUBCUTANEOUS at 05:08

## 2023-08-25 RX ADMIN — AMIODARONE HYDROCHLORIDE 1 MG/MIN: 1.8 INJECTION, SOLUTION INTRAVENOUS at 01:08

## 2023-08-25 RX ADMIN — METOPROLOL TARTRATE 12.5 MG: 25 TABLET, FILM COATED ORAL at 08:08

## 2023-08-25 RX ADMIN — AMIODARONE HYDROCHLORIDE 0.5 MG/MIN: 1.8 INJECTION, SOLUTION INTRAVENOUS at 07:08

## 2023-08-25 RX ADMIN — FUROSEMIDE 20 MG: 10 INJECTION, SOLUTION INTRAMUSCULAR; INTRAVENOUS at 12:08

## 2023-08-25 RX ADMIN — AMIODARONE HYDROCHLORIDE 150 MG: 1.5 INJECTION, SOLUTION INTRAVENOUS at 12:08

## 2023-08-25 NOTE — PLAN OF CARE
Per [facility], SERGEY needs to send referral to their sister company, [email or web address]. SERGEY sent referral to [email or web address] via [employer].      08/25/23 1311   Post-Acute Status   Post-Acute Authorization Home Health   Home Health Status Pending medical clearance/testing   Discharge Delays None known at this time   Discharge Plan   Discharge Plan A Home Health   Discharge Plan B Home Health

## 2023-08-25 NOTE — CONSULTS
[facility]  Cardiology  Progress Note    Patient Name: Vickie Benedict  MRN: [id number]  Admission Date: 8/21/2023  Hospital Length of Stay: 2 days  Code Status: Full Code   Attending Physician: Miguel Pickett MD   Primary Care Physician: Aureliano Morocho MD  Expected Discharge Date: 8/25/2023  Principal Problem:Acute on chronic diastolic (congestive) heart failure    Subjective:     Hospital Course:     Interval History:  Problem 1 congestive heart failure-on July 19th patient was admitted in sinus rhythm with heart failure  Echo at that time demonstrated moderate mitral and aortic regurgitation; dilated left; mean left atrial pressure 15; pulmonary hypertension; slightly dilated left ventricle  She was in sinus rhythm at that time and was sent home on propafenone and Lasix p.r.n.  She continues to have shortness of breath was readmitted here and propafenone was discontinued and she went into atrial fibrillation and is still short of breath    She denies any chest  Mild chronic renal insufficiency      ROS  Objective:     Vital Signs (Most Recent):  Temp: 98 °F (36.7 °C) (08/25/23 0720)  Pulse: 98 (08/25/23 0755)  Resp: 18 (08/25/23 0755)  BP: 129/79 (08/25/23 0720)  SpO2: 96 % (08/25/23 0755) Vital Signs (24h Range):  Temp:  [97.8 °F (36.6 °C)-99.5 °F (37.5 °C)] 98 °F (36.7 °C)  Pulse:  [] 98  Resp:  [18-20] 18  SpO2:  [95 %-98 %] 96 %  BP: ()/() 129/79     Weight: 68 kg (149 lb 14.6 oz)  Body mass index is 27.42 kg/m².    SpO2: 96 %         Intake/Output Summary (Last 24 hours) at 8/25/2023 1138  Last data filed at 8/25/2023 0315  Gross per 24 hour   Intake 360 ml   Output 1850 ml   Net -1490 ml       Lines/Drains/Airways       Peripheral Intravenous Line  Duration                  Peripheral IV - Single Lumen 08/24/23 1600 20 G;1 3/4 in Anterior;Left Forearm <1 day                    Physical Exam blood pressure is 140/80 pulse rates 90  Lungs crackles  Cardiac irregular 1/6  "systolic  Legs no edema    Significant Labs: CMP   Recent Labs   Lab 08/24/23  0438 08/25/23  0326    140   K 4.0 4.0   CL 99 99   CO2 33* 34*    119*   BUN 45* 43*   CREATININE 1.4 1.6*   CALCIUM 9.1 9.4   ANIONGAP 6* 7*   , CBC   Recent Labs   Lab 08/24/23  0438 08/25/23  0326   WBC 8.81 10.28   HGB 11.1* 11.4*   HCT 35.0* 35.6*    228   , and Troponin No results for input(s): "TROPONINI" in the last 48 hours.    Significant Imaging:   Assessment and Plan:   Patient has heart failure mildly reduced ejection fraction as well as diastolic compliance question pulmonary hypertension elevation mean left atrial pressure-once we get her back into regular rhythm(I am starting amiodarone) will put her on diuretics and Entresto  She will need to be on Eliquis 2.5 b.i.d.-  In regard to propafenone-she missed 1 dose of propafenone and went into atrial fib.  It is not clear if these relieved  Propafenone possibly  is exacerbating her heart failure    Brief HPI:     Active Diagnoses:    Diagnosis Date Noted POA    PRINCIPAL PROBLEM:  Acute on chronic diastolic (congestive) heart failure [I50.33] 08/24/2023 Unknown    Atrial fibrillation with RVR [I48.91] 08/24/2023 Unknown    Essential hypertension [I10] 04/28/2021 Yes     Chronic    History of atrial fibrillation [Z86.79] 04/28/2021 Not Applicable     Chronic    Postablative hypothyroidism [E89.0] 04/28/2021 Yes     Chronic    CKD (chronic kidney disease), stage III [N18.30]  Yes     Chronic      Problems Resolved During this Admission:       VTE Risk Mitigation (From admission, onward)           Ordered     enoxaparin injection 70 mg  Every 24 hours         08/24/23 1226     IP VTE HIGH RISK PATIENT  Once         08/21/23 2040     Place sequential compression device  Until discontinued         08/21/23 2040                    Sean Cantu MD  Cardiology  [facility]  "

## 2023-08-25 NOTE — CARE UPDATE
08/24/23 2026   Patient Assessment/Suction   Level of Consciousness (AVPU) alert   Respiratory Effort Normal;Unlabored   PRE-TX-O2   Device (Oxygen Therapy) nasal cannula   Flow (L/min) 2   SpO2 96 %   Pulse Oximetry Type Intermittent   $ Pulse Oximetry - Multiple Charge Pulse Oximetry - Multiple   Pulse 103   Resp 18   Education   $ Education 15 min   Respiratory Evaluation   $ Care Plan Tech Time 15 min

## 2023-08-25 NOTE — PROGRESS NOTES
"[facility] Medicine  Progress Note    Patient Name: Vickie Benedict  MRN: [id number]  Patient Class: IP- Inpatient   Admission Date: 8/21/2023  Length of Stay: 1 days  Attending Physician: Miguel Pickett MD  Primary Care Provider: Aureliano Morocho MD        Subjective:     Principal Problem:Acute on chronic diastolic (congestive) heart failure        HPI:  Ms. Benedict is a 87 yo w/pmhx of hypothyroidism, HFpEF on 4L NC at home, HTN, afib who presents w/SOB. Patient was discharged 1 month ago w/HF exacerbation and also treated for bronchitis. However, patient reports since discharge she has been feeling weak. Reports in the last week she has progressively gotten more SOB w/ exertion. She also reports 2 pillow orthopnea, PNA and LE edema up to calf. She reports she was discharged with 20mg lasix daily but did not much of a difference in her LE edema and stopped taking it. She then started taking a diuretic her cardiologist prescribed but does not remember the name or the dose. Believes it starts w/a "t". She took 1/2 a pill and noticed some improvement in her edema but not her breathing. Per chart review, cannot find name/dose of diuretic.     ED labs notable for trop 17, . TTE from 7/2023 w/normal EF and diastolic fxn. Cr 1.7 (baseline 1.3-1.5). Was given 20mg IV lasix w/500 cc output w/in 30 minutes.       Overview/Hospital Course:  Ms. Benedict is an 87 yo w/pmhx of HFpEF on 4L NC at home, afib, HTN who presented with HF exacerbation. Was recently discharged with lasix 20mg daily but reports not taking for the last week as she did not not any improvement with it ans was instead taking triamterine/HCTZ. Diuresing with IV lasix. Does have unilateral pain in LE. DVT US negative. Did have episode of bradycardia while sleeping. Will hold home metoprolol.         08/24  Pt went into A Fib RVR  Home medications started  Much improvement from CHF      Interval History:       Review of " Systems   Constitutional:  Negative for activity change and appetite change.   HENT:  Negative for congestion and dental problem.    Eyes:  Negative for discharge and itching.   Respiratory:  Positive for shortness of breath.    Cardiovascular:  Negative for chest pain.   Gastrointestinal:  Negative for abdominal distention and abdominal pain.   Endocrine: Negative for cold intolerance.   Genitourinary:  Negative for difficulty urinating and dysuria.   Musculoskeletal:  Negative for arthralgias and back pain.   Skin:  Negative for color change.   Neurological:  Negative for dizziness and facial asymmetry.   Hematological:  Negative for adenopathy.   Psychiatric/Behavioral:  Negative for agitation and behavioral problems.      Objective:     Vital Signs (Most Recent):  Temp: 99.5 °F (37.5 °C) (08/24/23 1952)  Pulse: 108 (08/24/23 1955)  Resp: 18 (08/24/23 1952)  BP: (!) 94/59 (08/24/23 1952)  SpO2: 97 % (08/24/23 1952) Vital Signs (24h Range):  Temp:  [97 °F (36.1 °C)-99.5 °F (37.5 °C)] 99.5 °F (37.5 °C)  Pulse:  [] 108  Resp:  [16-20] 18  SpO2:  [95 %-98 %] 97 %  BP: ()/(56-90) 94/59     Weight: 68 kg (149 lb 14.6 oz)  Body mass index is 27.42 kg/m².    Intake/Output Summary (Last 24 hours) at 8/24/2023 2012  Last data filed at 8/24/2023 1805  Gross per 24 hour   Intake 600 ml   Output 1650 ml   Net -1050 ml         Physical Exam  Vitals and nursing note reviewed.   Constitutional:       General: She is not in acute distress.  HENT:      Head: Atraumatic.      Right Ear: External ear normal.      Left Ear: External ear normal.      Nose: Nose normal.      Mouth/Throat:      Mouth: Mucous membranes are moist.   Cardiovascular:      Rate and Rhythm: Normal rate.   Pulmonary:      Effort: Pulmonary effort is normal.   Musculoskeletal:         General: Normal range of motion.      Cervical back: Normal range of motion.   Skin:     General: Skin is warm.   Neurological:      Mental Status: She is alert and  oriented to person, place, and time.   Psychiatric:         Behavior: Behavior normal.             Significant Labs: All pertinent labs within the past 24 hours have been reviewed.  CBC:   Recent Labs   Lab 08/23/23  0454 08/24/23  0438   WBC 11.08 8.81   HGB 10.5* 11.1*   HCT 33.4* 35.0*    226     CMP:   Recent Labs   Lab 08/23/23  0454 08/24/23  0438    138   K 3.9 4.0    99   CO2 31* 33*   * 104   BUN 44* 45*   CREATININE 1.7* 1.4   CALCIUM 9.3 9.1   ANIONGAP 6* 6*       Significant Imaging: I have reviewed all pertinent imaging results/findings within the past 24 hours.      Assessment/Plan:      * Acute on chronic diastolic (congestive) heart failure  Maintain iv lasix  Much improvement in condition with diuretics     Atrial fibrillation with RVR  Home medications re started  Iv lopressor PRN basis  Possible ISAURO/cardioversion tomorrow AM     Postablative hypothyroidism  Continue synthroid   Last TSH in 7/2023 wnl     History of atrial fibrillation  At  home on propafenone and metoprolol     Essential hypertension  Stable     CKD (chronic kidney disease), stage III  Baseline cr 1.3-1.5  Avoid nephrotoxic agents         VTE Risk Mitigation (From admission, onward)         Ordered     enoxaparin injection 70 mg  Every 24 hours         08/24/23 1226     IP VTE HIGH RISK PATIENT  Once         08/21/23 2040     Place sequential compression device  Until discontinued         08/21/23 2040                Discharge Planning   HEMANTH: 8/25/2023     Code Status: Full Code   Is the patient medically ready for discharge?:     Reason for patient still in hospital (select all that apply): Treatment  Discharge Plan A: Home Health   Discharge Delays: None known at this time              Miguel Pickett MD  Department of Hospital Medicine   [facility]

## 2023-08-25 NOTE — PT/OT/SLP PROGRESS
Occupational Therapy   Treatment    Name: Vickie Benedict  MRN: [id number]  Admitting Diagnosis:  Acute on chronic diastolic (congestive) heart failure       Recommendations:     Discharge Recommendations: home health OT  Discharge Equipment Recommendations:  walker, rolling, bedside commode  Barriers to discharge:  Decreased caregiver support    Assessment:     Vickie Benedict is a 88 y.o. female with a medical diagnosis of Acute on chronic diastolic (congestive) heart failure.  Performance deficits affecting function are weakness, impaired endurance, impaired self care skills, impaired functional mobility, gait instability, impaired balance, impaired cardiopulmonary response to activity.     Pt agreed to limited activity today (concern over Afib).      Rehab Prognosis:  Fair; patient would benefit from acute skilled OT services to address these deficits and reach maximum level of function.       Plan:     Patient to be seen 5 x/week to address the above listed problems via self-care/home management, therapeutic activities, therapeutic exercises  Plan of Care Expires: 09/22/23  Plan of Care Reviewed with: patient    Subjective     Pain/Comfort:  Pain Rating 1: 0/10  Pain Rating Post-Intervention 1: 0/10    Objective:     Communicated with: nurse prior to session.  Patient found supine with telemetry upon OT entry to room.    General Precautions: Standard, fall    Respiratory Status: Room air     Occupational Performance:     Bed Mobility:    Patient completed Supine to Sit with stand by assistance  Patient completed Sit to Supine with stand by assistance     Activities of Daily Living:  Feeding:  set-up assistance      Patient left HOB elevated with all lines intact, call button in reach, and bed alarm on    GOALS:   Multidisciplinary Problems       Occupational Therapy Goals          Problem: Occupational Therapy    Goal Priority Disciplines Outcome Interventions   Occupational Therapy Goal     OT, PT/OT      Description: Goals to be met by: 9/23/23     Patient will increase functional independence with ADLs by performing:    UE Dressing with Modified [place].  LE Dressing with Modified [place].  Grooming while standing at sink with Modified [place].  Toileting from toilet with Modified [place] for hygiene and clothing management.   Toilet transfer to toilet with Modified [place].                         Time Tracking:     OT Date of Treatment: 08/25/23  OT Start Time: 1145  OT Stop Time: 1155  OT Total Time (min): 10 min    Billable Minutes:Self Care/Home Management 10    OT/FANNIE: OT          8/25/2023

## 2023-08-25 NOTE — SUBJECTIVE & OBJECTIVE
Interval History:       Review of Systems   Constitutional:  Negative for activity change and appetite change.   HENT:  Negative for congestion and dental problem.    Eyes:  Negative for discharge and itching.   Respiratory:  Positive for shortness of breath.    Cardiovascular:  Negative for chest pain.   Gastrointestinal:  Negative for abdominal distention and abdominal pain.   Endocrine: Negative for cold intolerance.   Genitourinary:  Negative for difficulty urinating and dysuria.   Musculoskeletal:  Negative for arthralgias and back pain.   Skin:  Negative for color change.   Neurological:  Negative for dizziness and facial asymmetry.   Hematological:  Negative for adenopathy.   Psychiatric/Behavioral:  Negative for agitation and behavioral problems.      Objective:     Vital Signs (Most Recent):  Temp: 99.5 °F (37.5 °C) (08/24/23 1952)  Pulse: 108 (08/24/23 1955)  Resp: 18 (08/24/23 1952)  BP: (!) 94/59 (08/24/23 1952)  SpO2: 97 % (08/24/23 1952) Vital Signs (24h Range):  Temp:  [97 °F (36.1 °C)-99.5 °F (37.5 °C)] 99.5 °F (37.5 °C)  Pulse:  [] 108  Resp:  [16-20] 18  SpO2:  [95 %-98 %] 97 %  BP: ()/(56-90) 94/59     Weight: 68 kg (149 lb 14.6 oz)  Body mass index is 27.42 kg/m².    Intake/Output Summary (Last 24 hours) at 8/24/2023 2012  Last data filed at 8/24/2023 1805  Gross per 24 hour   Intake 600 ml   Output 1650 ml   Net -1050 ml         Physical Exam  Vitals and nursing note reviewed.   Constitutional:       General: She is not in acute distress.  HENT:      Head: Atraumatic.      Right Ear: External ear normal.      Left Ear: External ear normal.      Nose: Nose normal.      Mouth/Throat:      Mouth: Mucous membranes are moist.   Cardiovascular:      Rate and Rhythm: Normal rate.   Pulmonary:      Effort: Pulmonary effort is normal.   Musculoskeletal:         General: Normal range of motion.      Cervical back: Normal range of motion.   Skin:     General: Skin is warm.   Neurological:       Mental Status: She is alert and oriented to person, place, and time.   Psychiatric:         Behavior: Behavior normal.             Significant Labs: All pertinent labs within the past 24 hours have been reviewed.  CBC:   Recent Labs   Lab 08/23/23 0454 08/24/23  0438   WBC 11.08 8.81   HGB 10.5* 11.1*   HCT 33.4* 35.0*    226     CMP:   Recent Labs   Lab 08/23/23 0454 08/24/23 0438    138   K 3.9 4.0    99   CO2 31* 33*   * 104   BUN 44* 45*   CREATININE 1.7* 1.4   CALCIUM 9.3 9.1   ANIONGAP 6* 6*       Significant Imaging: I have reviewed all pertinent imaging results/findings within the past 24 hours.

## 2023-08-25 NOTE — PT/OT/SLP PROGRESS
Physical Therapy Treatment    Patient Name:  Vickie Benedict   MRN:  [id number]    Recommendations:     Discharge Recommendations: home health PT  Discharge Equipment Recommendations:    Barriers to discharge: None    Assessment:     Vickie Benedict is a 88 y.o. female admitted with a medical diagnosis of Acute on chronic diastolic (congestive) heart failure.  She presents with the following impairments/functional limitations: weakness, impaired endurance, impaired self care skills, impaired functional mobility, gait instability, impaired balance, impaired cardiopulmonary response to activity.    Pt agreeable to visit. Pt expressed concern about HR being elevated due to Afib. Pt reports that she declined ISAURO as her heart rate was controlled before she was taken off her home medications and she is upset about that. Pt agreeable to sit up in chair. Pt required stand by assist for supine to sit transfer and contact guard assist for sit to stand transfer with RW. Pt ambulated 20' with RW and contact guard assist on 2 L O2  with pt desatting to the 80s but quickly recovered once seated in chair with pursed lip breathing. HR 130s during ambulation. Pt left up in chair with chair alarm and RN informed.    Rehab Prognosis: Fair; patient would benefit from acute skilled PT services to address these deficits and reach maximum level of function.    Recent Surgery: * No surgery found *      Plan:     During this hospitalization, patient to be seen 6 x/week to address the identified rehab impairments via gait training, therapeutic activities, therapeutic exercises and progress toward the following goals:    Plan of Care Expires:  09/23/23    Subjective     Chief Complaint: pt upset about being taken off home medication  Patient/Family Comments/goals: to get HR controlled  Pain/Comfort:  Pain Rating 1: 0/10      Objective:     Communicated with RN prior to session.  Patient found HOB elevated with oxygen, pulse ox  (continuous), telemetry upon PT entry to room.     General Precautions: Standard, fall  Orthopedic Precautions: N/A  Braces: N/A  Respiratory Status: Nasal cannula, flow 2 L/min     Functional Mobility:  Bed Mobility:     Supine to Sit: stand by assistance  Transfers:     Sit to Stand:  contact guard assistance with rolling walker  Gait: x 20' with RW and CGA on 2 L O2, desats to 80s with HR 130s. Seated rest with PLB to recover SPO2 > 90%      AM-PAC 6 CLICK MOBILITY          Treatment & Education:  Pt educated on importance of time OOB, importance of intermittent mobility, safe techniques for transfers/ambulation, discharge recommendations/options, and use of call light for assistance and fall prevention.      Patient left up in chair with all lines intact, call button in reach, chair alarm on, and RN notified..    GOALS:   Multidisciplinary Problems       Physical Therapy Goals          Problem: Physical Therapy    Goal Priority Disciplines Outcome Goal Variances Interventions   Physical Therapy Goal     PT, PT/OT Ongoing, Progressing     Description: Goals to be met by: 23     Patient will increase functional independence with mobility by performin. Supine to sit with Modified [place]  2. Sit to supine with Modified [place]  3. Sit to stand transfer with Modified [place]  4. Bed to chair transfer with Modified [place] using Rolling Walker  5. Gait  x 100 feet with Modified [place] using Rolling Walker.                          Time Tracking:     PT Received On: 23  PT Start Time: 924     PT Stop Time: 950  PT Total Time (min): 26 min     Billable Minutes: Gait Training 10 and Therapeutic Activity 16    Treatment Type: Treatment  PT/PTA: PTA     Number of PTA visits since last PT visit: 2     2023

## 2023-08-26 LAB
ANION GAP SERPL CALC-SCNC: 9 MMOL/L (ref 8–16)
BACTERIA BLD CULT: NORMAL
BACTERIA BLD CULT: NORMAL
BASOPHILS # BLD AUTO: 0.05 K/UL (ref 0–0.2)
BASOPHILS NFR BLD: 0.5 % (ref 0–1.9)
BUN SERPL-MCNC: 39 MG/DL (ref 8–23)
CALCIUM SERPL-MCNC: 8.4 MG/DL (ref 8.7–10.5)
CHLORIDE SERPL-SCNC: 90 MMOL/L (ref 95–110)
CO2 SERPL-SCNC: 31 MMOL/L (ref 23–29)
CREAT SERPL-MCNC: 1.4 MG/DL (ref 0.5–1.4)
DIFFERENTIAL METHOD: ABNORMAL
EOSINOPHIL # BLD AUTO: 0.3 K/UL (ref 0–0.5)
EOSINOPHIL NFR BLD: 3 % (ref 0–8)
ERYTHROCYTE [DISTWIDTH] IN BLOOD BY AUTOMATED COUNT: 13.6 % (ref 11.5–14.5)
EST. GFR  (NO RACE VARIABLE): 36.2 ML/MIN/1.73 M^2
GLUCOSE SERPL-MCNC: 119 MG/DL (ref 70–110)
HCT VFR BLD AUTO: 36.8 % (ref 37–48.5)
HGB BLD-MCNC: 11.7 G/DL (ref 12–16)
IMM GRANULOCYTES # BLD AUTO: 0.02 K/UL (ref 0–0.04)
IMM GRANULOCYTES NFR BLD AUTO: 0.2 % (ref 0–0.5)
LYMPHOCYTES # BLD AUTO: 1.3 K/UL (ref 1–4.8)
LYMPHOCYTES NFR BLD: 12.2 % (ref 18–48)
MAGNESIUM SERPL-MCNC: 1.5 MG/DL (ref 1.6–2.6)
MCH RBC QN AUTO: 33.2 PG (ref 27–31)
MCHC RBC AUTO-ENTMCNC: 31.8 G/DL (ref 32–36)
MCV RBC AUTO: 105 FL (ref 82–98)
MONOCYTES # BLD AUTO: 1.5 K/UL (ref 0.3–1)
MONOCYTES NFR BLD: 13.8 % (ref 4–15)
NEUTROPHILS # BLD AUTO: 7.6 K/UL (ref 1.8–7.7)
NEUTROPHILS NFR BLD: 70.3 % (ref 38–73)
NRBC BLD-RTO: 0 /100 WBC
PHOSPHATE SERPL-MCNC: 3.3 MG/DL (ref 2.7–4.5)
PLATELET # BLD AUTO: 211 K/UL (ref 150–450)
PMV BLD AUTO: 10.4 FL (ref 9.2–12.9)
POTASSIUM SERPL-SCNC: 4 MMOL/L (ref 3.5–5.1)
RBC # BLD AUTO: 3.52 M/UL (ref 4–5.4)
SODIUM SERPL-SCNC: 130 MMOL/L (ref 136–145)
T4 FREE SERPL-MCNC: 1.21 NG/DL (ref 0.71–1.51)
WBC # BLD AUTO: 10.79 K/UL (ref 3.9–12.7)

## 2023-08-26 PROCEDURE — 87040 BLOOD CULTURE FOR BACTERIA: CPT | Performed by: INTERNAL MEDICINE

## 2023-08-26 PROCEDURE — 36415 COLL VENOUS BLD VENIPUNCTURE: CPT | Performed by: SPECIALIST

## 2023-08-26 PROCEDURE — 99233 PR SUBSEQUENT HOSPITAL CARE,LEVL III: ICD-10-PCS | Mod: ,,, | Performed by: SPECIALIST

## 2023-08-26 PROCEDURE — 83735 ASSAY OF MAGNESIUM: CPT | Performed by: STUDENT IN AN ORGANIZED HEALTH CARE EDUCATION/TRAINING PROGRAM

## 2023-08-26 PROCEDURE — 21000000 HC CCU ICU ROOM CHARGE

## 2023-08-26 PROCEDURE — 85025 COMPLETE CBC W/AUTO DIFF WBC: CPT | Performed by: STUDENT IN AN ORGANIZED HEALTH CARE EDUCATION/TRAINING PROGRAM

## 2023-08-26 PROCEDURE — 63600175 PHARM REV CODE 636 W HCPCS: Performed by: SPECIALIST

## 2023-08-26 PROCEDURE — 80048 BASIC METABOLIC PNL TOTAL CA: CPT | Performed by: STUDENT IN AN ORGANIZED HEALTH CARE EDUCATION/TRAINING PROGRAM

## 2023-08-26 PROCEDURE — 27000221 HC OXYGEN, UP TO 24 HOURS

## 2023-08-26 PROCEDURE — 36415 COLL VENOUS BLD VENIPUNCTURE: CPT | Performed by: INTERNAL MEDICINE

## 2023-08-26 PROCEDURE — 25000003 PHARM REV CODE 250: Performed by: SPECIALIST

## 2023-08-26 PROCEDURE — 94761 N-INVAS EAR/PLS OXIMETRY MLT: CPT

## 2023-08-26 PROCEDURE — 25000003 PHARM REV CODE 250: Performed by: INTERNAL MEDICINE

## 2023-08-26 PROCEDURE — 99900035 HC TECH TIME PER 15 MIN (STAT)

## 2023-08-26 PROCEDURE — 99233 SBSQ HOSP IP/OBS HIGH 50: CPT | Mod: ,,, | Performed by: SPECIALIST

## 2023-08-26 PROCEDURE — 36415 COLL VENOUS BLD VENIPUNCTURE: CPT | Performed by: STUDENT IN AN ORGANIZED HEALTH CARE EDUCATION/TRAINING PROGRAM

## 2023-08-26 PROCEDURE — 84439 ASSAY OF FREE THYROXINE: CPT | Performed by: SPECIALIST

## 2023-08-26 PROCEDURE — 84100 ASSAY OF PHOSPHORUS: CPT | Performed by: STUDENT IN AN ORGANIZED HEALTH CARE EDUCATION/TRAINING PROGRAM

## 2023-08-26 RX ORDER — ENOXAPARIN SODIUM 100 MG/ML
1 INJECTION SUBCUTANEOUS EVERY 12 HOURS
Status: DISCONTINUED | OUTPATIENT
Start: 2023-08-26 | End: 2023-08-26

## 2023-08-26 RX ORDER — ENOXAPARIN SODIUM 100 MG/ML
1 INJECTION SUBCUTANEOUS EVERY 24 HOURS
Status: DISCONTINUED | OUTPATIENT
Start: 2023-08-26 | End: 2023-08-28

## 2023-08-26 RX ORDER — METOPROLOL TARTRATE 25 MG/1
25 TABLET, FILM COATED ORAL 3 TIMES DAILY
Status: DISCONTINUED | OUTPATIENT
Start: 2023-08-26 | End: 2023-08-29 | Stop reason: HOSPADM

## 2023-08-26 RX ORDER — SODIUM CHLORIDE 9 MG/ML
INJECTION, SOLUTION INTRAVENOUS CONTINUOUS
Status: ACTIVE | OUTPATIENT
Start: 2023-08-26 | End: 2023-08-26

## 2023-08-26 RX ORDER — DIGOXIN 0.25 MG/ML
250 INJECTION INTRAMUSCULAR; INTRAVENOUS ONCE
Status: COMPLETED | OUTPATIENT
Start: 2023-08-26 | End: 2023-08-26

## 2023-08-26 RX ORDER — LANOLIN ALCOHOL/MO/W.PET/CERES
400 CREAM (GRAM) TOPICAL ONCE
Status: COMPLETED | OUTPATIENT
Start: 2023-08-26 | End: 2023-08-26

## 2023-08-26 RX ORDER — DIGOXIN 0.25 MG/ML
250 INJECTION INTRAMUSCULAR; INTRAVENOUS ONCE
Status: DISCONTINUED | OUTPATIENT
Start: 2023-08-26 | End: 2023-08-26

## 2023-08-26 RX ORDER — MAGNESIUM SULFATE 1 G/100ML
1 INJECTION INTRAVENOUS ONCE
Status: COMPLETED | OUTPATIENT
Start: 2023-08-26 | End: 2023-08-26

## 2023-08-26 RX ADMIN — AMIODARONE HYDROCHLORIDE 0.5 MG/MIN: 1.8 INJECTION, SOLUTION INTRAVENOUS at 09:08

## 2023-08-26 RX ADMIN — METOPROLOL TARTRATE 25 MG: 25 TABLET, FILM COATED ORAL at 02:08

## 2023-08-26 RX ADMIN — FUROSEMIDE 20 MG: 10 INJECTION, SOLUTION INTRAMUSCULAR; INTRAVENOUS at 01:08

## 2023-08-26 RX ADMIN — MAGNESIUM SULFATE 1 G: 1 INJECTION INTRAVENOUS at 02:08

## 2023-08-26 RX ADMIN — METOPROLOL TARTRATE 25 MG: 25 TABLET, FILM COATED ORAL at 09:08

## 2023-08-26 RX ADMIN — SODIUM CHLORIDE: 0.9 INJECTION, SOLUTION INTRAVENOUS at 02:08

## 2023-08-26 RX ADMIN — Medication 400 MG: at 06:08

## 2023-08-26 RX ADMIN — DIGOXIN 250 MCG: 0.25 INJECTION INTRAMUSCULAR; INTRAVENOUS at 02:08

## 2023-08-26 RX ADMIN — ENOXAPARIN SODIUM 70 MG: 100 INJECTION SUBCUTANEOUS at 04:08

## 2023-08-26 NOTE — PROGRESS NOTES
"[facility]  Adult Nutrition   Progress Note (Initial Assessment)     SUMMARY     Recommendations  Recommendation/Intervention:   1. Recommend Ensure HP for additional energy protein intake.   2. Continue current diet and encourage po intake.   3. RD to monitor and provide recommendations prn.  Goals: Improve po intake to meet 75% of EEN/EPN.  Nutrition Goal Status: progressing towards goal    Dietitian Rounds Brief   Pt with fair appetite and intake.       Diet order:   Current Diet Order: Cardiac diet                 Evaluation of Received Nutrient/Fluid Intake  Energy Calories Required: not meeting needs  Protein Required: not meeting needs  Fluid Required: not meeting needs  Comments:    % Intake of Estimated Energy Needs: 50%  % Meal Intake: 50 - 75 %      Intake/Output Summary (Last 24 hours) at 8/26/2023 1513  Last data filed at 8/26/2023 1501  Gross per 24 hour   Intake 360 ml   Output 1175 ml   Net -815 ml        Anthropometrics  Temp: 99.3 °F (37.4 °C)  Height Method: Stated  Height: 5' 2" (157.5 cm)  Height (inches): 62 in  Weight Method: Bed Scale  Weight: 68 kg (149 lb 14.6 oz)  Weight (lb): 149.91 lb  Ideal Body Weight (IBW), Female: 110 lb  % Ideal Body Weight, Female (lb): 136.28 %  BMI (Calculated): 27.4  BMI Grade: 25 - 29.9 - overweight       Estimated/Assessed Needs  Weight Used For Calorie Calculations: 68 kg (149 lb 14.6 oz)  Energy Calorie Requirements (kcal): 7969-2668 kcal (25-30 kcal/ kg bw)  Energy Need Method: Kcal/kg  Protein Requirements: 68-81 (1.0-1.2g/ kg bw)  Weight Used For Protein Calculations: 68 kg (149 lb 14.6 oz)     Estimated Fluid Requirement Method: RDA Method  RDA Method (mL): 1700       Reason for Assessment  Reason For Assessment: length of stay  Diagnosis: cardiac disease  Relevant Medical History: hypothyroidism, HFpEF on 4L NC at home, HTN, afib who presents w/SOB  Interdisciplinary Rounds: did not attend    Nutrition/Diet History  Patient Reported " Diet/Restrictions/Preferences: general  Spiritual, Cultural Beliefs, Worship Practices, Values that Affect Care: no  Food Allergies: NKFA  Factors Affecting Nutritional Intake: decreased appetite, early satiety    Nutrition Risk Screen  Nutrition Risk Screen: no indicators present     MST Score: 0  Have you recently lost weight without trying?: No  Weight loss score: 0  Have you been eating poorly because of a decreased appetite?: No  Appetite score: 0       Weight History:  Wt Readings from Last 10 Encounters:   08/21/23 68 kg (149 lb 14.6 oz)   08/25/23 68 kg (149 lb 14.6 oz)   07/21/23 67.8 kg (149 lb 7.6 oz)   07/19/23 68 kg (149 lb 14.6 oz)   04/03/23 76.2 kg (167 lb 15.9 oz)   12/06/21 80.7 kg (178 lb)   06/02/21 83 kg (183 lb)   04/12/21 82.1 kg (181 lb)        Lab/Procedures/Meds: Pertinent Labs/Meds Reviewed    Medications:Pertinent Medications Reviewed  Scheduled Meds:   enoxparin  1 mg/kg Subcutaneous Q24H (treatment, non-standard time)    magnesium sulfate IVPB  1 g Intravenous Once    metoprolol tartrate  25 mg Oral TID     Continuous Infusions:   sodium chloride 0.9% 50 mL/hr at 08/26/23 1441    amiodarone in dextrose 5% 0.5 mg/min (08/26/23 0944)     PRN Meds:.guaiFENesin 100 mg/5 ml, melatonin, metoprolol, sodium chloride 0.9%    Labs: Pertinent Labs Reviewed  Clinical Chemistry:  Recent Labs   Lab 08/21/23  1610 08/22/23  0419 08/24/23  0438 08/25/23  0326 08/26/23  0356      < > 138 140 130*   K 4.9   < > 4.0 4.0 4.0      < > 99 99 90*   CO2 26   < > 33* 34* 31*   *   < > 104 119* 119*   BUN 36*   < > 45* 43* 39*   CREATININE 1.7*   < > 1.4 1.6* 1.4   CALCIUM 9.4   < > 9.1 9.4 8.4*   PROT 8.0  --   --   --   --    ALBUMIN 3.9  --   --   --   --    BILITOT 1.0  --   --   --   --    ALKPHOS 70  --   --   --   --    AST 22  --   --   --   --    ALT 18  --   --   --   --    ANIONGAP 7*   < > 6* 7* 9   MG  --    < > 1.9 1.7 1.5*   PHOS  --    < > 3.4 3.5 3.3    < > = values in this  "interval not displayed.     CBC:   Recent Labs   Lab 08/26/23  0357   WBC 10.79   RBC 3.52*   HGB 11.7*   HCT 36.8*      *   MCH 33.2*   MCHC 31.8*     Lipid Panel:  No results for input(s): "CHOL", "HDL", "LDLCALC", "TRIG", "CHOLHDL" in the last 168 hours.  Cardiac Profile:  Recent Labs   Lab 08/21/23  1610 08/25/23  1153   * 1,617*     Inflammatory Labs:  No results for input(s): "CRP" in the last 168 hours.  Diabetes:  No results for input(s): "HGBA1C", "POCTGLUCOSE" in the last 168 hours.  Thyroid & Parathyroid:  No results for input(s): "TSH", "FREET4", "J5QIONS", "S6ACDWR", "THYROIDAB" in the last 168 hours.    Monitor and Evaluation  Food and Nutrient Intake: energy intake, food and beverage intake  Food and Nutrient Adminstration: diet order  Knowledge/Beliefs/Attitudes: food and nutrition knowledge/skill  Physical Activity and Function: nutrition-related ADLs and IADLs  Anthropometric Measurements: height/length, weight, weight change  Biochemical Data, Medical Tests and Procedures: inflammatory profile  Nutrition-Focused Physical Findings: overall appearance     Nutrition Risk  Level of Risk/Frequency of Follow-up: moderate     Nutrition Follow-Up  RD Follow-up?: Yes      Pamella Deng, ALANIS 08/26/2023 3:13 PM      "

## 2023-08-26 NOTE — NURSING
23:50 Amio infusing, IV infiltration noted. Extremity elevated and warm compressed applied. Attempted x2 to replace IV, no success. Patient not able to tolerate another attempt. Amio on paused until ultrasound guided IV placement is available.    01:28 Ultrasound guided IV placed by ROSE LEE RN. Patient tolerated well on 1st attempt. IV lasix administered. Amio gtt restarted.

## 2023-08-26 NOTE — PROGRESS NOTES
Pharmacist Renal Dose Adjustment Note    Vickie Benedict is a 88 y.o. female being treated with the medication Enoxaparin    Patient Data:    Vital Signs (Most Recent):  Temp: 99.3 °F (37.4 °C) (08/26/23 1214)  Pulse: (!) 117 (Dr. Cantu at bedside) (08/26/23 1411)  Resp: 18 (08/26/23 1411)  BP: 119/80 (08/26/23 1411)  SpO2: 96 % (08/26/23 1411) Vital Signs (72h Range):  Temp:  [97 °F (36.1 °C)-100 °F (37.8 °C)]   Pulse:  []   Resp:  [15-20]   BP: ()/()   SpO2:  [91 %-98 %]      Recent Labs   Lab 08/24/23  0438 08/25/23  0326 08/26/23  0356   CREATININE 1.4 1.6* 1.4     Serum creatinine: 1.4 mg/dL 08/26/23 0356  Estimated creatinine clearance: 25.1 mL/min    Medication:Enoxaparin dose: 70 mg frequency Q12H will be changed to medication:Enoxaparin dose:70 mg frequency:Q24H    Pharmacist's Name: Joi Brumfield  Pharmacist's Extension: 3545

## 2023-08-26 NOTE — ASSESSMENT & PLAN NOTE
On rhythmol and lopressor at home  Iv lopressor PRN basis  Pt didn't want  ISAURO/cardioversion   Started on iv amiodarone gtt,as per protocol by cardiology team

## 2023-08-26 NOTE — PLAN OF CARE
Problem: Adult Inpatient Plan of Care  Goal: Plan of Care Review  Outcome: Ongoing, Progressing  Goal: Patient-Specific Goal (Individualized)  Outcome: Ongoing, Progressing  Goal: Absence of Hospital-Acquired Illness or Injury  Outcome: Ongoing, Progressing  Goal: Optimal Comfort and Wellbeing  Outcome: Ongoing, Progressing  Goal: Readiness for Transition of Care  Outcome: Ongoing, Progressing     Problem: Fluid Imbalance (Pneumonia)  Goal: Fluid Balance  Outcome: Ongoing, Progressing     Problem: Infection (Pneumonia)  Goal: Resolution of Infection Signs and Symptoms  Outcome: Ongoing, Progressing     Problem: Respiratory Compromise (Pneumonia)  Goal: Effective Oxygenation and Ventilation  Outcome: Ongoing, Progressing     Problem: Fall Injury Risk  Goal: Absence of Fall and Fall-Related Injury  Outcome: Ongoing, Progressing     Problem: Skin Injury Risk Increased  Goal: Skin Health and Integrity  Outcome: Ongoing, Progressing     Problem: Adjustment to Illness (Heart Failure)  Goal: Optimal Coping  Outcome: Ongoing, Progressing     Problem: Cardiac Output Decreased (Heart Failure)  Goal: Optimal Cardiac Output  Outcome: Ongoing, Progressing     Problem: Dysrhythmia (Heart Failure)  Goal: Stable Heart Rate and Rhythm  Outcome: Ongoing, Progressing     Problem: Fluid Imbalance (Heart Failure)  Goal: Fluid Balance  Outcome: Ongoing, Progressing     Problem: Functional Ability Impaired (Heart Failure)  Goal: Optimal Functional Ability  Outcome: Ongoing, Progressing     Problem: Oral Intake Inadequate (Heart Failure)  Goal: Optimal Nutrition Intake  Outcome: Ongoing, Progressing     Problem: Respiratory Compromise (Heart Failure)  Goal: Effective Oxygenation and Ventilation  Outcome: Ongoing, Progressing     Problem: Sleep Disordered Breathing (Heart Failure)  Goal: Effective Breathing Pattern During Sleep  Outcome: Ongoing, Progressing     Problem: Dysrhythmia  Goal: Normalized Cardiac Rhythm  Outcome: Ongoing,  Progressing

## 2023-08-26 NOTE — CONSULTS
[facility]  Cardiology  Progress Note    Patient Name: Vickie Benedict  MRN: [id number]  Admission Date: 8/21/2023  Hospital Length of Stay: 3 days  Code Status: Full Code   Attending Physician: Miguel Pickett MD   Primary Care Physician: Aureliano Morocho MD  Expected Discharge Date: 8/26/2023  Principal Problem:Acute on chronic diastolic (congestive) heart failure    Subjective:     Hospital Course:  Patient is on amiodarone drip after IV amiodarone bolus      Interval History:  She is still in atrial fib RVR  She presented to the hospital short of breath due to heart failure reduced ejection fraction partially secondary to propafenone    She is talk to her physicianDr Sunshine who agreed that she needs ISAURO cardioversion and switch her antiarrhythmic over to p.o. Pacerone      ROS  Objective:     Vital Signs (Most Recent):  Temp: 99.3 °F (37.4 °C) (08/26/23 1214)  Pulse: (!) 120 (08/26/23 1214)  Resp: 18 (08/26/23 1214)  BP: 121/73 (08/26/23 1214)  SpO2: 98 % (08/26/23 1214) Vital Signs (24h Range):  Temp:  [98.5 °F (36.9 °C)-100 °F (37.8 °C)] 99.3 °F (37.4 °C)  Pulse:  [] 120  Resp:  [15-18] 18  SpO2:  [94 %-98 %] 98 %  BP: ()/(59-94) 121/73     Weight: 68 kg (149 lb 14.6 oz)  Body mass index is 27.42 kg/m².    SpO2: 98 %         Intake/Output Summary (Last 24 hours) at 8/26/2023 1409  Last data filed at 8/26/2023 0335  Gross per 24 hour   Intake 240 ml   Output 1175 ml   Net -935 ml       Lines/Drains/Airways       Peripheral Intravenous Line  Duration                  Peripheral IV - Single Lumen 08/26/23 0120 20 G Anterior;Right Forearm <1 day                    Physical Exam blood pressure 115/80    Heart rates 130  Lungs few crackles  Cardiac irregular  Significant Labs: CMP   Recent Labs   Lab 08/25/23  0326 08/26/23  0356    130*   K 4.0 4.0   CL 99 90*   CO2 34* 31*   * 119*   BUN 43* 39*   CREATININE 1.6* 1.4   CALCIUM 9.4 8.4*   ANIONGAP 7* 9    and CBC    Recent Labs   Lab 08/25/23  0326 08/26/23  0357   WBC 10.28 10.79   HGB 11.4* 11.7*   HCT 35.6* 36.8*    211       Significant Imaging:   Assessment and Plan:   Heart failure reduced ejection fraction with atrial fib RVR partially exacerbated by propafenone  Patient was in sinus rhythm on admission  Will give slight fluid bolus, 1 more dose of dig, IV magnesium  And she is still on atrial fib RVR in the morning we will go ahead and ISAURO cardiovert-she consents     Brief HPI:     Active Diagnoses:    Diagnosis Date Noted POA    PRINCIPAL PROBLEM:  Acute on chronic diastolic (congestive) heart failure [I50.33] 08/24/2023 Unknown    Atrial fibrillation with RVR [I48.91] 08/24/2023 Unknown    Essential hypertension [I10] 04/28/2021 Yes     Chronic    History of atrial fibrillation [Z86.79] 04/28/2021 Not Applicable     Chronic    Postablative hypothyroidism [E89.0] 04/28/2021 Yes     Chronic    CKD (chronic kidney disease), stage III [N18.30]  Yes     Chronic      Problems Resolved During this Admission:       VTE Risk Mitigation (From admission, onward)           Ordered     enoxaparin injection 70 mg  Every 12 hours         08/26/23 1405     IP VTE HIGH RISK PATIENT  Once         08/21/23 2040     Place sequential compression device  Until discontinued         08/21/23 2040                  I substantially and  personally reviewed old and new ecg's, lab reports,, xray reports  and  other cardiovascular studies including  echo's, stress tests, angiogram reports, holters,and vascular studies .  In addition I evaluated original cardiac cath  ___echo  ____cxr ______ct ____scan on [employer] or [employer] or other viewing platforms and  ___x_EKG's .  AFib RVR  I reviewed  office and hospital notes Yes ____ of  referring providers and other providers.  I reviewed personally old hospital and office notes 45   Time spent evaluating and managing this patient:________min.  Prolonged evaluation including with her  physician Dr. Ingris Cantu MD  Cardiology  [facility]

## 2023-08-26 NOTE — SUBJECTIVE & OBJECTIVE
Interval History:         Review of Systems   Constitutional:  Negative for activity change and appetite change.   HENT:  Negative for congestion and dental problem.    Eyes:  Negative for discharge and itching.   Respiratory:  Negative for shortness of breath.    Cardiovascular:  Positive for palpitations. Negative for chest pain.   Gastrointestinal:  Negative for abdominal distention and abdominal pain.   Endocrine: Negative for cold intolerance.   Genitourinary:  Negative for difficulty urinating and dysuria.   Musculoskeletal:  Negative for arthralgias and back pain.   Skin:  Negative for color change.   Neurological:  Negative for dizziness and facial asymmetry.   Hematological:  Negative for adenopathy.   Psychiatric/Behavioral:  Negative for agitation and behavioral problems.      Objective:     Vital Signs (Most Recent):  Temp: 97.7 °F (36.5 °C) (08/25/23 1217)  Pulse: 99 (08/25/23 1500)  Resp: 18 (08/25/23 1500)  BP: 134/86 (08/25/23 1500)  SpO2: 95 % (08/25/23 1500) Vital Signs (24h Range):  Temp:  [97.7 °F (36.5 °C)-99.5 °F (37.5 °C)] 97.7 °F (36.5 °C)  Pulse:  [] 99  Resp:  [18] 18  SpO2:  [93 %-97 %] 95 %  BP: ()/() 134/86     Weight: 68 kg (149 lb 14.6 oz)  Body mass index is 27.42 kg/m².    Intake/Output Summary (Last 24 hours) at 8/25/2023 1914  Last data filed at 8/25/2023 1752  Gross per 24 hour   Intake 120 ml   Output 1100 ml   Net -980 ml         Physical Exam  Vitals and nursing note reviewed.   Constitutional:       General: She is not in acute distress.  HENT:      Head: Atraumatic.      Right Ear: External ear normal.      Left Ear: External ear normal.      Nose: Nose normal.      Mouth/Throat:      Mouth: Mucous membranes are moist.   Cardiovascular:      Rate and Rhythm: Tachycardia present. Rhythm irregular.   Pulmonary:      Effort: Pulmonary effort is normal.   Musculoskeletal:         General: Normal range of motion.      Cervical back: Normal range of motion.    Skin:     General: Skin is warm.   Neurological:      Mental Status: She is alert and oriented to person, place, and time.   Psychiatric:         Behavior: Behavior normal.             Significant Labs: All pertinent labs within the past 24 hours have been reviewed.  CBC:   Recent Labs   Lab 08/24/23 0438 08/25/23  0326   WBC 8.81 10.28   HGB 11.1* 11.4*   HCT 35.0* 35.6*    228     CMP:   Recent Labs   Lab 08/24/23 0438 08/25/23  0326    140   K 4.0 4.0   CL 99 99   CO2 33* 34*    119*   BUN 45* 43*   CREATININE 1.4 1.6*   CALCIUM 9.1 9.4   ANIONGAP 6* 7*       Significant Imaging: I have reviewed all pertinent imaging results/findings within the past 24 hours.

## 2023-08-26 NOTE — PLAN OF CARE
Problem: Adult Inpatient Plan of Care  Goal: Plan of Care Review  Outcome: Ongoing, Progressing     Problem: Adult Inpatient Plan of Care  Goal: Optimal Comfort and Wellbeing  Outcome: Ongoing, Progressing     Problem: Respiratory Compromise (Pneumonia)  Goal: Effective Oxygenation and Ventilation  Outcome: Ongoing, Progressing     Problem: Fall Injury Risk  Goal: Absence of Fall and Fall-Related Injury  Outcome: Ongoing, Progressing    Problem: Dysrhythmia  Goal: Normalized Cardiac Rhythm  Outcome: Ongoing, Progressing     Problem: Fluid Imbalance (Heart Failure)  Goal: Fluid Balance  Outcome: Ongoing, Progressing

## 2023-08-26 NOTE — CARE UPDATE
08/26/23 0935   PRE-TX-O2   Device (Oxygen Therapy) nasal cannula   $ Is the patient on Low Flow Oxygen? Yes   Flow (L/min) 2   SpO2 98 %   Pulse Oximetry Type Intermittent   $ Pulse Oximetry - Multiple Charge Pulse Oximetry - Multiple   Pulse (!) 131   Resp 15   Respiratory Evaluation   $ Care Plan Tech Time 15 min

## 2023-08-26 NOTE — PROGRESS NOTES
"[facility] Medicine  Progress Note    Patient Name: Vickie Benedict  MRN: [id number]  Patient Class: IP- Inpatient   Admission Date: 8/21/2023  Length of Stay: 2 days  Attending Physician: Miguel Pickett MD  Primary Care Provider: Aureliano Morocho MD        Subjective:     Principal Problem:Acute on chronic diastolic (congestive) heart failure        HPI:  Ms. Benedict is a 87 yo w/pmhx of hypothyroidism, HFpEF on 4L NC at home, HTN, afib who presents w/SOB. Patient was discharged 1 month ago w/HF exacerbation and also treated for bronchitis. However, patient reports since discharge she has been feeling weak. Reports in the last week she has progressively gotten more SOB w/ exertion. She also reports 2 pillow orthopnea, PNA and LE edema up to calf. She reports she was discharged with 20mg lasix daily but did not much of a difference in her LE edema and stopped taking it. She then started taking a diuretic her cardiologist prescribed but does not remember the name or the dose. Believes it starts w/a "t". She took 1/2 a pill and noticed some improvement in her edema but not her breathing. Per chart review, cannot find name/dose of diuretic.     ED labs notable for trop 17, . TTE from 7/2023 w/normal EF and diastolic fxn. Cr 1.7 (baseline 1.3-1.5). Was given 20mg IV lasix w/500 cc output w/in 30 minutes.       Overview/Hospital Course:  Ms. Benedict is an 87 yo w/pmhx of HFpEF on 4L NC at home, afib, HTN who presented with HF exacerbation. Was recently discharged with lasix 20mg daily but reports not taking for the last week as she did not not any improvement with it ans was instead taking triamterine/HCTZ. Diuresing with IV lasix. Does have unilateral pain in LE. DVT US negative. Did have episode of bradycardia while sleeping. Will hold home metoprolol.         08/24  Pt went into A Fib RVR  Home medications started  Much improvement from CHF      08/25  Pt refused ISAURO " cardioversion  Started on amiodarone gtt by cardiology team      Interval History:         Review of Systems   Constitutional:  Negative for activity change and appetite change.   HENT:  Negative for congestion and dental problem.    Eyes:  Negative for discharge and itching.   Respiratory:  Negative for shortness of breath.    Cardiovascular:  Positive for palpitations. Negative for chest pain.   Gastrointestinal:  Negative for abdominal distention and abdominal pain.   Endocrine: Negative for cold intolerance.   Genitourinary:  Negative for difficulty urinating and dysuria.   Musculoskeletal:  Negative for arthralgias and back pain.   Skin:  Negative for color change.   Neurological:  Negative for dizziness and facial asymmetry.   Hematological:  Negative for adenopathy.   Psychiatric/Behavioral:  Negative for agitation and behavioral problems.      Objective:     Vital Signs (Most Recent):  Temp: 97.7 °F (36.5 °C) (08/25/23 1217)  Pulse: 99 (08/25/23 1500)  Resp: 18 (08/25/23 1500)  BP: 134/86 (08/25/23 1500)  SpO2: 95 % (08/25/23 1500) Vital Signs (24h Range):  Temp:  [97.7 °F (36.5 °C)-99.5 °F (37.5 °C)] 97.7 °F (36.5 °C)  Pulse:  [] 99  Resp:  [18] 18  SpO2:  [93 %-97 %] 95 %  BP: ()/() 134/86     Weight: 68 kg (149 lb 14.6 oz)  Body mass index is 27.42 kg/m².    Intake/Output Summary (Last 24 hours) at 8/25/2023 1914  Last data filed at 8/25/2023 1752  Gross per 24 hour   Intake 120 ml   Output 1100 ml   Net -980 ml         Physical Exam  Vitals and nursing note reviewed.   Constitutional:       General: She is not in acute distress.  HENT:      Head: Atraumatic.      Right Ear: External ear normal.      Left Ear: External ear normal.      Nose: Nose normal.      Mouth/Throat:      Mouth: Mucous membranes are moist.   Cardiovascular:      Rate and Rhythm: Tachycardia present. Rhythm irregular.   Pulmonary:      Effort: Pulmonary effort is normal.   Musculoskeletal:         General: Normal  range of motion.      Cervical back: Normal range of motion.   Skin:     General: Skin is warm.   Neurological:      Mental Status: She is alert and oriented to person, place, and time.   Psychiatric:         Behavior: Behavior normal.             Significant Labs: All pertinent labs within the past 24 hours have been reviewed.  CBC:   Recent Labs   Lab 08/24/23 0438 08/25/23  0326   WBC 8.81 10.28   HGB 11.1* 11.4*   HCT 35.0* 35.6*    228     CMP:   Recent Labs   Lab 08/24/23 0438 08/25/23  0326    140   K 4.0 4.0   CL 99 99   CO2 33* 34*    119*   BUN 45* 43*   CREATININE 1.4 1.6*   CALCIUM 9.1 9.4   ANIONGAP 6* 7*       Significant Imaging: I have reviewed all pertinent imaging results/findings within the past 24 hours.      Assessment/Plan:      * Acute on chronic diastolic (congestive) heart failure  Maintain iv lasix  Much improvement in condition with diuretics     Atrial fibrillation with RVR  On rhythmol and lopressor at home  Iv lopressor PRN basis  Pt didn't want  ISAURO/cardioversion   Started on iv amiodarone gtt,as per protocol by cardiology team    Postablative hypothyroidism  Continue synthroid   Last TSH in 7/2023 wnl     History of atrial fibrillation  At  home on propafenone and metoprolol     Essential hypertension  Stable     CKD (chronic kidney disease), stage III  Baseline cr 1.3-1.5  Avoid nephrotoxic agents         VTE Risk Mitigation (From admission, onward)         Ordered     enoxaparin injection 70 mg  Every 24 hours         08/24/23 1226     IP VTE HIGH RISK PATIENT  Once         08/21/23 2040     Place sequential compression device  Until discontinued         08/21/23 2040                Discharge Planning   HEMANTH: 8/26/2023     Code Status: Full Code   Is the patient medically ready for discharge?:     Reason for patient still in hospital (select all that apply): Treatment  Discharge Plan A: Home Health   Discharge Delays: None known at this time              Miguel  MD Piyush  Department of Hospital Medicine   [facility]

## 2023-08-26 NOTE — PT/OT/SLP PROGRESS
Physical Therapy      Patient Name:  Vickie Benedict   MRN:  [id number]    Patient not seen today secondary to RN hold, trying to control elevated HR . Will follow-up next scheduled tx date.

## 2023-08-27 ENCOUNTER — ANESTHESIA EVENT (OUTPATIENT)
Dept: LONG TERM CARE ACUTE | Facility: HOSPITAL | Age: 88
DRG: 291 | End: 2023-08-27
Payer: MEDICARE

## 2023-08-27 ENCOUNTER — CLINICAL SUPPORT (OUTPATIENT)
Dept: CARDIOLOGY | Facility: HOSPITAL | Age: 88
DRG: 291 | End: 2023-08-27
Attending: SPECIALIST
Payer: MEDICARE

## 2023-08-27 VITALS
SYSTOLIC BLOOD PRESSURE: 127 MMHG | DIASTOLIC BLOOD PRESSURE: 62 MMHG | RESPIRATION RATE: 24 BRPM | OXYGEN SATURATION: 99 % | HEART RATE: 60 BPM

## 2023-08-27 PROBLEM — I31.39 PERICARDIAL EFFUSION: Status: ACTIVE | Noted: 2023-08-27

## 2023-08-27 PROBLEM — I48.0 PAROXYSMAL ATRIAL FIBRILLATION: Status: ACTIVE | Noted: 2023-08-27

## 2023-08-27 PROBLEM — I50.43 ACUTE ON CHRONIC COMBINED SYSTOLIC AND DIASTOLIC CONGESTIVE HEART FAILURE: Status: ACTIVE | Noted: 2023-08-24

## 2023-08-27 PROBLEM — I25.10 ASCVD (ARTERIOSCLEROTIC CARDIOVASCULAR DISEASE): Status: ACTIVE | Noted: 2023-08-27

## 2023-08-27 LAB
ANION GAP SERPL CALC-SCNC: 6 MMOL/L (ref 8–16)
BASOPHILS # BLD AUTO: 0.02 K/UL (ref 0–0.2)
BASOPHILS NFR BLD: 0.2 % (ref 0–1.9)
BNP SERPL-MCNC: 921 PG/ML (ref 0–99)
BSA FOR ECHO PROCEDURE: 1.72 M2
BSA FOR ECHO PROCEDURE: 1.72 M2
BUN SERPL-MCNC: 38 MG/DL (ref 8–23)
CALCIUM SERPL-MCNC: 8.9 MG/DL (ref 8.7–10.5)
CHLORIDE SERPL-SCNC: 97 MMOL/L (ref 95–110)
CO2 SERPL-SCNC: 32 MMOL/L (ref 23–29)
CREAT SERPL-MCNC: 1.5 MG/DL (ref 0.5–1.4)
CV ECHO LV RWT: 0.38 CM
DIFFERENTIAL METHOD: ABNORMAL
E WAVE DECELERATION TIME: 237 MSEC
E/E' RATIO: 12.63 M/S
ECHO LV POSTERIOR WALL: 1.06 CM (ref 0.6–1.1)
EOSINOPHIL # BLD AUTO: 0.1 K/UL (ref 0–0.5)
EOSINOPHIL NFR BLD: 0.9 % (ref 0–8)
ERYTHROCYTE [DISTWIDTH] IN BLOOD BY AUTOMATED COUNT: 13.5 % (ref 11.5–14.5)
EST. GFR  (NO RACE VARIABLE): 33.3 ML/MIN/1.73 M^2
FRACTIONAL SHORTENING: 27 % (ref 28–44)
GLUCOSE SERPL-MCNC: 142 MG/DL (ref 70–110)
HCT VFR BLD AUTO: 35.1 % (ref 37–48.5)
HGB BLD-MCNC: 11.3 G/DL (ref 12–16)
IMM GRANULOCYTES # BLD AUTO: 0.07 K/UL (ref 0–0.04)
IMM GRANULOCYTES NFR BLD AUTO: 0.6 % (ref 0–0.5)
INTERVENTRICULAR SEPTUM: 1.02 CM (ref 0.6–1.1)
LEFT INTERNAL DIMENSION IN SYSTOLE: 4.07 CM (ref 2.1–4)
LEFT VENTRICLE DIASTOLIC VOLUME INDEX: 88.76 ML/M2
LEFT VENTRICLE DIASTOLIC VOLUME: 150 ML
LEFT VENTRICLE MASS INDEX: 134 G/M2
LEFT VENTRICLE SYSTOLIC VOLUME INDEX: 43.1 ML/M2
LEFT VENTRICLE SYSTOLIC VOLUME: 72.9 ML
LEFT VENTRICULAR INTERNAL DIMENSION IN DIASTOLE: 5.54 CM (ref 3.5–6)
LEFT VENTRICULAR MASS: 227.25 G
LV LATERAL E/E' RATIO: 12 M/S
LV SEPTAL E/E' RATIO: 13.33 M/S
LYMPHOCYTES # BLD AUTO: 1 K/UL (ref 1–4.8)
LYMPHOCYTES NFR BLD: 9.2 % (ref 18–48)
MAGNESIUM SERPL-MCNC: 2 MG/DL (ref 1.6–2.6)
MCH RBC QN AUTO: 33 PG (ref 27–31)
MCHC RBC AUTO-ENTMCNC: 32.2 G/DL (ref 32–36)
MCV RBC AUTO: 103 FL (ref 82–98)
MONOCYTES # BLD AUTO: 1.4 K/UL (ref 0.3–1)
MONOCYTES NFR BLD: 12.4 % (ref 4–15)
MV PEAK E VEL: 1.2 M/S
MV STENOSIS PRESSURE HALF TIME: 69 MS
MV VALVE AREA P 1/2 METHOD: 3.19 CM2
NEUTROPHILS # BLD AUTO: 8.4 K/UL (ref 1.8–7.7)
NEUTROPHILS NFR BLD: 76.7 % (ref 38–73)
NRBC BLD-RTO: 0 /100 WBC
PHOSPHATE SERPL-MCNC: 3.4 MG/DL (ref 2.7–4.5)
PISA TR MAX VEL: 3.27 M/S
PLATELET # BLD AUTO: 189 K/UL (ref 150–450)
PMV BLD AUTO: 9.9 FL (ref 9.2–12.9)
POTASSIUM SERPL-SCNC: 3.6 MMOL/L (ref 3.5–5.1)
PROCALCITONIN SERPL IA-MCNC: <0.05 NG/ML (ref 0–0.5)
PV MV: 0.85 M/S
PV PEAK GRADIENT: 7 MMHG
PV PEAK VELOCITY: 1.34 M/S
RA PRESSURE ESTIMATED: 15 MMHG
RBC # BLD AUTO: 3.42 M/UL (ref 4–5.4)
RV TB RVSP: 18 MMHG
SODIUM SERPL-SCNC: 135 MMOL/L (ref 136–145)
TDI LATERAL: 0.1 M/S
TDI SEPTAL: 0.09 M/S
TDI: 0.1 M/S
TR MAX PG: 43 MMHG
TV REST PULMONARY ARTERY PRESSURE: 58 MMHG
WBC # BLD AUTO: 10.92 K/UL (ref 3.9–12.7)
Z-SCORE OF LEFT VENTRICULAR DIMENSION IN END DIASTOLE: 1.63
Z-SCORE OF LEFT VENTRICULAR DIMENSION IN END SYSTOLE: 2.64

## 2023-08-27 PROCEDURE — 80048 BASIC METABOLIC PNL TOTAL CA: CPT | Performed by: STUDENT IN AN ORGANIZED HEALTH CARE EDUCATION/TRAINING PROGRAM

## 2023-08-27 PROCEDURE — 93010 ELECTROCARDIOGRAM REPORT: CPT | Mod: ,,, | Performed by: SPECIALIST

## 2023-08-27 PROCEDURE — 93306 TTE W/DOPPLER COMPLETE: CPT | Mod: 26,,, | Performed by: SPECIALIST

## 2023-08-27 PROCEDURE — 93325 TRANSESOPHAGEAL ECHO (TEE) W/ POSSIBLE CARDIOVERSION: ICD-10-PCS | Mod: 26,59,, | Performed by: SPECIALIST

## 2023-08-27 PROCEDURE — 25000003 PHARM REV CODE 250: Performed by: SPECIALIST

## 2023-08-27 PROCEDURE — D9220A PRA ANESTHESIA: Mod: ANES,,, | Performed by: STUDENT IN AN ORGANIZED HEALTH CARE EDUCATION/TRAINING PROGRAM

## 2023-08-27 PROCEDURE — 84100 ASSAY OF PHOSPHORUS: CPT | Performed by: STUDENT IN AN ORGANIZED HEALTH CARE EDUCATION/TRAINING PROGRAM

## 2023-08-27 PROCEDURE — 99900035 HC TECH TIME PER 15 MIN (STAT)

## 2023-08-27 PROCEDURE — 94799 UNLISTED PULMONARY SVC/PX: CPT

## 2023-08-27 PROCEDURE — 92960 TRANSESOPHAGEAL ECHO (TEE) W/ POSSIBLE CARDIOVERSION: ICD-10-PCS | Mod: ,,, | Performed by: SPECIALIST

## 2023-08-27 PROCEDURE — 36415 COLL VENOUS BLD VENIPUNCTURE: CPT | Performed by: INTERNAL MEDICINE

## 2023-08-27 PROCEDURE — 93306 ECHO (CUPID ONLY): ICD-10-PCS | Mod: 26,,, | Performed by: SPECIALIST

## 2023-08-27 PROCEDURE — 84145 PROCALCITONIN (PCT): CPT | Performed by: INTERNAL MEDICINE

## 2023-08-27 PROCEDURE — 63600175 PHARM REV CODE 636 W HCPCS: Performed by: SPECIALIST

## 2023-08-27 PROCEDURE — D9220A PRA ANESTHESIA: ICD-10-PCS | Mod: ANES,,, | Performed by: STUDENT IN AN ORGANIZED HEALTH CARE EDUCATION/TRAINING PROGRAM

## 2023-08-27 PROCEDURE — 93010 EKG 12-LEAD: ICD-10-PCS | Mod: ,,, | Performed by: SPECIALIST

## 2023-08-27 PROCEDURE — 27000221 HC OXYGEN, UP TO 24 HOURS

## 2023-08-27 PROCEDURE — 99232 SBSQ HOSP IP/OBS MODERATE 35: CPT | Mod: 25,,, | Performed by: SPECIALIST

## 2023-08-27 PROCEDURE — 93325 DOPPLER ECHO COLOR FLOW MAPG: CPT

## 2023-08-27 PROCEDURE — 21400001 HC TELEMETRY ROOM

## 2023-08-27 PROCEDURE — 25000003 PHARM REV CODE 250: Performed by: INTERNAL MEDICINE

## 2023-08-27 PROCEDURE — D9220A PRA ANESTHESIA: Mod: CRNA,,, | Performed by: NURSE ANESTHETIST, CERTIFIED REGISTERED

## 2023-08-27 PROCEDURE — 93325 DOPPLER ECHO COLOR FLOW MAPG: CPT | Mod: 26,59,, | Performed by: SPECIALIST

## 2023-08-27 PROCEDURE — 93308 TTE F-UP OR LMTD: CPT

## 2023-08-27 PROCEDURE — 92960 CARDIOVERSION ELECTRIC EXT: CPT | Mod: ,,, | Performed by: SPECIALIST

## 2023-08-27 PROCEDURE — 99900031 HC PATIENT EDUCATION (STAT)

## 2023-08-27 PROCEDURE — 83735 ASSAY OF MAGNESIUM: CPT | Performed by: STUDENT IN AN ORGANIZED HEALTH CARE EDUCATION/TRAINING PROGRAM

## 2023-08-27 PROCEDURE — 99232 PR SUBSEQUENT HOSPITAL CARE,LEVL II: ICD-10-PCS | Mod: 25,,, | Performed by: SPECIALIST

## 2023-08-27 PROCEDURE — 93005 ELECTROCARDIOGRAM TRACING: CPT | Performed by: SPECIALIST

## 2023-08-27 PROCEDURE — 93312 TRANSESOPHAGEAL ECHO (TEE) W/ POSSIBLE CARDIOVERSION: ICD-10-PCS | Mod: 26,,, | Performed by: SPECIALIST

## 2023-08-27 PROCEDURE — D9220A PRA ANESTHESIA: ICD-10-PCS | Mod: CRNA,,, | Performed by: NURSE ANESTHETIST, CERTIFIED REGISTERED

## 2023-08-27 PROCEDURE — 94761 N-INVAS EAR/PLS OXIMETRY MLT: CPT

## 2023-08-27 PROCEDURE — 63600175 PHARM REV CODE 636 W HCPCS: Performed by: NURSE ANESTHETIST, CERTIFIED REGISTERED

## 2023-08-27 PROCEDURE — 85025 COMPLETE CBC W/AUTO DIFF WBC: CPT | Performed by: STUDENT IN AN ORGANIZED HEALTH CARE EDUCATION/TRAINING PROGRAM

## 2023-08-27 PROCEDURE — 94618 PULMONARY STRESS TESTING: CPT

## 2023-08-27 PROCEDURE — 93312 ECHO TRANSESOPHAGEAL: CPT | Mod: 26,,, | Performed by: SPECIALIST

## 2023-08-27 PROCEDURE — 63600175 PHARM REV CODE 636 W HCPCS: Performed by: INTERNAL MEDICINE

## 2023-08-27 PROCEDURE — 83880 ASSAY OF NATRIURETIC PEPTIDE: CPT | Performed by: INTERNAL MEDICINE

## 2023-08-27 RX ORDER — MAGNESIUM SULFATE 1 G/100ML
1 INJECTION INTRAVENOUS ONCE
Status: COMPLETED | OUTPATIENT
Start: 2023-08-27 | End: 2023-08-27

## 2023-08-27 RX ORDER — SODIUM,POTASSIUM PHOSPHATES 280-250MG
2 POWDER IN PACKET (EA) ORAL
Status: DISCONTINUED | OUTPATIENT
Start: 2023-08-27 | End: 2023-08-29 | Stop reason: HOSPADM

## 2023-08-27 RX ORDER — AMIODARONE HYDROCHLORIDE 200 MG/1
400 TABLET ORAL 2 TIMES DAILY
Status: DISCONTINUED | OUTPATIENT
Start: 2023-08-27 | End: 2023-08-29 | Stop reason: HOSPADM

## 2023-08-27 RX ORDER — LANOLIN ALCOHOL/MO/W.PET/CERES
800 CREAM (GRAM) TOPICAL
Status: DISCONTINUED | OUTPATIENT
Start: 2023-08-27 | End: 2023-08-29 | Stop reason: HOSPADM

## 2023-08-27 RX ORDER — PROPOFOL 10 MG/ML
VIAL (ML) INTRAVENOUS
Status: DISCONTINUED | OUTPATIENT
Start: 2023-08-27 | End: 2023-08-27

## 2023-08-27 RX ORDER — SPIRONOLACTONE 25 MG/1
25 TABLET ORAL DAILY
Status: DISCONTINUED | OUTPATIENT
Start: 2023-08-27 | End: 2023-08-28

## 2023-08-27 RX ADMIN — PROPOFOL 20 MG: 10 INJECTION, EMULSION INTRAVENOUS at 09:08

## 2023-08-27 RX ADMIN — AMIODARONE HYDROCHLORIDE 400 MG: 200 TABLET ORAL at 06:08

## 2023-08-27 RX ADMIN — PROPOFOL 10 MG: 10 INJECTION, EMULSION INTRAVENOUS at 09:08

## 2023-08-27 RX ADMIN — ENOXAPARIN SODIUM 70 MG: 100 INJECTION SUBCUTANEOUS at 04:08

## 2023-08-27 RX ADMIN — SPIRONOLACTONE 25 MG: 25 TABLET ORAL at 11:08

## 2023-08-27 RX ADMIN — AMIODARONE HYDROCHLORIDE 0.5 MG/MIN: 1.8 INJECTION, SOLUTION INTRAVENOUS at 09:08

## 2023-08-27 RX ADMIN — METOPROLOL TARTRATE 5 MG: 1 INJECTION, SOLUTION INTRAVENOUS at 01:08

## 2023-08-27 RX ADMIN — MAGNESIUM SULFATE 1 G: 1 INJECTION INTRAVENOUS at 04:08

## 2023-08-27 NOTE — ASSESSMENT & PLAN NOTE
Small effusion noted on TTE on July 2023  On 08/27 pt had ISAURO and pericardial effusion worsened and subsequently TTE ordered

## 2023-08-27 NOTE — TRANSFER OF CARE
"Anesthesia Transfer of Care Note    Patient: Vickie eBnedict    Procedure(s) Performed: * No procedures listed *    Patient location: PACU    Anesthesia Type: general    Transport from OR: Transported from OR on room air with adequate spontaneous ventilation    Post pain: adequate analgesia    Post assessment: no apparent anesthetic complications    Post vital signs: stable    Level of consciousness: awake    Nausea/Vomiting: no nausea/vomiting    Complications: none    Transfer of care protocol was followed      Last vitals:   Visit Vitals  /72 (BP Location: Left arm, Patient Position: Lying)   Pulse 88   Temp 37.2 °C (99 °F)   Resp 19   Ht 5' 2" (1.575 m)   Wt 68 kg (149 lb 14.6 oz)   SpO2 98%   BMI 27.42 kg/m²     "

## 2023-08-27 NOTE — NURSING
0920 Dr Cantu, Dr. Little, Grabiel KEITA, MICHOACANO Springer, Remedios chinchilla, RN in room. Pt attached to zoll.   0924 scope in  0932 scope out  0932 200J shocked  0933 SB rhythm noted  0940 pt responding to voice, able to answer questions. Echo tech remains at bedside to complete Echo.

## 2023-08-27 NOTE — ANESTHESIA PREPROCEDURE EVALUATION
08/27/2023  Vickie Benedict is a 88 y.o., female.      Patient Active Problem List   Diagnosis    CKD (chronic kidney disease), stage III    Essential hypertension    History of atrial fibrillation    Psoriasis    Postablative hypothyroidism    Acute hypoxemic respiratory failure    CAP (community acquired pneumonia)    Acute on chronic diastolic (congestive) heart failure    Atrial fibrillation with RVR       Past Surgical History:   Procedure Laterality Date    HYSTERECTOMY      KNEE SURGERY Right     SHOULDER SURGERY Right         Tobacco Use:  The patient  reports that she has never smoked. She has never used smokeless tobacco.     Results for orders placed or performed during the hospital encounter of 08/21/23   EKG 12-lead    Collection Time: 08/24/23 12:23 PM    Narrative    Test Reason : I48.91,    Vent. Rate : 123 BPM     Atrial Rate : 000 BPM     P-R Int : 000 ms          QRS Dur : 158 ms      QT Int : 370 ms       P-R-T Axes : 000 080 -28 degrees     QTc Int : 529 ms    Atrial fibrillation with rapid ventricular response  Right bundle branch block  T wave abnormality, consider inferior ischemia  Abnormal ECG  When compared with ECG of 24-AUG-2023 07:09,  No significant change was found  Confirmed by Pepe VILLARREAL, Sean GOLD ([id number]) on 8/25/2023 7:01:25 PM    Referred By: [email or web address]   SELF           Confirmed By:Sean Cantu MD        Imaging Results          X-Ray Chest AP Portable (Final result)  Result time 08/21/23 16:45:36    Final result by Temo Jones MD (08/21/23 16:45:36)                 Narrative:      EXAM: XR CHEST AP PORTABLE    HISTORY: CHF    COMPARISON:Portable chest x-ray dated 7/20/2023    FINDINGS: The heart is moderately enlarged and unchanged. There are extensive airspace opacities throughout both lungs, worse on the right than the left that are essentially  new since the preceding chest x-ray and are most likely due to extensive pulmonary edema. Superimposed pneumonia cannot be excluded. No significant pleural effusions are identified.    IMPRESSION:   Severe CHF.    Electronically signed by:  Otilio Jones MD  8/21/2023 4:45 PM CDT Workstation: [id number]                               Lab Results   Component Value Date    WBC 10.92 08/27/2023    HGB 11.3 (L) 08/27/2023    HCT 35.1 (L) 08/27/2023     (H) 08/27/2023     08/27/2023     BMP  Lab Results   Component Value Date     (L) 08/27/2023    K 3.6 08/27/2023    CL 97 08/27/2023    CO2 32 (H) 08/27/2023    BUN 38 (H) 08/27/2023    CREATININE 1.5 (H) 08/27/2023    CALCIUM 8.9 08/27/2023    ANIONGAP 6 (L) 08/27/2023     (H) 08/27/2023     (H) 08/26/2023     (H) 08/25/2023       Results for orders placed during the hospital encounter of 08/21/23    Echo    Interpretation Summary    Left Ventricle: The left ventricle is normal in size. Mildly increased ventricular mass. Mildly increased wall thickness. There is mild concentric hypertrophy. Normal wall motion.  Atrial fibrillation is present There is mildly reduced systolic function. EF 45% Unable to assess due to atrial fibrillation. Elevated left ventricular filling pressure. Tissue Doppler velocity is reduced.  Mean left atrial pressure proximally 16    Right Ventricle: Normal right ventricular cavity size. Wall thickness is normal. Right ventricle wall motion  is normal. Systolic function is normal.    Aortic Valve: There is mild aortic valve sclerosis.    Tricuspid Valve: There is mild to moderate regurgitation.    Pulmonary Artery: There is mild pulmonary hypertension.    Pericardium: There is a small effusion adjacent to the right ventricle. Pericardial effusion is echolucent. No indication of cardiac tamponade.    Patient has atrial fibrillation, depressed ejection fraction, mild pulmonary hypertension, mean left  atrial pressure 16 mm Hg          Pre-op Assessment    I have reviewed the Patient Summary Reports.     I have reviewed the Nursing Notes. I have reviewed the NPO Status.   I have reviewed the Medications.     Review of Systems  Anesthesia Hx:  No problems with previous Anesthesia  Denies Family Hx of Anesthesia complications.   Denies Personal Hx of Anesthesia complications.   Social:  Non-Smoker    Hematology/Oncology:  Hematology Normal   Oncology Normal     EENT/Dental:EENT/Dental Normal   Cardiovascular:   Hypertension Dysrhythmias atrial fibrillation CHF hyperlipidemia ECG has been reviewed.    Pulmonary:  Pulmonary Normal    Renal/:   Chronic Renal Disease, CKD    Hepatic/GI:  Hepatic/GI Normal    Musculoskeletal:   Arthritis     Neurological:  Neurology Normal    Endocrine:  Endocrine Normal    Psych:  Psychiatric Normal           Physical Exam  General: Well nourished, Cooperative, Alert and Oriented    Airway:  Mallampati: II   Mouth Opening: Normal  TM Distance: Normal  Tongue: Normal  Neck ROM: Normal ROM    Dental:  Intact    Chest/Lungs:  Clear to auscultation    Heart:  Rate: Normal  Rhythm: Irregularly Irregular  Sounds: Normal        Anesthesia Plan  Type of Anesthesia, risks & benefits discussed:    Anesthesia Type: Gen Natural Airway  Intra-op Monitoring Plan: Standard ASA Monitors  Informed Consent: Informed consent signed with the Patient and all parties understand the risks and agree with anesthesia plan.  All questions answered.   ASA Score: 3 Emergent  Anesthesia Plan Notes: POM    Ready For Surgery From Anesthesia Perspective.     .

## 2023-08-27 NOTE — PROCEDURES
"Vickie Benedict is a 88 y.o. female patient.    Temp: 99 °F (37.2 °C) (08/27/23 0701)  Pulse: (!) 52 (08/27/23 0947)  Resp: (!) 25 (08/27/23 0947)  BP: 127/60 (08/27/23 0947)  SpO2: 97 % (08/27/23 0947)  Weight: 68 kg (149 lb 14.6 oz) (08/21/23 2036)  Height: 5' 2" (157.5 cm) (08/21/23 2036)       Procedures     ALISON consent obtained   -No absolute contraindications of esophageal stricture, tumor, perforation, laceration,or diverticulum and/or active GI bleed  -The risks, benefits & alternatives of the procedure were explained to the patient.   -The risks of transesophageal echo include but are not limited to:  Dental trauma, esophageal trauma/perforation, bleeding, laryngospasm/brochospasm, aspiration, sore throat/hoarseness, & dislodgement of the endotracheal tube/nasogastric tube (where applicable).    -The risks of moderate sedation include hypotension, respiratory depression, arrhythmias, bronchospasm, & death.    -Informed consent was obtained. The patient is agreeable to proceed with the procedure and all questions and concerns addressed.    M'allampati score 2 propofol used for sedation  ALISON probe then passed  via  transesophageal route and  imaging performed in the  standard 3 views ie  Upper esophageal, ,mid esophageal, and transgastric views . Saline bubbles were injected IV for PFO imaging of   Intraatrial septum.   At end of procedure, ALISON probe removed. The patient tolerated procedure well .    8/27/2023  Preliminary alison demonstrates no clot in left atrial appendage or left atrium  There is a small-to-moderate pericardial effusion circumferential with no definitive tamponade      "

## 2023-08-27 NOTE — CARE UPDATE
08/27/23 1443   Home Oxygen Qualification   $ Home O2 Qualification Pulmonary Stress Test/6 min walk;Tech time 15 minutes   Room Air SpO2 At Rest (!) 84 %   Heart Rate on O2 56 bpm   SpO2 Post Ambulation 98 %   Post Ambulation O2 LPM 2 LPM   Home O2 Eval Comments has Oxygen at home, uses 4L at times.

## 2023-08-27 NOTE — NURSING
Nurses Note -- 4 Eyes      8/27/2023   5:36 PM      Skin assessed during: Transfer      [x] No Altered Skin Integrity Present    []Prevention Measures Documented      [] Yes- Altered Skin Integrity Present or Discovered   [] LDA Added if Not in Epic (Describe Wound)   [] New Altered Skin Integrity was Present on Admit and Documented in LDA   [] Wound Image Taken    Wound Care Consulted? No    Attending Nurse:  Luis [id number]    Second RN/Staff Member:   nicol/Kiki

## 2023-08-27 NOTE — SUBJECTIVE & OBJECTIVE
Interval History:       Review of Systems   Constitutional:  Positive for fatigue. Negative for activity change and appetite change.   HENT:  Negative for congestion and dental problem.    Eyes:  Negative for discharge and itching.   Respiratory:  Negative for shortness of breath.    Cardiovascular:  Negative for chest pain.   Gastrointestinal:  Negative for abdominal distention and abdominal pain.   Endocrine: Negative for cold intolerance.   Genitourinary:  Negative for difficulty urinating and dysuria.   Musculoskeletal:  Negative for arthralgias and back pain.   Skin:  Negative for color change.   Neurological:  Negative for dizziness and facial asymmetry.   Hematological:  Negative for adenopathy.   Psychiatric/Behavioral:  Negative for agitation and behavioral problems.      Objective:     Vital Signs (Most Recent):  Temp: 99 °F (37.2 °C) (08/27/23 0701)  Pulse: (!) 59 (08/27/23 1101)  Resp: (!) 30 (08/27/23 1101)  BP: 136/61 (08/27/23 1101)  SpO2: 100 % (08/27/23 1101) Vital Signs (24h Range):  Temp:  [99 °F (37.2 °C)-99.3 °F (37.4 °C)] 99 °F (37.2 °C)  Pulse:  [] 59  Resp:  [16-36] 30  SpO2:  [89 %-100 %] 100 %  BP: (110-185)/() 136/61     Weight: 68 kg (149 lb 14.6 oz)  Body mass index is 27.42 kg/m².    Intake/Output Summary (Last 24 hours) at 8/27/2023 1340  Last data filed at 8/27/2023 0551  Gross per 24 hour   Intake 360 ml   Output 1000 ml   Net -640 ml         Physical Exam  Vitals and nursing note reviewed.   Constitutional:       General: She is not in acute distress.  HENT:      Head: Atraumatic.      Right Ear: External ear normal.      Left Ear: External ear normal.      Nose: Nose normal.      Mouth/Throat:      Mouth: Mucous membranes are moist.   Cardiovascular:      Rate and Rhythm: Normal rate.   Pulmonary:      Effort: Pulmonary effort is normal.   Musculoskeletal:         General: Normal range of motion.      Cervical back: Normal range of motion.   Skin:     General: Skin is  warm.   Neurological:      Mental Status: She is alert and oriented to person, place, and time.   Psychiatric:         Behavior: Behavior normal.             Significant Labs: All pertinent labs within the past 24 hours have been reviewed.  CBC:   Recent Labs   Lab 08/26/23 0357 08/27/23 0514   WBC 10.79 10.92   HGB 11.7* 11.3*   HCT 36.8* 35.1*    189     CMP:   Recent Labs   Lab 08/26/23 0356 08/27/23 0514   * 135*   K 4.0 3.6   CL 90* 97   CO2 31* 32*   * 142*   BUN 39* 38*   CREATININE 1.4 1.5*   CALCIUM 8.4* 8.9   ANIONGAP 9 6*       Significant Imaging: I have reviewed all pertinent imaging results/findings within the past 24 hours.

## 2023-08-27 NOTE — ASSESSMENT & PLAN NOTE
On rhythmol and lopressor at home  Now on iv amiodarone gtt and PO Lopressor  Pt didn't want  ISAURO/cardioversion initially but consented for ISAURO/cardioversion tomorrow AM

## 2023-08-27 NOTE — ASSESSMENT & PLAN NOTE
Was on  iv lasix which is now on Hold  Today received gentle hydration with iv NS, per Cardiology team

## 2023-08-27 NOTE — ASSESSMENT & PLAN NOTE
On rhythmol and lopressor at home  Was  on iv amiodarone gtt   Now on PO amiodarone and PO lopressor  S/p  ISAURO/cardioversion on 08/27

## 2023-08-27 NOTE — PROGRESS NOTES
"[facility] Medicine  Progress Note    Patient Name: Vickie Benedict  MRN: [id number]  Patient Class: IP- Inpatient   Admission Date: 8/21/2023  Length of Stay: 4 days  Attending Physician: Miguel Pickett MD  Primary Care Provider: Aureliano Morocho MD        Subjective:     Principal Problem:Acute on chronic combined systolic and diastolic congestive heart failure        HPI:  Ms. Benedict is a 89 yo w/pmhx of hypothyroidism, HFpEF on 4L NC at home, HTN, afib who presents w/SOB. Patient was discharged 1 month ago w/HF exacerbation and also treated for bronchitis. However, patient reports since discharge she has been feeling weak. Reports in the last week she has progressively gotten more SOB w/ exertion. She also reports 2 pillow orthopnea, PNA and LE edema up to calf. She reports she was discharged with 20mg lasix daily but did not much of a difference in her LE edema and stopped taking it. She then started taking a diuretic her cardiologist prescribed but does not remember the name or the dose. Believes it starts w/a "t". She took 1/2 a pill and noticed some improvement in her edema but not her breathing. Per chart review, cannot find name/dose of diuretic.     ED labs notable for trop 17, . TTE from 7/2023 w/normal EF and diastolic fxn. Cr 1.7 (baseline 1.3-1.5). Was given 20mg IV lasix w/500 cc output w/in 30 minutes.       Overview/Hospital Course:  Ms. Benedict is an 89 yo w/pmhx of HFpEF on 4L NC at home, afib, HTN who presented with HF exacerbation. Was recently discharged with lasix 20mg daily but reports not taking for the last week as she did not not any improvement with it ans was instead taking triamterine/HCTZ. Diuresing with IV lasix. Does have unilateral pain in LE. DVT US negative. Did have episode of bradycardia while sleeping. Will hold home metoprolol.         08/24  Pt went into A Fib RVR  Home medications started  Much improvement from CHF      08/25  Pt " refused ISAURO cardioversion  Started on amiodarone gtt by cardiology team      08/26  BP levels on lower range  Pt today consented for ISAURO/cardioversion  Low grade fever noted     08/27  Today had ISAURO/Cardioversion  Pro lenard levels WNL  Pt says she is very tired      Interval History:       Review of Systems   Constitutional:  Positive for fatigue. Negative for activity change and appetite change.   HENT:  Negative for congestion and dental problem.    Eyes:  Negative for discharge and itching.   Respiratory:  Negative for shortness of breath.    Cardiovascular:  Negative for chest pain.   Gastrointestinal:  Negative for abdominal distention and abdominal pain.   Endocrine: Negative for cold intolerance.   Genitourinary:  Negative for difficulty urinating and dysuria.   Musculoskeletal:  Negative for arthralgias and back pain.   Skin:  Negative for color change.   Neurological:  Negative for dizziness and facial asymmetry.   Hematological:  Negative for adenopathy.   Psychiatric/Behavioral:  Negative for agitation and behavioral problems.      Objective:     Vital Signs (Most Recent):  Temp: 99 °F (37.2 °C) (08/27/23 0701)  Pulse: (!) 59 (08/27/23 1101)  Resp: (!) 30 (08/27/23 1101)  BP: 136/61 (08/27/23 1101)  SpO2: 100 % (08/27/23 1101) Vital Signs (24h Range):  Temp:  [99 °F (37.2 °C)-99.3 °F (37.4 °C)] 99 °F (37.2 °C)  Pulse:  [] 59  Resp:  [16-36] 30  SpO2:  [89 %-100 %] 100 %  BP: (110-185)/() 136/61     Weight: 68 kg (149 lb 14.6 oz)  Body mass index is 27.42 kg/m².    Intake/Output Summary (Last 24 hours) at 8/27/2023 1340  Last data filed at 8/27/2023 0551  Gross per 24 hour   Intake 360 ml   Output 1000 ml   Net -640 ml         Physical Exam  Vitals and nursing note reviewed.   Constitutional:       General: She is not in acute distress.  HENT:      Head: Atraumatic.      Right Ear: External ear normal.      Left Ear: External ear normal.      Nose: Nose normal.      Mouth/Throat:      Mouth:  Mucous membranes are moist.   Cardiovascular:      Rate and Rhythm: Normal rate.   Pulmonary:      Effort: Pulmonary effort is normal.   Musculoskeletal:         General: Normal range of motion.      Cervical back: Normal range of motion.   Skin:     General: Skin is warm.   Neurological:      Mental Status: She is alert and oriented to person, place, and time.   Psychiatric:         Behavior: Behavior normal.             Significant Labs: All pertinent labs within the past 24 hours have been reviewed.  CBC:   Recent Labs   Lab 08/26/23  0357 08/27/23  0514   WBC 10.79 10.92   HGB 11.7* 11.3*   HCT 36.8* 35.1*    189     CMP:   Recent Labs   Lab 08/26/23  0356 08/27/23  0514   * 135*   K 4.0 3.6   CL 90* 97   CO2 31* 32*   * 142*   BUN 39* 38*   CREATININE 1.4 1.5*   CALCIUM 8.4* 8.9   ANIONGAP 9 6*       Significant Imaging: I have reviewed all pertinent imaging results/findings within the past 24 hours.      Assessment/Plan:      * Acute on chronic combined systolic and diastolic congestive heart failure  Was on  iv lasix which is now on Hold  On 08/26: received gentle hydration with iv NS, per Cardiology team      Atrial fibrillation with RVR  On rhythmol and lopressor at home  Was  on iv amiodarone gtt   Now on PO amiodarone and PO lopressor  S/p  ISAURO/cardioversion on 08/27      Pericardial effusion  Small effusion noted on TTE on July 2023  On 08/27 pt had ISAURO and pericardial effusion worsened and subsequently TTE ordered       ASCVD (arteriosclerotic cardiovascular disease)  Per Cardiology       Postablative hypothyroidism  Continue synthroid   Last TSH in 7/2023 wnl     History of atrial fibrillation  At  home on propafenone and metoprolol     Essential hypertension  Stable     CKD (chronic kidney disease), stage III  Baseline cr 1.3-1.5  Avoid nephrotoxic agents         VTE Risk Mitigation (From admission, onward)         Ordered     enoxaparin injection 70 mg  Every 24 hours          08/26/23 1430     IP VTE HIGH RISK PATIENT  Once         08/21/23 2040     Place sequential compression device  Until discontinued         08/21/23 2040                Discharge Planning   HEMANTH: 8/26/2023     Code Status: Full Code   Is the patient medically ready for discharge?:     Reason for patient still in hospital (select all that apply): Treatment  Discharge Plan A: Home Health   Discharge Delays: None known at this time              Miguel Pickett MD  Department of Hospital Medicine   [facility]

## 2023-08-27 NOTE — PROCEDURES
"Vickie Benedict is a 88 y.o. female patient.    Temp: 99 °F (37.2 °C) (08/27/23 0701)  Pulse: (!) 52 (08/27/23 0947)  Resp: (!) 25 (08/27/23 0947)  BP: 127/60 (08/27/23 0947)  SpO2: 97 % (08/27/23 0947)  Weight: 68 kg (149 lb 14.6 oz) (08/21/23 2036)  Height: 5' 2" (157.5 cm) (08/21/23 2036)       Electrical Cardioversion    Date/Time: 8/21/2023 3:44 PM  Location procedure was performed: [facility] C[facility]    Performed by: Sean Cantu MD  Authorized by: Sean Cantu MD  Assisting provider: Sean Cantu MD  Pre-operative diagnosis: afib  Post-operative diagnosis: same  Consent Done: Yes  Consent: Verbal consent obtained. Written consent obtained.  Risks and benefits: risks, benefits and alternatives were discussed  Consent given by: patient  Patient understanding: patient states understanding of the procedure being performed  Procedure consent: procedure consent matches procedure scheduled  Relevant documents: relevant documents present and verified  Test results: test results available and properly labeled  Required items: required blood products, implants, devices, and special equipment available  Time out: Immediately prior to procedure a "time out" was called to verify the correct patient, procedure, equipment, support staff and site/side marked as required.    Patient sedated: yes  Sedatives: propofol  Cardioversion basis: elective  Indications: failure of anti-arrhythmic medications  Pre-procedure rhythm: atrial fibrillation  Patient position: patient was placed in a supine position  Chest area: chest area exposed  Electrodes: pads  Electrodes placed: anterior-posterior  Number of attempts: 1  Attempt 1 shock (in Joules): 200  Attempt 1 outcome: conversion to normal sinus rhythm  Post-procedure rhythm: normal sinus rhythm  Complications: no complications  Patient tolerance: Patient tolerated the procedure well with no immediate complications  Technical procedures used: " cardioversion  Significant surgical tasks conducted by the assistant(s): na  Complications: No  Estimated blood loss (mL): 0  Specimens: No  Implants: No          8/27/2023

## 2023-08-27 NOTE — PT/OT/SLP PROGRESS
Physical Therapy      Patient Name:  Vickie Benedict   MRN:  [id number]    Patient not seen today x 2 attempts 10:08 receiving Echo, 11:05 Pt refused due to wanting to wait until after EKG . Will follow-up next scheduled visit.

## 2023-08-27 NOTE — SUBJECTIVE & OBJECTIVE
Interval History:       Review of Systems   Constitutional:  Negative for activity change and appetite change.   HENT:  Negative for congestion and dental problem.    Eyes:  Negative for discharge and itching.   Respiratory:  Negative for shortness of breath.    Cardiovascular:  Negative for chest pain.   Gastrointestinal:  Negative for abdominal distention and abdominal pain.   Endocrine: Negative for cold intolerance.   Genitourinary:  Negative for difficulty urinating and dysuria.   Musculoskeletal:  Negative for arthralgias and back pain.   Skin:  Negative for color change.   Neurological:  Negative for dizziness and facial asymmetry.   Hematological:  Negative for adenopathy.   Psychiatric/Behavioral:  Negative for agitation and behavioral problems.      Objective:     Vital Signs (Most Recent):  Temp: 99.3 °F (37.4 °C) (08/26/23 1214)  Pulse: 88 (08/26/23 1558)  Resp: 18 (08/26/23 1411)  BP: 139/71 (08/26/23 1558)  SpO2: 97 % (08/26/23 1558) Vital Signs (24h Range):  Temp:  [99 °F (37.2 °C)-100 °F (37.8 °C)] 99.3 °F (37.4 °C)  Pulse:  [] 88  Resp:  [15-18] 18  SpO2:  [95 %-98 %] 97 %  BP: ()/(58-94) 139/71     Weight: 68 kg (149 lb 14.6 oz)  Body mass index is 27.42 kg/m².    Intake/Output Summary (Last 24 hours) at 8/26/2023 1926  Last data filed at 8/26/2023 1730  Gross per 24 hour   Intake 600 ml   Output 1075 ml   Net -475 ml         Physical Exam  Vitals and nursing note reviewed.   Constitutional:       General: She is not in acute distress.  HENT:      Head: Atraumatic.      Right Ear: External ear normal.      Left Ear: External ear normal.      Nose: Nose normal.      Mouth/Throat:      Mouth: Mucous membranes are moist.   Cardiovascular:      Rate and Rhythm: Tachycardia present. Rhythm irregular.   Pulmonary:      Effort: Pulmonary effort is normal.   Musculoskeletal:         General: Normal range of motion.      Cervical back: Normal range of motion.   Skin:     General: Skin is warm.    Neurological:      Mental Status: She is alert and oriented to person, place, and time.   Psychiatric:         Behavior: Behavior normal.             Significant Labs: All pertinent labs within the past 24 hours have been reviewed.  CBC:   Recent Labs   Lab 08/25/23 0326 08/26/23  0357   WBC 10.28 10.79   HGB 11.4* 11.7*   HCT 35.6* 36.8*    211     CMP:   Recent Labs   Lab 08/25/23 0326 08/26/23  0356    130*   K 4.0 4.0   CL 99 90*   CO2 34* 31*   * 119*   BUN 43* 39*   CREATININE 1.6* 1.4   CALCIUM 9.4 8.4*   ANIONGAP 7* 9       Significant Imaging: I have reviewed all pertinent imaging results/findings within the past 24 hours.

## 2023-08-27 NOTE — ASSESSMENT & PLAN NOTE
Was on  iv lasix which is now on Hold  On 08/26: received gentle hydration with iv NS, per Cardiology team

## 2023-08-27 NOTE — ANESTHESIA POSTPROCEDURE EVALUATION
Anesthesia Post Evaluation    Patient: Vickie Benedict    Procedure(s) Performed: * No procedures listed *    Final Anesthesia Type: general      Patient location during evaluation: telemetry step down  Patient participation: Yes- Able to Participate  Level of consciousness: awake and alert  Post-procedure vital signs: reviewed and stable  Pain management: adequate  Airway patency: patent    PONV status at discharge: No PONV  Anesthetic complications: no      Cardiovascular status: hemodynamically stable  Respiratory status: unassisted, spontaneous ventilation and room air  Hydration status: euvolemic  Follow-up not needed.          Vitals Value Taken Time   /60 08/27/23 0947   Temp 37.2 °C (99 °F) 08/27/23 0701   Pulse 52 08/27/23 0947   Resp 25 08/27/23 0947   SpO2 97 % 08/27/23 0947         No case tracking events are documented in the log.      Pain/Tim Score: Tim Score: 6 (8/27/2023  9:36 AM)

## 2023-08-27 NOTE — PROGRESS NOTES
"[facility] Medicine  Progress Note    Patient Name: Vickie Benedict  MRN: [id number]  Patient Class: IP- Inpatient   Admission Date: 8/21/2023  Length of Stay: 3 days  Attending Physician: Miguel Pickett MD  Primary Care Provider: Aureliano Morocho MD        Subjective:     Principal Problem:Acute on chronic diastolic (congestive) heart failure        HPI:  Ms. Benedict is a 87 yo w/pmhx of hypothyroidism, HFpEF on 4L NC at home, HTN, afib who presents w/SOB. Patient was discharged 1 month ago w/HF exacerbation and also treated for bronchitis. However, patient reports since discharge she has been feeling weak. Reports in the last week she has progressively gotten more SOB w/ exertion. She also reports 2 pillow orthopnea, PNA and LE edema up to calf. She reports she was discharged with 20mg lasix daily but did not much of a difference in her LE edema and stopped taking it. She then started taking a diuretic her cardiologist prescribed but does not remember the name or the dose. Believes it starts w/a "t". She took 1/2 a pill and noticed some improvement in her edema but not her breathing. Per chart review, cannot find name/dose of diuretic.     ED labs notable for trop 17, . TTE from 7/2023 w/normal EF and diastolic fxn. Cr 1.7 (baseline 1.3-1.5). Was given 20mg IV lasix w/500 cc output w/in 30 minutes.       Overview/Hospital Course:  Ms. Benedict is an 87 yo w/pmhx of HFpEF on 4L NC at home, afib, HTN who presented with HF exacerbation. Was recently discharged with lasix 20mg daily but reports not taking for the last week as she did not not any improvement with it ans was instead taking triamterine/HCTZ. Diuresing with IV lasix. Does have unilateral pain in LE. DVT US negative. Did have episode of bradycardia while sleeping. Will hold home metoprolol.         08/24  Pt went into A Fib RVR  Home medications started  Much improvement from CHF      08/25  Pt refused ISAURO " cardioversion  Started on amiodarone gtt by cardiology team      08/26  BP levels on lower range  Pt today consented for ISAURO/cardioversion  Low grade fever noted       Interval History:       Review of Systems   Constitutional:  Negative for activity change and appetite change.   HENT:  Negative for congestion and dental problem.    Eyes:  Negative for discharge and itching.   Respiratory:  Negative for shortness of breath.    Cardiovascular:  Negative for chest pain.   Gastrointestinal:  Negative for abdominal distention and abdominal pain.   Endocrine: Negative for cold intolerance.   Genitourinary:  Negative for difficulty urinating and dysuria.   Musculoskeletal:  Negative for arthralgias and back pain.   Skin:  Negative for color change.   Neurological:  Negative for dizziness and facial asymmetry.   Hematological:  Negative for adenopathy.   Psychiatric/Behavioral:  Negative for agitation and behavioral problems.      Objective:     Vital Signs (Most Recent):  Temp: 99.3 °F (37.4 °C) (08/26/23 1214)  Pulse: 88 (08/26/23 1558)  Resp: 18 (08/26/23 1411)  BP: 139/71 (08/26/23 1558)  SpO2: 97 % (08/26/23 1558) Vital Signs (24h Range):  Temp:  [99 °F (37.2 °C)-100 °F (37.8 °C)] 99.3 °F (37.4 °C)  Pulse:  [] 88  Resp:  [15-18] 18  SpO2:  [95 %-98 %] 97 %  BP: ()/(58-94) 139/71     Weight: 68 kg (149 lb 14.6 oz)  Body mass index is 27.42 kg/m².    Intake/Output Summary (Last 24 hours) at 8/26/2023 1926  Last data filed at 8/26/2023 1730  Gross per 24 hour   Intake 600 ml   Output 1075 ml   Net -475 ml         Physical Exam  Vitals and nursing note reviewed.   Constitutional:       General: She is not in acute distress.  HENT:      Head: Atraumatic.      Right Ear: External ear normal.      Left Ear: External ear normal.      Nose: Nose normal.      Mouth/Throat:      Mouth: Mucous membranes are moist.   Cardiovascular:      Rate and Rhythm: Tachycardia present. Rhythm irregular.   Pulmonary:      Effort:  Pulmonary effort is normal.   Musculoskeletal:         General: Normal range of motion.      Cervical back: Normal range of motion.   Skin:     General: Skin is warm.   Neurological:      Mental Status: She is alert and oriented to person, place, and time.   Psychiatric:         Behavior: Behavior normal.             Significant Labs: All pertinent labs within the past 24 hours have been reviewed.  CBC:   Recent Labs   Lab 08/25/23  0326 08/26/23  0357   WBC 10.28 10.79   HGB 11.4* 11.7*   HCT 35.6* 36.8*    211     CMP:   Recent Labs   Lab 08/25/23  0326 08/26/23  0356    130*   K 4.0 4.0   CL 99 90*   CO2 34* 31*   * 119*   BUN 43* 39*   CREATININE 1.6* 1.4   CALCIUM 9.4 8.4*   ANIONGAP 7* 9       Significant Imaging: I have reviewed all pertinent imaging results/findings within the past 24 hours.      Assessment/Plan:      * Acute on chronic diastolic (congestive) heart failure  Was on  iv lasix which is now on Hold  Today received gentle hydration with iv NS, per Cardiology team      Atrial fibrillation with RVR  On rhythmol and lopressor at home  Now on iv amiodarone gtt and PO Lopressor  Pt didn't want  ISAURO/cardioversion initially but consented for ISAURO/cardioversion tomorrow AM       Postablative hypothyroidism  Continue synthroid   Last TSH in 7/2023 wnl     History of atrial fibrillation  At  home on propafenone and metoprolol     Essential hypertension  Stable     CKD (chronic kidney disease), stage III  Baseline cr 1.3-1.5  Avoid nephrotoxic agents         VTE Risk Mitigation (From admission, onward)         Ordered     enoxaparin injection 70 mg  Every 24 hours         08/26/23 1430     IP VTE HIGH RISK PATIENT  Once         08/21/23 2040     Place sequential compression device  Until discontinued         08/21/23 2040                Discharge Planning   HEMANTH: 8/26/2023     Code Status: Full Code   Is the patient medically ready for discharge?:     Reason for patient still in hospital  (select all that apply): Treatment  Discharge Plan A: Home Health   Discharge Delays: None known at this time              Miguel Pickett MD  Department of Hospital Medicine   [facility]

## 2023-08-28 PROBLEM — R50.9 FEVER: Status: ACTIVE | Noted: 2023-08-28

## 2023-08-28 LAB
ANION GAP SERPL CALC-SCNC: 4 MMOL/L (ref 8–16)
BASOPHILS # BLD AUTO: 0.03 K/UL (ref 0–0.2)
BASOPHILS NFR BLD: 0.2 % (ref 0–1.9)
BUN SERPL-MCNC: <5 MG/DL (ref 8–23)
CALCIUM SERPL-MCNC: 8.7 MG/DL (ref 8.7–10.5)
CHLORIDE SERPL-SCNC: 100 MMOL/L (ref 95–110)
CO2 SERPL-SCNC: 29 MMOL/L (ref 23–29)
CREAT SERPL-MCNC: 0.9 MG/DL (ref 0.5–1.4)
DIFFERENTIAL METHOD: ABNORMAL
EOSINOPHIL # BLD AUTO: 0.1 K/UL (ref 0–0.5)
EOSINOPHIL NFR BLD: 0.7 % (ref 0–8)
ERYTHROCYTE [DISTWIDTH] IN BLOOD BY AUTOMATED COUNT: 13.5 % (ref 11.5–14.5)
ERYTHROCYTE [SEDIMENTATION RATE] IN BLOOD BY WESTERGREN METHOD: 64 MM/HR (ref 0–20)
EST. GFR  (NO RACE VARIABLE): >60 ML/MIN/1.73 M^2
GLUCOSE SERPL-MCNC: 216 MG/DL (ref 70–110)
HCT VFR BLD AUTO: 32 % (ref 37–48.5)
HGB BLD-MCNC: 10.3 G/DL (ref 12–16)
IMM GRANULOCYTES # BLD AUTO: 0.03 K/UL (ref 0–0.04)
IMM GRANULOCYTES NFR BLD AUTO: 0.2 % (ref 0–0.5)
LYMPHOCYTES # BLD AUTO: 0.8 K/UL (ref 1–4.8)
LYMPHOCYTES NFR BLD: 6.9 % (ref 18–48)
MAGNESIUM SERPL-MCNC: 2.5 MG/DL (ref 1.6–2.6)
MCH RBC QN AUTO: 33 PG (ref 27–31)
MCHC RBC AUTO-ENTMCNC: 32.2 G/DL (ref 32–36)
MCV RBC AUTO: 103 FL (ref 82–98)
MONOCYTES # BLD AUTO: 1.3 K/UL (ref 0.3–1)
MONOCYTES NFR BLD: 10.4 % (ref 4–15)
NEUTROPHILS # BLD AUTO: 9.8 K/UL (ref 1.8–7.7)
NEUTROPHILS NFR BLD: 81.6 % (ref 38–73)
NRBC BLD-RTO: 0 /100 WBC
PHOSPHATE SERPL-MCNC: 2.6 MG/DL (ref 2.7–4.5)
PLATELET # BLD AUTO: 190 K/UL (ref 150–450)
PMV BLD AUTO: 10.3 FL (ref 9.2–12.9)
POTASSIUM SERPL-SCNC: 3.7 MMOL/L (ref 3.5–5.1)
RBC # BLD AUTO: 3.12 M/UL (ref 4–5.4)
SARS-COV-2 RDRP RESP QL NAA+PROBE: NEGATIVE
SODIUM SERPL-SCNC: 133 MMOL/L (ref 136–145)
WBC # BLD AUTO: 12.07 K/UL (ref 3.9–12.7)

## 2023-08-28 PROCEDURE — 25000003 PHARM REV CODE 250: Performed by: INTERNAL MEDICINE

## 2023-08-28 PROCEDURE — 36415 COLL VENOUS BLD VENIPUNCTURE: CPT | Performed by: SPECIALIST

## 2023-08-28 PROCEDURE — U0002 COVID-19 LAB TEST NON-CDC: HCPCS | Performed by: NURSE PRACTITIONER

## 2023-08-28 PROCEDURE — 80048 BASIC METABOLIC PNL TOTAL CA: CPT | Performed by: STUDENT IN AN ORGANIZED HEALTH CARE EDUCATION/TRAINING PROGRAM

## 2023-08-28 PROCEDURE — 21400001 HC TELEMETRY ROOM

## 2023-08-28 PROCEDURE — 85025 COMPLETE CBC W/AUTO DIFF WBC: CPT | Performed by: STUDENT IN AN ORGANIZED HEALTH CARE EDUCATION/TRAINING PROGRAM

## 2023-08-28 PROCEDURE — 99900035 HC TECH TIME PER 15 MIN (STAT)

## 2023-08-28 PROCEDURE — 87086 URINE CULTURE/COLONY COUNT: CPT | Performed by: INTERNAL MEDICINE

## 2023-08-28 PROCEDURE — 27000221 HC OXYGEN, UP TO 24 HOURS

## 2023-08-28 PROCEDURE — 25000003 PHARM REV CODE 250: Performed by: SPECIALIST

## 2023-08-28 PROCEDURE — 94761 N-INVAS EAR/PLS OXIMETRY MLT: CPT

## 2023-08-28 PROCEDURE — 97110 THERAPEUTIC EXERCISES: CPT | Mod: CQ

## 2023-08-28 PROCEDURE — 83735 ASSAY OF MAGNESIUM: CPT | Performed by: STUDENT IN AN ORGANIZED HEALTH CARE EDUCATION/TRAINING PROGRAM

## 2023-08-28 PROCEDURE — 85651 RBC SED RATE NONAUTOMATED: CPT | Performed by: SPECIALIST

## 2023-08-28 PROCEDURE — 63600175 PHARM REV CODE 636 W HCPCS: Performed by: INTERNAL MEDICINE

## 2023-08-28 PROCEDURE — 84100 ASSAY OF PHOSPHORUS: CPT | Performed by: STUDENT IN AN ORGANIZED HEALTH CARE EDUCATION/TRAINING PROGRAM

## 2023-08-28 PROCEDURE — 99900031 HC PATIENT EDUCATION (STAT)

## 2023-08-28 PROCEDURE — 94799 UNLISTED PULMONARY SVC/PX: CPT

## 2023-08-28 RX ORDER — FUROSEMIDE 20 MG/1
20 TABLET ORAL DAILY PRN
Status: DISCONTINUED | OUTPATIENT
Start: 2023-08-28 | End: 2023-08-29

## 2023-08-28 RX ADMIN — APIXABAN 5 MG: 5 TABLET, FILM COATED ORAL at 09:08

## 2023-08-28 RX ADMIN — APIXABAN 5 MG: 5 TABLET, FILM COATED ORAL at 08:08

## 2023-08-28 RX ADMIN — METOPROLOL TARTRATE 25 MG: 25 TABLET, FILM COATED ORAL at 08:08

## 2023-08-28 RX ADMIN — AZTREONAM 1000 MG: 1 INJECTION, POWDER, LYOPHILIZED, FOR SOLUTION INTRAMUSCULAR; INTRAVENOUS at 06:08

## 2023-08-28 RX ADMIN — SPIRONOLACTONE 25 MG: 25 TABLET ORAL at 09:08

## 2023-08-28 RX ADMIN — AMIODARONE HYDROCHLORIDE 400 MG: 200 TABLET ORAL at 09:08

## 2023-08-28 RX ADMIN — METOPROLOL TARTRATE 25 MG: 25 TABLET, FILM COATED ORAL at 06:08

## 2023-08-28 RX ADMIN — METOPROLOL TARTRATE 25 MG: 25 TABLET, FILM COATED ORAL at 09:08

## 2023-08-28 RX ADMIN — AZTREONAM 1000 MG: 1 INJECTION, POWDER, LYOPHILIZED, FOR SOLUTION INTRAMUSCULAR; INTRAVENOUS at 02:08

## 2023-08-28 RX ADMIN — AMIODARONE HYDROCHLORIDE 400 MG: 200 TABLET ORAL at 08:08

## 2023-08-28 NOTE — PT/OT/SLP PROGRESS
Occupational Therapy      Patient Name:  Vickie Benedict   MRN:  [id number]    Patient not seen today secondary to Patient unwilling to participate.  8/28/2023

## 2023-08-28 NOTE — PLAN OF CARE
Per MD, Pt to dc tomorrow with home health.       08/28/23 1310   Discharge Reassessment   Assessment Type Discharge Planning Reassessment   Did the patient's condition or plan change since previous assessment? No   Discharge Plan A Home Health   Discharge Plan B Home Health   Transition of Care Barriers None   Why the patient remains in the hospital Requires continued medical care   Post-Acute Status   Post-Acute Authorization Home Health   Home Health Status Set-up Complete/Auth obtained   Discharge Delays None known at this time

## 2023-08-28 NOTE — PROGRESS NOTES
[facility]  Department of Cardiology  Progress Note    PATIENT NAME: Vickie Benedict  MRN: [id number]  TODAY'S DATE: 08/28/2023  ADMIT DATE: 8/21/2023    SUBJECTIVE     PRINCIPLE PROBLEM: Acute on chronic combined systolic and diastolic congestive heart failure    INTERVAL HISTORY:    8/28/2023  Remains in SR. Pt w/o complaints. Resting quietly;   +Febrile  Review of patient's allergies indicates:   Allergen Reactions    Penicillins Anaphylaxis    Shellfish containing products          OBJECTIVE     VITAL SIGNS (Most Recent)  Temp: (!) 100.8 °F (38.2 °C) (08/28/23 1536)  Pulse: 69 (08/28/23 1536)  Resp: 20 (08/28/23 1536)  BP: (!) 152/69 (08/28/23 1536)  SpO2: 96 % (08/28/23 1536)    VENTILATION STATUS  Resp: 20 (08/28/23 1536)  SpO2: 96 % (08/28/23 1536)           I & O (Last 24H):  Intake/Output Summary (Last 24 hours) at 8/28/2023 1724  Last data filed at 8/28/2023 1533  Gross per 24 hour   Intake 480 ml   Output 875 ml   Net -395 ml       WEIGHTS  Wt Readings from Last 3 Encounters:   08/21/23 2036 68 kg (149 lb 14.6 oz)   08/21/23 1549 68 kg (150 lb)   08/25/23 1345 68 kg (149 lb 14.6 oz)   07/21/23 0545 67.8 kg (149 lb 7.6 oz)   07/19/23 0921 68 kg (150 lb)       PHYSICAL EXAM  CONSTITUTIONAL: frail elderly female breathing comfortably in bed in no apparent distress  NECK: no carotid bruit, no JVD  LUNGS: CTA  CHEST WALL: no tenderness  HEART: regular rate and rhythm, S1, S2 normal, no murmur, click, rub or gallop   ABDOMEN: soft, non-tender; bowel sounds normal; no masses,  no organomegaly  EXTREMITIES: Extremities normal, no edema  NEURO: AAO X 3    SCHEDULED MEDS:   amiodarone  400 mg Oral BID    apixaban  5 mg Oral BID    aztreonam  1,000 mg Intravenous Q8H    metoprolol tartrate  25 mg Oral TID       CONTINUOUS INFUSIONS:    PRN MEDS:furosemide, guaiFENesin 100 mg/5 ml, magnesium oxide, magnesium oxide, melatonin, metoprolol, potassium bicarbonate, potassium bicarbonate, potassium  "bicarbonate, potassium, sodium phosphates, potassium, sodium phosphates, potassium, sodium phosphates, sodium chloride 0.9%    LABS AND DIAGNOSTICS     CBC LAST 3 DAYS  Recent Labs   Lab 08/26/23  0357 08/27/23  0514 08/28/23 0417   WBC 10.79 10.92 12.07   RBC 3.52* 3.42* 3.12*   HGB 11.7* 11.3* 10.3*   HCT 36.8* 35.1* 32.0*   * 103* 103*   MCH 33.2* 33.0* 33.0*   MCHC 31.8* 32.2 32.2   RDW 13.6 13.5 13.5    189 190   MPV 10.4 9.9 10.3   GRAN 70.3  7.6 76.7*  8.4* 81.6*  9.8*   LYMPH 12.2*  1.3 9.2*  1.0 6.9*  0.8*   MONO 13.8  1.5* 12.4  1.4* 10.4  1.3*   BASO 0.05 0.02 0.03   NRBC 0 0 0       COAGULATION LAST 3 DAYS  No results for input(s): "LABPT", "INR", "APTT" in the last 168 hours.    CHEMISTRY LAST 3 DAYS  Recent Labs   Lab 08/26/23  0356 08/27/23 0514 08/28/23 0417   * 135* 133*   K 4.0 3.6 3.7   CL 90* 97 100   CO2 31* 32* 29   ANIONGAP 9 6* 4*   BUN 39* 38* <5*   CREATININE 1.4 1.5* 0.9   * 142* 216*   CALCIUM 8.4* 8.9 8.7   MG 1.5* 2.0 2.5       CARDIAC PROFILE LAST 3 DAYS  Recent Labs   Lab 08/25/23  1153 08/27/23  0514   BNP 1,617* 921*       ENDOCRINE LAST 3 DAYS  Recent Labs   Lab 08/27/23 0514   PROCAL <0.05       LAST ARTERIAL BLOOD GAS  ABG  No results for input(s): "PH", "PO2", "PCO2", "HCO3", "BE" in the last 168 hours.    LAST 7 DAYS MICROBIOLOGY   Microbiology Results (last 7 days)       Procedure Component Value Units Date/Time    Blood culture [[id number]] Collected: 08/26/23 1122    Order Status: Completed Specimen: Blood Updated: 08/28/23 1232     Blood Culture, Routine No Growth to date      No Growth to date      No Growth to date    Narrative:      Collection has been rescheduled by ALD1 at 08/26/2023 09:12 Reason:   Patient unavailable  Pt care  Collection has been rescheduled by ALD1 at 08/26/2023 09:12 Reason:   Patient unavailable  Pt care    Urine Culture High Risk [[id number]] Collected: 08/28/23 0914    Order Status: Sent Specimen: Urine, " Clean Catch Updated: 08/28/23 0927    Urine Culture High Risk [[id number]] Collected: 08/28/23 0914    Order Status: Sent Specimen: Urine, Clean Catch Updated: 08/28/23 0914    Blood Culture #1 **CANNOT BE ORDERED STAT** [[id number]] Collected: 08/21/23 1618    Order Status: Completed Specimen: Blood from Antecubital, Left Updated: 08/26/23 1632     Blood Culture, Routine No growth after 5 days.    Blood Culture #2 **CANNOT BE ORDERED STAT** [[id number]] Collected: 08/21/23 1619    Order Status: Completed Specimen: Blood Updated: 08/26/23 1632     Blood Culture, Routine No growth after 5 days.            MOST RECENT IMAGING  Echo  Addendum:   Left Ventricle: The left ventricle is mildly dilated. Mildly increased  ventricular mass. Mildly increased wall thickness. There is mild  concentric hypertrophy. Normal wall motion. Septal flattening in systole  consistent with right ventricular pressure overload. There is low normal  systolic function. EF 50% with patient in sinus rhythm and heart rate of  52 There is diastolic dysfunction but grade cannot be determined. Elevated  left ventricular filling pressure.     Right Ventricle: Normal right ventricular cavity size. Wall thickness  is normal. Right ventricle wall motion  is normal. Systolic function is  normal.     Mitral Valve: There is no stenosis.     Tricuspid Valve: There is mild to moderate regurgitation.     Pulmonary Artery: There is mild pulmonary hypertension. The estimated  pulmonary artery systolic pressure is 58 mmHg.     IVC/SVC: Elevated venous pressure at 15 mmHg.     Pericardium: There is a small posterior effusion. Pericardial effusion  is echolucent. No indication of cardiac tamponade.     Mean left atrial pressure slightly increased at 12 mm Hg  Narrative:   Left Ventricle: The left ventricle is mildly dilated. Mildly increased   ventricular mass. Mildly increased wall thickness. There is mild   concentric hypertrophy. Normal wall motion. Septal  flattening in systole   consistent with right ventricular pressure overload. There is low normal   systolic function. EF 50% with patient in sinus rhythm and heart rate of   52 There is diastolic dysfunction but grade cannot be determined. Elevated   left ventricular filling pressure.    Right Ventricle: Normal right ventricular cavity size. Wall thickness   is normal. Right ventricle wall motion  is normal. Systolic function is   normal.    Mitral Valve: There is no stenosis.    Tricuspid Valve: There is mild to moderate regurgitation.    Pulmonary Artery: There is mild pulmonary hypertension. The estimated   pulmonary artery systolic pressure is 58 mmHg.    IVC/SVC: Elevated venous pressure at 15 mmHg.    Mean left atrial pressure slightly increased at 12 mm Hg  Transesophageal echo (ISAURO) with possible cardioversion    Left Ventricle: The left ventricle is mildly dilated. Normal wall   thickness. Mild global hypokinesis present. There is mildly reduced   systolic function. EF  50% There is normal diastolic function.    Left Atrium: The left atrial appendage appears dilated. The left atrial   appendage has a windsock morphology. There is no thrombus in the left   atrial appendage.    Aortic Valve: There is mild aortic valve sclerosis.    Mitral Valve: There is no stenosis. There is mild regurgitation.    Aorta: Grade 1 small calcified atherosclerosis of the ascending aorta.    Pericardium: There is a small posterior effusion.  X-Ray Chest AP Portable  Narrative: EXAMINATION:  XR CHEST AP PORTABLE    CLINICAL HISTORY:  persistent fever;    FINDINGS:  Portable chest at 908 compared with 08/25/2023 shows unchanged enlarged cardiac silhouette size with normal mediastinal contours.  Patient is rotated.    Lungs are clear. Central pulmonary vasculature unchanged.  No acute osseous abnormality.  Impression: Unchanged cardiomegaly without acute cardiopulmonary abnormality.    Electronically signed by: Kaleb Jay  MD  Date:    08/27/2023  Time:    09:38      [facility]  Results for orders placed during the hospital encounter of 08/21/23    Echo    Interpretation Summary    Left Ventricle: The left ventricle is mildly dilated. Mildly increased ventricular mass. Mildly increased wall thickness. There is mild concentric hypertrophy. Normal wall motion. Septal flattening in systole consistent with right ventricular pressure overload. There is low normal systolic function. EF 50% with patient in sinus rhythm and heart rate of 52 There is diastolic dysfunction but grade cannot be determined. Elevated left ventricular filling pressure.    Right Ventricle: Normal right ventricular cavity size. Wall thickness is normal. Right ventricle wall motion  is normal. Systolic function is normal.    Mitral Valve: There is no stenosis.    Tricuspid Valve: There is mild to moderate regurgitation.    Pulmonary Artery: There is mild pulmonary hypertension. The estimated pulmonary artery systolic pressure is 58 mmHg.    IVC/SVC: Elevated venous pressure at 15 mmHg.    Pericardium: There is a small posterior effusion. Pericardial effusion is echolucent. No indication of cardiac tamponade.    Mean left atrial pressure slightly increased at 12 mm Hg      CURRENT/PREVIOUS VISIT EKG  Results for orders placed or performed during the hospital encounter of 08/21/23   EKG 12-lead    Collection Time: 08/27/23 10:21 AM    Narrative    Test Reason : I49.9,    Vent. Rate : 057 BPM     Atrial Rate : 057 BPM     P-R Int : 178 ms          QRS Dur : 146 ms      QT Int : 436 ms       P-R-T Axes : 040 052 -08 degrees     QTc Int : 424 ms    Sinus bradycardia  Right bundle branch block  T wave abnormality, consider lateral ischemia  Abnormal ECG  When compared with ECG of 24-AUG-2023 12:23,  Sinus rhythm has replaced Atrial fibrillation  Vent. rate has decreased BY  66 BPM  T wave inversion now evident in Lateral leads    Referred By: [email or web address]   SELF            Confirmed By:        ASSESSMENT/PLAN:     Active Hospital Problems    Diagnosis    *Acute on chronic combined systolic and diastolic congestive heart failure    Fever    ASCVD (arteriosclerotic cardiovascular disease)    Pericardial effusion    Atrial fibrillation with RVR    Essential hypertension    History of atrial fibrillation    Postablative hypothyroidism    CKD (chronic kidney disease), stage III       ASSESSMENT & PLAN:   Acute on chronic combined systolic and diastolic congestive heart failure  Afib with RVR  HTN  CKD  S/P cardioversion    RECOMMENDATIONS:  HFpEF: BNP down trending.Stopped spironolactone d/t CKD. Can give Lasix 20 mg QD prn. May start Entresto if labs improve. Obtain strict I/O and daily weight  PAF: S/P cardioversion 8/27/23.  Remains in normal sinus rhythm. Continue amiodarone 400 mg BID, Eliquis 5 mg BID, metoprolol tartrate 25 mg TID.  3.   Potassium 3.7. Continue to check and replace potassium and magnesium. Goal for potassium is 4.0, and goal for magnesium is 2.0.     Kisha Larry NP  [facility]  Department of Cardiology  Date of Service: 08/28/2023      I have personally interviewed and examined the patient.  I have reviewed all the Nurse Practitioner's documentation, and agree with the plan.       Dr. HIEN Stevenson M.D.  [facility]  Department of Cardiology  Date of Service: 08/28/2023  5:24 PM

## 2023-08-28 NOTE — PROGRESS NOTES
Pharmacist Renal Dose Adjustment Note    Vickie Benedict is a 88 y.o. female being treated with the medication azactam    Patient Data:    Vital Signs (Most Recent):  Temp: (!) 100.4 °F (38 °C) (08/28/23 0820)  Pulse: 72 (08/28/23 0820)  Resp: 20 (08/28/23 0820)  BP: (!) 163/80 (08/28/23 0820)  SpO2: 95 % (08/28/23 0820) Vital Signs (72h Range):  Temp:  [97.7 °F (36.5 °C)-100.4 °F (38 °C)]   Pulse:  []   Resp:  [15-36]   BP: ()/()   SpO2:  [89 %-100 %]      Recent Labs   Lab 08/26/23  0356 08/27/23  0514 08/28/23  0417   CREATININE 1.4 1.5* 0.9       Azactam 2 gm q 8hr will be changed to 1 gm q 8hr     Serum creatinine: 0.9 mg/dL 08/28/23 0417  Estimated creatinine clearance: 39.1 mL/min  Pharmacist's Name: Marylu Isabel  Pharmacist's Extension: [phone]

## 2023-08-28 NOTE — PT/OT/SLP PROGRESS
Physical Therapy Treatment    Patient Name:  Vickie Benedict   MRN:  [id number]    Recommendations:     Discharge Recommendations: home health PT  Discharge Equipment Recommendations:    Barriers to discharge:  decreased activity tolerance, L knee pain    Assessment:     Vickie Benedict is a 88 y.o. female admitted with a medical diagnosis of Acute on chronic combined systolic and diastolic congestive heart failure.  She presents with the following impairments/functional limitations: weakness, impaired endurance, impaired self care skills, impaired functional mobility, gait instability, impaired balance, impaired cardiopulmonary response to activity.    Pt with HOB elevated. Pt declined out of bed activity, reporting she attempted to sit EOB with OT but was only able to move RLE off the edge of the bed. Pt performed supine RLE TE and minimal LLE TE due to pain. Pt educated on the importance of out of bed activity and participating with therapy. Pt requests knee brace for LLE, to help with the pain.    Rehab Prognosis: Fair; patient would benefit from acute skilled PT services to address these deficits and reach maximum level of function.    Recent Surgery: * No surgery found *      Plan:     During this hospitalization, patient to be seen 6 x/week to address the identified rehab impairments via gait training, therapeutic activities, therapeutic exercises and progress toward the following goals:    Plan of Care Expires:  09/23/23    Subjective     Chief Complaint: L knee pain  Patient/Family Comments/goals: to get a knee brace for L knee  Pain/Comfort:  Pain Rating 1: other (see comments) (not rated)  Location - Side 1: Left  Location - Orientation 1: lateral  Location 1: knee  Pain Addressed 1: Cessation of Activity  Pain Rating Post-Intervention 1: other (see comments) (not rated)      Objective:     Communicated with RN prior to session.  Patient found HOB elevated with telemetry, oxygen upon PT entry to  room.     General Precautions: Standard, fall  Orthopedic Precautions: N/A  Braces: N/A  Respiratory Status: Nasal cannula, flow 2 L/min     Functional Mobility:  Declined due to L knee pain      AM-PAC 6 CLICK MOBILITY          Treatment & Education:  Pt educated on importance of time OOB, importance of intermittent mobility, safe techniques for transfers/ambulation, discharge recommendations/options, and use of call light for assistance and fall prevention.  Pt tolerated supine RLE TE x 10 each including hip flexion, knee extension, and ankle DF/PF and attempted on LLE but limited due to pain.    Patient left HOB elevated with all lines intact, call button in reach, bed alarm on, and RN notified..    GOALS:   Multidisciplinary Problems       Physical Therapy Goals          Problem: Physical Therapy    Goal Priority Disciplines Outcome Goal Variances Interventions   Physical Therapy Goal     PT, PT/OT Ongoing, Progressing     Description: Goals to be met by: 23     Patient will increase functional independence with mobility by performin. Supine to sit with Modified [place]  2. Sit to supine with Modified [place]  3. Sit to stand transfer with Modified [place]  4. Bed to chair transfer with Modified [place] using Rolling Walker  5. Gait  x 100 feet with Modified [place] using Rolling Walker.                          Time Tracking:     PT Received On: 23  PT Start Time: 1357     PT Stop Time: 1407  PT Total Time (min): 10 min     Billable Minutes: Therapeutic Exercise 10    Treatment Type: Treatment  PT/PTA: PTA     Number of PTA visits since last PT visit: 3     2023

## 2023-08-28 NOTE — RESPIRATORY THERAPY
08/27/23 1920   Patient Assessment/Suction   Level of Consciousness (AVPU) alert   Respiratory Effort Unlabored   PRE-TX-O2   Device (Oxygen Therapy) nasal cannula with humidification   $ Is the patient on Low Flow Oxygen? Yes   Flow (L/min) 3   Pulse Oximetry Type Intermittent   $ Pulse Oximetry - Multiple Charge Pulse Oximetry - Multiple   Education   $ Education 15 min   Respiratory Evaluation   $ Care Plan Tech Time 15 min   $ Eval/Re-eval Charges Re-evaluation

## 2023-08-29 VITALS
HEART RATE: 59 BPM | WEIGHT: 149.94 LBS | TEMPERATURE: 99 F | RESPIRATION RATE: 20 BRPM | DIASTOLIC BLOOD PRESSURE: 65 MMHG | BODY MASS INDEX: 27.59 KG/M2 | SYSTOLIC BLOOD PRESSURE: 152 MMHG | HEIGHT: 62 IN | OXYGEN SATURATION: 95 %

## 2023-08-29 PROBLEM — I50.43 ACUTE ON CHRONIC COMBINED SYSTOLIC AND DIASTOLIC CONGESTIVE HEART FAILURE: Status: RESOLVED | Noted: 2023-08-24 | Resolved: 2023-08-29

## 2023-08-29 PROBLEM — I48.91 ATRIAL FIBRILLATION WITH RVR: Status: RESOLVED | Noted: 2023-08-24 | Resolved: 2023-08-29

## 2023-08-29 PROBLEM — I31.39 PERICARDIAL EFFUSION: Status: RESOLVED | Noted: 2023-08-27 | Resolved: 2023-08-29

## 2023-08-29 PROBLEM — R50.9 FEVER: Status: RESOLVED | Noted: 2023-08-28 | Resolved: 2023-08-29

## 2023-08-29 LAB
ANION GAP SERPL CALC-SCNC: 7 MMOL/L (ref 8–16)
BACTERIA UR CULT: NORMAL
BACTERIA UR CULT: NORMAL
BASOPHILS # BLD AUTO: 0.03 K/UL (ref 0–0.2)
BASOPHILS NFR BLD: 0.2 % (ref 0–1.9)
BUN SERPL-MCNC: 37 MG/DL (ref 8–23)
CALCIUM SERPL-MCNC: 9 MG/DL (ref 8.7–10.5)
CHLORIDE SERPL-SCNC: 97 MMOL/L (ref 95–110)
CO2 SERPL-SCNC: 30 MMOL/L (ref 23–29)
CREAT SERPL-MCNC: 1.4 MG/DL (ref 0.5–1.4)
DIFFERENTIAL METHOD: ABNORMAL
EOSINOPHIL # BLD AUTO: 0.3 K/UL (ref 0–0.5)
EOSINOPHIL NFR BLD: 2.4 % (ref 0–8)
ERYTHROCYTE [DISTWIDTH] IN BLOOD BY AUTOMATED COUNT: 13.3 % (ref 11.5–14.5)
EST. GFR  (NO RACE VARIABLE): 36.2 ML/MIN/1.73 M^2
GLUCOSE SERPL-MCNC: 134 MG/DL (ref 70–110)
HCT VFR BLD AUTO: 31.3 % (ref 37–48.5)
HGB BLD-MCNC: 10 G/DL (ref 12–16)
IMM GRANULOCYTES # BLD AUTO: 0.04 K/UL (ref 0–0.04)
IMM GRANULOCYTES NFR BLD AUTO: 0.3 % (ref 0–0.5)
LYMPHOCYTES # BLD AUTO: 0.9 K/UL (ref 1–4.8)
LYMPHOCYTES NFR BLD: 7.5 % (ref 18–48)
MAGNESIUM SERPL-MCNC: 2 MG/DL (ref 1.6–2.6)
MCH RBC QN AUTO: 32.7 PG (ref 27–31)
MCHC RBC AUTO-ENTMCNC: 31.9 G/DL (ref 32–36)
MCV RBC AUTO: 102 FL (ref 82–98)
MONOCYTES # BLD AUTO: 1.4 K/UL (ref 0.3–1)
MONOCYTES NFR BLD: 11.5 % (ref 4–15)
NEUTROPHILS # BLD AUTO: 9.6 K/UL (ref 1.8–7.7)
NEUTROPHILS NFR BLD: 78.1 % (ref 38–73)
NRBC BLD-RTO: 0 /100 WBC
PHOSPHATE SERPL-MCNC: 2.7 MG/DL (ref 2.7–4.5)
PLATELET # BLD AUTO: 197 K/UL (ref 150–450)
PMV BLD AUTO: 9.8 FL (ref 9.2–12.9)
POTASSIUM SERPL-SCNC: 4 MMOL/L (ref 3.5–5.1)
RBC # BLD AUTO: 3.06 M/UL (ref 4–5.4)
SODIUM SERPL-SCNC: 134 MMOL/L (ref 136–145)
WBC # BLD AUTO: 12.32 K/UL (ref 3.9–12.7)

## 2023-08-29 PROCEDURE — 99232 PR SUBSEQUENT HOSPITAL CARE,LEVL II: ICD-10-PCS | Mod: ,,, | Performed by: INTERNAL MEDICINE

## 2023-08-29 PROCEDURE — 99232 SBSQ HOSP IP/OBS MODERATE 35: CPT | Mod: ,,, | Performed by: INTERNAL MEDICINE

## 2023-08-29 PROCEDURE — 27000221 HC OXYGEN, UP TO 24 HOURS

## 2023-08-29 PROCEDURE — 85025 COMPLETE CBC W/AUTO DIFF WBC: CPT | Performed by: STUDENT IN AN ORGANIZED HEALTH CARE EDUCATION/TRAINING PROGRAM

## 2023-08-29 PROCEDURE — 83735 ASSAY OF MAGNESIUM: CPT | Performed by: STUDENT IN AN ORGANIZED HEALTH CARE EDUCATION/TRAINING PROGRAM

## 2023-08-29 PROCEDURE — 84100 ASSAY OF PHOSPHORUS: CPT | Performed by: STUDENT IN AN ORGANIZED HEALTH CARE EDUCATION/TRAINING PROGRAM

## 2023-08-29 PROCEDURE — 99900031 HC PATIENT EDUCATION (STAT)

## 2023-08-29 PROCEDURE — 97530 THERAPEUTIC ACTIVITIES: CPT

## 2023-08-29 PROCEDURE — 94761 N-INVAS EAR/PLS OXIMETRY MLT: CPT

## 2023-08-29 PROCEDURE — 80048 BASIC METABOLIC PNL TOTAL CA: CPT | Performed by: STUDENT IN AN ORGANIZED HEALTH CARE EDUCATION/TRAINING PROGRAM

## 2023-08-29 PROCEDURE — 25000003 PHARM REV CODE 250: Performed by: SPECIALIST

## 2023-08-29 PROCEDURE — 36415 COLL VENOUS BLD VENIPUNCTURE: CPT | Performed by: STUDENT IN AN ORGANIZED HEALTH CARE EDUCATION/TRAINING PROGRAM

## 2023-08-29 PROCEDURE — 25000003 PHARM REV CODE 250: Performed by: INTERNAL MEDICINE

## 2023-08-29 PROCEDURE — 63600175 PHARM REV CODE 636 W HCPCS: Performed by: INTERNAL MEDICINE

## 2023-08-29 RX ORDER — AMIODARONE HYDROCHLORIDE 400 MG/1
TABLET ORAL
Qty: 42 TABLET | Refills: 0 | Status: SHIPPED | OUTPATIENT
Start: 2023-08-29 | End: 2023-10-09

## 2023-08-29 RX ORDER — FUROSEMIDE 20 MG/1
20 TABLET ORAL DAILY PRN
Qty: 30 TABLET | Refills: 0 | Status: SHIPPED | OUTPATIENT
Start: 2023-08-29 | End: 2023-10-09

## 2023-08-29 RX ORDER — METOPROLOL TARTRATE 25 MG/1
25 TABLET, FILM COATED ORAL 3 TIMES DAILY
Qty: 90 TABLET | Refills: 0 | Status: SHIPPED | OUTPATIENT
Start: 2023-08-29 | End: 2023-10-30 | Stop reason: SDUPTHER

## 2023-08-29 RX ORDER — FUROSEMIDE 20 MG/1
20 TABLET ORAL EVERY OTHER DAY
Status: DISCONTINUED | OUTPATIENT
Start: 2023-08-30 | End: 2023-08-29 | Stop reason: HOSPADM

## 2023-08-29 RX ADMIN — AZTREONAM 1000 MG: 1 INJECTION, POWDER, LYOPHILIZED, FOR SOLUTION INTRAMUSCULAR; INTRAVENOUS at 01:08

## 2023-08-29 RX ADMIN — AMIODARONE HYDROCHLORIDE 400 MG: 200 TABLET ORAL at 08:08

## 2023-08-29 RX ADMIN — AZTREONAM 1000 MG: 1 INJECTION, POWDER, LYOPHILIZED, FOR SOLUTION INTRAMUSCULAR; INTRAVENOUS at 09:08

## 2023-08-29 RX ADMIN — APIXABAN 5 MG: 5 TABLET, FILM COATED ORAL at 08:08

## 2023-08-29 RX ADMIN — METOPROLOL TARTRATE 25 MG: 25 TABLET, FILM COATED ORAL at 08:08

## 2023-08-29 NOTE — DISCHARGE SUMMARY
"[facility]  Discharge Summary      Patient Name: Vickie Benedict  MRN: [id number]  ELIJAH: [id number]  Patient Class: IP- Inpatient  Admission Date: 8/21/2023  Hospital Length of Stay: 6 days  Discharge Date and Time:  08/29/2023 4:18 PM  Attending Physician: Miguel Pickett MD   Discharging Provider: Miguel Pickett MD  Primary Care Provider: Aureliano Morocho MD    Primary Care Team: Networked reference to record PCT     HPI:   Ms. Benedict is a 87 yo w/pmhx of hypothyroidism, HFpEF on 4L NC at home, HTN, afib who presents w/SOB. Patient was discharged 1 month ago w/HF exacerbation and also treated for bronchitis. However, patient reports since discharge she has been feeling weak. Reports in the last week she has progressively gotten more SOB w/ exertion. She also reports 2 pillow orthopnea, PNA and LE edema up to calf. She reports she was discharged with 20mg lasix daily but did not much of a difference in her LE edema and stopped taking it. She then started taking a diuretic her cardiologist prescribed but does not remember the name or the dose. Believes it starts w/a "t". She took 1/2 a pill and noticed some improvement in her edema but not her breathing. Per chart review, cannot find name/dose of diuretic.     ED labs notable for trop 17, . TTE from 7/2023 w/normal EF and diastolic fxn. Cr 1.7 (baseline 1.3-1.5). Was given 20mg IV lasix w/500 cc output w/in 30 minutes.       * No surgery found *      Hospital Course:   Pt got admitted with  Acute on chronic combined systolic and diastolic congestive heart failure  Pt was started on iv lasix which was later put on hold due to hypotension   Pt went into Atrial fibrillation with RVR  (Normally pt  On rhythmol and lopressor at home)  Pt was started on iv amiodarone gtt  and eventually had ISAURO/cardioversion on 08/27  Pt was found to have low grade fever and all investigations/cultures done were normal   Later pt was discharged to " home with    Med reconciliation at the time of discharge as below        Goals of Care Treatment Preferences:  Code Status: Full Code      Consults:   Consults (From admission, onward)        Status Ordering Provider     Inpatient consult to Anesthesiology  Once        Provider:  Yo Lewis Jr., MD    Acknowledged CHAD ARIZMENDI     Inpatient consult to Cardiology  Once        Provider:  Sean Cantu MD    Acknowledged CLOVIS CORTES     Inpatient consult to   Once        Provider:  (Not yet assigned)    Completed CLOVIS CORTES          No new Assessment & Plan notes have been filed under this hospital service since the last note was generated.  Service: Hospital Medicine    Final Active Diagnoses:    Diagnosis Date Noted POA    ASCVD (arteriosclerotic cardiovascular disease) [I25.10] 08/27/2023 Unknown    Essential hypertension [I10] 04/28/2021 Yes     Chronic    History of atrial fibrillation [Z86.79] 04/28/2021 Not Applicable     Chronic    Postablative hypothyroidism [E89.0] 04/28/2021 Yes     Chronic    CKD (chronic kidney disease), stage III [N18.30]  Yes     Chronic      Problems Resolved During this Admission:    Diagnosis Date Noted Date Resolved POA    PRINCIPAL PROBLEM:  Acute on chronic combined systolic and diastolic congestive heart failure [I50.43] 08/24/2023 08/29/2023 Unknown    Atrial fibrillation with RVR [I48.91] 08/24/2023 08/29/2023 Unknown    Fever [R50.9] 08/28/2023 08/29/2023 Unknown    Pericardial effusion [I31.39] 08/27/2023 08/29/2023 Unknown       Discharged Condition: good    Disposition: Home-Health Care Svc    Follow Up:   Follow-up Information     Aureliano Morocho MD Follow up in 1 week(s).    Specialties: Interventional Cardiology, Cardiovascular Disease, Cardiology  Contact information:  [address]  [address]  [place] [address]  [phone]             [facility] Follow up.    Specialty: Home Health  "Services  Contact information:  [address]  [address]  [place] [address]  [phone]                       Patient Instructions:      WALKER FOR HOME USE     Order Specific Question Answer Comments   Type of Walker: Adult (5'4"-6'6") rolling walker   With wheels? Yes    Height: 5' 2" (1.575 m)    Weight: 68 kg (149 lb 14.6 oz)    Length of need (1-99 months): 3    Please check all that apply: Patient's condition impairs ambulation.    Please check all that apply: Patient is unable to safely ambulate without equipment.      3 IN 1 COMMODE FOR HOME USE     Order Specific Question Answer Comments   Type: Standard    Height: 5' 2" (1.575 m)    Weight: 68 kg (149 lb 14.6 oz)    Length of need (1-99 months): 3      Ambulatory referral/consult to [facility]   Standing Status: Future   Referral Priority: Routine Referral Type: Consultation   Referral Reason: Specialty Services Required   Number of Visits Requested: 1     Ambulatory referral/consult to Home Health   Standing Status: Future   Referral Priority: Routine Referral Type: Home Health   Referral Reason: Specialty Services Required   Requested Specialty: Home Health Services   Number of Visits Requested: 1       Significant Diagnostic Studies: Labs:   CMP   Recent Labs   Lab 08/28/23  0417 08/29/23  0500   * 134*   K 3.7 4.0    97   CO2 29 30*   * 134*   BUN <5* 37*   CREATININE 0.9 1.4   CALCIUM 8.7 9.0   ANIONGAP 4* 7*    and CBC   Recent Labs   Lab 08/28/23  0417 08/29/23  0500   WBC 12.07 12.32   HGB 10.3* 10.0*   HCT 32.0* 31.3*    197       Pending Diagnostic Studies:     Procedure Component Value Units Date/Time    Urinalysis [[id number]] Collected: 08/28/23 1127    Order Status: Sent Lab Status: In process Updated: 08/28/23 1128    Specimen: Urine, Clean Catch          Medications:  Reconciled Home Medications:      Medication List      START taking these medications    amiodarone 400 MG tablet  Commonly " known as: PACERONE  Take 1 tablet (400 mg total) by mouth 2 (two) times daily for 14 days, THEN 1 tablet (400 mg total) once daily for 14 days.  Start taking on: August 29, 2023     apixaban 5 mg Tab  Commonly known as: ELIQUIS  Take 1 tablet (5 mg total) by mouth 2 (two) times daily.     metoprolol tartrate 25 MG tablet  Commonly known as: LOPRESSOR  Take 1 tablet (25 mg total) by mouth 3 (three) times daily.        CHANGE how you take these medications    furosemide 20 MG tablet  Commonly known as: LASIX  Take 1 tablet (20 mg total) by mouth daily as needed (fluid retention).  What changed:   · reasons to take this  · additional instructions        CONTINUE taking these medications    CO Q-10 100 mg capsule  Generic drug: coenzyme Q10  Take 100 mg by mouth once daily.     FISH OIL ORAL  Take 1 capsule by mouth Daily.     FLAXSEED OIL ORAL  Take 1 capsule by mouth Daily.     GLUCOS-CHOND-MSM (WITH ANTIOX) ORAL  Take 1 capsule by mouth Daily.     multivitamin per tablet  Commonly known as: THERAGRAN  Take 1 tablet by mouth once daily.        STOP taking these medications    amLODIPine 10 MG tablet  Commonly known as: NORVASC     levothyroxine 100 MCG tablet  Commonly known as: SYNTHROID     lisinopriL 20 MG tablet  Commonly known as: PRINIVIL,ZESTRIL     metoprolol succinate 25 MG 24 hr tablet  Commonly known as: TOPROL-XL     propafenone 225 MG Tab  Commonly known as: RYTHMOL            Indwelling Lines/Drains at time of discharge:   Lines/Drains/Airways     None               Physical Exam  Cardiovascular:      Rate and Rhythm: Normal rate.   Neurological:      General: No focal deficit present.      Mental Status: She is alert.       Time spent on the discharge of patient: 45 minutes         Miguel Pickett MD  Department of Hospital Medicine  [facility]

## 2023-08-29 NOTE — PT/OT/SLP PROGRESS
Physical Therapy Treatment    Patient Name:  Vickie Benedict   MRN:  [id number]    Recommendations:     Discharge Recommendations: home health PT  Discharge Equipment Recommendations: bedside commode, walker, rolling  Barriers to discharge:  Increased caregiver burden of care    Assessment:     Vickie Benedict is a 88 y.o. female admitted with a medical diagnosis of Acute on chronic combined systolic and diastolic congestive heart failure.  She presents with the following impairments/functional limitations: weakness, impaired endurance, impaired self care skills, impaired functional mobility, gait instability, impaired balance, pain, decreased safety awareness, impaired cardiopulmonary response to activity .    Rehab Prognosis: Fair; patient would benefit from acute skilled PT services to address these deficits and reach maximum level of function.    Recent Surgery: * No surgery found *      Plan:     During this hospitalization, patient to be seen 6 x/week to address the identified rehab impairments via gait training, therapeutic activities, therapeutic exercises and progress toward the following goals:    Plan of Care Expires:  09/23/23    Subjective     Chief Complaint: B ankle  and L knee pain  Patient/Family Comments/goals: Return home with her son  Pain/Comfort:         Objective:     Communicated with nurse  prior to session.  Patient found supine with telemetry, PureWick, peripheral IV, bed alarm, oxygen upon PT entry to room.     General Precautions: Standard, fall  Orthopedic Precautions: N/A  Braces: N/A  Respiratory Status: Nasal cannula, flow 2 L/min     Functional Mobility:  Bed Mobility:     Supine to Sit: moderate assistance  Sit to Supine: moderate assistance  Transfers:     Bed to Bedside commode: minimum assistance with  no AD  using  Step Transfer and slow movement  Balance: unsupported sitting balance, Min A for standing balance      AM-PAC 6 CLICK MOBILITY          Treatment &  Education:  Pt participated in t/f training bed<>[facility] [place] , Educated on safety and benefits of increased time OOB, use of call light    Patient left supine with all lines intact, call button in reach, and bed alarm on..    GOALS:   Multidisciplinary Problems       Physical Therapy Goals          Problem: Physical Therapy    Goal Priority Disciplines Outcome Goal Variances Interventions   Physical Therapy Goal     PT, PT/OT Ongoing, Progressing     Description: Goals to be met by: 23     Patient will increase functional independence with mobility by performin. Supine to sit with Modified [place]  2. Sit to supine with Modified [place]  3. Sit to stand transfer with Modified [place]  4. Bed to chair transfer with Modified [place] using Rolling Walker  5. Gait  x 100 feet with Modified [place] using Rolling Walker.                          Time Tracking:     PT Received On: 23  PT Start Time: 1028     PT Stop Time: 1102  PT Total Time (min): 34 min     Billable Minutes: Therapeutic Activity 34 minutes    Treatment Type: Treatment  PT/PTA: PT     Number of PTA visits since last PT visit: 3     2023

## 2023-08-29 NOTE — PLAN OF CARE
Problem: Adult Inpatient Plan of Care  Goal: Plan of Care Review  Outcome: Ongoing, Progressing  Goal: Patient-Specific Goal (Individualized)  Outcome: Ongoing, Progressing     Problem: Fluid Imbalance (Pneumonia)  Goal: Fluid Balance  Outcome: Ongoing, Progressing     Problem: Infection (Pneumonia)  Goal: Resolution of Infection Signs and Symptoms  Outcome: Ongoing, Progressing     Problem: Respiratory Compromise (Pneumonia)  Goal: Effective Oxygenation and Ventilation  Outcome: Ongoing, Progressing     Problem: Fall Injury Risk  Goal: Absence of Fall and Fall-Related Injury  Outcome: Ongoing, Progressing

## 2023-08-29 NOTE — PROGRESS NOTES
"[facility] Medicine  Progress Note    Patient Name: Vickie Benedict  MRN: [id number]  Patient Class: IP- Inpatient   Admission Date: 8/21/2023  Length of Stay: 5 days  Attending Physician: Miguel Pickett MD  Primary Care Provider: Aureliano Morocho MD        Subjective:     Principal Problem:Acute on chronic combined systolic and diastolic congestive heart failure        HPI:  Ms. Benedict is a 89 yo w/pmhx of hypothyroidism, HFpEF on 4L NC at home, HTN, afib who presents w/SOB. Patient was discharged 1 month ago w/HF exacerbation and also treated for bronchitis. However, patient reports since discharge she has been feeling weak. Reports in the last week she has progressively gotten more SOB w/ exertion. She also reports 2 pillow orthopnea, PNA and LE edema up to calf. She reports she was discharged with 20mg lasix daily but did not much of a difference in her LE edema and stopped taking it. She then started taking a diuretic her cardiologist prescribed but does not remember the name or the dose. Believes it starts w/a "t". She took 1/2 a pill and noticed some improvement in her edema but not her breathing. Per chart review, cannot find name/dose of diuretic.     ED labs notable for trop 17, . TTE from 7/2023 w/normal EF and diastolic fxn. Cr 1.7 (baseline 1.3-1.5). Was given 20mg IV lasix w/500 cc output w/in 30 minutes.       Overview/Hospital Course:  Ms. Benedict is an 89 yo w/pmhx of HFpEF on 4L NC at home, afib, HTN who presented with HF exacerbation. Was recently discharged with lasix 20mg daily but reports not taking for the last week as she did not not any improvement with it ans was instead taking triamterine/HCTZ. Diuresing with IV lasix. Does have unilateral pain in LE. DVT US negative. Did have episode of bradycardia while sleeping. Will hold home metoprolol.         08/24  Pt went into A Fib RVR  Home medications started  Much improvement from CHF      08/25  Pt " refused ISAURO cardioversion  Started on amiodarone gtt by cardiology team      08/26  BP levels on lower range  Pt today consented for ISAURO/cardioversion  Low grade fever noted     08/27  Today had ISAURO/Cardioversion  Pro lenard levels WNL  Pt says she is very tired    08/28  Low grade fever persists  Started on iv abx  Urine culture was send only today even though ordered over weekend      Interval History:       Review of Systems   Constitutional:  Positive for fatigue. Negative for activity change and appetite change.   HENT:  Negative for congestion and dental problem.    Eyes:  Negative for discharge and itching.   Respiratory:  Negative for shortness of breath.    Cardiovascular:  Negative for chest pain.   Gastrointestinal:  Negative for abdominal distention and abdominal pain.   Endocrine: Negative for cold intolerance.   Genitourinary:  Negative for difficulty urinating and dysuria.   Musculoskeletal:  Negative for arthralgias and back pain.   Skin:  Negative for color change.   Neurological:  Negative for dizziness and facial asymmetry.   Hematological:  Negative for adenopathy.   Psychiatric/Behavioral:  Negative for agitation and behavioral problems.      Objective:     Vital Signs (Most Recent):  Temp: 99 °F (37.2 °C) (08/28/23 1942)  Pulse: 63 (08/28/23 1942)  Resp: 19 (08/28/23 1942)  BP: (!) 160/66 (95) (08/28/23 1942)  SpO2: 96 % (08/28/23 1942) Vital Signs (24h Range):  Temp:  [99 °F (37.2 °C)-100.8 °F (38.2 °C)] 99 °F (37.2 °C)  Pulse:  [63-80] 63  Resp:  [16-20] 19  SpO2:  [95 %-98 %] 96 %  BP: (147-163)/(64-80) 160/66     Weight: 68 kg (149 lb 14.6 oz)  Body mass index is 27.42 kg/m².    Intake/Output Summary (Last 24 hours) at 8/28/2023 2020  Last data filed at 8/28/2023 1533  Gross per 24 hour   Intake 480 ml   Output 875 ml   Net -395 ml         Physical Exam  Vitals and nursing note reviewed.   Constitutional:       General: She is not in acute distress.  HENT:      Head: Atraumatic.      Right  Ear: External ear normal.      Left Ear: External ear normal.      Nose: Nose normal.      Mouth/Throat:      Mouth: Mucous membranes are moist.   Cardiovascular:      Rate and Rhythm: Normal rate.   Pulmonary:      Effort: Pulmonary effort is normal.   Musculoskeletal:         General: Normal range of motion.      Cervical back: Normal range of motion.   Skin:     General: Skin is warm.   Neurological:      Mental Status: She is alert and oriented to person, place, and time.   Psychiatric:         Behavior: Behavior normal.             Significant Labs: All pertinent labs within the past 24 hours have been reviewed.  CBC:   Recent Labs   Lab 08/27/23 0514 08/28/23 0417   WBC 10.92 12.07   HGB 11.3* 10.3*   HCT 35.1* 32.0*    190     CMP:   Recent Labs   Lab 08/27/23 0514 08/28/23 0417   * 133*   K 3.6 3.7   CL 97 100   CO2 32* 29   * 216*   BUN 38* <5*   CREATININE 1.5* 0.9   CALCIUM 8.9 8.7   ANIONGAP 6* 4*       Significant Imaging: I have reviewed all pertinent imaging results/findings within the past 24 hours.      Assessment/Plan:      * Acute on chronic combined systolic and diastolic congestive heart failure  Was on  iv lasix which is now on Hold  On 08/26: received gentle hydration with iv NS, per Cardiology team  Diuretics PRN basis      Atrial fibrillation with RVR  On rhythmol and lopressor at home  Was  on iv amiodarone gtt   Now on PO amiodarone and PO lopressor  S/p  ISAURO/cardioversion on 08/27      Fever  Low grade fever persists  Started on iv abx  Urine culture was send only today even though ordered over weekend      Pericardial effusion  Small effusion noted on TTE on July 2023  On 08/27 pt had ISAURO and pericardial effusion worsened and subsequently TTE ordered       ASCVD (arteriosclerotic cardiovascular disease)  Per Cardiology       Postablative hypothyroidism  Continue synthroid   Last TSH in 7/2023 wnl     History of atrial fibrillation  At  home on propafenone and  metoprolol     Essential hypertension  Stable     CKD (chronic kidney disease), stage III  Baseline cr 1.3-1.5  Avoid nephrotoxic agents         VTE Risk Mitigation (From admission, onward)         Ordered     apixaban tablet 5 mg  2 times daily         08/28/23 0822     IP VTE HIGH RISK PATIENT  Once         08/21/23 2040     Place sequential compression device  Until discontinued         08/21/23 2040                Discharge Planning   HEMANTH: 8/29/2023     Code Status: Full Code   Is the patient medically ready for discharge?:     Reason for patient still in hospital (select all that apply): Treatment  Discharge Plan A: Home Health   Discharge Delays: None known at this time              Miguel Pickett MD  Department of Hospital Medicine   [facility]

## 2023-08-29 NOTE — SUBJECTIVE & OBJECTIVE
Interval History:       Review of Systems   Constitutional:  Positive for fatigue. Negative for activity change and appetite change.   HENT:  Negative for congestion and dental problem.    Eyes:  Negative for discharge and itching.   Respiratory:  Negative for shortness of breath.    Cardiovascular:  Negative for chest pain.   Gastrointestinal:  Negative for abdominal distention and abdominal pain.   Endocrine: Negative for cold intolerance.   Genitourinary:  Negative for difficulty urinating and dysuria.   Musculoskeletal:  Negative for arthralgias and back pain.   Skin:  Negative for color change.   Neurological:  Negative for dizziness and facial asymmetry.   Hematological:  Negative for adenopathy.   Psychiatric/Behavioral:  Negative for agitation and behavioral problems.      Objective:     Vital Signs (Most Recent):  Temp: 99 °F (37.2 °C) (08/28/23 1942)  Pulse: 63 (08/28/23 1942)  Resp: 19 (08/28/23 1942)  BP: (!) 160/66 (95) (08/28/23 1942)  SpO2: 96 % (08/28/23 1942) Vital Signs (24h Range):  Temp:  [99 °F (37.2 °C)-100.8 °F (38.2 °C)] 99 °F (37.2 °C)  Pulse:  [63-80] 63  Resp:  [16-20] 19  SpO2:  [95 %-98 %] 96 %  BP: (147-163)/(64-80) 160/66     Weight: 68 kg (149 lb 14.6 oz)  Body mass index is 27.42 kg/m².    Intake/Output Summary (Last 24 hours) at 8/28/2023 2020  Last data filed at 8/28/2023 1533  Gross per 24 hour   Intake 480 ml   Output 875 ml   Net -395 ml         Physical Exam  Vitals and nursing note reviewed.   Constitutional:       General: She is not in acute distress.  HENT:      Head: Atraumatic.      Right Ear: External ear normal.      Left Ear: External ear normal.      Nose: Nose normal.      Mouth/Throat:      Mouth: Mucous membranes are moist.   Cardiovascular:      Rate and Rhythm: Normal rate.   Pulmonary:      Effort: Pulmonary effort is normal.   Musculoskeletal:         General: Normal range of motion.      Cervical back: Normal range of motion.   Skin:     General: Skin is warm.    Neurological:      Mental Status: She is alert and oriented to person, place, and time.   Psychiatric:         Behavior: Behavior normal.             Significant Labs: All pertinent labs within the past 24 hours have been reviewed.  CBC:   Recent Labs   Lab 08/27/23 0514 08/28/23 0417   WBC 10.92 12.07   HGB 11.3* 10.3*   HCT 35.1* 32.0*    190     CMP:   Recent Labs   Lab 08/27/23 0514 08/28/23 0417   * 133*   K 3.6 3.7   CL 97 100   CO2 32* 29   * 216*   BUN 38* <5*   CREATININE 1.5* 0.9   CALCIUM 8.9 8.7   ANIONGAP 6* 4*       Significant Imaging: I have reviewed all pertinent imaging results/findings within the past 24 hours.

## 2023-08-29 NOTE — RESPIRATORY THERAPY
08/28/23 1915   Patient Assessment/Suction   Level of Consciousness (AVPU) alert   Respiratory Effort Unlabored   PRE-TX-O2   Device (Oxygen Therapy) nasal cannula with humidification   $ Is the patient on Low Flow Oxygen? Yes   Flow (L/min) 2   Pulse Oximetry Type Intermittent   $ Pulse Oximetry - Multiple Charge Pulse Oximetry - Multiple   Education   $ Education 15 min   Respiratory Evaluation   $ Care Plan Tech Time 15 min   $ Eval/Re-eval Charges Re-evaluation

## 2023-08-29 NOTE — PLAN OF CARE
Pt able to be accepted to [facility] tomorrow 8/30.   08/29/23 1321   Post-Acute Status   Post-Acute Authorization HME;Home Health   HME Status Pending post-acute provider review/more information requested   Home Health Status Set-up Complete/Auth obtained

## 2023-08-29 NOTE — PT/OT/SLP PROGRESS
Occupational Therapy   Treatment    Name: Vickie Benedict  MRN: [id number]  Admitting Diagnosis:  Acute on chronic combined systolic and diastolic congestive heart failure       Recommendations:     Discharge Recommendations: home health OT  Discharge Equipment Recommendations:  bedside commode, walker, rolling  Barriers to discharge:  Other (Comment) (limited mobility; high fall risk)    Assessment:     Vickie Benedict is a 88 y.o. female with a medical diagnosis of Acute on chronic combined systolic and diastolic congestive heart failure.  Performance deficits affecting function are weakness, impaired endurance, impaired self care skills, impaired functional mobility, gait instability, impaired balance, pain, impaired cardiopulmonary response to activity.     Rehab Prognosis:  Fair; patient would benefit from acute skilled OT services to address these deficits and reach maximum level of function.       Plan:     Patient to be seen 5 x/week to address the above listed problems via self-care/home management, therapeutic activities, therapeutic exercises  Plan of Care Expires: 09/22/23  Plan of Care Reviewed with: patient    Subjective     Chief Complaint: L knee pain  Patient/Family Comments/goals: ready to go home  Pain/Comfort:   Pt reports left knee pain that increases with movement; not rated on scale    Objective:     Communicated with: nurse prior to session.  Patient found supine with telemetry, oxygen upon OT entry to room.    General Precautions: Standard, fall      Treatment & Education:  Pt reports having worked on transfers with PT early; reports too much pain in left knee to attempt out of bed activity again at time of OT treatment; discussed fall prevention techniques and DME recommendations for home use; pt verbalized understanding.      Patient left HOB elevated with all lines intact, call button in reach, and bed alarm on    GOALS:   Multidisciplinary Problems       Occupational Therapy Goals           Problem: Occupational Therapy    Goal Priority Disciplines Outcome Interventions   Occupational Therapy Goal     OT, PT/OT     Description: Goals to be met by: 9/23/23     Patient will increase functional independence with ADLs by performing:    UE Dressing with Modified [place].  LE Dressing with Modified [place].  Grooming while standing at sink with Modified [place].  Toileting from toilet with Modified [place] for hygiene and clothing management.   Toilet transfer to toilet with Modified [place].                         Time Tracking:     OT Date of Treatment: 08/29/23  OT Start Time: 1134  OT Stop Time: 1148  OT Total Time (min): 14 min    Billable Minutes:Therapeutic Activity 14    OT/FANNIE: OT          8/29/2023

## 2023-08-29 NOTE — CARE UPDATE
08/29/23 0900   Patient Assessment/Suction   Level of Consciousness (AVPU) alert   Respiratory Effort Normal   Rhythm/Pattern, Respiratory unlabored   PRE-TX-O2   Device (Oxygen Therapy) nasal cannula with humidification   $ Is the patient on Low Flow Oxygen? Yes   Flow (L/min) 2  (home use)   SpO2 97 %   Pulse Oximetry Type Intermittent   $ Pulse Oximetry - Multiple Charge Pulse Oximetry - Multiple   Pulse (!) 58   Resp 16   Education   $ Education 15 min

## 2023-08-29 NOTE — ASSESSMENT & PLAN NOTE
Low grade fever persists  Started on iv abx  Urine culture was send only today even though ordered over weekend

## 2023-08-29 NOTE — PROGRESS NOTES
[facility]  Department of Cardiology  Progress Note    PATIENT NAME: Vickie Benedict  MRN: [id number]  TODAY'S DATE: 08/29/2023  ADMIT DATE: 8/21/2023    SUBJECTIVE     PRINCIPLE PROBLEM: Acute on chronic combined systolic and diastolic congestive heart failure    INTERVAL HISTORY:    8/29/2023  S/p CV 8/27/23. C/o feeling tired, weak; breathing good w/ low flow O2 (has home O2). Nonproductive cough  SR on tele with HR 45 at times when asleep    Review of patient's allergies indicates:   Allergen Reactions    Penicillins Anaphylaxis    Shellfish containing products          OBJECTIVE     VITAL SIGNS (Most Recent)  Temp: 98.9 °F (37.2 °C) (08/29/23 1543)  Pulse: (!) 59 (08/29/23 1543)  Resp: 20 (08/29/23 1543)  BP: (!) 152/65 (08/29/23 1543)  SpO2: 95 % (08/29/23 1543)    VENTILATION STATUS  Resp: 20 (08/29/23 1543)  SpO2: 95 % (08/29/23 1543)           I & O (Last 24H):  Intake/Output Summary (Last 24 hours) at 8/29/2023 1617  Last data filed at 8/29/2023 1544  Gross per 24 hour   Intake 1100 ml   Output 250 ml   Net 850 ml         WEIGHTS  Wt Readings from Last 3 Encounters:   08/21/23 2036 68 kg (149 lb 14.6 oz)   08/21/23 1549 68 kg (150 lb)   08/25/23 1345 68 kg (149 lb 14.6 oz)   07/21/23 0545 67.8 kg (149 lb 7.6 oz)   07/19/23 0921 68 kg (150 lb)       PHYSICAL EXAM  CONSTITUTIONAL: frail elderly female breathing comfortably in bed in no apparent distress; generalized weakness;   NECK: no carotid bruit, no JVD  LUNGS: CTA  CHEST WALL: no tenderness  HEART: regular rate and rhythm, S1, S2 normal, + murmur;   ABDOMEN: soft, non-tender; bowel sounds normal; no masses,  no organomegaly  EXTREMITIES: mild swelling to feet and ankles  NEURO:oriented, moves all extremities, speech and memory clear    SCHEDULED MEDS:   amiodarone  400 mg Oral BID    apixaban  5 mg Oral BID    metoprolol tartrate  25 mg Oral TID       CONTINUOUS INFUSIONS:    PRN MEDS:furosemide, guaiFENesin 100 mg/5 ml, magnesium  "oxide, magnesium oxide, melatonin, metoprolol, potassium bicarbonate, potassium bicarbonate, potassium bicarbonate, potassium, sodium phosphates, potassium, sodium phosphates, potassium, sodium phosphates, sodium chloride 0.9%    LABS AND DIAGNOSTICS     CBC LAST 3 DAYS  Recent Labs   Lab 08/27/23  0514 08/28/23  0417 08/29/23  0500   WBC 10.92 12.07 12.32   RBC 3.42* 3.12* 3.06*   HGB 11.3* 10.3* 10.0*   HCT 35.1* 32.0* 31.3*   * 103* 102*   MCH 33.0* 33.0* 32.7*   MCHC 32.2 32.2 31.9*   RDW 13.5 13.5 13.3    190 197   MPV 9.9 10.3 9.8   GRAN 76.7*  8.4* 81.6*  9.8* 78.1*  9.6*   LYMPH 9.2*  1.0 6.9*  0.8* 7.5*  0.9*   MONO 12.4  1.4* 10.4  1.3* 11.5  1.4*   BASO 0.02 0.03 0.03   NRBC 0 0 0         COAGULATION LAST 3 DAYS  No results for input(s): "LABPT", "INR", "APTT" in the last 168 hours.    CHEMISTRY LAST 3 DAYS  Recent Labs   Lab 08/27/23  0514 08/28/23 0417 08/29/23  0500   * 133* 134*   K 3.6 3.7 4.0   CL 97 100 97   CO2 32* 29 30*   ANIONGAP 6* 4* 7*   BUN 38* <5* 37*   CREATININE 1.5* 0.9 1.4   * 216* 134*   CALCIUM 8.9 8.7 9.0   MG 2.0 2.5 2.0         CARDIAC PROFILE LAST 3 DAYS  Recent Labs   Lab 08/25/23  1153 08/27/23  0514   BNP 1,617* 921*         ENDOCRINE LAST 3 DAYS  Recent Labs   Lab 08/27/23 0514   PROCAL <0.05         LAST ARTERIAL BLOOD GAS  ABG  No results for input(s): "PH", "PO2", "PCO2", "HCO3", "BE" in the last 168 hours.    LAST 7 DAYS MICROBIOLOGY   Microbiology Results (last 7 days)       Procedure Component Value Units Date/Time    Blood culture [[id number]] Collected: 08/26/23 1122    Order Status: Completed Specimen: Blood Updated: 08/29/23 1232     Blood Culture, Routine No Growth to date      No Growth to date      No Growth to date      No Growth to date    Narrative:      Collection has been rescheduled by ALD1 at 08/26/2023 09:12 Reason:   Patient unavailable  Pt care  Collection has been rescheduled by ALD1 at 08/26/2023 09:12 Reason: "   Patient unavailable  Pt care    Urine Culture High Risk [[id number]] Collected: 08/28/23 0914    Order Status: Completed Specimen: Urine, Clean Catch Updated: 08/29/23 0711     Urine Culture, Routine Multiple organisms isolated. None in predominance.  Repeat if      clinically necessary.    Narrative:      Indicated criteria for high risk culture:->Other  Other (specify):->uti    Urine Culture High Risk [[id number]] Collected: 08/28/23 0914    Order Status: Canceled Specimen: Urine, Clean Catch     Blood Culture #1 **CANNOT BE ORDERED STAT** [[id number]] Collected: 08/21/23 1618    Order Status: Completed Specimen: Blood from Antecubital, Left Updated: 08/26/23 1632     Blood Culture, Routine No growth after 5 days.    Blood Culture #2 **CANNOT BE ORDERED STAT** [[id number]] Collected: 08/21/23 1619    Order Status: Completed Specimen: Blood Updated: 08/26/23 1632     Blood Culture, Routine No growth after 5 days.            MOST RECENT IMAGING  Echo  Addendum:   Left Ventricle: The left ventricle is mildly dilated. Mildly increased  ventricular mass. Mildly increased wall thickness. There is mild  concentric hypertrophy. Normal wall motion. Septal flattening in systole  consistent with right ventricular pressure overload. There is low normal  systolic function. EF 50% with patient in sinus rhythm and heart rate of  52 There is diastolic dysfunction but grade cannot be determined. Elevated  left ventricular filling pressure.     Right Ventricle: Normal right ventricular cavity size. Wall thickness  is normal. Right ventricle wall motion  is normal. Systolic function is  normal.     Mitral Valve: There is no stenosis.     Tricuspid Valve: There is mild to moderate regurgitation.     Pulmonary Artery: There is mild pulmonary hypertension. The estimated  pulmonary artery systolic pressure is 58 mmHg.     IVC/SVC: Elevated venous pressure at 15 mmHg.     Pericardium: There is a small posterior effusion. Pericardial  effusion  is echolucent. No indication of cardiac tamponade.     Mean left atrial pressure slightly increased at 12 mm Hg  Narrative:   Left Ventricle: The left ventricle is mildly dilated. Mildly increased   ventricular mass. Mildly increased wall thickness. There is mild   concentric hypertrophy. Normal wall motion. Septal flattening in systole   consistent with right ventricular pressure overload. There is low normal   systolic function. EF 50% with patient in sinus rhythm and heart rate of   52 There is diastolic dysfunction but grade cannot be determined. Elevated   left ventricular filling pressure.    Right Ventricle: Normal right ventricular cavity size. Wall thickness   is normal. Right ventricle wall motion  is normal. Systolic function is   normal.    Mitral Valve: There is no stenosis.    Tricuspid Valve: There is mild to moderate regurgitation.    Pulmonary Artery: There is mild pulmonary hypertension. The estimated   pulmonary artery systolic pressure is 58 mmHg.    IVC/SVC: Elevated venous pressure at 15 mmHg.    Mean left atrial pressure slightly increased at 12 mm Hg  Transesophageal echo (ISAURO) with possible cardioversion    Left Ventricle: The left ventricle is mildly dilated. Normal wall   thickness. Mild global hypokinesis present. There is mildly reduced   systolic function. EF  50% There is normal diastolic function.    Left Atrium: The left atrial appendage appears dilated. The left atrial   appendage has a windsock morphology. There is no thrombus in the left   atrial appendage.    Aortic Valve: There is mild aortic valve sclerosis.    Mitral Valve: There is no stenosis. There is mild regurgitation.    Aorta: Grade 1 small calcified atherosclerosis of the ascending aorta.    Pericardium: There is a small posterior effusion.  X-Ray Chest AP Portable  Narrative: EXAMINATION:  XR CHEST AP PORTABLE    CLINICAL HISTORY:  persistent fever;    FINDINGS:  Portable chest at 908 compared with  08/25/2023 shows unchanged enlarged cardiac silhouette size with normal mediastinal contours.  Patient is rotated.    Lungs are clear. Central pulmonary vasculature unchanged.  No acute osseous abnormality.  Impression: Unchanged cardiomegaly without acute cardiopulmonary abnormality.    Electronically signed by: Kaleb Jay MD  Date:    08/27/2023  Time:    09:38      [facility]  Results for orders placed during the hospital encounter of 08/21/23    Echo    Interpretation Summary    Left Ventricle: The left ventricle is mildly dilated. Mildly increased ventricular mass. Mildly increased wall thickness. There is mild concentric hypertrophy. Normal wall motion. Septal flattening in systole consistent with right ventricular pressure overload. There is low normal systolic function. EF 50% with patient in sinus rhythm and heart rate of 52 There is diastolic dysfunction but grade cannot be determined. Elevated left ventricular filling pressure.    Right Ventricle: Normal right ventricular cavity size. Wall thickness is normal. Right ventricle wall motion  is normal. Systolic function is normal.    Mitral Valve: There is no stenosis.    Tricuspid Valve: There is mild to moderate regurgitation.    Pulmonary Artery: There is mild pulmonary hypertension. The estimated pulmonary artery systolic pressure is 58 mmHg.    IVC/SVC: Elevated venous pressure at 15 mmHg.    Pericardium: There is a small posterior effusion. Pericardial effusion is echolucent. No indication of cardiac tamponade.    Mean left atrial pressure slightly increased at 12 mm Hg      CURRENT/PREVIOUS VISIT EKG  Results for orders placed or performed during the hospital encounter of 08/21/23   EKG 12-lead    Collection Time: 08/27/23 10:21 AM    Narrative    Test Reason : I49.9,    Vent. Rate : 057 BPM     Atrial Rate : 057 BPM     P-R Int : 178 ms          QRS Dur : 146 ms      QT Int : 436 ms       P-R-T Axes : 040 052 -08 degrees     QTc Int : 424 ms    Sinus  bradycardia  Right bundle branch block  T wave abnormality, consider lateral ischemia  Abnormal ECG  When compared with ECG of 24-AUG-2023 12:23,  Sinus rhythm has replaced Atrial fibrillation  Vent. rate has decreased BY  66 BPM  T wave inversion now evident in Lateral leads    Referred By: [email or web address]   SELF           Confirmed By:        ASSESSMENT/PLAN:     Active Hospital Problems    Diagnosis    ASCVD (arteriosclerotic cardiovascular disease)    Essential hypertension    History of atrial fibrillation    Postablative hypothyroidism    CKD (chronic kidney disease), stage III       ASSESSMENT & PLAN:   HFpEF  Afib with RVR  HTN  CKD  S/P cardioversion    RECOMMENDATIONS:  HFpEF: BNP down trended. Stopped spironolactone d/t CKD. Changed Lasix to 20 mg QOD. Consider start Entresto if renal labs improve. Creatinine 1.4/GFR 36 today. Obtain strict I/O and daily weight  PAF: S/P cardioversion 8/27/23.  Remains in normal sinus rhythm. Continue amiodarone 400 mg BID, Eliquis 5 mg BID (weight >60 kg), and metoprolol tartrate 25 mg TID.  3.   Potassium 4.0, Mg 2.0. Continue to check and replace potassium and magnesium. Goal for potassium is 4.0, and goal for magnesium is 2.0.     Kisha Larry NP  [facility]  Department of Cardiology  Date of Service: 08/29/2023      I have personally interviewed and examined the patient.  I have reviewed all the Nurse Practitioner's documentation, and agree with the plan.       Dr. HIEN Stevenson M.D.  [facility]  Department of Cardiology  Date of Service: 08/29/2023  5:24 PM

## 2023-08-29 NOTE — PLAN OF CARE
Order for rolling walker and bedside commode sent to [facility] for review.   08/29/23 1321   Post-Acute Status   Post-Acute Authorization [facility] Status Pending post-acute provider review/more information requested

## 2023-08-29 NOTE — PLAN OF CARE
Problem: Physical Therapy  Goal: Physical Therapy Goal  Description: Goals to be met by: 23     Patient will increase functional independence with mobility by performin. Supine to sit with Modified [place]  2. Sit to supine with Modified [place]  3. Sit to stand transfer with Modified [place]  4. Bed to chair transfer with Modified [place] using Rolling Walker  5. Gait  x 100 feet with Modified [place] using Rolling Walker.     Outcome: Ongoing, Progressing

## 2023-08-29 NOTE — PLAN OF CARE
Per Nanette ([facility]), [facility] not in network with Pt's insurance. SERGEY called [place] [phone] [facility]. Per Lucy, Pt does not qualify for bedside commode with insurance but they will review for bedside commode. SERGEY faxed order for rolling walker via Epic.           08/29/23 7045   Post-Acute Status   Post-Acute Authorization Home Health;E   HME Status Referrals Sent   Home Health Status Set-up Complete/Auth obtained

## 2023-08-29 NOTE — PLAN OF CARE
Chart and orders reviewed.. Referral for bedside commode and rolling walker sent to [address]. Per Sonali, Pt does not qualify for bedside commode due to walking more than 15 feet with rolling walker. SERGEY sent referral for rolling walker. SERGEY received call back from Sonali. Per Sonali, Pt's son declined walker stating Pt had rolling walker at home already. Pt's son stated Pt does not need new walker. Updated home health packet sent to [facility]. Pt able to be accepted and start of care date is 8/30. Pt has no other needs to be addressed at this time. Pt cleared to discharge from case management.       08/29/23 1526   Final Note   Assessment Type Final Discharge Note   Anticipated Discharge Disposition Home-Health   What phone number can be called within the next 1-3 days to see how you are doing after discharge? [phone]   Hospital Resources/Appts/Education Provided Provided patient/caregiver with written discharge plan information;Post-Acute resouces added to AVS   Post-Acute Status   Post-Acute Authorization Home Health;HME   HME Status Patient declined/refused   Home Health Status Set-up Complete/Auth obtained   Discharge Delays None known at this time

## 2023-08-29 NOTE — ASSESSMENT & PLAN NOTE
Was on  iv lasix which is now on Hold  On 08/26: received gentle hydration with iv NS, per Cardiology team  Diuretics PRN basis

## 2023-08-30 ENCOUNTER — PATIENT OUTREACH (OUTPATIENT)
Dept: ADMINISTRATIVE | Facility: CLINIC | Age: 88
End: 2023-08-30
Payer: MEDICARE

## 2023-08-30 PROCEDURE — G0180 PR HOME HEALTH MD CERTIFICATION: ICD-10-PCS | Mod: ,,, | Performed by: INTERNAL MEDICINE

## 2023-08-30 PROCEDURE — G0180 MD CERTIFICATION HHA PATIENT: HCPCS | Mod: ,,, | Performed by: INTERNAL MEDICINE

## 2023-08-30 NOTE — PROGRESS NOTES
C3 nurse spoke with Vickie Benedict's son, Adarsh, for a TCC post hospital discharge follow up call. The patient has a scheduled HOSFU appointment with Aureliano Morocho MD on 09/01/23 @ 6772.

## 2023-08-30 NOTE — TELEPHONE ENCOUNTER
Adarsh (son)    Triamterene 25mg 1 tablet daily    [phone] Arlen  @ [phone] call to patient's preferred pharmacy. Pharmacy confirmed Amiodarone, Eliquis, and Metoprolol all ready for pickup. Advised naveen Altamirano.  @ [phone] call to [facility]. Answerer advised scheduled for admit on today. Scheduled to receive SN, PT, OT and medical SW. Will be verified after assessment. Will call patient/family to schedule this visit. Advised naveen Altamirano.    All questions/concerns Adarsh expressed during t.e. routed high priority to PCP staff.

## 2023-08-30 NOTE — PROGRESS NOTES
Spoke with pt son,offered appointment for this afternoon for a hospital follow up and to help with understanding medications.  Son refused appointment and said to cancel it. Will try to bring pt on Friday.

## 2023-08-31 LAB — BACTERIA BLD CULT: NORMAL

## 2023-09-01 ENCOUNTER — OFFICE VISIT (OUTPATIENT)
Dept: CARDIOLOGY | Facility: CLINIC | Age: 88
End: 2023-09-01
Payer: MEDICARE

## 2023-09-01 VITALS
DIASTOLIC BLOOD PRESSURE: 76 MMHG | BODY MASS INDEX: 28.35 KG/M2 | OXYGEN SATURATION: 93 % | HEART RATE: 62 BPM | WEIGHT: 155 LBS | SYSTOLIC BLOOD PRESSURE: 145 MMHG

## 2023-09-01 DIAGNOSIS — I49.9 CARDIAC ARRHYTHMIA, UNSPECIFIED CARDIAC ARRHYTHMIA TYPE: ICD-10-CM

## 2023-09-01 DIAGNOSIS — Z86.79 HISTORY OF ATRIAL FIBRILLATION: Chronic | ICD-10-CM

## 2023-09-01 DIAGNOSIS — I25.10 ASCVD (ARTERIOSCLEROTIC CARDIOVASCULAR DISEASE): ICD-10-CM

## 2023-09-01 DIAGNOSIS — I50.33 ACUTE ON CHRONIC HEART FAILURE WITH PRESERVED EJECTION FRACTION: ICD-10-CM

## 2023-09-01 DIAGNOSIS — N18.32 STAGE 3B CHRONIC KIDNEY DISEASE: Primary | ICD-10-CM

## 2023-09-01 DIAGNOSIS — I50.9 CONGESTIVE HEART FAILURE, UNSPECIFIED HF CHRONICITY, UNSPECIFIED HEART FAILURE TYPE: ICD-10-CM

## 2023-09-01 PROCEDURE — 99999 PR PBB SHADOW E&M-EST. PATIENT-LVL III: ICD-10-PCS | Mod: PBBFAC,,, | Performed by: INTERNAL MEDICINE

## 2023-09-01 PROCEDURE — 3288F PR FALLS RISK ASSESSMENT DOCUMENTED: ICD-10-PCS | Mod: CPTII,S$GLB,, | Performed by: INTERNAL MEDICINE

## 2023-09-01 PROCEDURE — 1111F DSCHRG MED/CURRENT MED MERGE: CPT | Mod: CPTII,S$GLB,, | Performed by: INTERNAL MEDICINE

## 2023-09-01 PROCEDURE — 99999 PR PBB SHADOW E&M-EST. PATIENT-LVL III: CPT | Mod: PBBFAC,,, | Performed by: INTERNAL MEDICINE

## 2023-09-01 PROCEDURE — 3288F FALL RISK ASSESSMENT DOCD: CPT | Mod: CPTII,S$GLB,, | Performed by: INTERNAL MEDICINE

## 2023-09-01 PROCEDURE — 99214 PR OFFICE/OUTPT VISIT, EST, LEVL IV, 30-39 MIN: ICD-10-PCS | Mod: S$GLB,,, | Performed by: INTERNAL MEDICINE

## 2023-09-01 PROCEDURE — 1101F PT FALLS ASSESS-DOCD LE1/YR: CPT | Mod: CPTII,S$GLB,, | Performed by: INTERNAL MEDICINE

## 2023-09-01 PROCEDURE — 1111F PR DISCHARGE MEDS RECONCILED W/ CURRENT OUTPATIENT MED LIST: ICD-10-PCS | Mod: CPTII,S$GLB,, | Performed by: INTERNAL MEDICINE

## 2023-09-01 PROCEDURE — 1160F RVW MEDS BY RX/DR IN RCRD: CPT | Mod: CPTII,S$GLB,, | Performed by: INTERNAL MEDICINE

## 2023-09-01 PROCEDURE — 1159F PR MEDICATION LIST DOCUMENTED IN MEDICAL RECORD: ICD-10-PCS | Mod: CPTII,S$GLB,, | Performed by: INTERNAL MEDICINE

## 2023-09-01 PROCEDURE — 1159F MED LIST DOCD IN RCRD: CPT | Mod: CPTII,S$GLB,, | Performed by: INTERNAL MEDICINE

## 2023-09-01 PROCEDURE — 1160F PR REVIEW ALL MEDS BY PRESCRIBER/CLIN PHARMACIST DOCUMENTED: ICD-10-PCS | Mod: CPTII,S$GLB,, | Performed by: INTERNAL MEDICINE

## 2023-09-01 PROCEDURE — 1101F PR PT FALLS ASSESS DOC 0-1 FALLS W/OUT INJ PAST YR: ICD-10-PCS | Mod: CPTII,S$GLB,, | Performed by: INTERNAL MEDICINE

## 2023-09-01 PROCEDURE — 99214 OFFICE O/P EST MOD 30 MIN: CPT | Mod: S$GLB,,, | Performed by: INTERNAL MEDICINE

## 2023-09-01 RX ORDER — SPIRONOLACTONE 25 MG/1
25 TABLET ORAL 2 TIMES DAILY
Qty: 60 TABLET | Refills: 11 | Status: SHIPPED | OUTPATIENT
Start: 2023-09-01 | End: 2024-04-01 | Stop reason: SDUPTHER

## 2023-09-01 RX ORDER — SACUBITRIL AND VALSARTAN 24; 26 MG/1; MG/1
1 TABLET, FILM COATED ORAL 2 TIMES DAILY
Qty: 180 TABLET | Refills: 11 | Status: SHIPPED | OUTPATIENT
Start: 2023-09-01

## 2023-09-01 NOTE — PROGRESS NOTES
Patient ID:  Andressa Benedict is a 88 y.o. female who presents for follow-up of Hospital Follow Up      She was admitted to the hospital on July 22nd she was in heart failure her O2 saturations were low she was placed on oxygen.  Approximately 2 weeks later she was readmitted to the hospital because of generalized edema significant shortness of breath.  At present she is taking metoprolol 25 mg t.i.d. Lasix 20 mg daily Eliquis 5 mg b.i.d. amiodarone 400 mg b.i.d. as well as levothyroxine.  She noticed that she is weak and tired.  During the last hospitalization she had a cardioversion.  Her blood pressure is 148/62 will initiate therapy with Entresto 04/20/2026 daily decrease the metoprolol to 12.5 t.i.d..  Aldactone 25 mg p.o. b.i.d. will be given and she will be seen in the office in 1 month with a blood work        Past Medical History:   Diagnosis Date    Atrial fibrillation     Bradycardia     Carotid bruit     CKD (chronic kidney disease), stage III     Hyperlipidemia     OA (osteoarthritis)     Thyroid disease         Past Surgical History:   Procedure Laterality Date    HYSTERECTOMY      KNEE SURGERY Right     SHOULDER SURGERY Right           Current Outpatient Medications   Medication Instructions    amiodarone (PACERONE) 400 MG tablet Take 1 tablet (400 mg total) by mouth 2 (two) times daily for 14 days, THEN 1 tablet (400 mg total) once daily for 14 days.    apixaban (ELIQUIS) 5 mg, Oral, 2 times daily    coenzyme Q10 (CO Q-10) 100 mg, Oral, Daily    docosahexaenoic acid/epa (FISH OIL ORAL) 1 capsule, Oral, Daily    FLAXSEED OIL ORAL 1 capsule, Oral, Daily    furosemide (LASIX) 20 mg, Oral, Daily PRN    glucosam/msm/chond/dxi322/hyal (GLUCOS-CHOND-MSM, WITH ANTIOX, ORAL) 1 capsule, Oral, Daily    metoprolol tartrate (LOPRESSOR) 25 mg, Oral, 3 times daily    multivitamin (THERAGRAN) per tablet 1 tablet, Oral, Daily    sacubitriL-valsartan (ENTRESTO) 24-26 mg per tablet 1 tablet, Oral, 2 times daily     spironolactone (ALDACTONE) 25 mg, Oral, 2 times daily        Review of patient's allergies indicates:   Allergen Reactions    Penicillins Anaphylaxis        Review of Systems   Cardiovascular:  Positive for leg swelling and palpitations. Negative for chest pain.   Musculoskeletal:  Positive for arthritis and joint pain.   Neurological:  Negative for dizziness and light-headedness.        Objective:     Vitals:    09/01/23 1354   BP: (!) 145/76   BP Location: Right arm   Pulse: 62   SpO2: (!) 93%   Weight: 70.3 kg (155 lb)   PF: (!) 4 L/min       Physical Exam  Vitals and nursing note reviewed.   Constitutional:       Appearance: She is well-developed.   HENT:      Head: Normocephalic and atraumatic.   Eyes:      Conjunctiva/sclera: Conjunctivae normal.   Cardiovascular:      Rate and Rhythm: Normal rate and regular rhythm.      Heart sounds: Murmur (Grade 2/6 systolic murmur at the base) heard.   Pulmonary:      Effort: Pulmonary effort is normal.      Breath sounds: Normal breath sounds.   Abdominal:      General: Bowel sounds are normal.      Palpations: Abdomen is soft.   Musculoskeletal:         General: Normal range of motion.      Right lower leg: Edema present.      Left lower leg: Edema present.   Skin:     General: Skin is warm and dry.   Neurological:      Mental Status: She is alert and oriented to person, place, and time.   Psychiatric:         Behavior: Behavior normal.         Thought Content: Thought content normal.         Judgment: Judgment normal.       CMP  Sodium   Date Value Ref Range Status   08/29/2023 134 (L) 136 - 145 mmol/L Final     Potassium   Date Value Ref Range Status   08/29/2023 4.0 3.5 - 5.1 mmol/L Final     Chloride   Date Value Ref Range Status   08/29/2023 97 95 - 110 mmol/L Final     CO2   Date Value Ref Range Status   08/29/2023 30 (H) 23 - 29 mmol/L Final     Glucose   Date Value Ref Range Status   08/29/2023 134 (H) 70 - 110 mg/dL Final     BUN   Date Value Ref Range  "Status   08/29/2023 37 (H) 8 - 23 mg/dL Final     Creatinine   Date Value Ref Range Status   08/29/2023 1.4 0.5 - 1.4 mg/dL Final     Calcium   Date Value Ref Range Status   08/29/2023 9.0 8.7 - 10.5 mg/dL Final     Total Protein   Date Value Ref Range Status   08/21/2023 8.0 6.0 - 8.4 g/dL Final     Albumin   Date Value Ref Range Status   08/21/2023 3.9 3.5 - 5.2 g/dL Final     Total Bilirubin   Date Value Ref Range Status   08/21/2023 1.0 0.1 - 1.0 mg/dL Final     Comment:     For infants and newborns, interpretation of results should be based  on gestational age, weight and in agreement with clinical  observations.    Premature Infant recommended reference ranges:  Up to 24 hours.............<8.0 mg/dL  Up to 48 hours............<12.0 mg/dL  3-5 days..................<15.0 mg/dL  6-29 days.................<15.0 mg/dL       Alkaline Phosphatase   Date Value Ref Range Status   08/21/2023 70 55 - 135 U/L Final     AST   Date Value Ref Range Status   08/21/2023 22 10 - 40 U/L Final     ALT   Date Value Ref Range Status   08/21/2023 18 10 - 44 U/L Final     Anion Gap   Date Value Ref Range Status   08/29/2023 7 (L) 8 - 16 mmol/L Final     eGFR if non    Date Value Ref Range Status   05/28/2021 30 (L) >59 mL/min/1.73 Final      BMP  Lab Results   Component Value Date     (L) 08/29/2023    K 4.0 08/29/2023    CL 97 08/29/2023    CO2 30 (H) 08/29/2023    BUN 37 (H) 08/29/2023    CREATININE 1.4 08/29/2023    CALCIUM 9.0 08/29/2023    ANIONGAP 7 (L) 08/29/2023    EGFRNONAA 30 (L) 05/28/2021      BNP  @LABRCNTIP(BNP,BNPTRIAGEBLO)@   Lab Results   Component Value Date    CHOL 152 06/02/2022     Lab Results   Component Value Date    HDL 51 06/02/2022     Lab Results   Component Value Date    LDLCALC 88 06/02/2022     Lab Results   Component Value Date    TRIG 66 06/02/2022     No results found for: "CHOLHDL"   Lab Results   Component Value Date    TSH 3.080 07/19/2023    FREET4 1.21 08/26/2023     No " "results found for: "LABA1C", "HGBA1C"  Lab Results   Component Value Date    WBC 12.32 08/29/2023    HGB 10.0 (L) 08/29/2023    HCT 31.3 (L) 08/29/2023     (H) 08/29/2023     08/29/2023         Results for orders placed during the hospital encounter of 08/21/23    Echo    Interpretation Summary    Left Ventricle: The left ventricle is mildly dilated. Mildly increased ventricular mass. Mildly increased wall thickness. There is mild concentric hypertrophy. Normal wall motion. Septal flattening in systole consistent with right ventricular pressure overload. There is low normal systolic function. EF 50% with patient in sinus rhythm and heart rate of 52 There is diastolic dysfunction but grade cannot be determined. Elevated left ventricular filling pressure.    Right Ventricle: Normal right ventricular cavity size. Wall thickness is normal. Right ventricle wall motion  is normal. Systolic function is normal.    Mitral Valve: There is no stenosis.    Tricuspid Valve: There is mild to moderate regurgitation.    Pulmonary Artery: There is mild pulmonary hypertension. The estimated pulmonary artery systolic pressure is 58 mmHg.    IVC/SVC: Elevated venous pressure at 15 mmHg.    Pericardium: There is a small posterior effusion. Pericardial effusion is echolucent. No indication of cardiac tamponade.    Mean left atrial pressure slightly increased at 12 mm Hg     No results found for this or any previous visit.         Assessment:       History of atrial fibrillation  Paroxysmal atrial fibrillation with rapid ventricular response.  She is on amiodarone as well as Eliquis.    ASCVD (arteriosclerotic cardiovascular disease)  Stable no symptoms of angina    (HFpEF) heart failure with preserved ejection fraction  She continues to have leg edema and shortness of breath.  She is on oxygen therefore will increase diuretics and start her on Entresto this has been discussed with the patient and her son       Plan:     "   Entresto 24 /26 twice a day    Decrease metoprolol to 12.5 mg p.o. b.i.d.  Aldactone 25 mg p.o. b.i.d.  Return to the office in 1 month with a BMP BNP and CBC

## 2023-09-04 PROBLEM — I50.30 (HFPEF) HEART FAILURE WITH PRESERVED EJECTION FRACTION: Status: ACTIVE | Noted: 2023-09-04

## 2023-09-04 NOTE — ASSESSMENT & PLAN NOTE
Paroxysmal atrial fibrillation with rapid ventricular response.  She is on amiodarone as well as Eliquis.

## 2023-09-04 NOTE — ASSESSMENT & PLAN NOTE
She continues to have leg edema and shortness of breath.  She is on oxygen therefore will increase diuretics and start her on Entresto this has been discussed with the patient and her son

## 2023-09-15 ENCOUNTER — TELEPHONE (OUTPATIENT)
Dept: CARDIOLOGY | Facility: CLINIC | Age: 88
End: 2023-09-15
Payer: MEDICARE

## 2023-09-15 NOTE — TELEPHONE ENCOUNTER
----- Message from Francisco Han sent at 9/15/2023  2:19 PM CDT -----  Type: Needs Medical Advice  Who Called:  Hugh from Home Health  Symptoms (please be specific):  said pt is taking amiodarone (PACERONE) 400 MG tablet in the morning and after she take it she have 4-5 hours of dizziness and her pulse drop down to 40--please call and advise  Best Call Back Number: [phone]  Additional Information: thank you

## 2023-09-15 NOTE — TELEPHONE ENCOUNTER
S/w  nurse & advised per dr galindo to cut the amiodarone in half. Call back next week & let us know how she is doing

## 2023-09-20 ENCOUNTER — TELEPHONE (OUTPATIENT)
Dept: CARDIOLOGY | Facility: CLINIC | Age: 88
End: 2023-09-20
Payer: MEDICARE

## 2023-09-20 NOTE — TELEPHONE ENCOUNTER
S/w HH nurse and advised to hold amiodarone. Come in tomorrow for ekg. If not go to ER with heart rate in 30s. HH nurse said he already suggested ER and she declined.

## 2023-09-20 NOTE — TELEPHONE ENCOUNTER
----- Message from Keesha Rubalcava, Patient Care Assistant sent at 9/20/2023  1:38 PM CDT -----  Regarding: advice  Contact: carlito with home health  Type: Needs Medical Advice  Who Called:  pt     Symptoms (please be specific):  pulse rate and dizziness   How long has patient had these symptoms:  a week     Pharmacy name and phone #:    [id number] - [place], [place] - [address] AT [facility] [address]  [address] [place] [place] [address]  Phone: [phone] Fax: [phone]    Best Call Back Number: [phone]     Additional Information: carlito with home health states she would like a callback regarding amiodarone (PACERONE) 400 MG tablet. Please call to advise. Thanks!

## 2023-09-20 NOTE — TELEPHONE ENCOUNTER
S/w stephen & Friday we had pt cut amiodarone down to 200mg due to heart rate in 40s and she got extremely dizzy after taking the 400mg.   She isnt as dizzy but heart rate in the 30s.  BP ok at 140 /70.  Advised with s/w nurse practitioner and call him back

## 2023-10-05 ENCOUNTER — LAB VISIT (OUTPATIENT)
Dept: LAB | Facility: HOSPITAL | Age: 88
End: 2023-10-05
Attending: INTERNAL MEDICINE
Payer: MEDICARE

## 2023-10-05 DIAGNOSIS — N18.32 STAGE 3B CHRONIC KIDNEY DISEASE: ICD-10-CM

## 2023-10-05 DIAGNOSIS — I49.9 CARDIAC ARRHYTHMIA, UNSPECIFIED CARDIAC ARRHYTHMIA TYPE: ICD-10-CM

## 2023-10-05 DIAGNOSIS — I50.9 CONGESTIVE HEART FAILURE, UNSPECIFIED HF CHRONICITY, UNSPECIFIED HEART FAILURE TYPE: ICD-10-CM

## 2023-10-05 LAB
ANION GAP SERPL CALC-SCNC: 4 MMOL/L (ref 8–16)
BNP SERPL-MCNC: 1511 PG/ML (ref 0–99)
BUN SERPL-MCNC: 34 MG/DL (ref 8–23)
CALCIUM SERPL-MCNC: 9.4 MG/DL (ref 8.7–10.5)
CHLORIDE SERPL-SCNC: 107 MMOL/L (ref 95–110)
CO2 SERPL-SCNC: 30 MMOL/L (ref 23–29)
CREAT SERPL-MCNC: 2 MG/DL (ref 0.5–1.4)
ERYTHROCYTE [DISTWIDTH] IN BLOOD BY AUTOMATED COUNT: 13.8 % (ref 11.5–14.5)
EST. GFR  (NO RACE VARIABLE): 23.4 ML/MIN/1.73 M^2
GLUCOSE SERPL-MCNC: 133 MG/DL (ref 70–110)
HCT VFR BLD AUTO: 34.9 % (ref 37–48.5)
HGB BLD-MCNC: 11.3 G/DL (ref 12–16)
MCH RBC QN AUTO: 33.9 PG (ref 27–31)
MCHC RBC AUTO-ENTMCNC: 32.4 G/DL (ref 32–36)
MCV RBC AUTO: 105 FL (ref 82–98)
PLATELET # BLD AUTO: 182 K/UL (ref 150–450)
PMV BLD AUTO: 10 FL (ref 9.2–12.9)
POTASSIUM SERPL-SCNC: 4.9 MMOL/L (ref 3.5–5.1)
RBC # BLD AUTO: 3.33 M/UL (ref 4–5.4)
SODIUM SERPL-SCNC: 141 MMOL/L (ref 136–145)
WBC # BLD AUTO: 6.03 K/UL (ref 3.9–12.7)

## 2023-10-05 PROCEDURE — 83880 ASSAY OF NATRIURETIC PEPTIDE: CPT | Performed by: INTERNAL MEDICINE

## 2023-10-05 PROCEDURE — 80048 BASIC METABOLIC PNL TOTAL CA: CPT | Performed by: INTERNAL MEDICINE

## 2023-10-05 PROCEDURE — 36415 COLL VENOUS BLD VENIPUNCTURE: CPT | Performed by: INTERNAL MEDICINE

## 2023-10-05 PROCEDURE — 85027 COMPLETE CBC AUTOMATED: CPT | Performed by: INTERNAL MEDICINE

## 2023-10-09 ENCOUNTER — OFFICE VISIT (OUTPATIENT)
Dept: CARDIOLOGY | Facility: CLINIC | Age: 88
End: 2023-10-09
Payer: MEDICARE

## 2023-10-09 VITALS
BODY MASS INDEX: 28.52 KG/M2 | HEIGHT: 62 IN | SYSTOLIC BLOOD PRESSURE: 130 MMHG | WEIGHT: 155 LBS | HEART RATE: 44 BPM | OXYGEN SATURATION: 99 % | DIASTOLIC BLOOD PRESSURE: 70 MMHG

## 2023-10-09 DIAGNOSIS — I50.9 CONGESTIVE HEART FAILURE, UNSPECIFIED HF CHRONICITY, UNSPECIFIED HEART FAILURE TYPE: ICD-10-CM

## 2023-10-09 DIAGNOSIS — Z86.79 HISTORY OF ATRIAL FIBRILLATION: Chronic | ICD-10-CM

## 2023-10-09 DIAGNOSIS — N18.32 STAGE 3B CHRONIC KIDNEY DISEASE: Primary | ICD-10-CM

## 2023-10-09 DIAGNOSIS — R00.1 BRADYCARDIA: ICD-10-CM

## 2023-10-09 PROCEDURE — 1126F AMNT PAIN NOTED NONE PRSNT: CPT | Mod: CPTII,S$GLB,, | Performed by: INTERNAL MEDICINE

## 2023-10-09 PROCEDURE — 1160F PR REVIEW ALL MEDS BY PRESCRIBER/CLIN PHARMACIST DOCUMENTED: ICD-10-PCS | Mod: CPTII,S$GLB,, | Performed by: INTERNAL MEDICINE

## 2023-10-09 PROCEDURE — 99999 PR PBB SHADOW E&M-EST. PATIENT-LVL III: ICD-10-PCS | Mod: PBBFAC,,, | Performed by: INTERNAL MEDICINE

## 2023-10-09 PROCEDURE — 3288F PR FALLS RISK ASSESSMENT DOCUMENTED: ICD-10-PCS | Mod: CPTII,S$GLB,, | Performed by: INTERNAL MEDICINE

## 2023-10-09 PROCEDURE — 1160F RVW MEDS BY RX/DR IN RCRD: CPT | Mod: CPTII,S$GLB,, | Performed by: INTERNAL MEDICINE

## 2023-10-09 PROCEDURE — 1101F PR PT FALLS ASSESS DOC 0-1 FALLS W/OUT INJ PAST YR: ICD-10-PCS | Mod: CPTII,S$GLB,, | Performed by: INTERNAL MEDICINE

## 2023-10-09 PROCEDURE — 3288F FALL RISK ASSESSMENT DOCD: CPT | Mod: CPTII,S$GLB,, | Performed by: INTERNAL MEDICINE

## 2023-10-09 PROCEDURE — 99214 PR OFFICE/OUTPT VISIT, EST, LEVL IV, 30-39 MIN: ICD-10-PCS | Mod: S$GLB,,, | Performed by: INTERNAL MEDICINE

## 2023-10-09 PROCEDURE — 99214 OFFICE O/P EST MOD 30 MIN: CPT | Mod: S$GLB,,, | Performed by: INTERNAL MEDICINE

## 2023-10-09 PROCEDURE — 1159F PR MEDICATION LIST DOCUMENTED IN MEDICAL RECORD: ICD-10-PCS | Mod: CPTII,S$GLB,, | Performed by: INTERNAL MEDICINE

## 2023-10-09 PROCEDURE — 1126F PR PAIN SEVERITY QUANTIFIED, NO PAIN PRESENT: ICD-10-PCS | Mod: CPTII,S$GLB,, | Performed by: INTERNAL MEDICINE

## 2023-10-09 PROCEDURE — 99999 PR PBB SHADOW E&M-EST. PATIENT-LVL III: CPT | Mod: PBBFAC,,, | Performed by: INTERNAL MEDICINE

## 2023-10-09 PROCEDURE — 1101F PT FALLS ASSESS-DOCD LE1/YR: CPT | Mod: CPTII,S$GLB,, | Performed by: INTERNAL MEDICINE

## 2023-10-09 PROCEDURE — 1159F MED LIST DOCD IN RCRD: CPT | Mod: CPTII,S$GLB,, | Performed by: INTERNAL MEDICINE

## 2023-10-09 RX ORDER — LEVOTHYROXINE SODIUM 100 UG/1
100 TABLET ORAL
COMMUNITY
End: 2024-03-25

## 2023-10-09 RX ORDER — TRIAMCINOLONE ACETONIDE 1 MG/G
CREAM TOPICAL 2 TIMES DAILY
Qty: 80 G | Refills: 4 | Status: SHIPPED | OUTPATIENT
Start: 2023-10-09

## 2023-10-09 NOTE — PROGRESS NOTES
Patient ID:  Andressa Benedict is a 89 y.o. female who presents for follow-up of Congestive Heart Failure      She was recently admitted to [facility] due to heart failure.  Her Rythmol was not reorder and she went into atrial fibrillation with rapid ventricular response.  Because of her heart failure and LV dysfunction amiodarone was started and Rythmol was discontinued.  She require cardioversion she denies any palpitations since discharge from the hospital.        Past Medical History:   Diagnosis Date    Atrial fibrillation     Bradycardia     Carotid bruit     CKD (chronic kidney disease), stage III     Hyperlipidemia     OA (osteoarthritis)     Thyroid disease         Past Surgical History:   Procedure Laterality Date    HYSTERECTOMY      KNEE SURGERY Right     SHOULDER SURGERY Right           Current Outpatient Medications   Medication Instructions    coenzyme Q10 (CO Q-10) 100 mg, Oral, Daily    docosahexaenoic acid/epa (FISH OIL ORAL) 1 capsule, Oral, Daily    FLAXSEED OIL ORAL 1 capsule, Oral, Daily    glucosam/msm/chond/fgr152/hyal (GLUCOS-CHOND-MSM, WITH ANTIOX, ORAL) 1 capsule, Oral, Daily    levothyroxine (SYNTHROID) 100 mcg, Oral, Before breakfast    metoprolol tartrate (LOPRESSOR) 25 mg, Oral, 3 times daily    multivitamin (THERAGRAN) per tablet 1 tablet, Oral, Daily    sacubitriL-valsartan (ENTRESTO) 24-26 mg per tablet 1 tablet, Oral, 2 times daily    spironolactone (ALDACTONE) 25 mg, Oral, 2 times daily    triamcinolone acetonide 0.1% (KENALOG) 0.1 % cream Topical (Top), 2 times daily        Review of patient's allergies indicates:   Allergen Reactions    Penicillins Anaphylaxis        Review of Systems   Cardiovascular:  Positive for dyspnea on exertion. Negative for chest pain, leg swelling and palpitations.   Respiratory:  Negative for cough.         Objective:     Vitals:    10/09/23 1606   BP: 130/70   BP Location: Left arm   Patient Position: Sitting   BP Method: Medium  "(Manual)   Pulse: (!) 44   SpO2: 99%   Weight: 70.3 kg (155 lb)   Height: 5' 2" (1.575 m)       Physical Exam  Vitals and nursing note reviewed.   Constitutional:       Appearance: She is well-developed.   HENT:      Head: Normocephalic and atraumatic.   Eyes:      Conjunctiva/sclera: Conjunctivae normal.   Cardiovascular:      Rate and Rhythm: Normal rate and regular rhythm.      Heart sounds: Normal heart sounds.   Pulmonary:      Effort: Pulmonary effort is normal.      Breath sounds: Normal breath sounds.   Abdominal:      General: Bowel sounds are normal.      Palpations: Abdomen is soft.   Musculoskeletal:         General: Normal range of motion.   Skin:     General: Skin is warm and dry.   Neurological:      Mental Status: She is alert and oriented to person, place, and time.   Psychiatric:         Behavior: Behavior normal.         Thought Content: Thought content normal.         Judgment: Judgment normal.       CMP  Sodium   Date Value Ref Range Status   10/05/2023 141 136 - 145 mmol/L Final     Potassium   Date Value Ref Range Status   10/05/2023 4.9 3.5 - 5.1 mmol/L Final     Chloride   Date Value Ref Range Status   10/05/2023 107 95 - 110 mmol/L Final     CO2   Date Value Ref Range Status   10/05/2023 30 (H) 23 - 29 mmol/L Final     Glucose   Date Value Ref Range Status   10/05/2023 133 (H) 70 - 110 mg/dL Final     BUN   Date Value Ref Range Status   10/05/2023 34 (H) 8 - 23 mg/dL Final     Creatinine   Date Value Ref Range Status   10/05/2023 2.0 (H) 0.5 - 1.4 mg/dL Final     Calcium   Date Value Ref Range Status   10/05/2023 9.4 8.7 - 10.5 mg/dL Final     Total Protein   Date Value Ref Range Status   08/21/2023 8.0 6.0 - 8.4 g/dL Final     Albumin   Date Value Ref Range Status   08/21/2023 3.9 3.5 - 5.2 g/dL Final     Total Bilirubin   Date Value Ref Range Status   08/21/2023 1.0 0.1 - 1.0 mg/dL Final     Comment:     For infants and newborns, interpretation of results should be based  on gestational " "age, weight and in agreement with clinical  observations.    Premature Infant recommended reference ranges:  Up to 24 hours.............<8.0 mg/dL  Up to 48 hours............<12.0 mg/dL  3-5 days..................<15.0 mg/dL  6-29 days.................<15.0 mg/dL       Alkaline Phosphatase   Date Value Ref Range Status   08/21/2023 70 55 - 135 U/L Final     AST   Date Value Ref Range Status   08/21/2023 22 10 - 40 U/L Final     ALT   Date Value Ref Range Status   08/21/2023 18 10 - 44 U/L Final     Anion Gap   Date Value Ref Range Status   10/05/2023 4 (L) 8 - 16 mmol/L Final     eGFR if non    Date Value Ref Range Status   05/28/2021 30 (L) >59 mL/min/1.73 Final      BMP  Lab Results   Component Value Date     10/05/2023    K 4.9 10/05/2023     10/05/2023    CO2 30 (H) 10/05/2023    BUN 34 (H) 10/05/2023    CREATININE 2.0 (H) 10/05/2023    CALCIUM 9.4 10/05/2023    ANIONGAP 4 (L) 10/05/2023    EGFRNONAA 30 (L) 05/28/2021      BNP  @LABRCNTIP(BNP,BNPTRIAGEBLO)@   Lab Results   Component Value Date    CHOL 152 06/02/2022     Lab Results   Component Value Date    HDL 51 06/02/2022     Lab Results   Component Value Date    LDLCALC 88 06/02/2022     Lab Results   Component Value Date    TRIG 66 06/02/2022     No results found for: "CHOLHDL"   Lab Results   Component Value Date    TSH 3.080 07/19/2023    FREET4 1.21 08/26/2023     No results found for: "LABA1C", "HGBA1C"  Lab Results   Component Value Date    WBC 6.03 10/05/2023    HGB 11.3 (L) 10/05/2023    HCT 34.9 (L) 10/05/2023     (H) 10/05/2023     10/05/2023         Results for orders placed during the hospital encounter of 08/21/23    Echo    Interpretation Summary    Left Ventricle: The left ventricle is mildly dilated. Mildly increased ventricular mass. Mildly increased wall thickness. There is mild concentric hypertrophy. Normal wall motion. Septal flattening in systole consistent with right ventricular pressure " overload. There is low normal systolic function. EF 50% with patient in sinus rhythm and heart rate of 52 There is diastolic dysfunction but grade cannot be determined. Elevated left ventricular filling pressure.    Right Ventricle: Normal right ventricular cavity size. Wall thickness is normal. Right ventricle wall motion  is normal. Systolic function is normal.    Mitral Valve: There is no stenosis.    Tricuspid Valve: There is mild to moderate regurgitation.    Pulmonary Artery: There is mild pulmonary hypertension. The estimated pulmonary artery systolic pressure is 58 mmHg.    IVC/SVC: Elevated venous pressure at 15 mmHg.    Pericardium: There is a small posterior effusion. Pericardial effusion is echolucent. No indication of cardiac tamponade.    Mean left atrial pressure slightly increased at 12 mm Hg     No results found for this or any previous visit.         Assessment:       History of atrial fibrillation  No recent atrial fibrillation    (HFpEF) heart failure with preserved ejection fraction  Recently admitted to Hospital in heart failure started on diuretics.  She denies any swelling    CKD (chronic kidney disease), stage III  Creatinine has mildly increased.  Will decrease the doses of spironolactone       Plan:       Decrease the spironolactone to 25 mg p.o. daily.  If she develops fluid retention she is to take it b.i.d. for few days.  She is to discontinue the Lopressor and try Toprol-XL 25 mg daily if the pulse goes below 50 she is to take 12-,1/2 daily.ill be seen in the office in 1 month.

## 2023-10-20 NOTE — TELEPHONE ENCOUNTER
----- Message from Josh Taylor sent at 10/20/2023  3:42 PM CDT -----  Refill:   Name of Caller: Patient  Best Call Back Number:  [phone]  Additional Information: Patient called for refill   Prescription Name: metoprolol tartrate (LOPRESSOR) 25 MG tablet ()  Time release once a day    Pharmacy Name:   [facility] #[id number] - [facility] [address] AT [place] [address]   Phone:  [phone]  Fax:  [phone]

## 2023-10-23 PROBLEM — J18.9 CAP (COMMUNITY ACQUIRED PNEUMONIA): Status: RESOLVED | Noted: 2023-07-20 | Resolved: 2023-10-23

## 2023-10-29 PROCEDURE — G0179 MD RECERTIFICATION HHA PT: HCPCS | Mod: ,,, | Performed by: INTERNAL MEDICINE

## 2023-10-29 PROCEDURE — G0179 PR HOME HEALTH MD RECERTIFICATION: ICD-10-PCS | Mod: ,,, | Performed by: INTERNAL MEDICINE

## 2023-10-30 RX ORDER — METOPROLOL TARTRATE 25 MG/1
12.5 TABLET, FILM COATED ORAL 3 TIMES DAILY
Qty: 45 TABLET | Refills: 3 | Status: SHIPPED | OUTPATIENT
Start: 2023-10-30 | End: 2023-11-01

## 2023-10-30 NOTE — TELEPHONE ENCOUNTER
----- Message from Roderick Segun sent at 10/30/2023 12:38 PM CDT -----  Type:  RX Refill Request    Who Called:  Patient  Refill or New Rx:  New  RX Name and Strength:  metoprolol tartrate (LOPRESSOR) 25 MG tablet      How is the patient currently taking it? (ex. 1XDay):  .5 Tablet XDay  Is this a 30 day or 90 day RX:  90    Preferred Pharmacy with phone number:    [facility] #[id number] - [place], [place] - [address] AT [facility] [address]  [address] [place] [address]  Phone: [phone] Fax: [phone]      Local or Mail Order:  Local  Ordering Provider:  Rashawn Nam Call Back Number:  [phone]  Additional Information:  Pt has one pill left.  Please to to confirm when sent.

## 2023-11-01 RX ORDER — METOPROLOL SUCCINATE 25 MG/1
25 TABLET, EXTENDED RELEASE ORAL DAILY
COMMUNITY
End: 2023-11-01 | Stop reason: SDUPTHER

## 2023-11-01 RX ORDER — METOPROLOL TARTRATE 25 MG/1
12.5 TABLET, FILM COATED ORAL 3 TIMES DAILY
Qty: 45 TABLET | Refills: 3 | OUTPATIENT
Start: 2023-11-01 | End: 2023-12-01

## 2023-11-01 NOTE — TELEPHONE ENCOUNTER
----- Message from Francisco Han sent at 11/1/2023  3:07 PM CDT -----  Type:  RX Refill Request    Who Called:  Geovany garay's home health nurse  Refill or New Rx:  refill  RX Name and Strength:  metoprolol succinate  (LOPRESSOR XL)  25 MG tablet  How is the patient currently taking it? (ex. 1XDay):  as directed  Is this a 30 day or 90 day RX:  90  Preferred Pharmacy with phone number:    [facility] #[id number] - [place] - [address] AT [facility] [address]  [address] [place] [address]  Phone: [phone] Fax: [phone]      Local or Mail Order:  local  Ordering Provider:  Aureliano Nam Call Back Number:  [phone] (home)     Additional Information:  please call and advise--thank you

## 2023-11-02 RX ORDER — METOPROLOL SUCCINATE 25 MG/1
25 TABLET, EXTENDED RELEASE ORAL DAILY
Qty: 90 TABLET | Refills: 2 | Status: SHIPPED | OUTPATIENT
Start: 2023-11-02 | End: 2024-04-01 | Stop reason: SDUPTHER

## 2023-11-09 ENCOUNTER — LAB VISIT (OUTPATIENT)
Dept: LAB | Facility: HOSPITAL | Age: 88
End: 2023-11-09
Attending: INTERNAL MEDICINE
Payer: MEDICARE

## 2023-11-09 DIAGNOSIS — N18.32 STAGE 3B CHRONIC KIDNEY DISEASE: ICD-10-CM

## 2023-11-09 DIAGNOSIS — R00.1 BRADYCARDIA: ICD-10-CM

## 2023-11-09 DIAGNOSIS — I50.9 CONGESTIVE HEART FAILURE, UNSPECIFIED HF CHRONICITY, UNSPECIFIED HEART FAILURE TYPE: ICD-10-CM

## 2023-11-09 LAB
ANION GAP SERPL CALC-SCNC: 6 MMOL/L (ref 8–16)
BNP SERPL-MCNC: 419 PG/ML (ref 0–99)
BUN SERPL-MCNC: 46 MG/DL (ref 8–23)
CALCIUM SERPL-MCNC: 9.9 MG/DL (ref 8.7–10.5)
CHLORIDE SERPL-SCNC: 106 MMOL/L (ref 95–110)
CO2 SERPL-SCNC: 26 MMOL/L (ref 23–29)
CREAT SERPL-MCNC: 1.8 MG/DL (ref 0.5–1.4)
ERYTHROCYTE [DISTWIDTH] IN BLOOD BY AUTOMATED COUNT: 13.3 % (ref 11.5–14.5)
EST. GFR  (NO RACE VARIABLE): 26.6 ML/MIN/1.73 M^2
GLUCOSE SERPL-MCNC: 147 MG/DL (ref 70–110)
HCT VFR BLD AUTO: 39.3 % (ref 37–48.5)
HGB BLD-MCNC: 12.6 G/DL (ref 12–16)
MCH RBC QN AUTO: 32.7 PG (ref 27–31)
MCHC RBC AUTO-ENTMCNC: 32.1 G/DL (ref 32–36)
MCV RBC AUTO: 102 FL (ref 82–98)
PLATELET # BLD AUTO: 211 K/UL (ref 150–450)
PMV BLD AUTO: 10.4 FL (ref 9.2–12.9)
POTASSIUM SERPL-SCNC: 5.1 MMOL/L (ref 3.5–5.1)
RBC # BLD AUTO: 3.85 M/UL (ref 4–5.4)
SODIUM SERPL-SCNC: 138 MMOL/L (ref 136–145)
WBC # BLD AUTO: 6.65 K/UL (ref 3.9–12.7)

## 2023-11-09 PROCEDURE — 80048 BASIC METABOLIC PNL TOTAL CA: CPT | Performed by: INTERNAL MEDICINE

## 2023-11-09 PROCEDURE — 85027 COMPLETE CBC AUTOMATED: CPT | Performed by: INTERNAL MEDICINE

## 2023-11-09 PROCEDURE — 83880 ASSAY OF NATRIURETIC PEPTIDE: CPT | Performed by: INTERNAL MEDICINE

## 2023-11-09 PROCEDURE — 36415 COLL VENOUS BLD VENIPUNCTURE: CPT | Performed by: INTERNAL MEDICINE

## 2023-11-10 ENCOUNTER — DOCUMENT SCAN (OUTPATIENT)
Dept: HOME HEALTH SERVICES | Facility: HOSPITAL | Age: 88
End: 2023-11-10
Payer: MEDICARE

## 2023-11-13 ENCOUNTER — OFFICE VISIT (OUTPATIENT)
Dept: CARDIOLOGY | Facility: CLINIC | Age: 88
End: 2023-11-13
Payer: MEDICARE

## 2023-11-13 ENCOUNTER — EXTERNAL HOME HEALTH (OUTPATIENT)
Dept: HOME HEALTH SERVICES | Facility: HOSPITAL | Age: 88
End: 2023-11-13
Payer: MEDICARE

## 2023-11-13 VITALS
DIASTOLIC BLOOD PRESSURE: 60 MMHG | SYSTOLIC BLOOD PRESSURE: 120 MMHG | HEIGHT: 62 IN | OXYGEN SATURATION: 97 % | BODY MASS INDEX: 28.21 KG/M2 | WEIGHT: 153.31 LBS | HEART RATE: 60 BPM

## 2023-11-13 DIAGNOSIS — Z86.79 HISTORY OF ATRIAL FIBRILLATION: Chronic | ICD-10-CM

## 2023-11-13 DIAGNOSIS — I48.0 PAROXYSMAL ATRIAL FIBRILLATION: ICD-10-CM

## 2023-11-13 DIAGNOSIS — I50.9 CONGESTIVE HEART FAILURE, UNSPECIFIED HF CHRONICITY, UNSPECIFIED HEART FAILURE TYPE: ICD-10-CM

## 2023-11-13 DIAGNOSIS — I50.33 ACUTE ON CHRONIC HEART FAILURE WITH PRESERVED EJECTION FRACTION: ICD-10-CM

## 2023-11-13 DIAGNOSIS — I10 ESSENTIAL HYPERTENSION: Chronic | ICD-10-CM

## 2023-11-13 DIAGNOSIS — N18.32 STAGE 3B CHRONIC KIDNEY DISEASE: Primary | ICD-10-CM

## 2023-11-13 PROCEDURE — 1160F RVW MEDS BY RX/DR IN RCRD: CPT | Mod: CPTII,S$GLB,, | Performed by: INTERNAL MEDICINE

## 2023-11-13 PROCEDURE — 1159F MED LIST DOCD IN RCRD: CPT | Mod: CPTII,S$GLB,, | Performed by: INTERNAL MEDICINE

## 2023-11-13 PROCEDURE — 99214 OFFICE O/P EST MOD 30 MIN: CPT | Mod: S$GLB,,, | Performed by: INTERNAL MEDICINE

## 2023-11-13 PROCEDURE — 3288F FALL RISK ASSESSMENT DOCD: CPT | Mod: CPTII,S$GLB,, | Performed by: INTERNAL MEDICINE

## 2023-11-13 PROCEDURE — 3288F PR FALLS RISK ASSESSMENT DOCUMENTED: ICD-10-PCS | Mod: CPTII,S$GLB,, | Performed by: INTERNAL MEDICINE

## 2023-11-13 PROCEDURE — 99214 PR OFFICE/OUTPT VISIT, EST, LEVL IV, 30-39 MIN: ICD-10-PCS | Mod: S$GLB,,, | Performed by: INTERNAL MEDICINE

## 2023-11-13 PROCEDURE — 1159F PR MEDICATION LIST DOCUMENTED IN MEDICAL RECORD: ICD-10-PCS | Mod: CPTII,S$GLB,, | Performed by: INTERNAL MEDICINE

## 2023-11-13 PROCEDURE — 99999 PR PBB SHADOW E&M-EST. PATIENT-LVL IV: CPT | Mod: PBBFAC,,, | Performed by: INTERNAL MEDICINE

## 2023-11-13 PROCEDURE — 1101F PT FALLS ASSESS-DOCD LE1/YR: CPT | Mod: CPTII,S$GLB,, | Performed by: INTERNAL MEDICINE

## 2023-11-13 PROCEDURE — 1101F PR PT FALLS ASSESS DOC 0-1 FALLS W/OUT INJ PAST YR: ICD-10-PCS | Mod: CPTII,S$GLB,, | Performed by: INTERNAL MEDICINE

## 2023-11-13 PROCEDURE — 99999 PR PBB SHADOW E&M-EST. PATIENT-LVL IV: ICD-10-PCS | Mod: PBBFAC,,, | Performed by: INTERNAL MEDICINE

## 2023-11-13 PROCEDURE — 1126F AMNT PAIN NOTED NONE PRSNT: CPT | Mod: CPTII,S$GLB,, | Performed by: INTERNAL MEDICINE

## 2023-11-13 PROCEDURE — 1160F PR REVIEW ALL MEDS BY PRESCRIBER/CLIN PHARMACIST DOCUMENTED: ICD-10-PCS | Mod: CPTII,S$GLB,, | Performed by: INTERNAL MEDICINE

## 2023-11-13 PROCEDURE — 1126F PR PAIN SEVERITY QUANTIFIED, NO PAIN PRESENT: ICD-10-PCS | Mod: CPTII,S$GLB,, | Performed by: INTERNAL MEDICINE

## 2023-11-13 NOTE — PROGRESS NOTES
Patient ID:  Andressa Benedict is a 89 y.o. female who presents for follow-up of Coronary Artery Disease and Congestive Heart Failure      She is doing better she is using the oxygen less unless.  She has been sleeping a lot.  She denies any palpitations any shortness of breath.  She has been taking spironolactone 25 mg p.o. daily.  She is complaining of mild tremors that has been going on for a while.  On physical examination she has some cogwheel rigidity suggestive of Parkinson's.  This was discussed with her and suggested for her to go see a neurologist        Past Medical History:   Diagnosis Date    Atrial fibrillation     Bradycardia     Carotid bruit     CKD (chronic kidney disease), stage III     Hyperlipidemia     OA (osteoarthritis)     Thyroid disease         Past Surgical History:   Procedure Laterality Date    HYSTERECTOMY      KNEE SURGERY Right     SHOULDER SURGERY Right           Current Outpatient Medications   Medication Instructions    coenzyme Q10 (CO Q-10) 100 mg, Oral, Daily    docosahexaenoic acid/epa (FISH OIL ORAL) 1 capsule, Oral, Daily    FLAXSEED OIL ORAL 1 capsule, Oral, Daily    glucosam/msm/chond/tmr132/hyal (GLUCOS-CHOND-MSM, WITH ANTIOX, ORAL) 1 capsule, Oral, Daily    levothyroxine (SYNTHROID) 100 mcg, Oral, Before breakfast    metoprolol succinate (TOPROL-XL) 25 mg, Oral, Daily    multivitamin (THERAGRAN) per tablet 1 tablet, Oral, Daily    sacubitriL-valsartan (ENTRESTO) 24-26 mg per tablet 1 tablet, Oral, 2 times daily    spironolactone (ALDACTONE) 25 mg, Oral, 2 times daily    triamcinolone acetonide 0.1% (KENALOG) 0.1 % cream Topical (Top), 2 times daily        Review of patient's allergies indicates:   Allergen Reactions    Penicillins Anaphylaxis        Review of Systems   Cardiovascular:  Positive for dyspnea on exertion. Negative for chest pain, irregular heartbeat and palpitations.   Respiratory:  Negative for cough and shortness of breath.    Neurological:   "Positive for tremors.        Objective:     Vitals:    11/13/23 1604   BP: 120/60   BP Location: Left arm   Patient Position: Sitting   BP Method: Medium (Manual)   Pulse: 60   SpO2: 97%   Weight: 69.6 kg (153 lb 5.3 oz)   Height: 5' 2" (1.575 m)       Physical Exam  Vitals and nursing note reviewed.   Constitutional:       Appearance: She is well-developed.   HENT:      Head: Normocephalic and atraumatic.   Eyes:      Conjunctiva/sclera: Conjunctivae normal.   Cardiovascular:      Rate and Rhythm: Normal rate and regular rhythm.      Heart sounds: Normal heart sounds.   Pulmonary:      Effort: Pulmonary effort is normal.      Breath sounds: Normal breath sounds.   Abdominal:      General: Bowel sounds are normal.      Palpations: Abdomen is soft.   Musculoskeletal:         General: Normal range of motion.   Skin:     General: Skin is warm and dry.   Neurological:      Mental Status: She is alert and oriented to person, place, and time.   Psychiatric:         Behavior: Behavior normal.         Thought Content: Thought content normal.         Judgment: Judgment normal.       CMP  Sodium   Date Value Ref Range Status   11/09/2023 138 136 - 145 mmol/L Final     Potassium   Date Value Ref Range Status   11/09/2023 5.1 3.5 - 5.1 mmol/L Final     Chloride   Date Value Ref Range Status   11/09/2023 106 95 - 110 mmol/L Final     CO2   Date Value Ref Range Status   11/09/2023 26 23 - 29 mmol/L Final     Glucose   Date Value Ref Range Status   11/09/2023 147 (H) 70 - 110 mg/dL Final     BUN   Date Value Ref Range Status   11/09/2023 46 (H) 8 - 23 mg/dL Final     Creatinine   Date Value Ref Range Status   11/09/2023 1.8 (H) 0.5 - 1.4 mg/dL Final     Calcium   Date Value Ref Range Status   11/09/2023 9.9 8.7 - 10.5 mg/dL Final     Total Protein   Date Value Ref Range Status   08/21/2023 8.0 6.0 - 8.4 g/dL Final     Albumin   Date Value Ref Range Status   08/21/2023 3.9 3.5 - 5.2 g/dL Final     Total Bilirubin   Date Value Ref " "Range Status   08/21/2023 1.0 0.1 - 1.0 mg/dL Final     Comment:     For infants and newborns, interpretation of results should be based  on gestational age, weight and in agreement with clinical  observations.    Premature Infant recommended reference ranges:  Up to 24 hours.............<8.0 mg/dL  Up to 48 hours............<12.0 mg/dL  3-5 days..................<15.0 mg/dL  6-29 days.................<15.0 mg/dL       Alkaline Phosphatase   Date Value Ref Range Status   08/21/2023 70 55 - 135 U/L Final     AST   Date Value Ref Range Status   08/21/2023 22 10 - 40 U/L Final     ALT   Date Value Ref Range Status   08/21/2023 18 10 - 44 U/L Final     Anion Gap   Date Value Ref Range Status   11/09/2023 6 (L) 8 - 16 mmol/L Final     eGFR if non    Date Value Ref Range Status   05/28/2021 30 (L) >59 mL/min/1.73 Final      BMP  Lab Results   Component Value Date     11/09/2023    K 5.1 11/09/2023     11/09/2023    CO2 26 11/09/2023    BUN 46 (H) 11/09/2023    CREATININE 1.8 (H) 11/09/2023    CALCIUM 9.9 11/09/2023    ANIONGAP 6 (L) 11/09/2023    EGFRNONAA 30 (L) 05/28/2021      BNP  @LABRCNTIP(BNP,BNPTRIAGEBLO)@   Lab Results   Component Value Date    CHOL 152 06/02/2022     Lab Results   Component Value Date    HDL 51 06/02/2022     Lab Results   Component Value Date    LDLCALC 88 06/02/2022     Lab Results   Component Value Date    TRIG 66 06/02/2022     No results found for: "CHOLHDL"   Lab Results   Component Value Date    TSH 3.080 07/19/2023    FREET4 1.21 08/26/2023     No results found for: "LABA1C", "HGBA1C"  Lab Results   Component Value Date    WBC 6.65 11/09/2023    HGB 12.6 11/09/2023    HCT 39.3 11/09/2023     (H) 11/09/2023     11/09/2023         Results for orders placed during the hospital encounter of 08/21/23    Echo    Interpretation Summary    Left Ventricle: The left ventricle is mildly dilated. Mildly increased ventricular mass. Mildly increased wall " thickness. There is mild concentric hypertrophy. Normal wall motion. Septal flattening in systole consistent with right ventricular pressure overload. There is low normal systolic function. EF 50% with patient in sinus rhythm and heart rate of 52 There is diastolic dysfunction but grade cannot be determined. Elevated left ventricular filling pressure.    Right Ventricle: Normal right ventricular cavity size. Wall thickness is normal. Right ventricle wall motion  is normal. Systolic function is normal.    Mitral Valve: There is no stenosis.    Tricuspid Valve: There is mild to moderate regurgitation.    Pulmonary Artery: There is mild pulmonary hypertension. The estimated pulmonary artery systolic pressure is 58 mmHg.    IVC/SVC: Elevated venous pressure at 15 mmHg.    Pericardium: There is a small posterior effusion. Pericardial effusion is echolucent. No indication of cardiac tamponade.    Mean left atrial pressure slightly increased at 12 mm Hg     No results found for this or any previous visit.         Assessment:       (HFpEF) heart failure with preserved ejection fraction  She is doing better from her heart failure.    Essential hypertension  Controlled on Entresto and metoprolol.    History of atrial fibrillation  She has been in sinus rhythm       Plan:       Decrease the spironolactone to 25 mg every other day due to hyperkalemia and increase in her BUN and creatinine.  Suggest for her to go see a neurologist for her tremors she might have Parkinson's

## 2023-11-14 ENCOUNTER — DOCUMENT SCAN (OUTPATIENT)
Dept: HOME HEALTH SERVICES | Facility: HOSPITAL | Age: 88
End: 2023-11-14
Payer: MEDICARE

## 2023-11-14 NOTE — ASSESSMENT & PLAN NOTE
Controlled on Entresto and metoprolol.   Impression: Type 2 diabetes mellitus without complications: R70.6. Plan: Diabetes type II: no background retinopathy, no signs of neovascularization noted. Discussed ocular and systemic benefits of blood sugar control.

## 2023-11-20 PROBLEM — J96.01 ACUTE HYPOXEMIC RESPIRATORY FAILURE: Status: RESOLVED | Noted: 2023-07-19 | Resolved: 2023-11-20

## 2023-11-28 ENCOUNTER — EXTERNAL HOME HEALTH (OUTPATIENT)
Dept: HOME HEALTH SERVICES | Facility: HOSPITAL | Age: 88
End: 2023-11-28
Payer: MEDICARE

## 2024-01-04 ENCOUNTER — LAB VISIT (OUTPATIENT)
Dept: LAB | Facility: HOSPITAL | Age: 89
End: 2024-01-04
Attending: INTERNAL MEDICINE
Payer: MEDICARE

## 2024-01-04 DIAGNOSIS — I50.9 CONGESTIVE HEART FAILURE, UNSPECIFIED HF CHRONICITY, UNSPECIFIED HEART FAILURE TYPE: ICD-10-CM

## 2024-01-04 DIAGNOSIS — N18.32 STAGE 3B CHRONIC KIDNEY DISEASE: ICD-10-CM

## 2024-01-04 DIAGNOSIS — I48.0 PAROXYSMAL ATRIAL FIBRILLATION: ICD-10-CM

## 2024-01-04 LAB
ANION GAP SERPL CALC-SCNC: 6 MMOL/L (ref 8–16)
BNP SERPL-MCNC: 546 PG/ML (ref 0–99)
BUN SERPL-MCNC: 34 MG/DL (ref 8–23)
CALCIUM SERPL-MCNC: 10.1 MG/DL (ref 8.7–10.5)
CHLORIDE SERPL-SCNC: 105 MMOL/L (ref 95–110)
CO2 SERPL-SCNC: 29 MMOL/L (ref 23–29)
CREAT SERPL-MCNC: 1.5 MG/DL (ref 0.5–1.4)
ERYTHROCYTE [DISTWIDTH] IN BLOOD BY AUTOMATED COUNT: 12.7 % (ref 11.5–14.5)
EST. GFR  (NO RACE VARIABLE): 33.1 ML/MIN/1.73 M^2
GLUCOSE SERPL-MCNC: 109 MG/DL (ref 70–110)
HCT VFR BLD AUTO: 39 % (ref 37–48.5)
HGB BLD-MCNC: 12.6 G/DL (ref 12–16)
MCH RBC QN AUTO: 32.8 PG (ref 27–31)
MCHC RBC AUTO-ENTMCNC: 32.3 G/DL (ref 32–36)
MCV RBC AUTO: 102 FL (ref 82–98)
PLATELET # BLD AUTO: 213 K/UL (ref 150–450)
PMV BLD AUTO: 10.3 FL (ref 9.2–12.9)
POTASSIUM SERPL-SCNC: 4.6 MMOL/L (ref 3.5–5.1)
RBC # BLD AUTO: 3.84 M/UL (ref 4–5.4)
SODIUM SERPL-SCNC: 140 MMOL/L (ref 136–145)
WBC # BLD AUTO: 6.23 K/UL (ref 3.9–12.7)

## 2024-01-04 PROCEDURE — 85027 COMPLETE CBC AUTOMATED: CPT | Performed by: INTERNAL MEDICINE

## 2024-01-04 PROCEDURE — 83880 ASSAY OF NATRIURETIC PEPTIDE: CPT | Performed by: INTERNAL MEDICINE

## 2024-01-04 PROCEDURE — 36415 COLL VENOUS BLD VENIPUNCTURE: CPT | Performed by: INTERNAL MEDICINE

## 2024-01-04 PROCEDURE — 80048 BASIC METABOLIC PNL TOTAL CA: CPT | Performed by: INTERNAL MEDICINE

## 2024-01-08 ENCOUNTER — OFFICE VISIT (OUTPATIENT)
Dept: CARDIOLOGY | Facility: CLINIC | Age: 89
End: 2024-01-08
Payer: MEDICARE

## 2024-01-08 VITALS
HEIGHT: 62 IN | OXYGEN SATURATION: 95 % | WEIGHT: 163.94 LBS | DIASTOLIC BLOOD PRESSURE: 72 MMHG | BODY MASS INDEX: 30.17 KG/M2 | SYSTOLIC BLOOD PRESSURE: 122 MMHG | HEART RATE: 59 BPM

## 2024-01-08 DIAGNOSIS — N18.32 STAGE 3B CHRONIC KIDNEY DISEASE: Primary | ICD-10-CM

## 2024-01-08 DIAGNOSIS — E89.0 POSTABLATIVE HYPOTHYROIDISM: Chronic | ICD-10-CM

## 2024-01-08 DIAGNOSIS — Z86.79 HISTORY OF ATRIAL FIBRILLATION: Chronic | ICD-10-CM

## 2024-01-08 DIAGNOSIS — I48.0 PAROXYSMAL ATRIAL FIBRILLATION: ICD-10-CM

## 2024-01-08 DIAGNOSIS — I31.39 PERICARDIAL EFFUSION: ICD-10-CM

## 2024-01-08 DIAGNOSIS — I50.33 ACUTE ON CHRONIC HEART FAILURE WITH PRESERVED EJECTION FRACTION: ICD-10-CM

## 2024-01-08 PROCEDURE — 1126F AMNT PAIN NOTED NONE PRSNT: CPT | Mod: CPTII,S$GLB,, | Performed by: INTERNAL MEDICINE

## 2024-01-08 PROCEDURE — 3288F FALL RISK ASSESSMENT DOCD: CPT | Mod: CPTII,S$GLB,, | Performed by: INTERNAL MEDICINE

## 2024-01-08 PROCEDURE — 1159F MED LIST DOCD IN RCRD: CPT | Mod: CPTII,S$GLB,, | Performed by: INTERNAL MEDICINE

## 2024-01-08 PROCEDURE — 99213 OFFICE O/P EST LOW 20 MIN: CPT | Mod: S$GLB,,, | Performed by: INTERNAL MEDICINE

## 2024-01-08 PROCEDURE — 99999 PR PBB SHADOW E&M-EST. PATIENT-LVL IV: CPT | Mod: PBBFAC,,, | Performed by: INTERNAL MEDICINE

## 2024-01-08 PROCEDURE — 1160F RVW MEDS BY RX/DR IN RCRD: CPT | Mod: CPTII,S$GLB,, | Performed by: INTERNAL MEDICINE

## 2024-01-08 PROCEDURE — 1101F PT FALLS ASSESS-DOCD LE1/YR: CPT | Mod: CPTII,S$GLB,, | Performed by: INTERNAL MEDICINE

## 2024-01-08 RX ORDER — ASPIRIN 81 MG/1
81 TABLET ORAL DAILY
COMMUNITY

## 2024-01-08 NOTE — PROGRESS NOTES
Patient ID:  Andressa Benedict is a 89 y.o. female who presents for follow-up of Hypertension      HFpEF) heart failure with preserved ejection fraction  She is doing better from her heart failure.     Essential hypertension  Controlled on Entresto and metoprolol.     History of atrial fibrillation  She has been in sinus rhythm  She is complaining of knee pain.  Her breathing is doing better she is only using the oxygen at night as needed.  Home health is coming to check on her on regular basis.  She continues to have shaking of her hands.  Her breathing has been doing fairly well although her BNP is 546.          Past Medical History:   Diagnosis Date    Atrial fibrillation     Bradycardia     Carotid bruit     CKD (chronic kidney disease), stage III     Hyperlipidemia     OA (osteoarthritis)     Thyroid disease         Past Surgical History:   Procedure Laterality Date    HYSTERECTOMY      KNEE SURGERY Right     SHOULDER SURGERY Right           Current Outpatient Medications   Medication Instructions    aspirin (ECOTRIN) 81 mg, Oral, Daily    coenzyme Q10 (CO Q-10) 100 mg, Oral, Daily    docosahexaenoic acid/epa (FISH OIL ORAL) 1 capsule, Oral, Daily    FLAXSEED OIL ORAL 1 capsule, Oral, Daily    glucosam/msm/chond/rhq907/hyal (GLUCOS-CHOND-MSM, WITH ANTIOX, ORAL) 1 capsule, Oral, Daily    levothyroxine (SYNTHROID) 100 mcg, Oral, Before breakfast    metoprolol succinate (TOPROL-XL) 25 mg, Oral, Daily    multivitamin (THERAGRAN) per tablet 1 tablet, Oral, Daily    sacubitriL-valsartan (ENTRESTO) 24-26 mg per tablet 1 tablet, Oral, 2 times daily    spironolactone (ALDACTONE) 25 mg, Oral, 2 times daily    triamcinolone acetonide 0.1% (KENALOG) 0.1 % cream Topical (Top), 2 times daily        Review of patient's allergies indicates:   Allergen Reactions    Penicillins Anaphylaxis        Review of Systems   Cardiovascular:  Negative for chest pain, dyspnea on exertion, leg swelling and palpitations.  "  Respiratory:  Negative for cough and shortness of breath.         Objective:     Vitals:    01/08/24 1335   BP: 122/72   BP Location: Left arm   Patient Position: Sitting   BP Method: Medium (Manual)   Pulse: (!) 59   SpO2: 95%   Weight: 74.3 kg (163 lb 14.6 oz)   Height: 5' 2" (1.575 m)       Physical Exam  Vitals and nursing note reviewed.   Constitutional:       Appearance: She is well-developed.   HENT:      Head: Normocephalic and atraumatic.   Eyes:      Conjunctiva/sclera: Conjunctivae normal.   Cardiovascular:      Rate and Rhythm: Normal rate and regular rhythm.      Heart sounds: Normal heart sounds.   Pulmonary:      Effort: Pulmonary effort is normal.      Breath sounds: Normal breath sounds.   Abdominal:      General: Bowel sounds are normal.      Palpations: Abdomen is soft.   Musculoskeletal:         General: Normal range of motion.   Skin:     General: Skin is warm and dry.   Neurological:      Mental Status: She is alert and oriented to person, place, and time.   Psychiatric:         Behavior: Behavior normal.         Thought Content: Thought content normal.         Judgment: Judgment normal.       CMP  Sodium   Date Value Ref Range Status   01/04/2024 140 136 - 145 mmol/L Final     Potassium   Date Value Ref Range Status   01/04/2024 4.6 3.5 - 5.1 mmol/L Final     Chloride   Date Value Ref Range Status   01/04/2024 105 95 - 110 mmol/L Final     CO2   Date Value Ref Range Status   01/04/2024 29 23 - 29 mmol/L Final     Glucose   Date Value Ref Range Status   01/04/2024 109 70 - 110 mg/dL Final     BUN   Date Value Ref Range Status   01/04/2024 34 (H) 8 - 23 mg/dL Final     Creatinine   Date Value Ref Range Status   01/04/2024 1.5 (H) 0.5 - 1.4 mg/dL Final     Calcium   Date Value Ref Range Status   01/04/2024 10.1 8.7 - 10.5 mg/dL Final     Total Protein   Date Value Ref Range Status   08/21/2023 8.0 6.0 - 8.4 g/dL Final     Albumin   Date Value Ref Range Status   08/21/2023 3.9 3.5 - 5.2 g/dL " "Final     Total Bilirubin   Date Value Ref Range Status   08/21/2023 1.0 0.1 - 1.0 mg/dL Final     Comment:     For infants and newborns, interpretation of results should be based  on gestational age, weight and in agreement with clinical  observations.    Premature Infant recommended reference ranges:  Up to 24 hours.............<8.0 mg/dL  Up to 48 hours............<12.0 mg/dL  3-5 days..................<15.0 mg/dL  6-29 days.................<15.0 mg/dL       Alkaline Phosphatase   Date Value Ref Range Status   08/21/2023 70 55 - 135 U/L Final     AST   Date Value Ref Range Status   08/21/2023 22 10 - 40 U/L Final     ALT   Date Value Ref Range Status   08/21/2023 18 10 - 44 U/L Final     Anion Gap   Date Value Ref Range Status   01/04/2024 6 (L) 8 - 16 mmol/L Final     eGFR if non    Date Value Ref Range Status   05/28/2021 30 (L) >59 mL/min/1.73 Final      BMP  Lab Results   Component Value Date     01/04/2024    K 4.6 01/04/2024     01/04/2024    CO2 29 01/04/2024    BUN 34 (H) 01/04/2024    CREATININE 1.5 (H) 01/04/2024    CALCIUM 10.1 01/04/2024    ANIONGAP 6 (L) 01/04/2024    EGFRNONAA 30 (L) 05/28/2021      BNP  @LABRCNTIP(BNP,BNPTRIAGEBLO)@   Lab Results   Component Value Date    CHOL 152 06/02/2022     Lab Results   Component Value Date    HDL 51 06/02/2022     Lab Results   Component Value Date    LDLCALC 88 06/02/2022     Lab Results   Component Value Date    TRIG 66 06/02/2022     No results found for: "CHOLHDL"   Lab Results   Component Value Date    TSH 3.080 07/19/2023    FREET4 1.21 08/26/2023     No results found for: "LABA1C", "HGBA1C"  Lab Results   Component Value Date    WBC 6.23 01/04/2024    HGB 12.6 01/04/2024    HCT 39.0 01/04/2024     (H) 01/04/2024     01/04/2024         Results for orders placed during the hospital encounter of 08/21/23    Echo    Interpretation Summary    Left Ventricle: The left ventricle is mildly dilated. Mildly increased " ventricular mass. Mildly increased wall thickness. There is mild concentric hypertrophy. Normal wall motion. Septal flattening in systole consistent with right ventricular pressure overload. There is low normal systolic function. EF 50% with patient in sinus rhythm and heart rate of 52 There is diastolic dysfunction but grade cannot be determined. Elevated left ventricular filling pressure.    Right Ventricle: Normal right ventricular cavity size. Wall thickness is normal. Right ventricle wall motion  is normal. Systolic function is normal.    Mitral Valve: There is no stenosis.    Tricuspid Valve: There is mild to moderate regurgitation.    Pulmonary Artery: There is mild pulmonary hypertension. The estimated pulmonary artery systolic pressure is 58 mmHg.    IVC/SVC: Elevated venous pressure at 15 mmHg.    Pericardium: There is a small posterior effusion. Pericardial effusion is echolucent. No indication of cardiac tamponade.    Mean left atrial pressure slightly increased at 12 mm Hg     No results found for this or any previous visit.         Assessment:       (HFpEF) heart failure with preserved ejection fraction  Appears to be compensated although her BNP is elevated at 546.  She is on Entresto, spironolactone, metoprolol.    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

## 2024-01-13 NOTE — ASSESSMENT & PLAN NOTE
Appears to be compensated although her BNP is elevated at 546.  She is on Entresto, spironolactone, metoprolol.

## 2024-03-25 RX ORDER — LEVOTHYROXINE SODIUM 100 UG/1
100 TABLET ORAL
Qty: 90 TABLET | Refills: 1 | Status: SHIPPED | OUTPATIENT
Start: 2024-03-25

## 2024-03-27 ENCOUNTER — LAB VISIT (OUTPATIENT)
Dept: LAB | Facility: HOSPITAL | Age: 89
End: 2024-03-27
Attending: INTERNAL MEDICINE
Payer: MEDICARE

## 2024-03-27 DIAGNOSIS — N18.32 STAGE 3B CHRONIC KIDNEY DISEASE: ICD-10-CM

## 2024-03-27 DIAGNOSIS — I48.0 PAROXYSMAL ATRIAL FIBRILLATION: ICD-10-CM

## 2024-03-27 DIAGNOSIS — I31.39 PERICARDIAL EFFUSION: ICD-10-CM

## 2024-03-27 LAB
ANION GAP SERPL CALC-SCNC: 6 MMOL/L (ref 8–16)
BNP SERPL-MCNC: 632 PG/ML (ref 0–99)
BUN SERPL-MCNC: 35 MG/DL (ref 8–23)
CALCIUM SERPL-MCNC: 9.5 MG/DL (ref 8.7–10.5)
CHLORIDE SERPL-SCNC: 107 MMOL/L (ref 95–110)
CO2 SERPL-SCNC: 29 MMOL/L (ref 23–29)
CREAT SERPL-MCNC: 1.6 MG/DL (ref 0.5–1.4)
ERYTHROCYTE [DISTWIDTH] IN BLOOD BY AUTOMATED COUNT: 12.4 % (ref 11.5–14.5)
EST. GFR  (NO RACE VARIABLE): 30.6 ML/MIN/1.73 M^2
GLUCOSE SERPL-MCNC: 114 MG/DL (ref 70–110)
HCT VFR BLD AUTO: 38.3 % (ref 37–48.5)
HGB BLD-MCNC: 12.3 G/DL (ref 12–16)
MCH RBC QN AUTO: 33.2 PG (ref 27–31)
MCHC RBC AUTO-ENTMCNC: 32.1 G/DL (ref 32–36)
MCV RBC AUTO: 104 FL (ref 82–98)
PLATELET # BLD AUTO: 212 K/UL (ref 150–450)
PMV BLD AUTO: 10.6 FL (ref 9.2–12.9)
POTASSIUM SERPL-SCNC: 4.2 MMOL/L (ref 3.5–5.1)
RBC # BLD AUTO: 3.7 M/UL (ref 4–5.4)
SODIUM SERPL-SCNC: 142 MMOL/L (ref 136–145)
TSH SERPL DL<=0.005 MIU/L-ACNC: 0.68 UIU/ML (ref 0.34–5.6)
WBC # BLD AUTO: 7.89 K/UL (ref 3.9–12.7)

## 2024-03-27 PROCEDURE — 36415 COLL VENOUS BLD VENIPUNCTURE: CPT | Performed by: INTERNAL MEDICINE

## 2024-03-27 PROCEDURE — 85027 COMPLETE CBC AUTOMATED: CPT | Performed by: INTERNAL MEDICINE

## 2024-03-27 PROCEDURE — 83880 ASSAY OF NATRIURETIC PEPTIDE: CPT | Performed by: INTERNAL MEDICINE

## 2024-03-27 PROCEDURE — 84443 ASSAY THYROID STIM HORMONE: CPT | Performed by: INTERNAL MEDICINE

## 2024-03-27 PROCEDURE — 80048 BASIC METABOLIC PNL TOTAL CA: CPT | Performed by: INTERNAL MEDICINE

## 2024-04-01 ENCOUNTER — OFFICE VISIT (OUTPATIENT)
Dept: CARDIOLOGY | Facility: CLINIC | Age: 89
End: 2024-04-01
Payer: MEDICARE

## 2024-04-01 VITALS
OXYGEN SATURATION: 95 % | HEIGHT: 62 IN | HEART RATE: 63 BPM | WEIGHT: 163.81 LBS | BODY MASS INDEX: 30.14 KG/M2 | SYSTOLIC BLOOD PRESSURE: 120 MMHG | DIASTOLIC BLOOD PRESSURE: 70 MMHG

## 2024-04-01 DIAGNOSIS — E89.0 POSTABLATIVE HYPOTHYROIDISM: ICD-10-CM

## 2024-04-01 DIAGNOSIS — Z86.79 HISTORY OF ATRIAL FIBRILLATION: Chronic | ICD-10-CM

## 2024-04-01 DIAGNOSIS — N18.32 STAGE 3B CHRONIC KIDNEY DISEASE: Primary | ICD-10-CM

## 2024-04-01 DIAGNOSIS — I50.33 ACUTE ON CHRONIC HEART FAILURE WITH PRESERVED EJECTION FRACTION: ICD-10-CM

## 2024-04-01 DIAGNOSIS — I10 ESSENTIAL HYPERTENSION: Chronic | ICD-10-CM

## 2024-04-01 PROCEDURE — 1101F PT FALLS ASSESS-DOCD LE1/YR: CPT | Mod: CPTII,S$GLB,, | Performed by: INTERNAL MEDICINE

## 2024-04-01 PROCEDURE — 1159F MED LIST DOCD IN RCRD: CPT | Mod: CPTII,S$GLB,, | Performed by: INTERNAL MEDICINE

## 2024-04-01 PROCEDURE — 3288F FALL RISK ASSESSMENT DOCD: CPT | Mod: CPTII,S$GLB,, | Performed by: INTERNAL MEDICINE

## 2024-04-01 PROCEDURE — 99213 OFFICE O/P EST LOW 20 MIN: CPT | Mod: S$GLB,,, | Performed by: INTERNAL MEDICINE

## 2024-04-01 PROCEDURE — 1160F RVW MEDS BY RX/DR IN RCRD: CPT | Mod: CPTII,S$GLB,, | Performed by: INTERNAL MEDICINE

## 2024-04-01 PROCEDURE — 99999 PR PBB SHADOW E&M-EST. PATIENT-LVL III: CPT | Mod: PBBFAC,,, | Performed by: INTERNAL MEDICINE

## 2024-04-01 PROCEDURE — 1126F AMNT PAIN NOTED NONE PRSNT: CPT | Mod: CPTII,S$GLB,, | Performed by: INTERNAL MEDICINE

## 2024-04-01 RX ORDER — METOPROLOL SUCCINATE 25 MG/1
25 TABLET, EXTENDED RELEASE ORAL DAILY
Qty: 90 TABLET | Refills: 2 | Status: SHIPPED | OUTPATIENT
Start: 2024-04-01

## 2024-04-01 RX ORDER — SPIRONOLACTONE 25 MG/1
25 TABLET ORAL EVERY OTHER DAY
Qty: 45 TABLET | Refills: 3 | Status: SHIPPED | OUTPATIENT
Start: 2024-04-01 | End: 2025-04-01

## 2024-04-01 NOTE — PROGRESS NOTES
Patient ID:  Andressa Benedict is a 89 y.o. female who presents for follow-up of Hypertension      HFpEF) heart failure with preserved ejection fraction  Appears to be compensated although her BNP is elevated at 546.  She is on Entresto, spironolactone, metoprolol.     CKD (chronic kidney disease), stage III  Stable     Postablative hypothyroidism  On thyroid replacement     History of atrial fibrillation  Atrial fibrillation controlled on metoprolol     She has been doing okay denies any chest pains any shortness of breath at night she uses the oxygen.  She denies any chest pains any palpitations.  During the last office visit her potassium was mildly elevated.  The spironolactone was decreased to 25 mg every other day.        Past Medical History:   Diagnosis Date    Atrial fibrillation     Bradycardia     Carotid bruit     CKD (chronic kidney disease), stage III     Hyperlipidemia     OA (osteoarthritis)     Thyroid disease         Past Surgical History:   Procedure Laterality Date    HYSTERECTOMY      KNEE SURGERY Right     SHOULDER SURGERY Right           Current Outpatient Medications   Medication Instructions    aspirin (ECOTRIN) 81 mg, Oral, Daily    coenzyme Q10 (CO Q-10) 100 mg, Oral, Daily    docosahexaenoic acid/epa (FISH OIL ORAL) 1 capsule, Oral, Daily    FLAXSEED OIL ORAL 1 capsule, Oral, Daily    glucosam/msm/chond/gax381/hyal (GLUCOS-CHOND-MSM, WITH ANTIOX, ORAL) 1 capsule, Oral, Daily    levothyroxine (SYNTHROID) 100 mcg, Oral    metoprolol succinate (TOPROL-XL) 25 mg, Oral, Daily    multivitamin (THERAGRAN) per tablet 1 tablet, Oral, Daily    sacubitriL-valsartan (ENTRESTO) 24-26 mg per tablet 1 tablet, Oral, 2 times daily    spironolactone (ALDACTONE) 25 mg, Oral, Every other day    triamcinolone acetonide 0.1% (KENALOG) 0.1 % cream Topical (Top), 2 times daily        Review of patient's allergies indicates:   Allergen Reactions    Penicillins Anaphylaxis        Review of Systems  "  Cardiovascular:  Positive for orthopnea and paroxysmal nocturnal dyspnea. Negative for chest pain, dyspnea on exertion and leg swelling.   Respiratory:  Positive for shortness of breath. Negative for cough.    Musculoskeletal:  Positive for joint pain.        Objective:     Vitals:    04/01/24 1323   BP: 120/70   BP Location: Left arm   Patient Position: Sitting   BP Method: Medium (Manual)   Pulse: 63   SpO2: 95%   Weight: 74.3 kg (163 lb 12.8 oz)   Height: 5' 2" (1.575 m)       Physical Exam  Vitals and nursing note reviewed.   Constitutional:       Appearance: She is well-developed. She is obese.   HENT:      Head: Normocephalic and atraumatic.   Eyes:      Conjunctiva/sclera: Conjunctivae normal.   Cardiovascular:      Rate and Rhythm: Normal rate and regular rhythm.      Heart sounds: Normal heart sounds.   Pulmonary:      Effort: Pulmonary effort is normal.      Breath sounds: Normal breath sounds.   Abdominal:      General: Bowel sounds are normal.      Palpations: Abdomen is soft.   Musculoskeletal:         General: Normal range of motion.   Skin:     General: Skin is warm and dry.   Neurological:      Mental Status: She is alert and oriented to person, place, and time.   Psychiatric:         Behavior: Behavior normal.         Thought Content: Thought content normal.         Judgment: Judgment normal.       CMP  Sodium   Date Value Ref Range Status   03/27/2024 142 136 - 145 mmol/L Final     Potassium   Date Value Ref Range Status   03/27/2024 4.2 3.5 - 5.1 mmol/L Final     Chloride   Date Value Ref Range Status   03/27/2024 107 95 - 110 mmol/L Final     CO2   Date Value Ref Range Status   03/27/2024 29 23 - 29 mmol/L Final     Glucose   Date Value Ref Range Status   03/27/2024 114 (H) 70 - 110 mg/dL Final     BUN   Date Value Ref Range Status   03/27/2024 35 (H) 8 - 23 mg/dL Final     Creatinine   Date Value Ref Range Status   03/27/2024 1.6 (H) 0.5 - 1.4 mg/dL Final     Calcium   Date Value Ref Range " "Status   03/27/2024 9.5 8.7 - 10.5 mg/dL Final     Total Protein   Date Value Ref Range Status   08/21/2023 8.0 6.0 - 8.4 g/dL Final     Albumin   Date Value Ref Range Status   08/21/2023 3.9 3.5 - 5.2 g/dL Final     Total Bilirubin   Date Value Ref Range Status   08/21/2023 1.0 0.1 - 1.0 mg/dL Final     Comment:     For infants and newborns, interpretation of results should be based  on gestational age, weight and in agreement with clinical  observations.    Premature Infant recommended reference ranges:  Up to 24 hours.............<8.0 mg/dL  Up to 48 hours............<12.0 mg/dL  3-5 days..................<15.0 mg/dL  6-29 days.................<15.0 mg/dL       Alkaline Phosphatase   Date Value Ref Range Status   08/21/2023 70 55 - 135 U/L Final     AST   Date Value Ref Range Status   08/21/2023 22 10 - 40 U/L Final     ALT   Date Value Ref Range Status   08/21/2023 18 10 - 44 U/L Final     Anion Gap   Date Value Ref Range Status   03/27/2024 6 (L) 8 - 16 mmol/L Final     eGFR if non    Date Value Ref Range Status   05/28/2021 30 (L) >59 mL/min/1.73 Final      BMP  Lab Results   Component Value Date     03/27/2024    K 4.2 03/27/2024     03/27/2024    CO2 29 03/27/2024    BUN 35 (H) 03/27/2024    CREATININE 1.6 (H) 03/27/2024    CALCIUM 9.5 03/27/2024    ANIONGAP 6 (L) 03/27/2024    EGFRNONAA 30 (L) 05/28/2021      BNP  @LABRCNTIP(BNP,BNPTRIAGEBLO)@   Lab Results   Component Value Date    CHOL 152 06/02/2022     Lab Results   Component Value Date    HDL 51 06/02/2022     Lab Results   Component Value Date    LDLCALC 88 06/02/2022     Lab Results   Component Value Date    TRIG 66 06/02/2022     No results found for: "CHOLHDL"   Lab Results   Component Value Date    TSH 0.684 03/27/2024    FREET4 1.21 08/26/2023     No results found for: "LABA1C", "HGBA1C"  Lab Results   Component Value Date    WBC 7.89 03/27/2024    HGB 12.3 03/27/2024    HCT 38.3 03/27/2024     (H) 03/27/2024    "  03/27/2024         Results for orders placed during the hospital encounter of 08/21/23    Echo    Interpretation Summary    Left Ventricle: The left ventricle is mildly dilated. Mildly increased ventricular mass. Mildly increased wall thickness. There is mild concentric hypertrophy. Normal wall motion. Septal flattening in systole consistent with right ventricular pressure overload. There is low normal systolic function. EF 50% with patient in sinus rhythm and heart rate of 52 There is diastolic dysfunction but grade cannot be determined. Elevated left ventricular filling pressure.    Right Ventricle: Normal right ventricular cavity size. Wall thickness is normal. Right ventricle wall motion  is normal. Systolic function is normal.    Mitral Valve: There is no stenosis.    Tricuspid Valve: There is mild to moderate regurgitation.    Pulmonary Artery: There is mild pulmonary hypertension. The estimated pulmonary artery systolic pressure is 58 mmHg.    IVC/SVC: Elevated venous pressure at 15 mmHg.    Pericardium: There is a small posterior effusion. Pericardial effusion is echolucent. No indication of cardiac tamponade.    Mean left atrial pressure slightly increased at 12 mm Hg     No results found for this or any previous visit.         Assessment:       (HFpEF) heart failure with preserved ejection fraction  Appears to be compensated clinically doing fine she is on Entresto, spironolactone, metoprolol    Essential hypertension  Controlled on Entresto, metoprolol and spironolactone    History of atrial fibrillation  Controlled on Toprol-XL 25 mg daily.    CKD (chronic kidney disease), stage III  Stable    Postablative hypothyroidism  Controlled on current doses of levothyroxine       Plan:       Continue the Entresto, spironolactone and metoprolol for heart failure controlled.  She will be seen in the office in 3 months with a BMP as well as a BNP.

## 2024-06-28 ENCOUNTER — LAB VISIT (OUTPATIENT)
Dept: LAB | Facility: HOSPITAL | Age: 89
End: 2024-06-28
Attending: INTERNAL MEDICINE
Payer: MEDICARE

## 2024-06-28 DIAGNOSIS — E89.0 POSTABLATIVE HYPOTHYROIDISM: ICD-10-CM

## 2024-06-28 DIAGNOSIS — N18.32 STAGE 3B CHRONIC KIDNEY DISEASE: ICD-10-CM

## 2024-06-28 LAB
ANION GAP SERPL CALC-SCNC: 5 MMOL/L (ref 8–16)
BNP SERPL-MCNC: 590 PG/ML (ref 0–99)
BUN SERPL-MCNC: 41 MG/DL (ref 8–23)
CALCIUM SERPL-MCNC: 9.5 MG/DL (ref 8.7–10.5)
CHLORIDE SERPL-SCNC: 107 MMOL/L (ref 95–110)
CO2 SERPL-SCNC: 29 MMOL/L (ref 23–29)
CREAT SERPL-MCNC: 1.4 MG/DL (ref 0.5–1.4)
ERYTHROCYTE [DISTWIDTH] IN BLOOD BY AUTOMATED COUNT: 12.9 % (ref 11.5–14.5)
EST. GFR  (NO RACE VARIABLE): 36 ML/MIN/1.73 M^2
GLUCOSE SERPL-MCNC: 100 MG/DL (ref 70–110)
HCT VFR BLD AUTO: 38.8 % (ref 37–48.5)
HGB BLD-MCNC: 12.3 G/DL (ref 12–16)
MCH RBC QN AUTO: 32.9 PG (ref 27–31)
MCHC RBC AUTO-ENTMCNC: 31.7 G/DL (ref 32–36)
MCV RBC AUTO: 104 FL (ref 82–98)
PLATELET # BLD AUTO: 198 K/UL (ref 150–450)
PMV BLD AUTO: 10.6 FL (ref 9.2–12.9)
POTASSIUM SERPL-SCNC: 4.6 MMOL/L (ref 3.5–5.1)
RBC # BLD AUTO: 3.74 M/UL (ref 4–5.4)
SODIUM SERPL-SCNC: 141 MMOL/L (ref 136–145)
WBC # BLD AUTO: 7.9 K/UL (ref 3.9–12.7)

## 2024-06-28 PROCEDURE — 83880 ASSAY OF NATRIURETIC PEPTIDE: CPT | Performed by: INTERNAL MEDICINE

## 2024-06-28 PROCEDURE — 36415 COLL VENOUS BLD VENIPUNCTURE: CPT | Performed by: INTERNAL MEDICINE

## 2024-06-28 PROCEDURE — 80048 BASIC METABOLIC PNL TOTAL CA: CPT | Performed by: INTERNAL MEDICINE

## 2024-06-28 PROCEDURE — 85027 COMPLETE CBC AUTOMATED: CPT | Performed by: INTERNAL MEDICINE

## 2024-07-02 ENCOUNTER — OFFICE VISIT (OUTPATIENT)
Dept: CARDIOLOGY | Facility: CLINIC | Age: 89
End: 2024-07-02
Payer: MEDICARE

## 2024-07-02 VITALS
OXYGEN SATURATION: 94 % | DIASTOLIC BLOOD PRESSURE: 62 MMHG | SYSTOLIC BLOOD PRESSURE: 122 MMHG | BODY MASS INDEX: 30.14 KG/M2 | HEART RATE: 87 BPM | HEIGHT: 62 IN | WEIGHT: 163.81 LBS

## 2024-07-02 DIAGNOSIS — Z86.79 HISTORY OF ATRIAL FIBRILLATION: Primary | ICD-10-CM

## 2024-07-02 DIAGNOSIS — N18.32 STAGE 3B CHRONIC KIDNEY DISEASE: ICD-10-CM

## 2024-07-02 DIAGNOSIS — I48.0 PAROXYSMAL ATRIAL FIBRILLATION: ICD-10-CM

## 2024-07-02 DIAGNOSIS — E89.0 POSTABLATIVE HYPOTHYROIDISM: Chronic | ICD-10-CM

## 2024-07-02 DIAGNOSIS — I10 ESSENTIAL HYPERTENSION: Chronic | ICD-10-CM

## 2024-07-02 PROCEDURE — 99999 PR PBB SHADOW E&M-EST. PATIENT-LVL IV: CPT | Mod: PBBFAC,,, | Performed by: INTERNAL MEDICINE

## 2024-07-02 RX ORDER — ASCORBIC ACID 250 MG
TABLET,CHEWABLE ORAL
COMMUNITY

## 2024-07-02 RX ORDER — BETAXOLOL HYDROCHLORIDE 5 MG/ML
1 SOLUTION/ DROPS OPHTHALMIC 2 TIMES DAILY
COMMUNITY
Start: 2024-05-29

## 2024-07-02 NOTE — PROGRESS NOTES
Patient ID:  Andressa Benedict is a 89 y.o. female who presents for follow-up of Congestive Heart Failure and Hypertension      (HFpEF) heart failure with preserved ejection fraction  Appears to be compensated clinically doing fine she is on Entresto, spironolactone, metoprolol     Essential hypertension  Controlled on Entresto, metoprolol and spironolactone     History of atrial fibrillation  Controlled on Toprol-XL 25 mg daily.     CKD (chronic kidney disease), stage III  Stable     Postablative hypothyroidism  Controlled on current doses of levothyroxine    She has been doing much better.  Denies any significant palpitations shortness of breath.  She has not been walking much.  She has been encouraged to walk around the house for a proximally 10 minutes every hour while she is awake.        Past Medical History:   Diagnosis Date    Atrial fibrillation     Bradycardia     Carotid bruit     CKD (chronic kidney disease), stage III     Hyperlipidemia     OA (osteoarthritis)     Thyroid disease         Past Surgical History:   Procedure Laterality Date    HYSTERECTOMY      KNEE SURGERY Right     SHOULDER SURGERY Right           Current Outpatient Medications   Medication Instructions    ascorbic acid, vitamin C, (VITAMIN C) 250 mg Chew Oral    aspirin (ECOTRIN) 81 mg, Oral, Daily    betaxolol 0.5% (BETOPTIC-S) 0.5 % Drop 1 drop, Both Eyes, 2 times daily    coenzyme Q10 (CO Q-10) 100 mg, Oral, Daily    docosahexaenoic acid/epa (FISH OIL ORAL) 1 capsule, Oral, Daily    ergocalciferol, vitamin D2, (VITAMIN D ORAL) Oral    FLAXSEED OIL ORAL 1 capsule, Oral, Daily    glucosam/msm/chond/uok566/hyal (GLUCOS-CHOND-MSM, WITH ANTIOX, ORAL) 1 capsule, Oral, Daily    levothyroxine (SYNTHROID) 100 mcg, Oral    metoprolol succinate (TOPROL-XL) 25 mg, Oral, Daily    multivitamin (THERAGRAN) per tablet 1 tablet, Oral, Daily    sacubitriL-valsartan (ENTRESTO) 24-26 mg per tablet 1 tablet, Oral, 2 times daily    spironolactone  "(ALDACTONE) 25 mg, Oral, Every other day    triamcinolone acetonide 0.1% (KENALOG) 0.1 % cream Topical (Top), 2 times daily        Review of patient's allergies indicates:   Allergen Reactions    Penicillins Anaphylaxis        Review of Systems   Cardiovascular:  Positive for dyspnea on exertion. Negative for chest pain and leg swelling.   Respiratory:  Negative for cough and shortness of breath.    Neurological:  Positive for weakness.        Objective:     Vitals:    07/02/24 1329   BP: 122/62   BP Location: Left arm   Patient Position: Sitting   BP Method: Medium (Manual)   Pulse: 87   SpO2: (!) 94%   Weight: 74.3 kg (163 lb 12.8 oz)   Height: 5' 2" (1.575 m)       Physical Exam  Vitals and nursing note reviewed.   Constitutional:       Appearance: She is well-developed.   HENT:      Head: Normocephalic and atraumatic.   Eyes:      Conjunctiva/sclera: Conjunctivae normal.   Cardiovascular:      Rate and Rhythm: Normal rate and regular rhythm.      Heart sounds: Normal heart sounds.      Comments: Ectopy  Pulmonary:      Effort: Pulmonary effort is normal.      Breath sounds: Normal breath sounds.   Abdominal:      General: Bowel sounds are normal.      Palpations: Abdomen is soft.   Musculoskeletal:         General: Normal range of motion.   Skin:     General: Skin is warm and dry.   Neurological:      Mental Status: She is alert and oriented to person, place, and time.   Psychiatric:         Behavior: Behavior normal.         Thought Content: Thought content normal.         Judgment: Judgment normal.       CMP  Sodium   Date Value Ref Range Status   06/28/2024 141 136 - 145 mmol/L Final     Potassium   Date Value Ref Range Status   06/28/2024 4.6 3.5 - 5.1 mmol/L Final     Chloride   Date Value Ref Range Status   06/28/2024 107 95 - 110 mmol/L Final     CO2   Date Value Ref Range Status   06/28/2024 29 23 - 29 mmol/L Final     Glucose   Date Value Ref Range Status   06/28/2024 100 70 - 110 mg/dL Final     BUN " "  Date Value Ref Range Status   06/28/2024 41 (H) 8 - 23 mg/dL Final     Creatinine   Date Value Ref Range Status   06/28/2024 1.4 0.5 - 1.4 mg/dL Final     Calcium   Date Value Ref Range Status   06/28/2024 9.5 8.7 - 10.5 mg/dL Final     Total Protein   Date Value Ref Range Status   08/21/2023 8.0 6.0 - 8.4 g/dL Final     Albumin   Date Value Ref Range Status   08/21/2023 3.9 3.5 - 5.2 g/dL Final     Total Bilirubin   Date Value Ref Range Status   08/21/2023 1.0 0.1 - 1.0 mg/dL Final     Comment:     For infants and newborns, interpretation of results should be based  on gestational age, weight and in agreement with clinical  observations.    Premature Infant recommended reference ranges:  Up to 24 hours.............<8.0 mg/dL  Up to 48 hours............<12.0 mg/dL  3-5 days..................<15.0 mg/dL  6-29 days.................<15.0 mg/dL       Alkaline Phosphatase   Date Value Ref Range Status   08/21/2023 70 55 - 135 U/L Final     AST   Date Value Ref Range Status   08/21/2023 22 10 - 40 U/L Final     ALT   Date Value Ref Range Status   08/21/2023 18 10 - 44 U/L Final     Anion Gap   Date Value Ref Range Status   06/28/2024 5 (L) 8 - 16 mmol/L Final     eGFR if non    Date Value Ref Range Status   05/28/2021 30 (L) >59 mL/min/1.73 Final      BMP  Lab Results   Component Value Date     06/28/2024    K 4.6 06/28/2024     06/28/2024    CO2 29 06/28/2024    BUN 41 (H) 06/28/2024    CREATININE 1.4 06/28/2024    CALCIUM 9.5 06/28/2024    ANIONGAP 5 (L) 06/28/2024    EGFRNONAA 30 (L) 05/28/2021      BNP  @LABRCNTIP(BNP,BNPTRIAGEBLO)@   Lab Results   Component Value Date    CHOL 152 06/02/2022     Lab Results   Component Value Date    HDL 51 06/02/2022     Lab Results   Component Value Date    LDLCALC 88 06/02/2022     Lab Results   Component Value Date    TRIG 66 06/02/2022     No results found for: "CHOLHDL"   Lab Results   Component Value Date    TSH 0.684 03/27/2024    FREET4 1.21 " "08/26/2023     No results found for: "LABA1C", "HGBA1C"  Lab Results   Component Value Date    WBC 7.90 06/28/2024    HGB 12.3 06/28/2024    HCT 38.8 06/28/2024     (H) 06/28/2024     06/28/2024         Results for orders placed during the hospital encounter of 08/21/23    Echo    Interpretation Summary    Left Ventricle: The left ventricle is mildly dilated. Mildly increased ventricular mass. Mildly increased wall thickness. There is mild concentric hypertrophy. Normal wall motion. Septal flattening in systole consistent with right ventricular pressure overload. There is low normal systolic function. EF 50% with patient in sinus rhythm and heart rate of 52 There is diastolic dysfunction but grade cannot be determined. Elevated left ventricular filling pressure.    Right Ventricle: Normal right ventricular cavity size. Wall thickness is normal. Right ventricle wall motion  is normal. Systolic function is normal.    Mitral Valve: There is no stenosis.    Tricuspid Valve: There is mild to moderate regurgitation.    Pulmonary Artery: There is mild pulmonary hypertension. The estimated pulmonary artery systolic pressure is 58 mmHg.    IVC/SVC: Elevated venous pressure at 15 mmHg.    Pericardium: There is a small posterior effusion. Pericardial effusion is echolucent. No indication of cardiac tamponade.    Mean left atrial pressure slightly increased at 12 mm Hg     No results found for this or any previous visit.     EKG  Results for orders placed or performed during the hospital encounter of 08/21/23   EKG 12-lead    Collection Time: 08/27/23 10:21 AM    Narrative    Test Reason : I49.9,    Vent. Rate : 057 BPM     Atrial Rate : 057 BPM     P-R Int : 178 ms          QRS Dur : 146 ms      QT Int : 436 ms       P-R-T Axes : 040 052 -08 degrees     QTc Int : 424 ms    Sinus bradycardia  Right bundle branch block  T wave abnormality, consider lateral ischemia  Abnormal ECG  When compared with ECG of " 24-AUG-2023 12:23,  Sinus rhythm has replaced Atrial fibrillation  Vent. rate has decreased BY  66 BPM  T wave inversion now evident in Lateral leads  Confirmed by Pepe VILLARREAL, Sean GOLD ([id number]) on 8/30/2023 12:12:50 PM    Referred By: SHIVA   SELF           Confirmed By:Sean Cantu MD      Stress  No results found for this or any previous visit.             Assessment:       (HFpEF) heart failure with preserved ejection fraction  Clinically she is compensated on Entresto spironolactone and metoprolol.  Her renal function is stable her BNP has improved    Essential hypertension  Controlled on Entresto metoprolol and spironolactone    History of atrial fibrillation  On 25 mg of Toprol-XL    CKD (chronic kidney disease), stage III  Stable    Postablative hypothyroidism  On thyroid replacement       Plan:       She has been encouraged to walk with her walker around the house.  She is to continue the Toprol-XL for the atrial fibrillation.  She has to continue the thyroid replacement.  She is to continue Entresto metoprolol and spironolactone for her heart failure.  She will be seen in the office in 3 months

## 2024-07-02 NOTE — ASSESSMENT & PLAN NOTE
Clinically she is compensated on Entresto spironolactone and metoprolol.  Her renal function is stable her BNP has improved

## 2024-07-05 LAB
OHS QRS DURATION: 150 MS
OHS QTC CALCULATION: 454 MS

## 2024-09-10 RX ORDER — SACUBITRIL AND VALSARTAN 24; 26 MG/1; MG/1
1 TABLET, FILM COATED ORAL 2 TIMES DAILY
Qty: 180 TABLET | Refills: 11 | Status: SHIPPED | OUTPATIENT
Start: 2024-09-10

## 2024-09-25 ENCOUNTER — TELEPHONE (OUTPATIENT)
Dept: CARDIOLOGY | Facility: CLINIC | Age: 89
End: 2024-09-25
Payer: MEDICARE

## 2024-09-25 NOTE — TELEPHONE ENCOUNTER
----- Message from Yanet Quan sent at 9/25/2024 11:55 AM CDT -----  Contact: Son  Type: Needs Medical Advice  Who Called:  Adarsh/Son    Best Call Back Number: [phone]  Additional Information: States he would like to speak with office regarding pt says he think she may need to be placed back with oxygen.Please call back and advise

## 2024-09-28 ENCOUNTER — HOSPITAL ENCOUNTER (INPATIENT)
Facility: HOSPITAL | Age: 89
LOS: 2 days | Discharge: HOME | DRG: 291 | End: 2024-09-30
Attending: EMERGENCY MEDICINE | Admitting: STUDENT IN AN ORGANIZED HEALTH CARE EDUCATION/TRAINING PROGRAM
Payer: MEDICARE

## 2024-09-28 DIAGNOSIS — R79.89 ELEVATED TROPONIN: ICD-10-CM

## 2024-09-28 DIAGNOSIS — I50.9 ACUTE ON CHRONIC CONGESTIVE HEART FAILURE, UNSPECIFIED HEART FAILURE TYPE: Primary | ICD-10-CM

## 2024-09-28 DIAGNOSIS — E83.42 HYPOMAGNESEMIA: ICD-10-CM

## 2024-09-28 DIAGNOSIS — R06.02 SOB (SHORTNESS OF BREATH): ICD-10-CM

## 2024-09-28 DIAGNOSIS — R07.9 CHEST PAIN: ICD-10-CM

## 2024-09-28 LAB
ALBUMIN SERPL BCP-MCNC: 4 G/DL (ref 3.5–5.2)
ALP SERPL-CCNC: 69 U/L (ref 55–135)
ALT SERPL W/O P-5'-P-CCNC: 12 U/L (ref 10–44)
ANION GAP SERPL CALC-SCNC: 9 MMOL/L (ref 8–16)
AST SERPL-CCNC: 15 U/L (ref 10–40)
BACTERIA #/AREA URNS HPF: ABNORMAL /HPF
BASOPHILS # BLD AUTO: 0.05 K/UL (ref 0–0.2)
BASOPHILS NFR BLD: 0.6 % (ref 0–1.9)
BILIRUB SERPL-MCNC: 0.9 MG/DL (ref 0.1–1)
BILIRUB UR QL STRIP: NEGATIVE
BNP SERPL-MCNC: 3439 PG/ML (ref 0–99)
BUN SERPL-MCNC: 27 MG/DL (ref 8–23)
CALCIUM SERPL-MCNC: 9.9 MG/DL (ref 8.7–10.5)
CHLORIDE SERPL-SCNC: 104 MMOL/L (ref 95–110)
CLARITY UR: ABNORMAL
CO2 SERPL-SCNC: 25 MMOL/L (ref 23–29)
COLOR UR: YELLOW
CREAT SERPL-MCNC: 1.4 MG/DL (ref 0.5–1.4)
DIFFERENTIAL METHOD BLD: ABNORMAL
EOSINOPHIL # BLD AUTO: 0.2 K/UL (ref 0–0.5)
EOSINOPHIL NFR BLD: 1.9 % (ref 0–8)
ERYTHROCYTE [DISTWIDTH] IN BLOOD BY AUTOMATED COUNT: 14.1 % (ref 11.5–14.5)
EST. GFR  (NO RACE VARIABLE): 35.7 ML/MIN/1.73 M^2
GLUCOSE SERPL-MCNC: 120 MG/DL (ref 70–110)
GLUCOSE UR QL STRIP: NEGATIVE
HCT VFR BLD AUTO: 37.8 % (ref 37–48.5)
HGB BLD-MCNC: 12.2 G/DL (ref 12–16)
HGB UR QL STRIP: NEGATIVE
HYALINE CASTS #/AREA URNS LPF: 0 /LPF
IMM GRANULOCYTES # BLD AUTO: 0.01 K/UL (ref 0–0.04)
IMM GRANULOCYTES NFR BLD AUTO: 0.1 % (ref 0–0.5)
INR PPP: 1 (ref 0.8–1.2)
KETONES UR QL STRIP: NEGATIVE
LEUKOCYTE ESTERASE UR QL STRIP: ABNORMAL
LYMPHOCYTES # BLD AUTO: 1.6 K/UL (ref 1–4.8)
LYMPHOCYTES NFR BLD: 19.3 % (ref 18–48)
MAGNESIUM SERPL-MCNC: 1.5 MG/DL (ref 1.6–2.6)
MCH RBC QN AUTO: 33.7 PG (ref 27–31)
MCHC RBC AUTO-ENTMCNC: 32.3 G/DL (ref 32–36)
MCV RBC AUTO: 104 FL (ref 82–98)
MICROSCOPIC COMMENT: ABNORMAL
MONOCYTES # BLD AUTO: 0.8 K/UL (ref 0.3–1)
MONOCYTES NFR BLD: 9.5 % (ref 4–15)
NEUTROPHILS # BLD AUTO: 5.8 K/UL (ref 1.8–7.7)
NEUTROPHILS NFR BLD: 68.6 % (ref 38–73)
NITRITE UR QL STRIP: NEGATIVE
NRBC BLD-RTO: 0 /100 WBC
PH UR STRIP: 6 [PH] (ref 5–8)
PLATELET # BLD AUTO: 220 K/UL (ref 150–450)
PMV BLD AUTO: 10.2 FL (ref 9.2–12.9)
POTASSIUM SERPL-SCNC: 4.2 MMOL/L (ref 3.5–5.1)
PROT SERPL-MCNC: 7.3 G/DL (ref 6–8.4)
PROT UR QL STRIP: ABNORMAL
PROTHROMBIN TIME: 11.6 SEC (ref 9–12.5)
RBC # BLD AUTO: 3.62 M/UL (ref 4–5.4)
RBC #/AREA URNS HPF: 3 /HPF (ref 0–4)
SODIUM SERPL-SCNC: 138 MMOL/L (ref 136–145)
SP GR UR STRIP: 1.02 (ref 1–1.03)
SQUAMOUS #/AREA URNS HPF: 14 /HPF
TROPONIN I SERPL HS-MCNC: 60.6 PG/ML (ref 0–14.9)
TSH SERPL DL<=0.005 MIU/L-ACNC: 0.73 UIU/ML (ref 0.34–5.6)
URN SPEC COLLECT METH UR: ABNORMAL
UROBILINOGEN UR STRIP-ACNC: NEGATIVE EU/DL
WBC # BLD AUTO: 8.44 K/UL (ref 3.9–12.7)
WBC #/AREA URNS HPF: 27 /HPF (ref 0–5)

## 2024-09-28 PROCEDURE — 63600175 PHARM REV CODE 636 W HCPCS: Performed by: EMERGENCY MEDICINE

## 2024-09-28 PROCEDURE — 25000003 PHARM REV CODE 250: Performed by: EMERGENCY MEDICINE

## 2024-09-28 PROCEDURE — 87086 URINE CULTURE/COLONY COUNT: CPT | Performed by: EMERGENCY MEDICINE

## 2024-09-28 PROCEDURE — 96365 THER/PROPH/DIAG IV INF INIT: CPT

## 2024-09-28 PROCEDURE — 94799 UNLISTED PULMONARY SVC/PX: CPT

## 2024-09-28 PROCEDURE — 83880 ASSAY OF NATRIURETIC PEPTIDE: CPT | Performed by: EMERGENCY MEDICINE

## 2024-09-28 PROCEDURE — 99900035 HC TECH TIME PER 15 MIN (STAT)

## 2024-09-28 PROCEDURE — 94761 N-INVAS EAR/PLS OXIMETRY MLT: CPT

## 2024-09-28 PROCEDURE — 81001 URINALYSIS AUTO W/SCOPE: CPT | Performed by: EMERGENCY MEDICINE

## 2024-09-28 PROCEDURE — 99406 BEHAV CHNG SMOKING 3-10 MIN: CPT

## 2024-09-28 PROCEDURE — 93005 ELECTROCARDIOGRAM TRACING: CPT | Performed by: GENERAL PRACTICE

## 2024-09-28 PROCEDURE — 96375 TX/PRO/DX INJ NEW DRUG ADDON: CPT

## 2024-09-28 PROCEDURE — 93010 ELECTROCARDIOGRAM REPORT: CPT | Mod: ,,, | Performed by: GENERAL PRACTICE

## 2024-09-28 PROCEDURE — 80053 COMPREHEN METABOLIC PANEL: CPT | Performed by: EMERGENCY MEDICINE

## 2024-09-28 PROCEDURE — 85025 COMPLETE CBC W/AUTO DIFF WBC: CPT | Performed by: EMERGENCY MEDICINE

## 2024-09-28 PROCEDURE — 83735 ASSAY OF MAGNESIUM: CPT | Performed by: EMERGENCY MEDICINE

## 2024-09-28 PROCEDURE — 85610 PROTHROMBIN TIME: CPT | Performed by: EMERGENCY MEDICINE

## 2024-09-28 PROCEDURE — 27000221 HC OXYGEN, UP TO 24 HOURS

## 2024-09-28 PROCEDURE — 99900031 HC PATIENT EDUCATION (STAT)

## 2024-09-28 PROCEDURE — 84443 ASSAY THYROID STIM HORMONE: CPT | Performed by: EMERGENCY MEDICINE

## 2024-09-28 PROCEDURE — 84484 ASSAY OF TROPONIN QUANT: CPT | Performed by: EMERGENCY MEDICINE

## 2024-09-28 PROCEDURE — 21400001 HC TELEMETRY ROOM

## 2024-09-28 PROCEDURE — 99291 CRITICAL CARE FIRST HOUR: CPT

## 2024-09-28 RX ORDER — FUROSEMIDE 10 MG/ML
40 INJECTION INTRAMUSCULAR; INTRAVENOUS ONCE
Status: DISCONTINUED | OUTPATIENT
Start: 2024-09-28 | End: 2024-09-28

## 2024-09-28 RX ORDER — SPIRONOLACTONE 25 MG/1
25 TABLET ORAL ONCE
Status: COMPLETED | OUTPATIENT
Start: 2024-09-28 | End: 2024-09-28

## 2024-09-28 RX ORDER — METOPROLOL SUCCINATE 25 MG/1
25 TABLET, EXTENDED RELEASE ORAL ONCE
Status: COMPLETED | OUTPATIENT
Start: 2024-09-28 | End: 2024-09-28

## 2024-09-28 RX ORDER — ACETAMINOPHEN 325 MG/1
650 TABLET ORAL EVERY 4 HOURS PRN
Status: DISCONTINUED | OUTPATIENT
Start: 2024-09-28 | End: 2024-09-30 | Stop reason: HOSPADM

## 2024-09-28 RX ORDER — SPIRONOLACTONE 25 MG/1
25 TABLET ORAL EVERY OTHER DAY
Status: DISCONTINUED | OUTPATIENT
Start: 2024-09-30 | End: 2024-09-30 | Stop reason: HOSPADM

## 2024-09-28 RX ORDER — SODIUM,POTASSIUM PHOSPHATES 280-250MG
2 POWDER IN PACKET (EA) ORAL
Status: DISCONTINUED | OUTPATIENT
Start: 2024-09-28 | End: 2024-09-30 | Stop reason: HOSPADM

## 2024-09-28 RX ORDER — GLUCAGON 1 MG
1 KIT INJECTION
Status: DISCONTINUED | OUTPATIENT
Start: 2024-09-28 | End: 2024-09-30 | Stop reason: HOSPADM

## 2024-09-28 RX ORDER — ENOXAPARIN SODIUM 100 MG/ML
40 INJECTION SUBCUTANEOUS EVERY 24 HOURS
Status: DISCONTINUED | OUTPATIENT
Start: 2024-09-28 | End: 2024-09-28

## 2024-09-28 RX ORDER — FUROSEMIDE 10 MG/ML
20 INJECTION INTRAMUSCULAR; INTRAVENOUS
Status: COMPLETED | OUTPATIENT
Start: 2024-09-28 | End: 2024-09-28

## 2024-09-28 RX ORDER — METOPROLOL SUCCINATE 25 MG/1
25 TABLET, EXTENDED RELEASE ORAL DAILY
Status: DISCONTINUED | OUTPATIENT
Start: 2024-09-29 | End: 2024-09-30 | Stop reason: HOSPADM

## 2024-09-28 RX ORDER — CALCIUM CARBONATE 200(500)MG
1000 TABLET,CHEWABLE ORAL 3 TIMES DAILY PRN
Status: DISCONTINUED | OUTPATIENT
Start: 2024-09-28 | End: 2024-09-30 | Stop reason: HOSPADM

## 2024-09-28 RX ORDER — ASPIRIN 81 MG/1
81 TABLET ORAL DAILY
Status: DISCONTINUED | OUTPATIENT
Start: 2024-09-29 | End: 2024-09-30 | Stop reason: HOSPADM

## 2024-09-28 RX ORDER — SODIUM CHLORIDE 0.9 % (FLUSH) 0.9 %
10 SYRINGE (ML) INJECTION
Status: DISCONTINUED | OUTPATIENT
Start: 2024-09-28 | End: 2024-09-30 | Stop reason: HOSPADM

## 2024-09-28 RX ORDER — ONDANSETRON HYDROCHLORIDE 2 MG/ML
4 INJECTION, SOLUTION INTRAVENOUS EVERY 6 HOURS PRN
Status: DISCONTINUED | OUTPATIENT
Start: 2024-09-28 | End: 2024-09-30 | Stop reason: HOSPADM

## 2024-09-28 RX ORDER — IBUPROFEN 200 MG
24 TABLET ORAL
Status: DISCONTINUED | OUTPATIENT
Start: 2024-09-28 | End: 2024-09-30 | Stop reason: HOSPADM

## 2024-09-28 RX ORDER — ENOXAPARIN SODIUM 100 MG/ML
30 INJECTION SUBCUTANEOUS EVERY 24 HOURS
Status: DISCONTINUED | OUTPATIENT
Start: 2024-09-28 | End: 2024-09-30 | Stop reason: HOSPADM

## 2024-09-28 RX ORDER — IBUPROFEN 200 MG
16 TABLET ORAL
Status: DISCONTINUED | OUTPATIENT
Start: 2024-09-28 | End: 2024-09-30 | Stop reason: HOSPADM

## 2024-09-28 RX ORDER — LANOLIN ALCOHOL/MO/W.PET/CERES
800 CREAM (GRAM) TOPICAL
Status: DISCONTINUED | OUTPATIENT
Start: 2024-09-28 | End: 2024-09-30 | Stop reason: HOSPADM

## 2024-09-28 RX ORDER — MAGNESIUM SULFATE 1 G/100ML
1 INJECTION INTRAVENOUS ONCE
Status: COMPLETED | OUTPATIENT
Start: 2024-09-28 | End: 2024-09-28

## 2024-09-28 RX ORDER — PANTOPRAZOLE SODIUM 40 MG/1
40 TABLET, DELAYED RELEASE ORAL DAILY
Status: DISCONTINUED | OUTPATIENT
Start: 2024-09-29 | End: 2024-09-30 | Stop reason: HOSPADM

## 2024-09-28 RX ORDER — LEVOTHYROXINE SODIUM 100 UG/1
100 TABLET ORAL
Status: DISCONTINUED | OUTPATIENT
Start: 2024-09-29 | End: 2024-09-30 | Stop reason: HOSPADM

## 2024-09-28 RX ORDER — FUROSEMIDE 10 MG/ML
40 INJECTION INTRAMUSCULAR; INTRAVENOUS EVERY 12 HOURS
Status: DISCONTINUED | OUTPATIENT
Start: 2024-09-29 | End: 2024-09-30

## 2024-09-28 RX ADMIN — FUROSEMIDE 20 MG: 10 INJECTION, SOLUTION INTRAMUSCULAR; INTRAVENOUS at 08:09

## 2024-09-28 RX ADMIN — MAGNESIUM SULFATE HEPTAHYDRATE 1 G: 1 INJECTION, SOLUTION INTRAVENOUS at 08:09

## 2024-09-28 RX ADMIN — SPIRONOLACTONE 25 MG: 25 TABLET ORAL at 07:09

## 2024-09-28 RX ADMIN — SACUBITRIL AND VALSARTAN 1 TABLET: 24; 26 TABLET, FILM COATED ORAL at 07:09

## 2024-09-28 RX ADMIN — METOPROLOL SUCCINATE 25 MG: 25 TABLET, EXTENDED RELEASE ORAL at 07:09

## 2024-09-29 PROBLEM — I48.0 PAROXYSMAL A-FIB: Status: ACTIVE | Noted: 2024-09-29

## 2024-09-29 PROBLEM — I50.31 ACUTE HEART FAILURE WITH PRESERVED EJECTION FRACTION (HFPEF): Status: ACTIVE | Noted: 2024-09-29

## 2024-09-29 PROBLEM — R79.89 ELEVATED TROPONIN: Status: ACTIVE | Noted: 2024-09-29

## 2024-09-29 LAB
ALBUMIN SERPL BCP-MCNC: 3.8 G/DL (ref 3.5–5.2)
ALP SERPL-CCNC: 67 U/L (ref 55–135)
ALT SERPL W/O P-5'-P-CCNC: 10 U/L (ref 10–44)
ANION GAP SERPL CALC-SCNC: 9 MMOL/L (ref 8–16)
AST SERPL-CCNC: 14 U/L (ref 10–40)
BASOPHILS # BLD AUTO: 0.05 K/UL (ref 0–0.2)
BASOPHILS NFR BLD: 0.5 % (ref 0–1.9)
BILIRUB SERPL-MCNC: 1 MG/DL (ref 0.1–1)
BUN SERPL-MCNC: 27 MG/DL (ref 8–23)
CALCIUM SERPL-MCNC: 9.6 MG/DL (ref 8.7–10.5)
CHLORIDE SERPL-SCNC: 104 MMOL/L (ref 95–110)
CO2 SERPL-SCNC: 25 MMOL/L (ref 23–29)
CREAT SERPL-MCNC: 1.5 MG/DL (ref 0.5–1.4)
DIFFERENTIAL METHOD BLD: ABNORMAL
EOSINOPHIL # BLD AUTO: 0.2 K/UL (ref 0–0.5)
EOSINOPHIL NFR BLD: 1.6 % (ref 0–8)
ERYTHROCYTE [DISTWIDTH] IN BLOOD BY AUTOMATED COUNT: 14.1 % (ref 11.5–14.5)
EST. GFR  (NO RACE VARIABLE): 32.9 ML/MIN/1.73 M^2
GLUCOSE SERPL-MCNC: 137 MG/DL (ref 70–110)
HCT VFR BLD AUTO: 36.9 % (ref 37–48.5)
HGB BLD-MCNC: 11.7 G/DL (ref 12–16)
IMM GRANULOCYTES # BLD AUTO: 0.03 K/UL (ref 0–0.04)
IMM GRANULOCYTES NFR BLD AUTO: 0.3 % (ref 0–0.5)
LYMPHOCYTES # BLD AUTO: 1.8 K/UL (ref 1–4.8)
LYMPHOCYTES NFR BLD: 17.3 % (ref 18–48)
MAGNESIUM SERPL-MCNC: 1.9 MG/DL (ref 1.6–2.6)
MCH RBC QN AUTO: 32.6 PG (ref 27–31)
MCHC RBC AUTO-ENTMCNC: 31.7 G/DL (ref 32–36)
MCV RBC AUTO: 103 FL (ref 82–98)
MONOCYTES # BLD AUTO: 1 K/UL (ref 0.3–1)
MONOCYTES NFR BLD: 10.1 % (ref 4–15)
NEUTROPHILS # BLD AUTO: 7.1 K/UL (ref 1.8–7.7)
NEUTROPHILS NFR BLD: 70.2 % (ref 38–73)
NRBC BLD-RTO: 0 /100 WBC
OHS QRS DURATION: 128 MS
OHS QTC CALCULATION: 493 MS
PLATELET # BLD AUTO: 215 K/UL (ref 150–450)
PMV BLD AUTO: 11.2 FL (ref 9.2–12.9)
POTASSIUM SERPL-SCNC: 4.4 MMOL/L (ref 3.5–5.1)
PROT SERPL-MCNC: 7.1 G/DL (ref 6–8.4)
RBC # BLD AUTO: 3.59 M/UL (ref 4–5.4)
SODIUM SERPL-SCNC: 138 MMOL/L (ref 136–145)
TROPONIN I SERPL HS-MCNC: 90 PG/ML (ref 0–14.9)
WBC # BLD AUTO: 10.12 K/UL (ref 3.9–12.7)

## 2024-09-29 PROCEDURE — 80053 COMPREHEN METABOLIC PANEL: CPT | Performed by: STUDENT IN AN ORGANIZED HEALTH CARE EDUCATION/TRAINING PROGRAM

## 2024-09-29 PROCEDURE — 85025 COMPLETE CBC W/AUTO DIFF WBC: CPT | Performed by: STUDENT IN AN ORGANIZED HEALTH CARE EDUCATION/TRAINING PROGRAM

## 2024-09-29 PROCEDURE — 63600175 PHARM REV CODE 636 W HCPCS: Performed by: STUDENT IN AN ORGANIZED HEALTH CARE EDUCATION/TRAINING PROGRAM

## 2024-09-29 PROCEDURE — 25000003 PHARM REV CODE 250: Performed by: STUDENT IN AN ORGANIZED HEALTH CARE EDUCATION/TRAINING PROGRAM

## 2024-09-29 PROCEDURE — 84484 ASSAY OF TROPONIN QUANT: CPT | Performed by: STUDENT IN AN ORGANIZED HEALTH CARE EDUCATION/TRAINING PROGRAM

## 2024-09-29 PROCEDURE — 94761 N-INVAS EAR/PLS OXIMETRY MLT: CPT

## 2024-09-29 PROCEDURE — 36415 COLL VENOUS BLD VENIPUNCTURE: CPT | Performed by: STUDENT IN AN ORGANIZED HEALTH CARE EDUCATION/TRAINING PROGRAM

## 2024-09-29 PROCEDURE — 27000221 HC OXYGEN, UP TO 24 HOURS

## 2024-09-29 PROCEDURE — 21400001 HC TELEMETRY ROOM

## 2024-09-29 PROCEDURE — 83735 ASSAY OF MAGNESIUM: CPT | Performed by: STUDENT IN AN ORGANIZED HEALTH CARE EDUCATION/TRAINING PROGRAM

## 2024-09-29 RX ORDER — HYDRALAZINE HYDROCHLORIDE 20 MG/ML
10 INJECTION INTRAMUSCULAR; INTRAVENOUS ONCE
Status: COMPLETED | OUTPATIENT
Start: 2024-09-29 | End: 2024-09-29

## 2024-09-29 RX ADMIN — FUROSEMIDE 40 MG: 10 INJECTION, SOLUTION INTRAMUSCULAR; INTRAVENOUS at 09:09

## 2024-09-29 RX ADMIN — HYDRALAZINE HYDROCHLORIDE 10 MG: 20 INJECTION INTRAMUSCULAR; INTRAVENOUS at 04:09

## 2024-09-29 RX ADMIN — SACUBITRIL AND VALSARTAN 1 TABLET: 24; 26 TABLET, FILM COATED ORAL at 09:09

## 2024-09-29 RX ADMIN — BETAXOLOL HYDROCHLORIDE 1 DROP: 2.8 SUSPENSION/ DROPS OPHTHALMIC at 09:09

## 2024-09-29 RX ADMIN — LEVOTHYROXINE SODIUM 100 MCG: 100 TABLET ORAL at 05:09

## 2024-09-29 RX ADMIN — BETAXOLOL HYDROCHLORIDE 1 DROP: 2.8 SUSPENSION/ DROPS OPHTHALMIC at 12:09

## 2024-09-29 RX ADMIN — METOPROLOL SUCCINATE 25 MG: 25 TABLET, FILM COATED, EXTENDED RELEASE ORAL at 09:09

## 2024-09-29 RX ADMIN — ASPIRIN 81 MG: 81 TABLET, COATED ORAL at 09:09

## 2024-09-29 RX ADMIN — ENOXAPARIN SODIUM 30 MG: 30 INJECTION SUBCUTANEOUS at 05:09

## 2024-09-29 NOTE — SUBJECTIVE & OBJECTIVE
Past Medical History:   Diagnosis Date    Atrial fibrillation     Bradycardia     Carotid bruit     CKD (chronic kidney disease), stage III     Hyperlipidemia     OA (osteoarthritis)     Thyroid disease        Past Surgical History:   Procedure Laterality Date    HYSTERECTOMY      KNEE SURGERY Right     SHOULDER SURGERY Right        Review of patient's allergies indicates:   Allergen Reactions    Penicillins Anaphylaxis       No current facility-administered medications on file prior to encounter.     Current Outpatient Medications on File Prior to Encounter   Medication Sig    ascorbic acid, vitamin C, (VITAMIN C) 250 mg Chew Take by mouth.    aspirin (ECOTRIN) 81 MG EC tablet Take 81 mg by mouth once daily.    coenzyme Q10 (CO Q-10) 100 mg capsule Take 100 mg by mouth once daily.    docosahexaenoic acid/epa (FISH OIL ORAL) Take 1 capsule by mouth Daily.    ENTRESTO 24-26 mg per tablet TAKE 1 TABLET BY MOUTH TWICE DAILY    ergocalciferol, vitamin D2, (VITAMIN D ORAL) Take by mouth.    FLAXSEED OIL ORAL Take 1 capsule by mouth Daily.    glucosam/msm/chond/htd302/hyal (GLUCOS-CHOND-MSM, WITH ANTIOX, ORAL) Take 1 capsule by mouth Daily.    metoprolol succinate (TOPROL-XL) 25 MG 24 hr tablet Take 1 tablet (25 mg total) by mouth once daily.    multivitamin (THERAGRAN) per tablet Take 1 tablet by mouth once daily.    betaxolol 0.5% (BETOPTIC-S) 0.5 % Drop Place 1 drop into both eyes 2 (two) times daily.    levothyroxine (SYNTHROID) 100 MCG tablet TAKE 1 TABLET BY MOUTH EVERY DAY    spironolactone (ALDACTONE) 25 MG tablet Take 1 tablet (25 mg total) by mouth every other day.    triamcinolone acetonide 0.1% (KENALOG) 0.1 % cream Apply topically 2 (two) times daily.     Family History       Problem Relation (Age of Onset)    Heart disease Father          Tobacco Use    Smoking status: Never    Smokeless tobacco: Never   Substance and Sexual Activity    Alcohol use: Never    Drug use: Never    Sexual activity: Not on file      Review of Systems      Constitutional:  Negative for fever, chills, generalized weakness, appetite change  HENT:  Negative for congestion, sore throat  Eyes:  Negative for redness, discharge  Respiratory:  Positive for cough and shortness of breath  Cardiovascular:  Negative for chest pain, palpitation  Gastrointestinal:  Negative for abdominal pain, nausea, vomiting, diarrhea  Genitourinary:  Negative for flank pain, difficulty urination  Musculoskeletal:  Negative for arthralgia, myalgia  Skin:  Negative for color change  Neuro:  Negative for dizziness, focal weakness  Psychiatric:  Negative for agitation, confusion    Objective:     Vital Signs (Most Recent):  Temp: 97.4 °F (36.3 °C) (09/28/24 1911)  Pulse: 74 (09/28/24 2047)  Resp: 20 (09/28/24 2047)  BP: (!) 175/75 (09/28/24 1959)  SpO2: 95 % (09/28/24 2047) Vital Signs (24h Range):  Temp:  [97.4 °F (36.3 °C)] 97.4 °F (36.3 °C)  Pulse:  [70-90] 74  Resp:  [20-28] 20  SpO2:  [90 %-96 %] 95 %  BP: (175-189)/(75-99) 175/75     Weight: 73.9 kg (163 lb)  Body mass index is 29.81 kg/m².     Physical Exam       General:  Awake, alert, not in apparent distress  Head:  NC/AT  HEENT:  PERRLA, EOMI, no pallor or icterus               Oral mucosa moist without exudates or erythema  Neck:  Supple, no JVD, no lymphadenopathy  Chest:  S1-S2 normal, no M/G/R  Respiratory:  Normal vesicular breath sounds with bibasilar crackle  Abdomen:  Soft, nondistended, nontender, no organomegaly, bowel sounds present  Extremities:  2+ pitting edema of bilateral lower extremities present  Neuro:  Awake, alert, oriented x3, grossly intact motor and sensory exam  Psychiatric:  Normal mood and affect        Significant Labs: All pertinent labs within the past 24 hours have been reviewed.  Recent Lab Results         09/28/24 1941 09/28/24 1931        Albumin   4.0       ALP   69       ALT   12       Anion Gap   9       Appearance, UA Hazy         AST   15       Bacteria, UA Rare          Baso #   0.05       Basophil %   0.6       Bilirubin (UA) Negative         BILIRUBIN TOTAL   0.9  Comment: For infants and newborns, interpretation of results should be based  on gestational age, weight and in agreement with clinical  observations.    Premature Infant recommended reference ranges:  Up to 24 hours.............<8.0 mg/dL  Up to 48 hours............<12.0 mg/dL  3-5 days..................<15.0 mg/dL  6-29 days.................<15.0 mg/dL         BNP   3,439  Comment: Values of less than 100 pg/ml are consistent with non-CHF populations.       BUN   27       Calcium   9.9       Chloride   104       CO2   25       Color, UA Yellow         Creatinine   1.4       Differential Method   Automated       eGFR   35.7       Eos #   0.2       Eos %   1.9       Glucose   120       Glucose, UA Negative         Gran # (ANC)   5.8       Gran %   68.6       Hematocrit   37.8       Hemoglobin   12.2       Hyaline Casts, UA 0         Immature Grans (Abs)   0.01  Comment: Mild elevation in immature granulocytes is non specific and   can be seen in a variety of conditions including stress response,   acute inflammation, trauma and pregnancy. Correlation with other   laboratory and clinical findings is essential.         Immature Granulocytes   0.1       INR   1.0  Comment: Coumadin Therapy:  2.0 - 3.0 for INR for all indicators except mechanical heart valves  and antiphospholipid syndromes which should use 2.5 - 3.5.         Ketones, UA Negative         Leukocyte Esterase, UA 3+         Lymph #   1.6       Lymph %   19.3       Magnesium    1.5       MCH   33.7       MCHC   32.3       MCV   104       Microscopic Comment SEE COMMENT  Comment: Other formed elements not mentioned in the report are not   present in the microscopic examination.            Mono #   0.8       Mono %   9.5       MPV   10.2       NITRITE UA Negative         nRBC   0       Blood, UA Negative         pH, UA 6.0         Platelet Count   220        Potassium   4.2       PROTEIN TOTAL   7.3       Protein, UA 2+  Comment: Recommend a 24 hour urine protein or a urine   protein/creatinine ratio if globulin induced proteinuria is  clinically suspected.           PT   11.6       RBC   3.62       RBC, UA 3         RDW   14.1       Sodium   138       Spec Grav UA 1.020         Specimen UA Urine, Clean Catch         Squam Epithel, UA 14         Troponin I High Sensitivity   60.6  Comment: Troponin results differ between methods. Do not use   results between Troponin methods interchangeably.    Alkaline Phospatase levels above 400 U/L may   cause false positive results.    Access hsTnI should not be used for patients taking   Asfotase isaac (Strensiq).  Critical result TNIHS 60.6 pg/mL called to and read back by Shawna Gutierres RN ER at 28-Sep-2024 20:14 [facility].         TSH   0.726       UROBILINOGEN UA Negative         WBC, UA 27         WBC   8.44               Significant Imaging: I have reviewed all pertinent imaging results/findings within the past 24 hours.  I have reviewed and interpreted all pertinent imaging results/findings within the past 24 hours.

## 2024-09-29 NOTE — NURSING
Nurses Note -- 4 Eyes      9/28/2024   11:46 PM      Skin assessed during: Admit      [x] No Altered Skin Integrity Present    [x]Prevention Measures Documented      [] Yes- Altered Skin Integrity Present or Discovered   [] LDA Added if Not in Epic (Describe Wound)   [] New Altered Skin Integrity was Present on Admit and Documented in LDA   [] Wound Image Taken    Wound Care Consulted? No    Attending Nurse:  Yolanda Castano RN/Staff Member:   Nithya Coronado

## 2024-09-29 NOTE — ASSESSMENT & PLAN NOTE
Much improved today   Continue Lasix 40 mg b.i.d.   Continue Entresto, Aldactone, metoprolol  Monitor I&O

## 2024-09-29 NOTE — ED NOTES
Pt resting on stretcher NADN, respirations even and unlabored. Endorses improvement on 2L NC at this time. AAOx4, GCS 15. Family at bedside.  Pt states has been having difficulties with speech over past few months intermittnetly with worsening over past 3 days. Patient hypertensive on arrival to ED, states had not taken her daily metoprolol today.

## 2024-09-29 NOTE — NURSING
Patient arrived to unit via stretcher from ED. Patient scooted over to bed with assistance. Tele box in place. Patient oriented to room, bed in lowest position, wheels locked, bed rails up x2, bed alarm initiated, call light and bedside table within reach. Safety measures addressed.

## 2024-09-29 NOTE — PLAN OF CARE
SW attempted to meet with pt at bedside to do d/c assessment. Pt was sleeping and did not want to do discharge assessment at this time.     09/29/24 1037   Discharge Assessment   Assessment Type Discharge Planning Assessment   Prior to hospitilization cognitive status: Unable to Assess   Current cognitive status: Unable to Assess

## 2024-09-29 NOTE — SUBJECTIVE & OBJECTIVE
Interval History:  Patient was seen and examined at bedside this morning.  She reports that her breathing is much improved today.  Continue Lasix, possible discharge tomorrow.    Review of Systems   All other systems reviewed and are negative.    Objective:     Vital Signs (Most Recent):  Temp: 97.9 °F (36.6 °C) (09/29/24 1126)  Pulse: 71 (09/29/24 1126)  Resp: 20 (09/29/24 1126)  BP: 137/74 (09/29/24 1126)  SpO2: 97 % (09/29/24 1126) Vital Signs (24h Range):  Temp:  [97.4 °F (36.3 °C)-98.5 °F (36.9 °C)] 97.9 °F (36.6 °C)  Pulse:  [44-90] 71  Resp:  [19-28] 20  SpO2:  [90 %-97 %] 97 %  BP: (137-189)/(74-99) 137/74     Weight: 69.1 kg (152 lb 5.4 oz)  Body mass index is 27.86 kg/m².    Intake/Output Summary (Last 24 hours) at 9/29/2024 1153  Last data filed at 9/29/2024 0517  Gross per 24 hour   Intake 300 ml   Output 300 ml   Net 0 ml         Physical Exam  Vitals and nursing note reviewed.   Constitutional:       Appearance: She is well-developed.   HENT:      Head: Normocephalic and atraumatic.      Right Ear: External ear normal.      Left Ear: External ear normal.      Nose: Nose normal.   Eyes:      Conjunctiva/sclera: Conjunctivae normal.      Pupils: Pupils are equal, round, and reactive to light.   Cardiovascular:      Rate and Rhythm: Normal rate and regular rhythm.      Heart sounds: Normal heart sounds.   Pulmonary:      Effort: Pulmonary effort is normal.      Breath sounds: Normal breath sounds.   Abdominal:      General: Bowel sounds are normal.      Palpations: Abdomen is soft.   Musculoskeletal:         General: Normal range of motion.      Cervical back: Normal range of motion and neck supple.   Skin:     General: Skin is dry.      Capillary Refill: Capillary refill takes 2 to 3 seconds.   Neurological:      Mental Status: She is alert and oriented to person, place, and time.   Psychiatric:         Behavior: Behavior normal.         Thought Content: Thought content normal.         Judgment: Judgment  normal.             Significant Labs: All pertinent labs within the past 24 hours have been reviewed.  CBC:   Recent Labs   Lab 09/28/24  1931 09/29/24  0249   WBC 8.44 10.12   HGB 12.2 11.7*   HCT 37.8 36.9*    215     CMP:   Recent Labs   Lab 09/28/24 1931 09/29/24  0248    138   K 4.2 4.4    104   CO2 25 25   * 137*   BUN 27* 27*   CREATININE 1.4 1.5*   CALCIUM 9.9 9.6   PROT 7.3 7.1   ALBUMIN 4.0 3.8   BILITOT 0.9 1.0   ALKPHOS 69 67   AST 15 14   ALT 12 10   ANIONGAP 9 9       Significant Imaging: I have reviewed all pertinent imaging results/findings within the past 24 hours.

## 2024-09-29 NOTE — PROGRESS NOTES
Pharmacist Renal Dose Adjustment Note    Andressa Benedict is a 89 y.o. female being treated with the medication lovenox    Patient Data:    Vital Signs (Most Recent):  Temp: 97.4 °F (36.3 °C) (09/28/24 1911)  Pulse: 74 (09/28/24 2047)  Resp: 20 (09/28/24 2047)  BP: (!) 175/75 (09/28/24 1959)  SpO2: 95 % (09/28/24 2047) Vital Signs (72h Range):  Temp:  [97.4 °F (36.3 °C)]   Pulse:  [70-90]   Resp:  [20-28]   BP: (175-189)/(75-99)   SpO2:  [90 %-96 %]      Recent Labs   Lab 09/28/24 1931   CREATININE 1.4     Serum creatinine: 1.4 mg/dL 09/28/24 1931  Estimated creatinine clearance: 25.1 mL/min    Medication:lovenox dose: 40 mg  frequency q24h will be changed to medication:lovenox dose:30 mg frequency:q24h  Reason: CrCl < 30 mL/min     Pharmacist's Name: Jeffrey Cohn  Pharmacist's Extension: [phone]

## 2024-09-29 NOTE — ASSESSMENT & PLAN NOTE
Chronic, controlled. Latest blood pressure and vitals reviewed-     Temp:  [97.4 °F (36.3 °C)-98.5 °F (36.9 °C)]   Pulse:  [44-90]   Resp:  [19-28]   BP: (137-189)/(74-99)   SpO2:  [90 %-97 %] .   Home meds for hypertension were reviewed and noted below.   Hypertension Medications               ENTRESTO 24-26 mg per tablet TAKE 1 TABLET BY MOUTH TWICE DAILY    metoprolol succinate (TOPROL-XL) 25 MG 24 hr tablet Take 1 tablet (25 mg total) by mouth once daily.    spironolactone (ALDACTONE) 25 MG tablet Take 1 tablet (25 mg total) by mouth every other day.            While in the hospital, will manage blood pressure as follows; Continue home antihypertensive regimen    Will utilize p.r.n. blood pressure medication only if patient's blood pressure greater than 180/110 and she develops symptoms such as worsening chest pain or shortness of breath.

## 2024-09-29 NOTE — ED PROVIDER NOTES
Encounter Date: 9/28/2024       History     Chief Complaint   Patient presents with    Shortness of Breath     Sob that started 2 weeks ago.      HPI  Pt w/ PMHx HFpEF (EF 50% 8/2023) , HTN, afib, CKD, postablative hypothyroidism presents to ED with progressively worsening shortness of breath increasing over a 2 week period.  She states she gets very winded with minimal exertion.  Her legs have been swelling, but not as severe as they has been in the past.  She reports 2-3 pillow orthopnea which is increased from baseline.  She denies any fever.  She has had a lingering dry cough for a couple of weeks.  She denies any chest pain.  Denies abdominal pain.  She did have some nonbloody diarrhea today.  Denies nausea or vomiting.  Denies urinary complaints.  She reports compliance with her medications, but did not take them today due to feeling unwell and having diarrhea.    Review of patient's allergies indicates:   Allergen Reactions    Penicillins Anaphylaxis     Past Medical History:   Diagnosis Date    Atrial fibrillation     Bradycardia     Carotid bruit     CKD (chronic kidney disease), stage III     Hyperlipidemia     OA (osteoarthritis)     Thyroid disease      Past Surgical History:   Procedure Laterality Date    HYSTERECTOMY      KNEE SURGERY Right     SHOULDER SURGERY Right      Family History   Problem Relation Name Age of Onset    Heart disease Father       Social History     Tobacco Use    Smoking status: Never    Smokeless tobacco: Never   Substance Use Topics    Alcohol use: Never    Drug use: Never     Review of Systems   Constitutional:  Positive for activity change and fatigue. Negative for fever.   HENT:  Negative for sore throat.    Respiratory:  Positive for cough and shortness of breath.    Cardiovascular:  Positive for leg swelling. Negative for chest pain.   Gastrointestinal:  Positive for diarrhea. Negative for nausea.   Genitourinary:  Negative for dysuria.   Skin:  Negative for rash.    Neurological:  Negative for weakness.   Hematological:  Does not bruise/bleed easily.       Physical Exam     Initial Vitals [09/28/24 1911]   BP Pulse Resp Temp SpO2   (!) 189/99 90 (!) 21 97.4 °F (36.3 °C) (!) 93 %      MAP       --         Physical Exam    Nursing note and vitals reviewed.  Constitutional: She appears well-developed and well-nourished. No distress.   HENT:   Head: Normocephalic and atraumatic. Mouth/Throat: Oropharynx is clear and moist.   Eyes: EOM are normal. Pupils are equal, round, and reactive to light.   Neck: Neck supple.   Normal range of motion.  Cardiovascular:  Normal rate and regular rhythm.           Pulmonary/Chest: No respiratory distress.   Bibasilar crackles   Abdominal: Abdomen is soft. There is no abdominal tenderness. There is no rebound and no guarding.   Musculoskeletal:         General: Edema (BLE, symmetric) present. No tenderness. Normal range of motion.      Cervical back: Normal range of motion and neck supple.     Neurological: She is alert and oriented to person, place, and time. She has normal strength. No cranial nerve deficit. GCS score is 15. GCS eye subscore is 4. GCS verbal subscore is 5. GCS motor subscore is 6.   Skin: Skin is warm and dry. Capillary refill takes less than 2 seconds. No rash noted.   Psychiatric: She has a normal mood and affect. Thought content normal.         ED Course   Procedures  Labs Reviewed   CBC W/ AUTO DIFFERENTIAL - Abnormal       Result Value    WBC 8.44      RBC 3.62 (*)     Hemoglobin 12.2      Hematocrit 37.8       (*)     MCH 33.7 (*)     MCHC 32.3      RDW 14.1      Platelets 220      MPV 10.2      Immature Granulocytes 0.1      Gran # (ANC) 5.8      Immature Grans (Abs) 0.01      Lymph # 1.6      Mono # 0.8      Eos # 0.2      Baso # 0.05      nRBC 0      Gran % 68.6      Lymph % 19.3      Mono % 9.5      Eosinophil % 1.9      Basophil % 0.6      Differential Method Automated     COMPREHENSIVE METABOLIC PANEL -  Abnormal    Sodium 138      Potassium 4.2      Chloride 104      CO2 25      Glucose 120 (*)     BUN 27 (*)     Creatinine 1.4      Calcium 9.9      Total Protein 7.3      Albumin 4.0      Total Bilirubin 0.9      Alkaline Phosphatase 69      AST 15      ALT 12      eGFR 35.7 (*)     Anion Gap 9     URINALYSIS, REFLEX TO URINE CULTURE - Abnormal    Specimen UA Urine, Clean Catch      Color, UA Yellow      Appearance, UA Hazy (*)     pH, UA 6.0      Specific Gravity, UA 1.020      Protein, UA 2+ (*)     Glucose, UA Negative      Ketones, UA Negative      Bilirubin (UA) Negative      Occult Blood UA Negative      Nitrite, UA Negative      Urobilinogen, UA Negative      Leukocytes, UA 3+ (*)     Narrative:     Specimen Source->Urine   TROPONIN I HIGH SENSITIVITY - Abnormal    Troponin I High Sensitivity 60.6 (*)    B-TYPE NATRIURETIC PEPTIDE - Abnormal    BNP 3,439 (*)    MAGNESIUM - Abnormal    Magnesium 1.5 (*)    URINALYSIS MICROSCOPIC - Abnormal    RBC, UA 3      WBC, UA 27 (*)     Bacteria Rare      Squam Epithel, UA 14      Hyaline Casts, UA 0      Microscopic Comment SEE COMMENT      Narrative:     Specimen Source->Urine   CULTURE, URINE   PROTIME-INR    Prothrombin Time 11.6      INR 1.0     TSH    TSH 0.726       EKG Readings: (Independently Interpreted)   NSR, HR 87, right bundle branch pattern, no STEMI.  No significant change from prior.       Imaging Results              X-Ray Chest AP Portable (Final result)  Result time 09/28/24 20:46:17      Final result by Ciara Maynard MD (09/28/24 20:46:17)                   Impression:      As above.      Electronically signed by: Ciara Maynard  Date:    09/28/2024  Time:    20:46               Narrative:    EXAMINATION:  XR CHEST AP PORTABLE    CLINICAL HISTORY:  sob;    TECHNIQUE:  Single frontal view of the chest was performed.    COMPARISON:  08/27/2023    FINDINGS:  Enlarged cardiac silhouette with pulmonary vascular congestion.  No focal  consolidation.  No large pleural effusion.                                       Medications   magnesium sulfate in dextrose IVPB (premix) 1 g (1 g Intravenous New Bag 9/28/24 2032)   metoprolol succinate (TOPROL-XL) 24 hr tablet 25 mg (25 mg Oral Given 9/28/24 1959)   spironolactone tablet 25 mg (25 mg Oral Given 9/28/24 1959)   sacubitriL-valsartan 24-26 mg per tablet 1 tablet (1 tablet Oral Given 9/28/24 1959)   furosemide injection 20 mg (20 mg Intravenous Given 9/28/24 2032)     Medical Decision Making  Amount and/or Complexity of Data Reviewed  External Data Reviewed: notes.     Details: Last hospitalization for CHF exacerbation in August of 2023.  Was discharged on Lasix 20 mg p.r.n. at that time which she states she is no longer taking.  Labs: ordered. Decision-making details documented in ED Course.  Radiology: ordered and independent interpretation performed. Decision-making details documented in ED Course.  ECG/medicine tests: ordered and independent interpretation performed. Decision-making details documented in ED Course.    Risk  Prescription drug management.  Decision regarding hospitalization.                     Patient presents to ED as above.  Patient hypertensive to 200s over 90s on arrival with oxygen saturation of low 90s.  Patient states she was previously on home O2 but no longer qualifies.  Patient placed on 2 L nasal cannula for comfort with improvement to mid 90s.  Differential includes but not limited to CHF exacerbation, pneumonia, less likely ACS, PE.  EKG shows no evidence acute ischemia or arrhythmia.  Chest x-ray independently reviewed by me and per radiology read shows pulmonary vascular congestion and cardiomegaly.  All labs reviewed by me and significant for normal WBC count, normal hemoglobin, stable function, magnesium 1.5, BNP 34 39 which is markedly above baseline, high sensitivity troponin also elevated at 60.6 above baseline.  UA with 3+ leukocyte esterase 27 WBCs.  Urine  culture sent.  Troponin elevation likely secondary to volume overload.  Patient and son report that patient is very sensitive to IV Lasix and had hypotension during last admission.  We will start with Lasix 20 mg IV x1 now.  She was also given her home dose medications including Toprol-XL, Entresto, spironolactone with improvement in her blood pressure.  She was given IV magnesium replacement.  Will admit to hospitalist for further management and workup.         CRITICAL CARE:    The high probability of sudden, clinically significant deterioration in the patient's condition required the  highest level of my preparedness to intervene urgently.    The services I provided to this patient were to treat and/or prevent clinically significant deterioration  that could result in severe disability or death. Services included some or all of the following: chart data  review, reviewing nursing notes and/or old charts, documentation time, consultant collaboration  regarding findings and treatment options, medication orders and management, direct patient care, re-  evaluations, vital sign assessments and ordering, interpreting and reviewing diagnostic studies/lab tests.    Aggregate critical care time was 35 minutes which includes only time during which I was engaged in  work directly related to the patient's care, as described above, whether at the bedside or elsewhere in  the Emergency Department. It did not include time spent performing other reported procedures or the  services of residents, students, nurses or midlevel providers.          Clinical Impression:  Final diagnoses:  [R06.02] SOB (shortness of breath)  [I50.9] Acute on chronic congestive heart failure, unspecified heart failure type (Primary)  [E83.42] Hypomagnesemia          ED Disposition Condition    Observation Stable                Madonna Salazar MD  09/28/24 8511

## 2024-09-29 NOTE — H&P
[facility] - Emergency Dept  Hospital Medicine  History & Physical    Patient Name: Andressa Benedict  MRN: [id number]  Patient Class: IP- Inpatient  Admission Date: 9/28/2024  Attending Physician: Misael Dietrich MD   Primary Care Provider: Aureliano Morocho MD         Patient information was obtained from patient and ER records.     Subjective:     Principal Problem:<principal problem not specified>    Chief Complaint:   Chief Complaint   Patient presents with    Shortness of Breath     Sob that started 2 weeks ago.         HPI: The patient is a 89-year-old female with past medical history of HFpEF, CAD, hypertension, paroxysmal atrial fibrillation, CKD stage 3, hypothyroidism who presented to the ED with the complaints of shortness of breath.    She reports that she has been having increased swelling of her bilateral lower extremities for the past few days.  She reports that she also has orthopnea and has shortness of breath on minimal exertion.  She reports that she is able to walk around with the help of a crutch. She complains of dry cough.  She denies any fever, chills, chest pain, abdominal pain, urinary symptoms.  She reports that she does not take Lasix at home.  She reports that she was admitted to [facility] last year for CHF exacerbation.        Past Medical History:   Diagnosis Date    Atrial fibrillation     Bradycardia     Carotid bruit     CKD (chronic kidney disease), stage III     Hyperlipidemia     OA (osteoarthritis)     Thyroid disease        Past Surgical History:   Procedure Laterality Date    HYSTERECTOMY      KNEE SURGERY Right     SHOULDER SURGERY Right        Review of patient's allergies indicates:   Allergen Reactions    Penicillins Anaphylaxis       No current facility-administered medications on file prior to encounter.     Current Outpatient Medications on File Prior to Encounter   Medication Sig    ascorbic acid, vitamin C, (VITAMIN C) 250 mg Chew Take by mouth.     aspirin (ECOTRIN) 81 MG EC tablet Take 81 mg by mouth once daily.    coenzyme Q10 (CO Q-10) 100 mg capsule Take 100 mg by mouth once daily.    docosahexaenoic acid/epa (FISH OIL ORAL) Take 1 capsule by mouth Daily.    ENTRESTO 24-26 mg per tablet TAKE 1 TABLET BY MOUTH TWICE DAILY    ergocalciferol, vitamin D2, (VITAMIN D ORAL) Take by mouth.    FLAXSEED OIL ORAL Take 1 capsule by mouth Daily.    glucosam/msm/chond/mlg136/hyal (GLUCOS-CHOND-MSM, WITH ANTIOX, ORAL) Take 1 capsule by mouth Daily.    metoprolol succinate (TOPROL-XL) 25 MG 24 hr tablet Take 1 tablet (25 mg total) by mouth once daily.    multivitamin (THERAGRAN) per tablet Take 1 tablet by mouth once daily.    betaxolol 0.5% (BETOPTIC-S) 0.5 % Drop Place 1 drop into both eyes 2 (two) times daily.    levothyroxine (SYNTHROID) 100 MCG tablet TAKE 1 TABLET BY MOUTH EVERY DAY    spironolactone (ALDACTONE) 25 MG tablet Take 1 tablet (25 mg total) by mouth every other day.    triamcinolone acetonide 0.1% (KENALOG) 0.1 % cream Apply topically 2 (two) times daily.     Family History       Problem Relation (Age of Onset)    Heart disease Father          Tobacco Use    Smoking status: Never    Smokeless tobacco: Never   Substance and Sexual Activity    Alcohol use: Never    Drug use: Never    Sexual activity: Not on file     Review of Systems      Constitutional:  Negative for fever, chills, generalized weakness, appetite change  HENT:  Negative for congestion, sore throat  Eyes:  Negative for redness, discharge  Respiratory:  Positive for cough and shortness of breath  Cardiovascular:  Negative for chest pain, palpitation  Gastrointestinal:  Negative for abdominal pain, nausea, vomiting, diarrhea  Genitourinary:  Negative for flank pain, difficulty urination  Musculoskeletal:  Negative for arthralgia, myalgia  Skin:  Negative for color change  Neuro:  Negative for dizziness, focal weakness  Psychiatric:  Negative for agitation, confusion    Objective:     Vital  Signs (Most Recent):  Temp: 97.4 °F (36.3 °C) (09/28/24 1911)  Pulse: 74 (09/28/24 2047)  Resp: 20 (09/28/24 2047)  BP: (!) 175/75 (09/28/24 1959)  SpO2: 95 % (09/28/24 2047) Vital Signs (24h Range):  Temp:  [97.4 °F (36.3 °C)] 97.4 °F (36.3 °C)  Pulse:  [70-90] 74  Resp:  [20-28] 20  SpO2:  [90 %-96 %] 95 %  BP: (175-189)/(75-99) 175/75     Weight: 73.9 kg (163 lb)  Body mass index is 29.81 kg/m².     Physical Exam       General:  Awake, alert, not in apparent distress  Head:  NC/AT  HEENT:  PERRLA, EOMI, no pallor or icterus               Oral mucosa moist without exudates or erythema  Neck:  Supple, no JVD, no lymphadenopathy  Chest:  S1-S2 normal, no M/G/R  Respiratory:  Normal vesicular breath sounds with bibasilar crackle  Abdomen:  Soft, nondistended, nontender, no organomegaly, bowel sounds present  Extremities:  2+ pitting edema of bilateral lower extremities present  Neuro:  Awake, alert, oriented x3, grossly intact motor and sensory exam  Psychiatric:  Normal mood and affect        Significant Labs: All pertinent labs within the past 24 hours have been reviewed.  Recent Lab Results         09/28/24 1941 09/28/24 1931        Albumin   4.0       ALP   69       ALT   12       Anion Gap   9       Appearance, UA Hazy         AST   15       Bacteria, UA Rare         Baso #   0.05       Basophil %   0.6       Bilirubin (UA) Negative         BILIRUBIN TOTAL   0.9  Comment: For infants and newborns, interpretation of results should be based  on gestational age, weight and in agreement with clinical  observations.    Premature Infant recommended reference ranges:  Up to 24 hours.............<8.0 mg/dL  Up to 48 hours............<12.0 mg/dL  3-5 days..................<15.0 mg/dL  6-29 days.................<15.0 mg/dL         BNP   3,439  Comment: Values of less than 100 pg/ml are consistent with non-CHF populations.       BUN   27       Calcium   9.9       Chloride   104       CO2   25       Color, UA Yellow          Creatinine   1.4       Differential Method   Automated       eGFR   35.7       Eos #   0.2       Eos %   1.9       Glucose   120       Glucose, UA Negative         Gran # (ANC)   5.8       Gran %   68.6       Hematocrit   37.8       Hemoglobin   12.2       Hyaline Casts, UA 0         Immature Grans (Abs)   0.01  Comment: Mild elevation in immature granulocytes is non specific and   can be seen in a variety of conditions including stress response,   acute inflammation, trauma and pregnancy. Correlation with other   laboratory and clinical findings is essential.         Immature Granulocytes   0.1       INR   1.0  Comment: Coumadin Therapy:  2.0 - 3.0 for INR for all indicators except mechanical heart valves  and antiphospholipid syndromes which should use 2.5 - 3.5.         Ketones, UA Negative         Leukocyte Esterase, UA 3+         Lymph #   1.6       Lymph %   19.3       Magnesium    1.5       MCH   33.7       MCHC   32.3       MCV   104       Microscopic Comment SEE COMMENT  Comment: Other formed elements not mentioned in the report are not   present in the microscopic examination.            Mono #   0.8       Mono %   9.5       MPV   10.2       NITRITE UA Negative         nRBC   0       Blood, UA Negative         pH, UA 6.0         Platelet Count   220       Potassium   4.2       PROTEIN TOTAL   7.3       Protein, UA 2+  Comment: Recommend a 24 hour urine protein or a urine   protein/creatinine ratio if globulin induced proteinuria is  clinically suspected.           PT   11.6       RBC   3.62       RBC, UA 3         RDW   14.1       Sodium   138       Spec Grav UA 1.020         Specimen UA Urine, Clean Catch         Squam Epithel, UA 14         Troponin I High Sensitivity   60.6  Comment: Troponin results differ between methods. Do not use   results between Troponin methods interchangeably.    Alkaline Phospatase levels above 400 U/L may   cause false positive results.    Access hsTnI should not be used  for patients taking   Asfotase isaac (Strensiq).  Critical result TNIHS 60.6 pg/mL called to and read back by Shawna Gutierres RN ER at 28-Sep-2024 20:14 by [facility].         TSH   0.726       UROBILINOGEN UA Negative         WBC, UA 27         WBC   8.44               Significant Imaging: I have reviewed all pertinent imaging results/findings within the past 24 hours.  I have reviewed and interpreted all pertinent imaging results/findings within the past 24 hours.  Assessment/Plan:       # Acute hypoxic respiratory failure due to CHF exacerbation  # Acute on chronic HFpEF:   Echo 08/27/2023: EF 50%, diastolic dysfunction, mild pulmonary hypertension   Pro BNP has significantly increased from the prior results   Lasix 40 mg IV b.i.d., monitor input and output and daily weight   Continue Entresto 24-26 mg b.i.d., Aldactone 25 mg daily, metoprolol XL 25 mg daily   Continue oxygen to maintain SaO2 > 92%    # elevated troponin:  NSTEMI type 2 due to demand  HS troponin I was elevated at 60.6, EKG does not show any acute ST-T wave changes  Will trend troponin and continue telemetry    # hypertension:   Continue Entresto, Aldactone, metoprolol, Lasix    # paroxysmal atrial fibrillation:   Continue metoprolol XL 25 mg daily   Not on anticoagulation    # hypothyroidism:   Continue Synthroid 100 mcg daily    # CAD:   Continue aspirin 81 mg daily      # GERD: on protonix    # Diet: Adult cardiac diet  # DVT prophylaxis:  Lovenox 30 mg sq daily  # Code: Full code        Misael Dietrich MD  Department of Hospital Medicine  [facility] - Emergency Dept

## 2024-09-29 NOTE — HPI
The patient is a 89-year-old female with past medical history of HFpEF, CAD, hypertension, paroxysmal atrial fibrillation, CKD stage 3, hypothyroidism who presented to the ED with the complaints of shortness of breath.    She reports that she has been having increased swelling of her bilateral lower extremities for the past few days.  She reports that she also has orthopnea and has shortness of breath on minimal exertion.  She reports that she is able to walk around with the help of a crutch. She complains of dry cough.  She denies any fever, chills, chest pain, abdominal pain, urinary symptoms.  She reports that she does not take Lasix at home.  She reports that she was admitted to [facility] last year for CHF exacerbation.  The patient reports that she was on oxygen about a year ago for CHF exacerbation, however, she reports that she improved and she is not on oxygen anymore.

## 2024-09-29 NOTE — NURSING
"Pt refused lovenox shot and protonix. Education provided and pt refuses. Pt asking why she cannot take her own medicine. Explained to pt nurse will provide medication while in the hospital. C/o of cramping in feet and asking "if someone will help her". Pt placed in chair and moving her feet around. Pt states she not want any medication for the cramping.     "

## 2024-09-29 NOTE — PROGRESS NOTES
[facility] Medicine  Progress Note    Patient Name: Andressa Benedict  MRN: [id number]  Patient Class: IP- Inpatient   Admission Date: 9/28/2024  Length of Stay: 1 days  Attending Physician: Polo Ferguson DO  Primary Care Provider: Aureliano Morocho MD        Subjective:     Principal Problem:Acute heart failure with preserved ejection fraction (HFpEF)        HPI:  The patient is a 89-year-old female with past medical history of HFpEF, CAD, hypertension, paroxysmal atrial fibrillation, CKD stage 3, hypothyroidism who presented to the ED with the complaints of shortness of breath.    She reports that she has been having increased swelling of her bilateral lower extremities for the past few days.  She reports that she also has orthopnea and has shortness of breath on minimal exertion.  She reports that she is able to walk around with the help of a crutch. She complains of dry cough.  She denies any fever, chills, chest pain, abdominal pain, urinary symptoms.  She reports that she does not take Lasix at home.  She reports that she was admitted to [facility] last year for CHF exacerbation.  The patient reports that she was on oxygen about a year ago for CHF exacerbation, however, she reports that she improved and she is not on oxygen anymore.        Overview/Hospital Course:  No notes on file    Interval History:  Patient was seen and examined at bedside this morning.  She reports that her breathing is much improved today.  Continue Lasix, possible discharge tomorrow.    Review of Systems   All other systems reviewed and are negative.    Objective:     Vital Signs (Most Recent):  Temp: 97.9 °F (36.6 °C) (09/29/24 1126)  Pulse: 71 (09/29/24 1126)  Resp: 20 (09/29/24 1126)  BP: 137/74 (09/29/24 1126)  SpO2: 97 % (09/29/24 1126) Vital Signs (24h Range):  Temp:  [97.4 °F (36.3 °C)-98.5 °F (36.9 °C)] 97.9 °F (36.6 °C)  Pulse:  [44-90] 71  Resp:  [19-28] 20  SpO2:  [90 %-97 %] 97 %  BP:  (137-189)/(74-99) 137/74     Weight: 69.1 kg (152 lb 5.4 oz)  Body mass index is 27.86 kg/m².    Intake/Output Summary (Last 24 hours) at 9/29/2024 1153  Last data filed at 9/29/2024 0517  Gross per 24 hour   Intake 300 ml   Output 300 ml   Net 0 ml         Physical Exam  Vitals and nursing note reviewed.   Constitutional:       Appearance: She is well-developed.   HENT:      Head: Normocephalic and atraumatic.      Right Ear: External ear normal.      Left Ear: External ear normal.      Nose: Nose normal.   Eyes:      Conjunctiva/sclera: Conjunctivae normal.      Pupils: Pupils are equal, round, and reactive to light.   Cardiovascular:      Rate and Rhythm: Normal rate and regular rhythm.      Heart sounds: Normal heart sounds.   Pulmonary:      Effort: Pulmonary effort is normal.      Breath sounds: Normal breath sounds.   Abdominal:      General: Bowel sounds are normal.      Palpations: Abdomen is soft.   Musculoskeletal:         General: Normal range of motion.      Cervical back: Normal range of motion and neck supple.   Skin:     General: Skin is dry.      Capillary Refill: Capillary refill takes 2 to 3 seconds.   Neurological:      Mental Status: She is alert and oriented to person, place, and time.   Psychiatric:         Behavior: Behavior normal.         Thought Content: Thought content normal.         Judgment: Judgment normal.             Significant Labs: All pertinent labs within the past 24 hours have been reviewed.  CBC:   Recent Labs   Lab 09/28/24 1931 09/29/24  0249   WBC 8.44 10.12   HGB 12.2 11.7*   HCT 37.8 36.9*    215     CMP:   Recent Labs   Lab 09/28/24 1931 09/29/24  0248    138   K 4.2 4.4    104   CO2 25 25   * 137*   BUN 27* 27*   CREATININE 1.4 1.5*   CALCIUM 9.9 9.6   PROT 7.3 7.1   ALBUMIN 4.0 3.8   BILITOT 0.9 1.0   ALKPHOS 69 67   AST 15 14   ALT 12 10   ANIONGAP 9 9       Significant Imaging: I have reviewed all pertinent imaging results/findings within  the past 24 hours.    Assessment/Plan:      * Acute heart failure with preserved ejection fraction (HFpEF)    Much improved today   Continue Lasix 40 mg b.i.d.   Continue Entresto, Aldactone, metoprolol  Monitor I&O    Paroxysmal A-fib  Continue metoprolol    Elevated troponin  Likely secondary to demand ischemia      Acute hypoxemic respiratory failure  Improving    Essential hypertension  Chronic, controlled. Latest blood pressure and vitals reviewed-     Temp:  [97.4 °F (36.3 °C)-98.5 °F (36.9 °C)]   Pulse:  [44-90]   Resp:  [19-28]   BP: (137-189)/(74-99)   SpO2:  [90 %-97 %] .   Home meds for hypertension were reviewed and noted below.   Hypertension Medications               ENTRESTO 24-26 mg per tablet TAKE 1 TABLET BY MOUTH TWICE DAILY    metoprolol succinate (TOPROL-XL) 25 MG 24 hr tablet Take 1 tablet (25 mg total) by mouth once daily.    spironolactone (ALDACTONE) 25 MG tablet Take 1 tablet (25 mg total) by mouth every other day.            While in the hospital, will manage blood pressure as follows; Continue home antihypertensive regimen    Will utilize p.r.n. blood pressure medication only if patient's blood pressure greater than 180/110 and she develops symptoms such as worsening chest pain or shortness of breath.          VTE Risk Mitigation (From admission, onward)           Ordered     enoxaparin injection 30 mg  Daily         09/28/24 2155     IP VTE HIGH RISK PATIENT  Once         09/28/24 2150     Place sequential compression device  Until discontinued         09/28/24 2150                    Discharge Planning   HEMANTH:      Code Status: Full Code   Is the patient medically ready for discharge?:     Reason for patient still in hospital (select all that apply): Patient trending condition                     Polo Ferguson DO  Department of Hospital Medicine   [facility]

## 2024-09-29 NOTE — ED NOTES
Report called to receiving MASOOD Mat[place]. All questions answered. Pt to transfer to room 2521B on telemetry with all personal belongings at bedside.

## 2024-09-30 VITALS
SYSTOLIC BLOOD PRESSURE: 120 MMHG | WEIGHT: 152.31 LBS | HEIGHT: 62 IN | BODY MASS INDEX: 28.03 KG/M2 | HEART RATE: 73 BPM | OXYGEN SATURATION: 98 % | RESPIRATION RATE: 16 BRPM | TEMPERATURE: 98 F | DIASTOLIC BLOOD PRESSURE: 61 MMHG

## 2024-09-30 LAB
ALBUMIN SERPL BCP-MCNC: 3.6 G/DL (ref 3.5–5.2)
ALP SERPL-CCNC: 60 U/L (ref 55–135)
ALT SERPL W/O P-5'-P-CCNC: 10 U/L (ref 10–44)
ANION GAP SERPL CALC-SCNC: 7 MMOL/L (ref 8–16)
AST SERPL-CCNC: 12 U/L (ref 10–40)
BASOPHILS # BLD AUTO: 0.03 K/UL (ref 0–0.2)
BASOPHILS NFR BLD: 0.4 % (ref 0–1.9)
BILIRUB SERPL-MCNC: 0.8 MG/DL (ref 0.1–1)
BUN SERPL-MCNC: 31 MG/DL (ref 8–23)
CALCIUM SERPL-MCNC: 9.6 MG/DL (ref 8.7–10.5)
CHLORIDE SERPL-SCNC: 101 MMOL/L (ref 95–110)
CO2 SERPL-SCNC: 32 MMOL/L (ref 23–29)
CREAT SERPL-MCNC: 1.5 MG/DL (ref 0.5–1.4)
DIFFERENTIAL METHOD BLD: ABNORMAL
EOSINOPHIL # BLD AUTO: 0.2 K/UL (ref 0–0.5)
EOSINOPHIL NFR BLD: 2.5 % (ref 0–8)
ERYTHROCYTE [DISTWIDTH] IN BLOOD BY AUTOMATED COUNT: 14.2 % (ref 11.5–14.5)
EST. GFR  (NO RACE VARIABLE): 32.9 ML/MIN/1.73 M^2
GLUCOSE SERPL-MCNC: 107 MG/DL (ref 70–110)
HCT VFR BLD AUTO: 35.5 % (ref 37–48.5)
HGB BLD-MCNC: 11.3 G/DL (ref 12–16)
IMM GRANULOCYTES # BLD AUTO: 0.02 K/UL (ref 0–0.04)
IMM GRANULOCYTES NFR BLD AUTO: 0.3 % (ref 0–0.5)
LYMPHOCYTES # BLD AUTO: 1.3 K/UL (ref 1–4.8)
LYMPHOCYTES NFR BLD: 16.8 % (ref 18–48)
MAGNESIUM SERPL-MCNC: 1.6 MG/DL (ref 1.6–2.6)
MCH RBC QN AUTO: 33.5 PG (ref 27–31)
MCHC RBC AUTO-ENTMCNC: 31.8 G/DL (ref 32–36)
MCV RBC AUTO: 105 FL (ref 82–98)
MONOCYTES # BLD AUTO: 0.9 K/UL (ref 0.3–1)
MONOCYTES NFR BLD: 11.4 % (ref 4–15)
NEUTROPHILS # BLD AUTO: 5.2 K/UL (ref 1.8–7.7)
NEUTROPHILS NFR BLD: 68.6 % (ref 38–73)
NRBC BLD-RTO: 0 /100 WBC
PLATELET # BLD AUTO: 206 K/UL (ref 150–450)
PMV BLD AUTO: 10.9 FL (ref 9.2–12.9)
POCT GLUCOSE: 115 MG/DL (ref 70–110)
POCT GLUCOSE: 206 MG/DL (ref 70–110)
POTASSIUM SERPL-SCNC: 3.8 MMOL/L (ref 3.5–5.1)
PROT SERPL-MCNC: 6.9 G/DL (ref 6–8.4)
RBC # BLD AUTO: 3.37 M/UL (ref 4–5.4)
SODIUM SERPL-SCNC: 140 MMOL/L (ref 136–145)
WBC # BLD AUTO: 7.54 K/UL (ref 3.9–12.7)

## 2024-09-30 PROCEDURE — 94618 PULMONARY STRESS TESTING: CPT

## 2024-09-30 PROCEDURE — 25000003 PHARM REV CODE 250: Performed by: STUDENT IN AN ORGANIZED HEALTH CARE EDUCATION/TRAINING PROGRAM

## 2024-09-30 PROCEDURE — 80053 COMPREHEN METABOLIC PANEL: CPT | Performed by: STUDENT IN AN ORGANIZED HEALTH CARE EDUCATION/TRAINING PROGRAM

## 2024-09-30 PROCEDURE — 36415 COLL VENOUS BLD VENIPUNCTURE: CPT | Performed by: STUDENT IN AN ORGANIZED HEALTH CARE EDUCATION/TRAINING PROGRAM

## 2024-09-30 PROCEDURE — 27000221 HC OXYGEN, UP TO 24 HOURS

## 2024-09-30 PROCEDURE — 99900035 HC TECH TIME PER 15 MIN (STAT)

## 2024-09-30 PROCEDURE — 94761 N-INVAS EAR/PLS OXIMETRY MLT: CPT

## 2024-09-30 PROCEDURE — 85025 COMPLETE CBC W/AUTO DIFF WBC: CPT | Performed by: STUDENT IN AN ORGANIZED HEALTH CARE EDUCATION/TRAINING PROGRAM

## 2024-09-30 PROCEDURE — 83735 ASSAY OF MAGNESIUM: CPT | Performed by: STUDENT IN AN ORGANIZED HEALTH CARE EDUCATION/TRAINING PROGRAM

## 2024-09-30 RX ORDER — FUROSEMIDE 20 MG/1
20 TABLET ORAL ONCE
Status: COMPLETED | OUTPATIENT
Start: 2024-09-30 | End: 2024-09-30

## 2024-09-30 RX ADMIN — SPIRONOLACTONE 25 MG: 25 TABLET ORAL at 10:09

## 2024-09-30 RX ADMIN — SACUBITRIL AND VALSARTAN 1 TABLET: 24; 26 TABLET, FILM COATED ORAL at 10:09

## 2024-09-30 RX ADMIN — METOPROLOL SUCCINATE 25 MG: 25 TABLET, FILM COATED, EXTENDED RELEASE ORAL at 10:09

## 2024-09-30 RX ADMIN — ASPIRIN 81 MG: 81 TABLET, COATED ORAL at 10:09

## 2024-09-30 RX ADMIN — BETAXOLOL HYDROCHLORIDE 1 DROP: 2.8 SUSPENSION/ DROPS OPHTHALMIC at 10:09

## 2024-09-30 RX ADMIN — LEVOTHYROXINE SODIUM 100 MCG: 100 TABLET ORAL at 05:09

## 2024-09-30 RX ADMIN — PANTOPRAZOLE SODIUM 40 MG: 40 TABLET, DELAYED RELEASE ORAL at 05:09

## 2024-09-30 RX ADMIN — FUROSEMIDE 20 MG: 20 TABLET ORAL at 10:09

## 2024-09-30 NOTE — CARE UPDATE
09/30/24 1336   Home Oxygen Qualification   $ Home O2 Qualification Tech time 15 minutes;Pulmonary Stress Test/6 min walk   Room Air SpO2 At Rest (!) 86 %   Room Air SpO2 During Ambulation (!) 86 %   SpO2 During Ambulation on O2 92 %   Heart Rate on O2 78 bpm   Ambulation O2 LPM 2 LPM   SpO2 Post Ambulation 93 %   Post Ambulation Heart Rate 80 bpm   Post Ambulation O2 LPM 2 LPM

## 2024-09-30 NOTE — PLAN OF CARE
Charts and orders reviewed. Pt to discharge home.  called Pt's PCP office to schedule Pt follow-up appointment; appointment scheduled (10/07/2024 @ 3:00 PM) and PCP details added to AVS. Pt has no other needs to be addressed by case management. Pt cleared to discharge from case management standpoint.     Pt's son to transport pt home from [facility].     09/30/24 1221   Final Note   Assessment Type Final Discharge Note   Anticipated Discharge Disposition Home   What phone number can be called within the next 1-3 days to see how you are doing after discharge? [phone]   Hospital Resources/Appts/Education Provided Appointments scheduled and added to AVS   Post-Acute Status   Discharge Delays None known at this time

## 2024-09-30 NOTE — HOSPITAL COURSE
Patient rapidly improved with IV diuresis.  Patient never had any chest pain, elevated troponin secondary to demand ischemia.  She has remained hemodynamically stable, labs unremarkable.  She will likely require some home O2 at baseline.  She has no shortness of breath, no increased respiratory rate or work of breathing, no rales noted.  Patient will be discharged today.  She will follow up outpatient with primary care physician

## 2024-09-30 NOTE — DISCHARGE SUMMARY
[facility] Medicine  Discharge Summary      Patient Name: Andressa Benedict  MRN: [id number]  ELIJAH: [id number]  Patient Class: IP- Inpatient  Admission Date: 9/28/2024  Hospital Length of Stay: 2 days  Discharge Date and Time:  09/30/2024 12:05 PM  Attending Physician: Polo Ferguson DO   Discharging Provider: Polo Ferguson DO  Primary Care Provider: Aureliano Morocho MD    Primary Care Team: Networked reference to record PCT     HPI:   The patient is a 89-year-old female with past medical history of HFpEF, CAD, hypertension, paroxysmal atrial fibrillation, CKD stage 3, hypothyroidism who presented to the ED with the complaints of shortness of breath.    She reports that she has been having increased swelling of her bilateral lower extremities for the past few days.  She reports that she also has orthopnea and has shortness of breath on minimal exertion.  She reports that she is able to walk around with the help of a crutch. She complains of dry cough.  She denies any fever, chills, chest pain, abdominal pain, urinary symptoms.  She reports that she does not take Lasix at home.  She reports that she was admitted to [facility] last year for CHF exacerbation.  The patient reports that she was on oxygen about a year ago for CHF exacerbation, however, she reports that she improved and she is not on oxygen anymore.        * No surgery found *      Hospital Course:   Patient rapidly improved with IV diuresis.  Patient never had any chest pain, elevated troponin secondary to demand ischemia.  She has remained hemodynamically stable, labs unremarkable.  She will likely require some home O2 at baseline.  She has no shortness of breath, no increased respiratory rate or work of breathing, no rales noted.  Patient will be discharged today.  She will follow up outpatient with primary care physician     Goals of Care Treatment Preferences:  Code Status: Full Code         Consults:     No new Assessment  & Plan notes have been filed under this hospital service since the last note was generated.  Service: Hospital Medicine    Final Active Diagnoses:    Diagnosis Date Noted POA    PRINCIPAL PROBLEM:  Acute heart failure with preserved ejection fraction (HFpEF) [I50.31] 09/29/2024 Yes    Elevated troponin [R79.89] 09/29/2024 Yes    Paroxysmal A-fib [I48.0] 09/29/2024 Yes    Acute hypoxemic respiratory failure [J96.01] 07/19/2023 Yes    Essential hypertension [I10] 04/28/2021 Yes     Chronic      Problems Resolved During this Admission:       Discharged Condition: good    Disposition: Home or Self Care    Follow Up:   Follow-up Information       Aureliano Morocho MD Follow up in 1 week(s).    Specialties: Interventional Cardiology, Cardiovascular Disease, Cardiology  Contact information:  [address] [place] [address]  [address]  CARDIOLOGY [facility] [address]  [phone]                           Patient Instructions:      Notify your health care provider if you experience any of the following:  increased confusion or weakness     Notify your health care provider if you experience any of the following:  persistent dizziness, light-headedness, or visual disturbances     Notify your health care provider if you experience any of the following:  worsening rash     Notify your health care provider if you experience any of the following:  severe persistent headache     Notify your health care provider if you experience any of the following:  difficulty breathing or increased cough     Notify your health care provider if you experience any of the following:  redness, tenderness, or signs of infection (pain, swelling, redness, odor or green/yellow discharge around incision site)     Notify your health care provider if you experience any of the following:  severe uncontrolled pain     Notify your health care provider if you experience any of the following:  temperature >100.4     Notify your health care provider if you  experience any of the following:  persistent nausea and vomiting or diarrhea     Activity as tolerated       Significant Diagnostic Studies: Labs: CMP   Recent Labs   Lab 09/28/24 1931 09/29/24 0248 09/30/24  0404    138 140   K 4.2 4.4 3.8    104 101   CO2 25 25 32*   * 137* 107   BUN 27* 27* 31*   CREATININE 1.4 1.5* 1.5*   CALCIUM 9.9 9.6 9.6   PROT 7.3 7.1 6.9   ALBUMIN 4.0 3.8 3.6   BILITOT 0.9 1.0 0.8   ALKPHOS 69 67 60   AST 15 14 12   ALT 12 10 10   ANIONGAP 9 9 7*    and CBC   Recent Labs   Lab 09/28/24 1931 09/29/24 0249 09/30/24  0404   WBC 8.44 10.12 7.54   HGB 12.2 11.7* 11.3*   HCT 37.8 36.9* 35.5*    215 206       Pending Diagnostic Studies:       None             Imaging Results              X-Ray Chest AP Portable (Final result)  Result time 09/28/24 20:46:17      Final result by Ciara Maynard MD (09/28/24 20:46:17)                   Impression:      As above.      Electronically signed by: Ciara Maynard  Date:    09/28/2024  Time:    20:46               Narrative:    EXAMINATION:  XR CHEST AP PORTABLE    CLINICAL HISTORY:  sob;    TECHNIQUE:  Single frontal view of the chest was performed.    COMPARISON:  08/27/2023    FINDINGS:  Enlarged cardiac silhouette with pulmonary vascular congestion.  No focal consolidation.  No large pleural effusion.                                          Medications:  Reconciled Home Medications:      Medication List        CONTINUE taking these medications      aspirin 81 MG EC tablet  Commonly known as: ECOTRIN  Take 81 mg by mouth once daily.     betaxolol 0.5% 0.5 % Drop  Commonly known as: BETOPTIC-S  Place 1 drop into both eyes 2 (two) times daily.     CO Q-10 100 mg capsule  Generic drug: coenzyme Q10  Take 100 mg by mouth once daily.     ENTRESTO 24-26 mg per tablet  Generic drug: sacubitriL-valsartan  TAKE 1 TABLET BY MOUTH TWICE DAILY     FISH OIL ORAL  Take 1 capsule by mouth Daily.     FLAXSEED OIL ORAL  Take 1  capsule by mouth Daily.     GLUCOS-CHOND-MSM (WITH ANTIOX) ORAL  Take 1 capsule by mouth Daily.     levothyroxine 100 MCG tablet  Commonly known as: SYNTHROID  TAKE 1 TABLET BY MOUTH EVERY DAY     metoprolol succinate 25 MG 24 hr tablet  Commonly known as: TOPROL-XL  Take 1 tablet (25 mg total) by mouth once daily.     multivitamin per tablet  Commonly known as: THERAGRAN  Take 1 tablet by mouth once daily.     spironolactone 25 MG tablet  Commonly known as: ALDACTONE  Take 1 tablet (25 mg total) by mouth every other day.     triamcinolone acetonide 0.1% 0.1 % cream  Commonly known as: KENALOG  Apply topically 2 (two) times daily.     VITAMIN C 250 mg Chew  Generic drug: ascorbic acid (vitamin C)  Take by mouth.     VITAMIN D ORAL  Take by mouth.              Indwelling Lines/Drains at time of discharge:   Lines/Drains/Airways       None                   Time spent on the discharge of patient: 35 minutes         Polo Ferguson DO  Department of Hospital Medicine  [facility]

## 2024-09-30 NOTE — CARE UPDATE
09/30/24 0745   PRE-TX-O2   Device (Oxygen Therapy) nasal cannula   $ Is the patient on Low Flow Oxygen? Yes   Flow (L/min) (Oxygen Therapy) 3   SpO2 97 %   Pulse Oximetry Type Intermittent   $ Pulse Oximetry - Multiple Charge Pulse Oximetry - Multiple   Pulse 72   Resp 15   Respiratory Evaluation   $ Care Plan Tech Time 15 min

## 2024-10-01 LAB
BACTERIA UR CULT: NORMAL
BACTERIA UR CULT: NORMAL

## 2024-10-07 ENCOUNTER — OFFICE VISIT (OUTPATIENT)
Dept: CARDIOLOGY | Facility: CLINIC | Age: 89
End: 2024-10-07
Payer: MEDICARE

## 2024-10-07 VITALS
OXYGEN SATURATION: 93 % | DIASTOLIC BLOOD PRESSURE: 62 MMHG | HEIGHT: 62 IN | HEART RATE: 69 BPM | BODY MASS INDEX: 27.86 KG/M2 | SYSTOLIC BLOOD PRESSURE: 122 MMHG

## 2024-10-07 DIAGNOSIS — N18.32 STAGE 3B CHRONIC KIDNEY DISEASE: ICD-10-CM

## 2024-10-07 DIAGNOSIS — E89.0 POSTABLATIVE HYPOTHYROIDISM: Chronic | ICD-10-CM

## 2024-10-07 DIAGNOSIS — I50.9 ACUTE ON CHRONIC CONGESTIVE HEART FAILURE, UNSPECIFIED HEART FAILURE TYPE: Primary | ICD-10-CM

## 2024-10-07 DIAGNOSIS — I10 ESSENTIAL HYPERTENSION: Chronic | ICD-10-CM

## 2024-10-07 DIAGNOSIS — I50.9 CONGESTIVE HEART FAILURE, UNSPECIFIED HF CHRONICITY, UNSPECIFIED HEART FAILURE TYPE: ICD-10-CM

## 2024-10-07 DIAGNOSIS — I50.33 ACUTE ON CHRONIC HEART FAILURE WITH PRESERVED EJECTION FRACTION: ICD-10-CM

## 2024-10-07 DIAGNOSIS — Z86.79 HISTORY OF ATRIAL FIBRILLATION: Chronic | ICD-10-CM

## 2024-10-07 PROCEDURE — 1111F DSCHRG MED/CURRENT MED MERGE: CPT | Mod: CPTII,S$GLB,, | Performed by: INTERNAL MEDICINE

## 2024-10-07 PROCEDURE — 3288F FALL RISK ASSESSMENT DOCD: CPT | Mod: CPTII,S$GLB,, | Performed by: INTERNAL MEDICINE

## 2024-10-07 PROCEDURE — 99999 PR PBB SHADOW E&M-EST. PATIENT-LVL IV: CPT | Mod: PBBFAC,,, | Performed by: INTERNAL MEDICINE

## 2024-10-07 PROCEDURE — 99213 OFFICE O/P EST LOW 20 MIN: CPT | Mod: S$GLB,,, | Performed by: INTERNAL MEDICINE

## 2024-10-07 PROCEDURE — 1159F MED LIST DOCD IN RCRD: CPT | Mod: CPTII,S$GLB,, | Performed by: INTERNAL MEDICINE

## 2024-10-07 PROCEDURE — 1126F AMNT PAIN NOTED NONE PRSNT: CPT | Mod: CPTII,S$GLB,, | Performed by: INTERNAL MEDICINE

## 2024-10-07 PROCEDURE — 1160F RVW MEDS BY RX/DR IN RCRD: CPT | Mod: CPTII,S$GLB,, | Performed by: INTERNAL MEDICINE

## 2024-10-07 PROCEDURE — 1101F PT FALLS ASSESS-DOCD LE1/YR: CPT | Mod: CPTII,S$GLB,, | Performed by: INTERNAL MEDICINE

## 2024-10-07 NOTE — ASSESSMENT & PLAN NOTE
She had acute heart failure.  She has been on spironolactone every other day will increase it on daily basis.

## 2024-10-07 NOTE — PROGRESS NOTES
Patient ID:  Andressa Benedict is a 89 y.o. female who presents for follow-up of Hospital Follow Up and Congestive Heart Failure      HFpEF) heart failure with preserved ejection fraction  Appears to be compensated clinically doing fine she is on Entresto, spironolactone, metoprolol     Essential hypertension  Controlled on Entresto, metoprolol and spironolactone     History of atrial fibrillation  Controlled on Toprol-XL 25 mg daily.     CKD (chronic kidney disease), stage III  Stable     Postablative hypothyroidism  Controlled on current doses of levothyroxine    She was recently admitted to [facility] in pulmonary edema.  She was complaining of a lot of shortness of breath her blood pressure was markedly elevated.  She was aggressively diuresed and discharged home after 2 days.  She now has home oxygen.  She is complaining of constipation in the past she has taking MiraLax that has helped her.  She has difficulty sleeping she thinks that the Entresto is making it difficult for her to sleep.          Past Medical History:   Diagnosis Date    Atrial fibrillation     Bradycardia     Carotid bruit     CKD (chronic kidney disease), stage III     Hyperlipidemia     OA (osteoarthritis)     Thyroid disease         Past Surgical History:   Procedure Laterality Date    HYSTERECTOMY      KNEE SURGERY Right     SHOULDER SURGERY Right           Current Outpatient Medications   Medication Instructions    ascorbic acid, vitamin C, (VITAMIN C) 250 mg Chew Take by mouth.    aspirin (ECOTRIN) 81 mg, Daily    betaxolol 0.5% (BETOPTIC-S) 0.5 % Drop 1 drop, 2 times daily    coenzyme Q10 (CO Q-10) 100 mg, Daily    docosahexaenoic acid/epa (FISH OIL ORAL) 1 capsule, Daily    ENTRESTO 24-26 mg per tablet 1 tablet, Oral, 2 times daily    ergocalciferol, vitamin D2, (VITAMIN D ORAL) Take by mouth.    FLAXSEED OIL ORAL 1 capsule, Daily    glucosam/msm/chond/mzr732/hyal (GLUCOS-CHOND-MSM, WITH ANTIOX, ORAL) 1 capsule,  "Daily    levothyroxine (SYNTHROID) 100 mcg, Oral    metoprolol succinate (TOPROL-XL) 25 mg, Oral, Daily    multivitamin (THERAGRAN) per tablet 1 tablet, Daily    spironolactone (ALDACTONE) 25 mg, Oral, Every other day    triamcinolone acetonide 0.1% (KENALOG) 0.1 % cream Topical (Top), 2 times daily        Review of patient's allergies indicates:   Allergen Reactions    Penicillins Anaphylaxis        Review of Systems   Cardiovascular:  Negative for chest pain, dyspnea on exertion and palpitations.   Respiratory:  Negative for cough and shortness of breath.    Gastrointestinal:  Positive for constipation.        Objective:     Vitals:    10/07/24 1517   BP: 122/62   BP Location: Left arm   Patient Position: Sitting   Pulse: 69   SpO2: (!) 93%   Height: 5' 2" (1.575 m)       Physical Exam  Vitals and nursing note reviewed.   Constitutional:       Appearance: She is well-developed.   HENT:      Head: Normocephalic and atraumatic.   Eyes:      Conjunctiva/sclera: Conjunctivae normal.   Cardiovascular:      Rate and Rhythm: Normal rate and regular rhythm.      Heart sounds: Normal heart sounds.   Pulmonary:      Effort: Pulmonary effort is normal.      Breath sounds: Normal breath sounds.   Abdominal:      General: Bowel sounds are normal.      Palpations: Abdomen is soft.   Musculoskeletal:         General: Normal range of motion.   Skin:     General: Skin is warm and dry.   Neurological:      Mental Status: She is alert and oriented to person, place, and time.   Psychiatric:         Behavior: Behavior normal.         Thought Content: Thought content normal.         Judgment: Judgment normal.       CMP  Sodium   Date Value Ref Range Status   09/30/2024 140 136 - 145 mmol/L Final     Potassium   Date Value Ref Range Status   09/30/2024 3.8 3.5 - 5.1 mmol/L Final     Chloride   Date Value Ref Range Status   09/30/2024 101 95 - 110 mmol/L Final     CO2   Date Value Ref Range Status   09/30/2024 32 (H) 23 - 29 mmol/L Final "     Glucose   Date Value Ref Range Status   09/30/2024 107 70 - 110 mg/dL Final     BUN   Date Value Ref Range Status   09/30/2024 31 (H) 8 - 23 mg/dL Final     Creatinine   Date Value Ref Range Status   09/30/2024 1.5 (H) 0.5 - 1.4 mg/dL Final     Calcium   Date Value Ref Range Status   09/30/2024 9.6 8.7 - 10.5 mg/dL Final     Total Protein   Date Value Ref Range Status   09/30/2024 6.9 6.0 - 8.4 g/dL Final     Albumin   Date Value Ref Range Status   09/30/2024 3.6 3.5 - 5.2 g/dL Final     Total Bilirubin   Date Value Ref Range Status   09/30/2024 0.8 0.1 - 1.0 mg/dL Final     Comment:     For infants and newborns, interpretation of results should be based  on gestational age, weight and in agreement with clinical  observations.    Premature Infant recommended reference ranges:  Up to 24 hours.............<8.0 mg/dL  Up to 48 hours............<12.0 mg/dL  3-5 days..................<15.0 mg/dL  6-29 days.................<15.0 mg/dL       Alkaline Phosphatase   Date Value Ref Range Status   09/30/2024 60 55 - 135 U/L Final     AST   Date Value Ref Range Status   09/30/2024 12 10 - 40 U/L Final     ALT   Date Value Ref Range Status   09/30/2024 10 10 - 44 U/L Final     Anion Gap   Date Value Ref Range Status   09/30/2024 7 (L) 8 - 16 mmol/L Final     eGFR if non    Date Value Ref Range Status   05/28/2021 30 (L) >59 mL/min/1.73 Final      BMP  Lab Results   Component Value Date     09/30/2024    K 3.8 09/30/2024     09/30/2024    CO2 32 (H) 09/30/2024    BUN 31 (H) 09/30/2024    CREATININE 1.5 (H) 09/30/2024    CALCIUM 9.6 09/30/2024    ANIONGAP 7 (L) 09/30/2024    EGFRNONAA 30 (L) 05/28/2021      BNP  @LABRCNTIP(BNP,BNPTRIAGEBLO)@   Lab Results   Component Value Date    CHOL 152 06/02/2022     Lab Results   Component Value Date    HDL 51 06/02/2022     Lab Results   Component Value Date    LDLCALC 88 06/02/2022     Lab Results   Component Value Date    TRIG 66 06/02/2022     No results  "found for: "CHOLHDL"   Lab Results   Component Value Date    TSH 0.726 09/28/2024    FREET4 1.21 08/26/2023     No results found for: "LABA1C", "HGBA1C"  Lab Results   Component Value Date    WBC 7.54 09/30/2024    HGB 11.3 (L) 09/30/2024    HCT 35.5 (L) 09/30/2024     (H) 09/30/2024     09/30/2024         Results for orders placed during the hospital encounter of 08/21/23    Echo    Interpretation Summary    Left Ventricle: The left ventricle is mildly dilated. Mildly increased ventricular mass. Mildly increased wall thickness. There is mild concentric hypertrophy. Normal wall motion. Septal flattening in systole consistent with right ventricular pressure overload. There is low normal systolic function. EF 50% with patient in sinus rhythm and heart rate of 52 There is diastolic dysfunction but grade cannot be determined. Elevated left ventricular filling pressure.    Right Ventricle: Normal right ventricular cavity size. Wall thickness is normal. Right ventricle wall motion  is normal. Systolic function is normal.    Mitral Valve: There is no stenosis.    Tricuspid Valve: There is mild to moderate regurgitation.    Pulmonary Artery: There is mild pulmonary hypertension. The estimated pulmonary artery systolic pressure is 58 mmHg.    IVC/SVC: Elevated venous pressure at 15 mmHg.    Pericardium: There is a small posterior effusion. Pericardial effusion is echolucent. No indication of cardiac tamponade.    Mean left atrial pressure slightly increased at 12 mm Hg     No results found for this or any previous visit.     EKG  Results for orders placed or performed during the hospital encounter of 09/28/24   EKG 12-lead    Collection Time: 09/28/24  7:26 PM   Result Value Ref Range    QRS Duration 128 ms    OHS QTC Calculation 493 ms    Narrative    Test Reason : R06.02,    Vent. Rate : 087 BPM     Atrial Rate : 087 BPM     P-R Int : 172 ms          QRS Dur : 128 ms      QT Int : 410 ms       P-R-T Axes : " 037 050 041 degrees     QTc Int : 493 ms    Normal sinus rhythm  Possible Left atrial enlargement  Right bundle branch block  Abnormal ECG  When compared with ECG of 02-JUL-2024 14:16,  Premature atrial complexes are no longer Present  Nonspecific T wave abnormality has replaced inverted T waves in Inferior  leads  Confirmed by Shelli VILLARREAL, Geovany TATE ([id number]) on 9/29/2024 10:13:27 AM    Referred By: [email or web address]   SELF           Confirmed By:Geovany Marques MD      Stress  No results found for this or any previous visit.             Assessment:       (HFpEF) heart failure with preserved ejection fraction  She had acute heart failure.  She has been on spironolactone every other day will increase it on daily basis.    Essential hypertension  Controlled on Entresto, Toprol-XL, spironolactone    History of atrial fibrillation  Controlled on Toprol-XL    CKD (chronic kidney disease), stage III  Stable    Postablative hypothyroidism  On thyroid replacement       Plan:       She is to take the spironolactone 25 mg daily.  She is to take Entresto 1 tablet in the morning half a tablet in the evening to see if it helps with her sleeping.  She will be seen in the office in 1 month with a BMP BNP and a CBC.

## 2024-10-11 ENCOUNTER — HOSPITAL ENCOUNTER (INPATIENT)
Facility: HOSPITAL | Age: 89
LOS: 3 days | Discharge: HOME HEALTH | DRG: 291 | End: 2024-10-14
Attending: STUDENT IN AN ORGANIZED HEALTH CARE EDUCATION/TRAINING PROGRAM | Admitting: HOSPITALIST
Payer: MEDICARE

## 2024-10-11 DIAGNOSIS — I50.9 HEART FAILURE: ICD-10-CM

## 2024-10-11 DIAGNOSIS — R06.02 SHORTNESS OF BREATH: ICD-10-CM

## 2024-10-11 DIAGNOSIS — R07.9 CHEST PAIN: ICD-10-CM

## 2024-10-11 DIAGNOSIS — R22.1 NECK MASS: Primary | ICD-10-CM

## 2024-10-11 PROBLEM — N17.9 ACUTE RENAL FAILURE: Status: RESOLVED | Noted: 2024-10-11 | Resolved: 2024-10-11

## 2024-10-11 PROBLEM — N17.9 ACUTE RENAL FAILURE: Status: ACTIVE | Noted: 2024-10-11

## 2024-10-11 PROBLEM — I10 HYPERTENSION: Status: ACTIVE | Noted: 2024-10-11

## 2024-10-11 PROBLEM — I50.33 ACUTE ON CHRONIC DIASTOLIC HEART FAILURE: Status: ACTIVE | Noted: 2024-10-11

## 2024-10-11 PROBLEM — R00.1 BRADYCARDIA: Status: ACTIVE | Noted: 2024-10-11

## 2024-10-11 LAB
ALBUMIN SERPL BCP-MCNC: 4.2 G/DL (ref 3.5–5.2)
ALP SERPL-CCNC: 70 U/L (ref 55–135)
ALT SERPL W/O P-5'-P-CCNC: 31 U/L (ref 10–44)
ANION GAP SERPL CALC-SCNC: 15 MMOL/L (ref 8–16)
AST SERPL-CCNC: 38 U/L (ref 10–40)
BACTERIA #/AREA URNS HPF: ABNORMAL /HPF
BASOPHILS # BLD AUTO: 0.05 K/UL (ref 0–0.2)
BASOPHILS NFR BLD: 0.5 % (ref 0–1.9)
BILIRUB SERPL-MCNC: 1.4 MG/DL (ref 0.1–1)
BILIRUB UR QL STRIP: NEGATIVE
BNP SERPL-MCNC: >4700 PG/ML (ref 0–99)
BUN SERPL-MCNC: 46 MG/DL (ref 8–23)
CALCIUM SERPL-MCNC: 10 MG/DL (ref 8.7–10.5)
CHLORIDE SERPL-SCNC: 102 MMOL/L (ref 95–110)
CLARITY UR: ABNORMAL
CO2 SERPL-SCNC: 18 MMOL/L (ref 23–29)
COLOR UR: YELLOW
CREAT SERPL-MCNC: 2.1 MG/DL (ref 0.5–1.4)
DIFFERENTIAL METHOD BLD: ABNORMAL
EOSINOPHIL # BLD AUTO: 0.1 K/UL (ref 0–0.5)
EOSINOPHIL NFR BLD: 0.6 % (ref 0–8)
ERYTHROCYTE [DISTWIDTH] IN BLOOD BY AUTOMATED COUNT: 14.3 % (ref 11.5–14.5)
EST. GFR  (NO RACE VARIABLE): 22 ML/MIN/1.73 M^2
GLUCOSE SERPL-MCNC: 116 MG/DL (ref 70–110)
GLUCOSE UR QL STRIP: NEGATIVE
HCT VFR BLD AUTO: 37.6 % (ref 37–48.5)
HGB BLD-MCNC: 12.4 G/DL (ref 12–16)
HGB UR QL STRIP: NEGATIVE
HYALINE CASTS #/AREA URNS LPF: 3 /LPF
IMM GRANULOCYTES # BLD AUTO: 0.03 K/UL (ref 0–0.04)
IMM GRANULOCYTES NFR BLD AUTO: 0.3 % (ref 0–0.5)
KETONES UR QL STRIP: NEGATIVE
LEUKOCYTE ESTERASE UR QL STRIP: ABNORMAL
LYMPHOCYTES # BLD AUTO: 1.4 K/UL (ref 1–4.8)
LYMPHOCYTES NFR BLD: 14.6 % (ref 18–48)
MAGNESIUM SERPL-MCNC: 1.9 MG/DL (ref 1.6–2.6)
MCH RBC QN AUTO: 34.3 PG (ref 27–31)
MCHC RBC AUTO-ENTMCNC: 33 G/DL (ref 32–36)
MCV RBC AUTO: 104 FL (ref 82–98)
MICROSCOPIC COMMENT: ABNORMAL
MONOCYTES # BLD AUTO: 1 K/UL (ref 0.3–1)
MONOCYTES NFR BLD: 10.4 % (ref 4–15)
NEUTROPHILS # BLD AUTO: 7 K/UL (ref 1.8–7.7)
NEUTROPHILS NFR BLD: 73.6 % (ref 38–73)
NITRITE UR QL STRIP: NEGATIVE
NON-SQ EPI CELLS #/AREA URNS HPF: 2 /HPF
NRBC BLD-RTO: 0 /100 WBC
OHS QRS DURATION: 146 MS
OHS QRS DURATION: 146 MS
OHS QTC CALCULATION: 514 MS
OHS QTC CALCULATION: 514 MS
PH UR STRIP: 5 [PH] (ref 5–8)
PLATELET # BLD AUTO: 230 K/UL (ref 150–450)
PMV BLD AUTO: 10.9 FL (ref 9.2–12.9)
POTASSIUM SERPL-SCNC: 5 MMOL/L (ref 3.5–5.1)
PROT SERPL-MCNC: 7.9 G/DL (ref 6–8.4)
PROT UR QL STRIP: ABNORMAL
RBC # BLD AUTO: 3.62 M/UL (ref 4–5.4)
RBC #/AREA URNS HPF: 7 /HPF (ref 0–4)
SODIUM SERPL-SCNC: 135 MMOL/L (ref 136–145)
SP GR UR STRIP: 1.01 (ref 1–1.03)
SQUAMOUS #/AREA URNS HPF: 15 /HPF
TROPONIN I SERPL HS-MCNC: 67.3 PG/ML (ref 0–14.9)
TROPONIN I SERPL HS-MCNC: 67.8 PG/ML (ref 0–14.9)
TROPONIN I SERPL HS-MCNC: 80.3 PG/ML (ref 0–14.9)
TSH SERPL DL<=0.005 MIU/L-ACNC: 2.38 UIU/ML (ref 0.34–5.6)
URN SPEC COLLECT METH UR: ABNORMAL
UROBILINOGEN UR STRIP-ACNC: NEGATIVE EU/DL
WBC # BLD AUTO: 9.5 K/UL (ref 3.9–12.7)
WBC #/AREA URNS HPF: 42 /HPF (ref 0–5)

## 2024-10-11 PROCEDURE — 63600175 PHARM REV CODE 636 W HCPCS: Performed by: STUDENT IN AN ORGANIZED HEALTH CARE EDUCATION/TRAINING PROGRAM

## 2024-10-11 PROCEDURE — 83036 HEMOGLOBIN GLYCOSYLATED A1C: CPT | Performed by: HOSPITALIST

## 2024-10-11 PROCEDURE — 27000221 HC OXYGEN, UP TO 24 HOURS

## 2024-10-11 PROCEDURE — 99223 1ST HOSP IP/OBS HIGH 75: CPT | Mod: ,,, | Performed by: INTERNAL MEDICINE

## 2024-10-11 PROCEDURE — 25000003 PHARM REV CODE 250: Performed by: HOSPITALIST

## 2024-10-11 PROCEDURE — 94761 N-INVAS EAR/PLS OXIMETRY MLT: CPT

## 2024-10-11 PROCEDURE — 81001 URINALYSIS AUTO W/SCOPE: CPT | Performed by: STUDENT IN AN ORGANIZED HEALTH CARE EDUCATION/TRAINING PROGRAM

## 2024-10-11 PROCEDURE — 87086 URINE CULTURE/COLONY COUNT: CPT | Performed by: STUDENT IN AN ORGANIZED HEALTH CARE EDUCATION/TRAINING PROGRAM

## 2024-10-11 PROCEDURE — 84443 ASSAY THYROID STIM HORMONE: CPT | Performed by: STUDENT IN AN ORGANIZED HEALTH CARE EDUCATION/TRAINING PROGRAM

## 2024-10-11 PROCEDURE — 83735 ASSAY OF MAGNESIUM: CPT | Performed by: STUDENT IN AN ORGANIZED HEALTH CARE EDUCATION/TRAINING PROGRAM

## 2024-10-11 PROCEDURE — 63600175 PHARM REV CODE 636 W HCPCS: Performed by: HOSPITALIST

## 2024-10-11 PROCEDURE — 80053 COMPREHEN METABOLIC PANEL: CPT | Performed by: STUDENT IN AN ORGANIZED HEALTH CARE EDUCATION/TRAINING PROGRAM

## 2024-10-11 PROCEDURE — 36415 COLL VENOUS BLD VENIPUNCTURE: CPT | Performed by: HOSPITALIST

## 2024-10-11 PROCEDURE — 21400001 HC TELEMETRY ROOM

## 2024-10-11 PROCEDURE — 84484 ASSAY OF TROPONIN QUANT: CPT | Mod: 91 | Performed by: HOSPITALIST

## 2024-10-11 PROCEDURE — 84484 ASSAY OF TROPONIN QUANT: CPT | Mod: 91 | Performed by: STUDENT IN AN ORGANIZED HEALTH CARE EDUCATION/TRAINING PROGRAM

## 2024-10-11 PROCEDURE — 85025 COMPLETE CBC W/AUTO DIFF WBC: CPT | Performed by: STUDENT IN AN ORGANIZED HEALTH CARE EDUCATION/TRAINING PROGRAM

## 2024-10-11 PROCEDURE — 83880 ASSAY OF NATRIURETIC PEPTIDE: CPT | Performed by: STUDENT IN AN ORGANIZED HEALTH CARE EDUCATION/TRAINING PROGRAM

## 2024-10-11 RX ORDER — SODIUM,POTASSIUM PHOSPHATES 280-250MG
2 POWDER IN PACKET (EA) ORAL
Status: DISCONTINUED | OUTPATIENT
Start: 2024-10-11 | End: 2024-10-14 | Stop reason: HOSPADM

## 2024-10-11 RX ORDER — ATROPINE SULFATE 0.1 MG/ML
1 INJECTION INTRAVENOUS
Status: DISCONTINUED | OUTPATIENT
Start: 2024-10-11 | End: 2024-10-14 | Stop reason: HOSPADM

## 2024-10-11 RX ORDER — IBUPROFEN 200 MG
24 TABLET ORAL
Status: DISCONTINUED | OUTPATIENT
Start: 2024-10-11 | End: 2024-10-14 | Stop reason: HOSPADM

## 2024-10-11 RX ORDER — NALOXONE HCL 0.4 MG/ML
0.02 VIAL (ML) INJECTION
Status: DISCONTINUED | OUTPATIENT
Start: 2024-10-11 | End: 2024-10-14 | Stop reason: HOSPADM

## 2024-10-11 RX ORDER — LEVOTHYROXINE SODIUM 100 UG/1
100 TABLET ORAL
Status: DISCONTINUED | OUTPATIENT
Start: 2024-10-12 | End: 2024-10-14 | Stop reason: HOSPADM

## 2024-10-11 RX ORDER — ASPIRIN 81 MG/1
81 TABLET ORAL DAILY
Status: DISCONTINUED | OUTPATIENT
Start: 2024-10-12 | End: 2024-10-14 | Stop reason: HOSPADM

## 2024-10-11 RX ORDER — IBUPROFEN 200 MG
16 TABLET ORAL
Status: DISCONTINUED | OUTPATIENT
Start: 2024-10-11 | End: 2024-10-14 | Stop reason: HOSPADM

## 2024-10-11 RX ORDER — LANOLIN ALCOHOL/MO/W.PET/CERES
800 CREAM (GRAM) TOPICAL
Status: DISCONTINUED | OUTPATIENT
Start: 2024-10-11 | End: 2024-10-14 | Stop reason: HOSPADM

## 2024-10-11 RX ORDER — HYDROCODONE BITARTRATE AND ACETAMINOPHEN 5; 325 MG/1; MG/1
1 TABLET ORAL EVERY 6 HOURS PRN
Status: DISCONTINUED | OUTPATIENT
Start: 2024-10-11 | End: 2024-10-14 | Stop reason: HOSPADM

## 2024-10-11 RX ORDER — GLUCAGON 1 MG
1 KIT INJECTION
Status: DISCONTINUED | OUTPATIENT
Start: 2024-10-11 | End: 2024-10-14 | Stop reason: HOSPADM

## 2024-10-11 RX ORDER — MEROPENEM AND SODIUM CHLORIDE 500 MG/50ML
500 INJECTION, SOLUTION INTRAVENOUS
Status: DISCONTINUED | OUTPATIENT
Start: 2024-10-11 | End: 2024-10-11

## 2024-10-11 RX ORDER — ONDANSETRON HYDROCHLORIDE 2 MG/ML
4 INJECTION, SOLUTION INTRAVENOUS EVERY 6 HOURS PRN
Status: DISCONTINUED | OUTPATIENT
Start: 2024-10-11 | End: 2024-10-14 | Stop reason: HOSPADM

## 2024-10-11 RX ORDER — ONDANSETRON HYDROCHLORIDE 2 MG/ML
4 INJECTION, SOLUTION INTRAVENOUS
Status: COMPLETED | OUTPATIENT
Start: 2024-10-11 | End: 2024-10-11

## 2024-10-11 RX ORDER — SODIUM CHLORIDE 0.9 % (FLUSH) 0.9 %
10 SYRINGE (ML) INJECTION
Status: DISCONTINUED | OUTPATIENT
Start: 2024-10-11 | End: 2024-10-14 | Stop reason: HOSPADM

## 2024-10-11 RX ORDER — AMOXICILLIN 250 MG
1 CAPSULE ORAL 2 TIMES DAILY PRN
Status: DISCONTINUED | OUTPATIENT
Start: 2024-10-11 | End: 2024-10-14 | Stop reason: HOSPADM

## 2024-10-11 RX ORDER — HYDRALAZINE HYDROCHLORIDE 25 MG/1
25 TABLET, FILM COATED ORAL EVERY 8 HOURS
Status: DISCONTINUED | OUTPATIENT
Start: 2024-10-11 | End: 2024-10-12

## 2024-10-11 RX ORDER — SODIUM CHLORIDE 0.9 % (FLUSH) 0.9 %
2 SYRINGE (ML) INJECTION EVERY 12 HOURS PRN
Status: DISCONTINUED | OUTPATIENT
Start: 2024-10-11 | End: 2024-10-14 | Stop reason: HOSPADM

## 2024-10-11 RX ORDER — TALC
6 POWDER (GRAM) TOPICAL NIGHTLY PRN
Status: DISCONTINUED | OUTPATIENT
Start: 2024-10-11 | End: 2024-10-14 | Stop reason: HOSPADM

## 2024-10-11 RX ORDER — ACETAMINOPHEN 325 MG/1
650 TABLET ORAL EVERY 4 HOURS PRN
Status: DISCONTINUED | OUTPATIENT
Start: 2024-10-11 | End: 2024-10-14 | Stop reason: HOSPADM

## 2024-10-11 RX ORDER — ENOXAPARIN SODIUM 100 MG/ML
40 INJECTION SUBCUTANEOUS EVERY 24 HOURS
Status: DISCONTINUED | OUTPATIENT
Start: 2024-10-11 | End: 2024-10-11

## 2024-10-11 RX ORDER — ONDANSETRON HYDROCHLORIDE 2 MG/ML
INJECTION, SOLUTION INTRAVENOUS
Status: DISCONTINUED
Start: 2024-10-11 | End: 2024-10-11 | Stop reason: WASHOUT

## 2024-10-11 RX ORDER — FUROSEMIDE 10 MG/ML
40 INJECTION INTRAMUSCULAR; INTRAVENOUS
Status: COMPLETED | OUTPATIENT
Start: 2024-10-11 | End: 2024-10-11

## 2024-10-11 RX ORDER — FUROSEMIDE 10 MG/ML
40 INJECTION INTRAMUSCULAR; INTRAVENOUS EVERY 12 HOURS
Status: DISCONTINUED | OUTPATIENT
Start: 2024-10-11 | End: 2024-10-11

## 2024-10-11 RX ORDER — ACETAMINOPHEN 325 MG/1
650 TABLET ORAL EVERY 8 HOURS PRN
Status: DISCONTINUED | OUTPATIENT
Start: 2024-10-11 | End: 2024-10-14 | Stop reason: HOSPADM

## 2024-10-11 RX ORDER — ENOXAPARIN SODIUM 100 MG/ML
30 INJECTION SUBCUTANEOUS EVERY 24 HOURS
Status: DISCONTINUED | OUTPATIENT
Start: 2024-10-11 | End: 2024-10-14 | Stop reason: HOSPADM

## 2024-10-11 RX ORDER — ALUMINUM HYDROXIDE, MAGNESIUM HYDROXIDE, AND SIMETHICONE 1200; 120; 1200 MG/30ML; MG/30ML; MG/30ML
30 SUSPENSION ORAL 4 TIMES DAILY PRN
Status: DISCONTINUED | OUTPATIENT
Start: 2024-10-11 | End: 2024-10-14 | Stop reason: HOSPADM

## 2024-10-11 RX ORDER — FUROSEMIDE 10 MG/ML
40 INJECTION INTRAMUSCULAR; INTRAVENOUS EVERY 12 HOURS
Status: DISCONTINUED | OUTPATIENT
Start: 2024-10-12 | End: 2024-10-11

## 2024-10-11 RX ADMIN — HYDRALAZINE HYDROCHLORIDE 25 MG: 25 TABLET ORAL at 03:10

## 2024-10-11 RX ADMIN — ENOXAPARIN SODIUM 30 MG: 30 INJECTION SUBCUTANEOUS at 06:10

## 2024-10-11 RX ADMIN — MEROPENEM 500 MG: 500 INJECTION, POWDER, FOR SOLUTION INTRAVENOUS at 08:10

## 2024-10-11 RX ADMIN — Medication 6 MG: at 08:10

## 2024-10-11 RX ADMIN — ONDANSETRON 4 MG: 2 INJECTION INTRAMUSCULAR; INTRAVENOUS at 12:10

## 2024-10-11 RX ADMIN — FUROSEMIDE 40 MG: 10 INJECTION, SOLUTION INTRAMUSCULAR; INTRAVENOUS at 03:10

## 2024-10-11 RX ADMIN — HYDRALAZINE HYDROCHLORIDE 25 MG: 25 TABLET ORAL at 10:10

## 2024-10-11 NOTE — SUBJECTIVE & OBJECTIVE
Past Medical History:   Diagnosis Date    Atrial fibrillation     Bradycardia     Carotid bruit     CKD (chronic kidney disease), stage III     Hyperlipidemia     OA (osteoarthritis)     Thyroid disease        Past Surgical History:   Procedure Laterality Date    HYSTERECTOMY      KNEE SURGERY Right     SHOULDER SURGERY Right        Review of patient's allergies indicates:   Allergen Reactions    Penicillins Anaphylaxis       No current facility-administered medications on file prior to encounter.     Current Outpatient Medications on File Prior to Encounter   Medication Sig    ascorbic acid, vitamin C, (VITAMIN C) 250 mg Chew Take 1 tablet by mouth Daily.    aspirin (ECOTRIN) 81 MG EC tablet Take 81 mg by mouth once daily.    betaxolol 0.5% (BETOPTIC-S) 0.5 % Drop Place 1 drop into both eyes 2 (two) times daily.    coenzyme Q10 (CO Q-10) 100 mg capsule Take 100 mg by mouth once daily.    docosahexaenoic acid/epa (FISH OIL ORAL) Take 1 capsule by mouth Daily.    ENTRESTO 24-26 mg per tablet TAKE 1 TABLET BY MOUTH TWICE DAILY (Patient taking differently: Take 1 tablet by mouth As instructed (1 tablet AM and 0.5 tablet PM).)    ergocalciferol, vitamin D2, (VITAMIN D ORAL) Take 1 tablet by mouth Daily.    FLAXSEED OIL ORAL Take 1 capsule by mouth Daily.    glucosam/msm/chond/cft650/hyal (GLUCOS-CHOND-MSM, WITH ANTIOX, ORAL) Take 1 capsule by mouth Daily.    levothyroxine (SYNTHROID) 100 MCG tablet TAKE 1 TABLET BY MOUTH EVERY DAY (Patient taking differently: Take 100 mcg by mouth before breakfast.)    metoprolol succinate (TOPROL-XL) 25 MG 24 hr tablet Take 1 tablet (25 mg total) by mouth once daily. (Patient taking differently: Take 12.5 mg by mouth once daily.)    multivitamin (THERAGRAN) per tablet Take 1 tablet by mouth once daily.    spironolactone (ALDACTONE) 25 MG tablet Take 1 tablet (25 mg total) by mouth every other day. (Patient taking differently: Take 25 mg by mouth once daily.)    [DISCONTINUED]  triamcinolone acetonide 0.1% (KENALOG) 0.1 % cream Apply topically 2 (two) times daily.     Family History       Problem Relation (Age of Onset)    Heart disease Father          Tobacco Use    Smoking status: Never    Smokeless tobacco: Never   Substance and Sexual Activity    Alcohol use: Never    Drug use: Never    Sexual activity: Not on file     Review of Systems   Constitutional:  Positive for activity change, appetite change and fatigue. Negative for diaphoresis and fever.   HENT:  Negative for sore throat.    Eyes:  Negative for visual disturbance.   Respiratory:  Positive for shortness of breath.    Cardiovascular:  Positive for leg swelling. Negative for chest pain and palpitations.   Gastrointestinal:  Positive for nausea. Negative for abdominal pain and vomiting.   Genitourinary:  Negative for dysuria, frequency and urgency.   Musculoskeletal:  Negative for back pain.   Skin:  Negative for rash.   Neurological:  Negative for syncope.   Psychiatric/Behavioral:  Negative for confusion.      Objective:     Vital Signs (Most Recent):  Temp: 98.1 °F (36.7 °C) (10/11/24 1053)  Pulse: (!) 38 (10/11/24 1308)  Resp: (!) 21 (10/11/24 1308)  BP: (!) 160/66 (10/11/24 1301)  SpO2: 95 % (10/11/24 1308) Vital Signs (24h Range):  Temp:  [98.1 °F (36.7 °C)] 98.1 °F (36.7 °C)  Pulse:  [31-66] 38  Resp:  [16-23] 21  SpO2:  [92 %-97 %] 95 %  BP: (160-187)/(66-99) 160/66     Weight: 68 kg (150 lb)  Body mass index is 27.44 kg/m².     Physical Exam  Vitals reviewed.   Constitutional:       Appearance: Normal appearance.   HENT:      Head: Normocephalic and atraumatic.      Mouth/Throat:      Mouth: Mucous membranes are moist.   Eyes:      Extraocular Movements: Extraocular movements intact.      Conjunctiva/sclera: Conjunctivae normal.      Pupils: Pupils are equal, round, and reactive to light.   Cardiovascular:      Rate and Rhythm: Regular rhythm. Bradycardia present.   Pulmonary:      Effort: Pulmonary effort is normal.       Breath sounds: Normal breath sounds.   Abdominal:      General: Abdomen is flat. Bowel sounds are normal. There is no distension.      Palpations: Abdomen is soft.      Tenderness: There is no abdominal tenderness.   Musculoskeletal:      Cervical back: Normal range of motion and neck supple.      Comments: +2 bilateral LE swelling.    Skin:     General: Skin is warm.   Neurological:      Mental Status: She is alert and oriented to person, place, and time.   Psychiatric:         Mood and Affect: Mood normal.         Behavior: Behavior normal.               Significant Labs: All pertinent labs within the past 24 hours have been reviewed.  Recent Lab Results         10/11/24  1151        Albumin 4.2       ALP 70       ALT 31       Anion Gap 15       AST 38       Baso # 0.05       Basophil % 0.5       BILIRUBIN TOTAL 1.4  Comment: For infants and newborns, interpretation of results should be based  on gestational age, weight and in agreement with clinical  observations.    Premature Infant recommended reference ranges:  Up to 24 hours.............<8.0 mg/dL  Up to 48 hours............<12.0 mg/dL  3-5 days..................<15.0 mg/dL  6-29 days.................<15.0 mg/dL         BNP >4,700  Comment: Values of less than 100 pg/ml are consistent with non-CHF populations.       BUN 46       Calcium 10.0       Chloride 102       CO2 18       Creatinine 2.1       Differential Method Automated       eGFR 22.0       Eos # 0.1       Eos % 0.6       Glucose 116       Gran # (ANC) 7.0       Gran % 73.6       Hematocrit 37.6       Hemoglobin 12.4       Immature Grans (Abs) 0.03  Comment: Mild elevation in immature granulocytes is non specific and   can be seen in a variety of conditions including stress response,   acute inflammation, trauma and pregnancy. Correlation with other   laboratory and clinical findings is essential.         Immature Granulocytes 0.3       Lymph # 1.4       Lymph % 14.6       Magnesium  1.9       MCH  34.3       MCHC 33.0              Mono # 1.0       Mono % 10.4       MPV 10.9       nRBC 0       Platelet Count 230       Potassium 5.0       PROTEIN TOTAL 7.9       RBC 3.62       RDW 14.3       Sodium 135       Troponin I High Sensitivity 67.8  Comment: Troponin results differ between methods. Do not use   results between Troponin methods interchangeably.    Alkaline Phospatase levels above 400 U/L may   cause false positive results.    Access hsTnI should not be used for patients taking   Asfotase isaac (Strensiq).  Critical result [facility]NI[facility] 67.8 pg/mL called to and read back by Aysha Torres ED, RN. at 11-Oct-2024 13:13 by [facility].  Result Rechecked         TSH 2.377       WBC 9.50               Significant Imaging: I have reviewed all pertinent imaging results/findings within the past 24 hours.

## 2024-10-11 NOTE — ASSESSMENT & PLAN NOTE
Suspect cardio renal.  Monitor kidney function on IV lasix. If worsen, then primary team to consult cardiology.     Most recent creatinine and eGFR are listed below.  Recent Labs     10/11/24  1151   CREATININE 2.1*   EGFRNORACEVR 22.0*

## 2024-10-11 NOTE — PHARMACY MED REC
"Admission Medication History     The home medication history was taken by Kamar Vasquez.    You may go to "Admission" then "Reconcile Home Medications" tabs to review and/or act upon these items.     The home medication list has been updated by the Pharmacy department.   Please read ALL comments highlighted in yellow.   Please address this information as you see fit.    Feel free to contact us if you have any questions or require assistance.      The medications listed below were removed from the home medication list. Please reorder if appropriate:  Patient reports no longer taking the following medication(s):  Triamcinolone Cream    Medications listed below were obtained from: Patient/family and Analytic software- [employer] in [place]  No current facility-administered medications on file prior to encounter.     Current Outpatient Medications on File Prior to Encounter   Medication Sig Dispense Refill    ascorbic acid, vitamin C, (VITAMIN C) 250 mg Chew Take 1 tablet by mouth Daily.      aspirin (ECOTRIN) 81 MG EC tablet Take 81 mg by mouth once daily.      betaxolol 0.5% (BETOPTIC-S) 0.5 % Drop Place 1 drop into both eyes 2 (two) times daily.      coenzyme Q10 (CO Q-10) 100 mg capsule Take 100 mg by mouth once daily.      docosahexaenoic acid/epa (FISH OIL ORAL) Take 1 capsule by mouth Daily.      ENTRESTO 24-26 mg per tablet TAKE 1 TABLET BY MOUTH TWICE DAILY (Patient taking differently: Take 1 tablet by mouth As instructed (1 tablet AM and 0.5 tablet PM).) 180 tablet 11    ergocalciferol, vitamin D2, (VITAMIN D ORAL) Take 1 tablet by mouth Daily.      FLAXSEED OIL ORAL Take 1 capsule by mouth Daily.      glucosam/msm/chond/mlo072/hyal (GLUCOS-CHOND-MSM, WITH ANTIOX, ORAL) Take 1 capsule by mouth Daily.      levothyroxine (SYNTHROID) 100 MCG tablet TAKE 1 TABLET BY MOUTH EVERY DAY (Patient taking differently: Take 100 mcg by mouth before breakfast.) 90 tablet 1    metoprolol succinate (TOPROL-XL) 25 MG 24 hr tablet Take 1 " tablet (25 mg total) by mouth once daily. (Patient taking differently: Take 12.5 mg by mouth once daily.) 90 tablet 2    multivitamin (THERAGRAN) per tablet Take 1 tablet by mouth once daily.      spironolactone (ALDACTONE) 25 MG tablet Take 1 tablet (25 mg total) by mouth every other day. (Patient taking differently: Take 25 mg by mouth once daily.) 45 tablet 3    [DISCONTINUED] triamcinolone acetonide 0.1% (KENALOG) 0.1 % cream Apply topically 2 (two) times daily. 80 g 4             Kamar Vasquez  EXT 1924                .

## 2024-10-11 NOTE — ED PROVIDER NOTES
Encounter Date: 10/11/2024       History     Chief Complaint   Patient presents with    Shortness of Breath     Pt c/o cough, shortness of breath, and dizziness since she was discharged for the same compliant     HPI    89-year-old female with history of AFib, CKD, hyperlipidemia, heart failure with a preserved EF, thyroid disease presenting with cough, shortness of breath, and some decreased appetite with nausea.  Patient was admitted about 2 weeks ago for heart failure exacerbation and was discharged on daily spironolactone and Entresto.  Patient follow up with Cardiology 2 days ago and informed him of the complaint of difficulty sleeping after taking Entresto.  At that time cardiology decreased the dose to a half tablet in the evening.  Patient denies any chest pain, fever, lower extremity swelling, orthopnea.  Describes abdominal fullness with decreased appetite and frequent gagging.    Review of patient's allergies indicates:   Allergen Reactions    Penicillins Anaphylaxis     Past Medical History:   Diagnosis Date    Atrial fibrillation     Bradycardia     Carotid bruit     CKD (chronic kidney disease), stage III     Hyperlipidemia     OA (osteoarthritis)     Thyroid disease      Past Surgical History:   Procedure Laterality Date    HYSTERECTOMY      KNEE SURGERY Right     SHOULDER SURGERY Right      Family History   Problem Relation Name Age of Onset    Heart disease Father       Social History     Tobacco Use    Smoking status: Never    Smokeless tobacco: Never   Substance Use Topics    Alcohol use: Never    Drug use: Never     Review of Systems    As noted above    Physical Exam     Initial Vitals [10/11/24 1053]   BP Pulse Resp Temp SpO2   (!) 187/99 (!) 36 16 98.1 °F (36.7 °C) (!) 92 %      MAP       --         Physical Exam    Constitutional: She appears well-developed. She is not diaphoretic.   HENT:   Head: Normocephalic and atraumatic.   Eyes: EOM are normal. Pupils are equal, round, and reactive to  light.   Neck:   JVP estimated at 9 cm H2O   Normal range of motion.  Cardiovascular:  Normal rate.           Murmur heard.  Systolic murmur loudest on the 2nd ribs space along right midclavicular line   Pulmonary/Chest: Breath sounds normal. No respiratory distress. She has no wheezes. She has no rales.   Abdominal: Abdomen is soft. She exhibits no mass. There is no abdominal tenderness. There is no rebound and no guarding.   Musculoskeletal:         General: No edema. Normal range of motion.      Cervical back: Normal range of motion.     Neurological: She is alert and oriented to person, place, and time.   Skin: Skin is warm and dry. Capillary refill takes less than 2 seconds. No rash noted.         ED Course   Procedures  Labs Reviewed   CBC W/ AUTO DIFFERENTIAL - Abnormal       Result Value    WBC 9.50      RBC 3.62 (*)     Hemoglobin 12.4      Hematocrit 37.6       (*)     MCH 34.3 (*)     MCHC 33.0      RDW 14.3      Platelets 230      MPV 10.9      Immature Granulocytes 0.3      Gran # (ANC) 7.0      Immature Grans (Abs) 0.03      Lymph # 1.4      Mono # 1.0      Eos # 0.1      Baso # 0.05      nRBC 0      Gran % 73.6 (*)     Lymph % 14.6 (*)     Mono % 10.4      Eosinophil % 0.6      Basophil % 0.5      Differential Method Automated     COMPREHENSIVE METABOLIC PANEL - Abnormal    Sodium 135 (*)     Potassium 5.0      Chloride 102      CO2 18 (*)     Glucose 116 (*)     BUN 46 (*)     Creatinine 2.1 (*)     Calcium 10.0      Total Protein 7.9      Albumin 4.2      Total Bilirubin 1.4 (*)     Alkaline Phosphatase 70      AST 38      ALT 31      eGFR 22.0 (*)     Anion Gap 15     B-TYPE NATRIURETIC PEPTIDE - Abnormal    BNP >4,700 (*)    TROPONIN I HIGH SENSITIVITY - Abnormal    Troponin I High Sensitivity 67.8 (*)    URINALYSIS, REFLEX TO URINE CULTURE - Abnormal    Specimen UA Urine, Clean Catch      Color, UA Yellow      Appearance, UA Hazy (*)     pH, UA 5.0      Specific Gravity, UA 1.015       Protein, UA 1+ (*)     Glucose, UA Negative      Ketones, UA Negative      Bilirubin (UA) Negative      Occult Blood UA Negative      Nitrite, UA Negative      Urobilinogen, UA Negative      Leukocytes, UA 3+ (*)     Narrative:     Specimen Source->Urine   URINALYSIS MICROSCOPIC - Abnormal    RBC, UA 7 (*)     WBC, UA 42 (*)     Bacteria Occasional      Squam Epithel, UA 15      Non-Squam Epith 2 (*)     Hyaline Casts, UA 3 (*)     Microscopic Comment SEE COMMENT      Narrative:     Specimen Source->Urine   CULTURE, URINE   TSH    TSH 2.377     MAGNESIUM    Magnesium 1.9     TROPONIN I HIGH SENSITIVITY   HEMOGLOBIN A1C     EKG Readings: (Independently Interpreted)   EKG interpreted by me with bigeminy, rate around 72, T-wave inversions V3, V4.  Right bundle-branch block is present.  Bigeminy appears new although patient does have prior T-wave inversions in V2 and V3 with right bundle-branch block on prior EKG.     ECG Results              EKG 12-lead (In process)        Collection Time Result Time QRS Duration OHS QTC Calculation    10/11/24 11:04:42 10/11/24 15:40:36 146 514                     In process by Interface, Lab In [facility] (10/11/24 15:40:46)                   Narrative:    Test Reason : I50.9,    Vent. Rate : 072 BPM     Atrial Rate : 072 BPM     P-R Int : 166 ms          QRS Dur : 146 ms      QT Int : 470 ms       P-R-T Axes : 048 060 027 degrees     QTc Int : 514 ms    Sinus rhythm with frequent and consecutive Premature ventricular complexes   in a pattern of bigeminy  Possible Left atrial enlargement  Right bundle branch block  Abnormal ECG  When compared with ECG of 28-SEP-2024 19:26,  Premature ventricular complexes are now Present    Referred By: [email or web address]   SELF           Confirmed By:                                   Imaging Results              US Soft Tissue Head Neck (In process)                      X-Ray Chest AP Portable (Final result)  Result time 10/11/24 11:43:38      Final result by  Olaf Nelson DO (10/11/24 11:43:38)                   Impression:      1. Enlarged cardiomediastinal silhouette and prominent central hilar vascular structures may reflect pulmonary vascular congestion.  2. Mild bilateral perihilar interstitial thickening could reflect mild pulmonary edema in the proper clinical setting.      Electronically signed by: Olaf Nelson  Date:    10/11/2024  Time:    11:43               Narrative:    EXAMINATION:  XR CHEST AP PORTABLE    CLINICAL HISTORY:  Shortness of breath    FINDINGS:  Portable chest with comparison chest x-ray 09/28/2024.  Unchanged enlarged cardiomediastinal silhouette.  Prominent size the central hilar vascular structures again noted.  There is mild perihilar interstitial thickening.No definite confluent airspace opacities.  No acute osseous abnormality.                                       Medications   aspirin EC tablet 81 mg (has no administration in time range)   levothyroxine tablet 100 mcg (has no administration in time range)   sodium chloride 0.9% flush 2 mL (has no administration in time range)   melatonin tablet 6 mg (has no administration in time range)   ondansetron injection 4 mg (has no administration in time range)   senna-docusate 8.6-50 mg per tablet 1 tablet (has no administration in time range)   acetaminophen tablet 650 mg (has no administration in time range)   aluminum-magnesium hydroxide-simethicone 200-200-20 mg/5 mL suspension 30 mL (has no administration in time range)   acetaminophen tablet 650 mg (has no administration in time range)   HYDROcodone-acetaminophen 5-325 mg per tablet 1 tablet (has no administration in time range)   naloxone 0.4 mg/mL injection 0.02 mg (has no administration in time range)   potassium bicarbonate disintegrating tablet 50 mEq (has no administration in time range)   potassium bicarbonate disintegrating tablet 35 mEq (has no administration in time range)   potassium bicarbonate disintegrating tablet 60  mEq (has no administration in time range)   magnesium oxide tablet 800 mg (has no administration in time range)   magnesium oxide tablet 800 mg (has no administration in time range)   potassium, sodium phosphates 280-160-250 mg packet 2 packet (has no administration in time range)   potassium, sodium phosphates 280-160-250 mg packet 2 packet (has no administration in time range)   potassium, sodium phosphates 280-160-250 mg packet 2 packet (has no administration in time range)   glucose chewable tablet 16 g (has no administration in time range)   glucose chewable tablet 24 g (has no administration in time range)   dextrose 50% injection 12.5 g (has no administration in time range)   dextrose 50% injection 25 g (has no administration in time range)   glucagon (human recombinant) injection 1 mg (has no administration in time range)   sodium chloride 0.9% flush 10 mL (has no administration in time range)   atropine injection 1 mg (has no administration in time range)   hydrALAZINE tablet 25 mg (25 mg Oral Given 10/11/24 1533)   enoxaparin injection 30 mg (has no administration in time range)   ondansetron injection 4 mg (4 mg Intravenous Given 10/11/24 1205)   furosemide injection 40 mg (40 mg Intravenous Given 10/11/24 1521)     Medical Decision Making  89-year-old female with history of heart failure with preserved EF, thyroid disease, hypertension presenting with several days of fatigue, decreased appetite, nausea, and shortness of breath.  Vital signs here appears stable.  Blood pressure is elevated.  Heart rate documented at 36 although appears to be 72 with bigeminy on the monitor.  Differential includes ACS, CHF exacerbation, medication side effect, UTI, electrolyte derangement.    Lab work shows ALTON, anion gap acidosis, further progression of elevated BNP with elements of pulmonary edema on imaging.  Troponin is elevated at 67 although patient denies chest pain and levels are improved compared to 2 weeks ago.   Suspect this is demand ischemia from CHF exacerbation.  Patient was given IV Lasix and admitted to Hospital Medicine for further workup and management.    Sebastian Choi MD  Emergency Medicine      Amount and/or Complexity of Data Reviewed  Labs: ordered. Decision-making details documented in ED Course.  Radiology: ordered.    Risk  Prescription drug management.  Decision regarding hospitalization.               ED Course as of 10/11/24 1621   Fri Oct 11, 2024   1307 BNP(!): >4,700 [KH]   1315 Troponin I High Sensitivity(!!): 67.8 [KH]      ED Course User Index  [KH] Sebastian Choi MD                           Clinical Impression:  Final diagnoses:  [R06.02] Shortness of breath          ED Disposition Condition    Admit Stable                Sebastian Choi MD  10/11/24 7515

## 2024-10-11 NOTE — PROGRESS NOTES
Pharmacist Renal Dose Adjustment Note    Andressa Benedict is a 89 y.o. female being treated with the medication Enoxaparin.    Patient Data:    Vital Signs (Most Recent):  Temp: 98.1 °F (36.7 °C) (10/11/24 1053)  Pulse: (!) 51 (10/11/24 1436)  Resp: 12 (10/11/24 1436)  BP: (!) 160/66 (10/11/24 1301)  SpO2: (!) 93 % (10/11/24 1436) Vital Signs (72h Range):  Temp:  [98.1 °F (36.7 °C)]   Pulse:  [31-66]   Resp:  [12-23]   BP: (160-187)/(66-99)   SpO2:  [92 %-97 %]      Recent Labs   Lab 10/11/24  1151   CREATININE 2.1*     Serum creatinine: 2.1 mg/dL (H) 10/11/24 1151  Estimated creatinine clearance: 16.1 mL/min (A)    Enoxaparin 40 mg subq every 24 hours will be changed to Enoxaparin 30 mg subq every 24 hours due to CrCl < 30 ml/min.    Pharmacist's Name: Mariela Reid  Pharmacist's Extension: [phone]

## 2024-10-11 NOTE — ASSESSMENT & PLAN NOTE
Patients blood pressure range in the last 24 hours was: BP  Min: 160/66  Max: 187/99.  Hold pt's entresto, and aldactone given renal failure  Hold metropolol given bradycardia  Will start hydralazine 25 mg q6h. Will add norvasc or nifedipine if needed.

## 2024-10-11 NOTE — ASSESSMENT & PLAN NOTE
CHF pathway  Stricts I&O, daily wt  Lasix iv  Cards consult  Pt is not on lasix at home. Plz consider discharging pt on lasix.   Better bp control.

## 2024-10-11 NOTE — CONSULTS
[facility] Emergency Dept  Department of Cardiology  Consult Note      PATIENT NAME: Andressa Benedict  MRN: [id number]  TODAY'S DATE: 10/11/2024  ADMIT DATE: 10/11/2024                          CONSULT REQUESTED BY: Tamar Woodson MD    SUBJECTIVE     PRINCIPAL PROBLEM: Acute on chronic diastolic heart failure      REASON FOR CONSULT:  Bradycardia to the 30's. HF exacerbation      HPI:     89 yo female with history of AFib, CKD, hyperlipidemia, diastolic HF and thyroid disease presenting with generalized weakness, decreased oral intake, shortness of breath, and bilateral lower extremity swelling. Hypertensive in ED and intermittent bradycardia with HR as low as 30s.  BNP 4700.  Bigeminy noted on EKG.  Pulmonary edema on CXR.  Patient was given IV lasix in ED and admitted for HF exacerbation. ALTON/CKD.     IN ED: H&H 12/37, K 5.0, Cr 2.1, Mag 1.9; BNP 4700, Troponin HS 60 >90 > 67.8.       FROM H&P   Shortness of Breath       Pt c/o cough, shortness of breath, and dizziness since she was discharged for the same compliant         HPI: 89 yo female with history of AFib, CKD, hyperlipidemia, diastolic HF and thyroid disease presenting with generalized weakness, decreased oral intake, shortness of breath, and bilateral lower extremity swelling.  Patient can not recall how long she had the symptoms for.  Patient is hard of hearing, so interview is very difficult.     In the ER, vitals showed a blood pressure of 187/99, heart rate of 76, respiratory rate of 16, afebrile satting 92% on room air. CBC is wnl. CMP showed sodium of 135, creatinine of 2.1, compared to baseline of 1.5.  BNP is elevated at more than 4700, 1st troponin is 67.  TSH is within normal limits.  EKG showed sinus rhythm with premature ventricular complexes in a pattern of bigeminal.  Chest x-ray showed enlarged cardiomediastinal silhouette and prominent central hilar vascular structures which may reflect pulmonary vascular congestion.  Mild bilateral perihilar interstitial thickening could reflect mild pulmonary edema in the proper clinical setting.  Patient was given 40 mg of Lasix IV, and will be admitted to Medicine for heart failure exacerbation.      Review of patient's allergies indicates:   Allergen Reactions    Penicillins Anaphylaxis       Past Medical History:   Diagnosis Date    Atrial fibrillation     Bradycardia     Carotid bruit     CKD (chronic kidney disease), stage III     Hyperlipidemia     OA (osteoarthritis)     Thyroid disease      Past Surgical History:   Procedure Laterality Date    HYSTERECTOMY      KNEE SURGERY Right     SHOULDER SURGERY Right      Social History     Tobacco Use    Smoking status: Never    Smokeless tobacco: Never   Substance Use Topics    Alcohol use: Never    Drug use: Never        REVIEW OF SYSTEMS    As mentioned in HPI    OBJECTIVE     VITAL SIGNS (Most Recent)  Temp: 98.1 °F (36.7 °C) (10/11/24 1053)  Pulse: (!) 51 (10/11/24 1436)  Resp: 12 (10/11/24 1436)  BP: (!) 160/66 (10/11/24 1301)  SpO2: (!) 93 % (10/11/24 1436)    VENTILATION STATUS  Resp: 12 (10/11/24 1436)  SpO2: (!) 93 % (10/11/24 1436)           I & O (Last 24H):No intake or output data in the 24 hours ending 10/11/24 1533    WEIGHTS  Wt Readings from Last 3 Encounters:   10/11/24 1053 68 kg (150 lb)   09/29/24 0530 69.1 kg (152 lb 5.4 oz)   09/28/24 2315 69.1 kg (152 lb 5.4 oz)   09/28/24 1911 73.9 kg (163 lb)   07/02/24 1329 74.3 kg (163 lb 12.8 oz)       PHYSICAL EXAM    CONSTITUTIONAL: NAD  HEENT: Normocephalic. No pallor  NECK: no JVD; mass noted to L lower neck  LUNGS: CTA b/l  HEART: regular rate and rhythm, S1, S2 normal, no murmur ; intermittent bigeminy  ABDOMEN: soft, non-tender, bowel sounds normal  EXTREMITIES: No edema  SKIN: No rash  NEURO: AAO X 3  PSYCH: normal affect      HOME MEDICATIONS:  No current facility-administered medications on file prior to encounter.     Current Outpatient Medications on File Prior to  Encounter   Medication Sig Dispense Refill    ascorbic acid, vitamin C, (VITAMIN C) 250 mg Chew Take 1 tablet by mouth Daily.      aspirin (ECOTRIN) 81 MG EC tablet Take 81 mg by mouth once daily.      betaxolol 0.5% (BETOPTIC-S) 0.5 % Drop Place 1 drop into both eyes 2 (two) times daily.      coenzyme Q10 (CO Q-10) 100 mg capsule Take 100 mg by mouth once daily.      docosahexaenoic acid/epa (FISH OIL ORAL) Take 1 capsule by mouth Daily.      ENTRESTO 24-26 mg per tablet TAKE 1 TABLET BY MOUTH TWICE DAILY (Patient taking differently: Take 1 tablet by mouth As instructed (1 tablet AM and 0.5 tablet PM).) 180 tablet 11    ergocalciferol, vitamin D2, (VITAMIN D ORAL) Take 1 tablet by mouth Daily.      FLAXSEED OIL ORAL Take 1 capsule by mouth Daily.      glucosam/msm/chond/oyz764/hyal (GLUCOS-CHOND-MSM, WITH ANTIOX, ORAL) Take 1 capsule by mouth Daily.      levothyroxine (SYNTHROID) 100 MCG tablet TAKE 1 TABLET BY MOUTH EVERY DAY (Patient taking differently: Take 100 mcg by mouth before breakfast.) 90 tablet 1    metoprolol succinate (TOPROL-XL) 25 MG 24 hr tablet Take 1 tablet (25 mg total) by mouth once daily. (Patient taking differently: Take 12.5 mg by mouth once daily.) 90 tablet 2    multivitamin (THERAGRAN) per tablet Take 1 tablet by mouth once daily.      spironolactone (ALDACTONE) 25 MG tablet Take 1 tablet (25 mg total) by mouth every other day. (Patient taking differently: Take 25 mg by mouth once daily.) 45 tablet 3    [DISCONTINUED] triamcinolone acetonide 0.1% (KENALOG) 0.1 % cream Apply topically 2 (two) times daily. 80 g 4       SCHEDULED MEDS:   [START ON 10/12/2024] aspirin  81 mg Oral Daily    enoxparin  30 mg Subcutaneous Q24H (prophylaxis, 1700)    hydrALAZINE  25 mg Oral Q8H    [START ON 10/12/2024] levothyroxine  100 mcg Oral Before breakfast       CONTINUOUS INFUSIONS:    PRN MEDS:  Current Facility-Administered Medications:     acetaminophen, 650 mg, Oral, Q8H PRN    acetaminophen, 650 mg,  "Oral, Q4H PRN    aluminum-magnesium hydroxide-simethicone, 30 mL, Oral, QID PRN    atropine, 1 mg, Intravenous, PRN    dextrose 50%, 12.5 g, Intravenous, PRN    dextrose 50%, 25 g, Intravenous, PRN    glucagon (human recombinant), 1 mg, Intramuscular, PRN    glucose, 16 g, Oral, PRN    glucose, 24 g, Oral, PRN    HYDROcodone-acetaminophen, 1 tablet, Oral, Q6H PRN    magnesium oxide, 800 mg, Oral, PRN    magnesium oxide, 800 mg, Oral, PRN    melatonin, 6 mg, Oral, Nightly PRN    naloxone, 0.02 mg, Intravenous, PRN    ondansetron, 4 mg, Intravenous, Q6H PRN    potassium bicarbonate, 35 mEq, Oral, PRN    potassium bicarbonate, 50 mEq, Oral, PRN    potassium bicarbonate, 60 mEq, Oral, PRN    potassium, sodium phosphates, 2 packet, Oral, PRN    potassium, sodium phosphates, 2 packet, Oral, PRN    potassium, sodium phosphates, 2 packet, Oral, PRN    senna-docusate 8.6-50 mg, 1 tablet, Oral, BID PRN    sodium chloride 0.9%, 10 mL, Intravenous, PRN    sodium chloride 0.9%, 2 mL, Intravenous, Q12H PRN    LABS AND DIAGNOSTICS     CBC LAST 3 DAYS  Recent Labs   Lab 10/11/24  1151   WBC 9.50   RBC 3.62*   HGB 12.4   HCT 37.6   *   MCH 34.3*   MCHC 33.0   RDW 14.3      MPV 10.9   GRAN 73.6*  7.0   LYMPH 14.6*  1.4   MONO 10.4  1.0   BASO 0.05   NRBC 0       COAGULATION LAST 3 DAYS  No results for input(s): "LABPT", "INR", "APTT" in the last 168 hours.    CHEMISTRY LAST 3 DAYS  Recent Labs   Lab 10/11/24  1151   *   K 5.0      CO2 18*   ANIONGAP 15   BUN 46*   CREATININE 2.1*   *   CALCIUM 10.0   MG 1.9   ALBUMIN 4.2   PROT 7.9   ALKPHOS 70   ALT 31   AST 38   BILITOT 1.4*       CARDIAC PROFILE LAST 3 DAYS  Recent Labs   Lab 10/11/24  1151   BNP >4,700*   TROPONINIHS 67.8*       ENDOCRINE LAST 3 DAYS  Recent Labs   Lab 10/11/24  1151   TSH 2.377       LAST ARTERIAL BLOOD GAS  ABG  No results for input(s): "PH", "PO2", "PCO2", "HCO3", "BE" in the last 168 hours.    LAST 7 DAYS MICROBIOLOGY "   Microbiology Results (last 7 days)       ** No results found for the last 168 hours. **            MOST RECENT IMAGING  X-Ray Chest AP Portable  Narrative: EXAMINATION:  XR CHEST AP PORTABLE    CLINICAL HISTORY:  Shortness of breath    FINDINGS:  Portable chest with comparison chest x-ray 09/28/2024.  Unchanged enlarged cardiomediastinal silhouette.  Prominent size the central hilar vascular structures again noted.  There is mild perihilar interstitial thickening.No definite confluent airspace opacities.  No acute osseous abnormality.  Impression: 1. Enlarged cardiomediastinal silhouette and prominent central hilar vascular structures may reflect pulmonary vascular congestion.  2. Mild bilateral perihilar interstitial thickening could reflect mild pulmonary edema in the proper clinical setting.    Electronically signed by: Olaf Nelson  Date:    10/11/2024  Time:    11:43      ECHOCARDIOGRAM RESULTS (last 5)  Results for orders placed during the hospital encounter of 08/21/23    Echo    Interpretation Summary    Left Ventricle: The left ventricle is mildly dilated. Mildly increased ventricular mass. Mildly increased wall thickness. There is mild concentric hypertrophy. Normal wall motion. Septal flattening in systole consistent with right ventricular pressure overload. There is low normal systolic function. EF 50% with patient in sinus rhythm and heart rate of 52 There is diastolic dysfunction but grade cannot be determined. Elevated left ventricular filling pressure.    Right Ventricle: Normal right ventricular cavity size. Wall thickness is normal. Right ventricle wall motion  is normal. Systolic function is normal.    Mitral Valve: There is no stenosis.    Tricuspid Valve: There is mild to moderate regurgitation.    Pulmonary Artery: There is mild pulmonary hypertension. The estimated pulmonary artery systolic pressure is 58 mmHg.    IVC/SVC: Elevated venous pressure at 15 mmHg.    Pericardium: There is a  small posterior effusion. Pericardial effusion is echolucent. No indication of cardiac tamponade.    Mean left atrial pressure slightly increased at 12 mm Hg      Transesophageal echo (ISAURO) with possible cardioversion    Interpretation Summary    Left Ventricle: The left ventricle is mildly dilated. Normal wall thickness. Mild global hypokinesis present. There is mildly reduced systolic function. EF  50% There is normal diastolic function.    Left Atrium: The left atrial appendage appears dilated. The left atrial appendage has a windsock morphology. There is no thrombus in the left atrial appendage.    Aortic Valve: There is mild aortic valve sclerosis.    Mitral Valve: There is no stenosis. There is mild regurgitation.    Aorta: Grade 1 small calcified atherosclerosis of the ascending aorta.    Pericardium: There is a small posterior effusion.      Echo    Interpretation Summary    Left Ventricle: The left ventricle is normal in size. Mildly increased ventricular mass. Mildly increased wall thickness. There is mild concentric hypertrophy. Normal wall motion.  Atrial fibrillation is present There is mildly reduced systolic function. EF 45% Unable to assess due to atrial fibrillation. Elevated left ventricular filling pressure. Tissue Doppler velocity is reduced.  Mean left atrial pressure proximally 16    Right Ventricle: Normal right ventricular cavity size. Wall thickness is normal. Right ventricle wall motion  is normal. Systolic function is normal.    Aortic Valve: There is mild aortic valve sclerosis.    Tricuspid Valve: There is mild to moderate regurgitation.    Pulmonary Artery: There is mild pulmonary hypertension.    Pericardium: There is a small effusion adjacent to the right ventricle. Pericardial effusion is echolucent. No indication of cardiac tamponade.    Patient has atrial fibrillation, depressed ejection fraction, mild pulmonary hypertension, mean left atrial pressure 16 mm Hg      Results for  orders placed during the hospital encounter of 07/19/23    Echo    Interpretation Summary  · The left ventricle is mildly enlarged with normal systolic function.  · Indeterminate left ventricular diastolic function.  · The estimated ejection fraction is 55%.  · Normal right ventricular size with normal right ventricular systolic function.  · Mild left atrial enlargement.  · Mild right atrial enlargement.  · Moderate mitral regurgitation.  · Moderate tricuspid regurgitation.  · Mild aortic regurgitation.  · Intermediate central venous pressure (8 mmHg).  · The estimated PA systolic pressure is 55 mmHg.  · There is moderate pulmonary hypertension.  · Trivial circumferential pericardial effusion.      CURRENT/PREVIOUS VISIT EKG  Results for orders placed or performed in visit on 10/11/24   EKG 12-lead    Collection Time: 10/11/24 11:04 AM   Result Value Ref Range    QRS Duration 146 ms    [facility] QTC Calculation 514 ms    Narrative    Test Reason :     Vent. Rate : 072 BPM     Atrial Rate : 072 BPM     P-R Int : 166 ms          QRS Dur : 146 ms      QT Int : 470 ms       P-R-T Axes : 048 060 027 degrees     QTc Int : 514 ms    Sinus rhythm with frequent and consecutive Premature ventricular complexes   in a pattern of bigeminy  Possible Left atrial enlargement  Right bundle branch block  Abnormal ECG  When compared with ECG of 28-SEP-2024 19:26,  Premature ventricular complexes are now Present    Referred By: [email or web address]   SELF           Confirmed By:            ASSESSMENT/PLAN:     Active Hospital Problems    Diagnosis    *Acute on chronic diastolic heart failure    Bradycardia    Acute renal failure    Hypertension    Paroxysmal A-fib    Elevated troponin       ASSESSMENT & PLAN:     Acute on chronic HFpEF- EF 50%  Bradycardia  Hypertension  ALTON/CKD  PAF   Elevated troponin HS      RECOMMENDATIONS:    Acute CHF exacerbation. Appears dry on examination. No significant crackles on exam.  2 g salt restriction. 1.5 L fluid  restriction. Strict I's and O's.  Daily weights.  ALTON/CKD. Closely trend renal function. Would hold further diuresis.   Intermittent bigeminy on tele.    Pulmonary Hypertension present.   Stop Entresto altogether at this time. K 5.0. Cr 2.1.    Hold spironolactone for now.  Hx PAF.  SR this admission.  Not on anticoagulation at home.  Recommend US of neck.  Mass noted on L lower neck.  Thank you for for the consult. We will continue to follow.      Luz Maria Hamilton NP  Department of Cardiology  Date of Service: 10/11/2024        I have personally interviewed and examined the patient, I have reviewed the Nurse Practitioner's history and physical, assessment, and plan. I agree with the findings and plan.      Aureliano Morocho M.D.  Department of Cardiology  Date of Service: 10/11/2024

## 2024-10-11 NOTE — H&P
[facility] - Emergency Dept  Hospital Medicine  History & Physical    Patient Name: Andressa Benedict  MRN: [id number]  Patient Class: Emergency  Admission Date: 10/11/2024  Attending Physician: Tamar Woodson MD  Primary Care Provider: Aureliano Morocho MD         Patient information was obtained from patient and ER records.     Subjective:     Principal Problem:Acute on chronic diastolic heart failure    Chief Complaint:   Chief Complaint   Patient presents with    Shortness of Breath     Pt c/o cough, shortness of breath, and dizziness since she was discharged for the same compliant        HPI: 89 yo female with history of AFib, CKD, hyperlipidemia, diastolic HF and thyroid disease presenting with generalized weakness, decreased oral intake, shortness of breath, and bilateral lower extremity swelling.  Patient can not recall how long she had the symptoms for.  Patient is hard of hearing, so interview is very difficult.    In the ER, vitals showed a blood pressure of 187/99, heart rate of 76, respiratory rate of 16, afebrile satting 92% on room air. CBC is wnl. CMP showed sodium of 135, creatinine of 2.1, compared to baseline of 1.5.  BNP is elevated at more than 4700, 1st troponin is 67.  TSH is within normal limits.  EKG showed sinus rhythm with premature ventricular complexes in a pattern of bigeminal.  Chest x-ray showed enlarged cardiomediastinal silhouette and prominent central hilar vascular structures which may reflect pulmonary vascular congestion. Mild bilateral perihilar interstitial thickening could reflect mild pulmonary edema in the proper clinical setting.  Patient was given 40 mg of Lasix IV, and will be admitted to Medicine for heart failure exacerbation.      Past Medical History:   Diagnosis Date    Atrial fibrillation     Bradycardia     Carotid bruit     CKD (chronic kidney disease), stage III     Hyperlipidemia     OA (osteoarthritis)     Thyroid disease        Past  Surgical History:   Procedure Laterality Date    HYSTERECTOMY      KNEE SURGERY Right     SHOULDER SURGERY Right        Review of patient's allergies indicates:   Allergen Reactions    Penicillins Anaphylaxis       No current facility-administered medications on file prior to encounter.     Current Outpatient Medications on File Prior to Encounter   Medication Sig    ascorbic acid, vitamin C, (VITAMIN C) 250 mg Chew Take 1 tablet by mouth Daily.    aspirin (ECOTRIN) 81 MG EC tablet Take 81 mg by mouth once daily.    betaxolol 0.5% (BETOPTIC-S) 0.5 % Drop Place 1 drop into both eyes 2 (two) times daily.    coenzyme Q10 (CO Q-10) 100 mg capsule Take 100 mg by mouth once daily.    docosahexaenoic acid/epa (FISH OIL ORAL) Take 1 capsule by mouth Daily.    ENTRESTO 24-26 mg per tablet TAKE 1 TABLET BY MOUTH TWICE DAILY (Patient taking differently: Take 1 tablet by mouth As instructed (1 tablet AM and 0.5 tablet PM).)    ergocalciferol, vitamin D2, (VITAMIN D ORAL) Take 1 tablet by mouth Daily.    FLAXSEED OIL ORAL Take 1 capsule by mouth Daily.    glucosam/msm/chond/urh565/hyal (GLUCOS-CHOND-MSM, WITH ANTIOX, ORAL) Take 1 capsule by mouth Daily.    levothyroxine (SYNTHROID) 100 MCG tablet TAKE 1 TABLET BY MOUTH EVERY DAY (Patient taking differently: Take 100 mcg by mouth before breakfast.)    metoprolol succinate (TOPROL-XL) 25 MG 24 hr tablet Take 1 tablet (25 mg total) by mouth once daily. (Patient taking differently: Take 12.5 mg by mouth once daily.)    multivitamin (THERAGRAN) per tablet Take 1 tablet by mouth once daily.    spironolactone (ALDACTONE) 25 MG tablet Take 1 tablet (25 mg total) by mouth every other day. (Patient taking differently: Take 25 mg by mouth once daily.)    [DISCONTINUED] triamcinolone acetonide 0.1% (KENALOG) 0.1 % cream Apply topically 2 (two) times daily.     Family History       Problem Relation (Age of Onset)    Heart disease Father          Tobacco Use    Smoking status: Never     Smokeless tobacco: Never   Substance and Sexual Activity    Alcohol use: Never    Drug use: Never    Sexual activity: Not on file     Review of Systems   Constitutional:  Positive for activity change, appetite change and fatigue. Negative for diaphoresis and fever.   HENT:  Negative for sore throat.    Eyes:  Negative for visual disturbance.   Respiratory:  Positive for shortness of breath.    Cardiovascular:  Positive for leg swelling. Negative for chest pain and palpitations.   Gastrointestinal:  Positive for nausea. Negative for abdominal pain and vomiting.   Genitourinary:  Negative for dysuria, frequency and urgency.   Musculoskeletal:  Negative for back pain.   Skin:  Negative for rash.   Neurological:  Negative for syncope.   Psychiatric/Behavioral:  Negative for confusion.      Objective:     Vital Signs (Most Recent):  Temp: 98.1 °F (36.7 °C) (10/11/24 1053)  Pulse: (!) 38 (10/11/24 1308)  Resp: (!) 21 (10/11/24 1308)  BP: (!) 160/66 (10/11/24 1301)  SpO2: 95 % (10/11/24 1308) Vital Signs (24h Range):  Temp:  [98.1 °F (36.7 °C)] 98.1 °F (36.7 °C)  Pulse:  [31-66] 38  Resp:  [16-23] 21  SpO2:  [92 %-97 %] 95 %  BP: (160-187)/(66-99) 160/66     Weight: 68 kg (150 lb)  Body mass index is 27.44 kg/m².     Physical Exam  Vitals reviewed.   Constitutional:       Appearance: Normal appearance.   HENT:      Head: Normocephalic and atraumatic.      Mouth/Throat:      Mouth: Mucous membranes are moist.   Eyes:      Extraocular Movements: Extraocular movements intact.      Conjunctiva/sclera: Conjunctivae normal.      Pupils: Pupils are equal, round, and reactive to light.   Cardiovascular:      Rate and Rhythm: Regular rhythm. Bradycardia present.   Pulmonary:      Effort: Pulmonary effort is normal.      Breath sounds: Normal breath sounds.   Abdominal:      General: Abdomen is flat. Bowel sounds are normal. There is no distension.      Palpations: Abdomen is soft.      Tenderness: There is no abdominal tenderness.    Musculoskeletal:      Cervical back: Normal range of motion and neck supple.      Comments: +2 bilateral LE swelling.    Skin:     General: Skin is warm.   Neurological:      Mental Status: She is alert and oriented to person, place, and time.   Psychiatric:         Mood and Affect: Mood normal.         Behavior: Behavior normal.               Significant Labs: All pertinent labs within the past 24 hours have been reviewed.  Recent Lab Results         10/11/24  1151        Albumin 4.2       ALP 70       ALT 31       Anion Gap 15       AST 38       Baso # 0.05       Basophil % 0.5       BILIRUBIN TOTAL 1.4  Comment: For infants and newborns, interpretation of results should be based  on gestational age, weight and in agreement with clinical  observations.    Premature Infant recommended reference ranges:  Up to 24 hours.............<8.0 mg/dL  Up to 48 hours............<12.0 mg/dL  3-5 days..................<15.0 mg/dL  6-29 days.................<15.0 mg/dL         BNP >4,700  Comment: Values of less than 100 pg/ml are consistent with non-CHF populations.       BUN 46       Calcium 10.0       Chloride 102       CO2 18       Creatinine 2.1       Differential Method Automated       eGFR 22.0       Eos # 0.1       Eos % 0.6       Glucose 116       Gran # (ANC) 7.0       Gran % 73.6       Hematocrit 37.6       Hemoglobin 12.4       Immature Grans (Abs) 0.03  Comment: Mild elevation in immature granulocytes is non specific and   can be seen in a variety of conditions including stress response,   acute inflammation, trauma and pregnancy. Correlation with other   laboratory and clinical findings is essential.         Immature Granulocytes 0.3       Lymph # 1.4       Lymph % 14.6       Magnesium  1.9       MCH 34.3       MCHC 33.0              Mono # 1.0       Mono % 10.4       MPV 10.9       nRBC 0       Platelet Count 230       Potassium 5.0       PROTEIN TOTAL 7.9       RBC 3.62       RDW 14.3       Sodium 135        Troponin I High Sensitivity 67.8  Comment: Troponin results differ between methods. Do not use   results between Troponin methods interchangeably.    Alkaline Phospatase levels above 400 U/L may   cause false positive results.    Access hsTnI should not be used for patients taking   Asfotase isaac (Strensiq).  Critical result TNIHS 67.8 pg/mL called to and read back by Aysha Torres ED, RN. at 11-Oct-2024 13:13 [facility].  Result Rechecked         TSH 2.377       WBC 9.50               Significant Imaging: I have reviewed all pertinent imaging results/findings within the past 24 hours.  Assessment/Plan:     * Acute on chronic diastolic heart failure  CHF pathway  Stricts I&O, daily wt  Lasix iv  Cards consult  Pt is not on lasix at home. Plz consider discharging pt on lasix.   Better bp control.        Bradycardia  HR dropping to the 30's.  Will order atropine prn.  Hold metoprolol.  Will consult cardiology.       Acute renal failure  Suspect cardio renal.  Monitor kidney function on IV lasix. If worsen, then primary team to consult cardiology.     Most recent creatinine and eGFR are listed below.  Recent Labs     10/11/24  1151   CREATININE 2.1*   EGFRNORACEVR 22.0*         Paroxysmal A-fib  Hold metoprolol given bradycardia  Pt is not on AC  Admit to tele.     Hypertension  Patients blood pressure range in the last 24 hours was: BP  Min: 160/66  Max: 187/99.  Hold pt's entresto, and aldactone given renal failure  Hold metropolol given bradycardia  Will start hydralazine 25 mg q6h. Will add norvasc or nifedipine if needed.     Elevated troponin  Suspect ischemic demand.   Trend troponins.           VTE Risk Mitigation (From admission, onward)      None                            Tamar Woodson MD  Department of Hospital Medicine  Formerly [facility] - Emergency Dept

## 2024-10-11 NOTE — HPI
89 yo female with history of AFib, CKD, hyperlipidemia, diastolic HF and thyroid disease presenting with generalized weakness, decreased oral intake, shortness of breath, and bilateral lower extremity swelling.  Patient can not recall how long she had the symptoms for.  Patient is hard of hearing, so interview is very difficult.    In the ER, vitals showed a blood pressure of 187/99, heart rate of 76, respiratory rate of 16, afebrile satting 92% on room air. CBC is wnl. CMP showed sodium of 135, creatinine of 2.1, compared to baseline of 1.5.  BNP is elevated at more than 4700, 1st troponin is 67.  TSH is within normal limits.  EKG showed sinus rhythm with premature ventricular complexes in a pattern of bigeminal.  Chest x-ray showed enlarged cardiomediastinal silhouette and prominent central hilar vascular structures which may reflect pulmonary vascular congestion. Mild bilateral perihilar interstitial thickening could reflect mild pulmonary edema in the proper clinical setting.  Patient was given 40 mg of Lasix IV, and will be admitted to Medicine for heart failure exacerbation.

## 2024-10-11 NOTE — PLAN OF CARE
[facility] - Emergency Dept  Initial Discharge Assessment       Primary Care Provider: Aureliano Morocho MD    Admission Diagnosis: Shortness of breath [R06.02]    Admission Date: 10/11/2024  Expected Discharge Date:       met with Pt at bedside to complete discharge assessment. Pt AAOx4s. Demographics, PCP, and insurance verified. No home health. No dialysis. Pt reports using walker and cane to ambulate. Pt report using shower chair for bathing. Pt reports ability to complete all other ADLs without assistance. Pt verbalized plan to discharge home with home health services.  Pt verbalized plan to discharge home via family transport. Pt has no other needs to be addressed at this time.     Transition of Care Barriers: None    Payor: [employer]ET[employer] MANAGED MEDICARE / Plan: AETNA MEDICARE PLAN PPO / Product Type: Medicare Advantage /     Extended Emergency Contact Information  Primary Emergency Contact: BAL ERICKSON  Mobile Phone: [phone]  Relation: Son   needed? No    Discharge Plan A: Home Health  Discharge Plan B: Home with family      [id number] - [place] - [address] AT [facility] [address]  [address]  [place] [address]  Phone: [phone] Fax: [phone]      Initial Assessment (most recent)       Adult Discharge Assessment - 10/11/24 7347          Discharge Assessment    Assessment Type Discharge Planning Assessment     Confirmed/corrected address, phone number and insurance Yes     Confirmed Demographics Correct on Facesheet     Source of Information patient     When was your last doctors appointment? 10/11/24     Communicated HEMANTH with patient/caregiver Date not available/Unable to determine     Reason For Admission Acute on chronic diastolic heart failure     People in Home child(nicolás), adult     Facility Arrived From: Home     Do you expect to return to your current living situation? Yes     Do you have help at home or  someone to help you manage your care at home? Yes     Who are your caregiver(s) and their phone number(s)? Son: Adarsh Holguin [phone]     Prior to hospitilization cognitive status: Alert/Oriented     Current cognitive status: Alert/Oriented     Walking or Climbing Stairs Difficulty yes     Walking or Climbing Stairs ambulation difficulty, requires equipment;stair climbing difficulty, requires equipment     Dressing/Bathing Difficulty yes     Dressing/Bathing bathing difficulty, requires equipment     Home Accessibility wheelchair accessible     Home Layout Able to live on 1st floor     Equipment Currently Used at Home oxygen;walker, standard;cane, straight;shower chair     Readmission within 30 days? No     Patient currently being followed by outpatient case management? No     Do you currently have service(s) that help you manage your care at home? No     Do you take prescription medications? Yes     Do you have prescription coverage? Yes     Coverage Payor: AETNA MANAGED MEDICARE - AETNA MEDICARE PLAN PPO -     Do you have any problems affording any of your prescribed medications? No     Is the patient taking medications as prescribed? yes     Who is going to help you get home at discharge? Son: Adarsh Holguin [phone]     How do you get to doctors appointments? family or friend will provide     Are you on dialysis? No     Do you take coumadin? No     Discharge Plan A Home Health     Discharge Plan B Home with family     DME Needed Upon Discharge  none     Discharge Plan discussed with: Patient     Transition of Care Barriers None        Physical Activity    On average, how many days per week do you engage in moderate to strenuous exercise (like a brisk walk)? 0 days     On average, how many minutes do you engage in exercise at this level? 0 min        Financial Resource Strain    How hard is it for you to pay for the very basics like food, housing, medical care, and heating? Not hard at all         Housing Stability    In the last 12 months, was there a time when you were not able to pay the mortgage or rent on time? No     At any time in the past 12 months, were you homeless or living in a shelter (including now)? No        Transportation Needs    Has the lack of transportation kept you from medical appointments, meetings, work or from getting things needed for daily living? No        Food Insecurity    Within the past 12 months, you worried that your food would run out before you got the money to buy more. Never true     Within the past 12 months, the food you bought just didn't last and you didn't have money to get more. Never true        Stress    Do you feel stress - tense, restless, nervous, or anxious, or unable to sleep at night because your mind is troubled all the time - these days? Not at all        Social Isolation    How often do you feel lonely or isolated from those around you?  Never        Alcohol Use    Q1: How often do you have a drink containing alcohol? Never     Q2: How many drinks containing alcohol do you have on a typical day when you are drinking? Patient does not drink     Q3: How often do you have six or more drinks on one occasion? Never        Utilities    In the past 12 months has the electric, gas, oil, or water company threatened to shut off services in your home? No        Health Literacy    How often do you need to have someone help you when you read instructions, pamphlets, or other written material from your doctor or pharmacy? Often

## 2024-10-12 PROBLEM — R00.1 BRADYCARDIA: Status: RESOLVED | Noted: 2024-10-11 | Resolved: 2024-10-12

## 2024-10-12 LAB
BACTERIA UR CULT: NORMAL
BACTERIA UR CULT: NORMAL
ESTIMATED AVG GLUCOSE: 120 MG/DL (ref 68–131)
HBA1C MFR BLD: 5.8 % (ref 4.5–6.2)

## 2024-10-12 PROCEDURE — 25000003 PHARM REV CODE 250: Performed by: INTERNAL MEDICINE

## 2024-10-12 PROCEDURE — 27000221 HC OXYGEN, UP TO 24 HOURS

## 2024-10-12 PROCEDURE — 25000003 PHARM REV CODE 250: Performed by: HOSPITALIST

## 2024-10-12 PROCEDURE — 97530 THERAPEUTIC ACTIVITIES: CPT

## 2024-10-12 PROCEDURE — 21400001 HC TELEMETRY ROOM

## 2024-10-12 PROCEDURE — 94799 UNLISTED PULMONARY SVC/PX: CPT

## 2024-10-12 PROCEDURE — 99900035 HC TECH TIME PER 15 MIN (STAT)

## 2024-10-12 PROCEDURE — 99232 SBSQ HOSP IP/OBS MODERATE 35: CPT | Mod: ,,, | Performed by: INTERNAL MEDICINE

## 2024-10-12 PROCEDURE — 63600175 PHARM REV CODE 636 W HCPCS: Performed by: HOSPITALIST

## 2024-10-12 PROCEDURE — 94761 N-INVAS EAR/PLS OXIMETRY MLT: CPT

## 2024-10-12 PROCEDURE — 97165 OT EVAL LOW COMPLEX 30 MIN: CPT

## 2024-10-12 PROCEDURE — 97161 PT EVAL LOW COMPLEX 20 MIN: CPT

## 2024-10-12 PROCEDURE — 63600175 PHARM REV CODE 636 W HCPCS: Performed by: INTERNAL MEDICINE

## 2024-10-12 RX ORDER — AMLODIPINE BESYLATE 5 MG/1
5 TABLET ORAL DAILY
Status: DISCONTINUED | OUTPATIENT
Start: 2024-10-12 | End: 2024-10-14 | Stop reason: HOSPADM

## 2024-10-12 RX ORDER — HYDRALAZINE HYDROCHLORIDE 25 MG/1
50 TABLET, FILM COATED ORAL EVERY 8 HOURS
Status: DISCONTINUED | OUTPATIENT
Start: 2024-10-12 | End: 2024-10-14 | Stop reason: HOSPADM

## 2024-10-12 RX ORDER — FUROSEMIDE 10 MG/ML
40 INJECTION INTRAMUSCULAR; INTRAVENOUS EVERY 12 HOURS
Status: DISCONTINUED | OUTPATIENT
Start: 2024-10-12 | End: 2024-10-14 | Stop reason: HOSPADM

## 2024-10-12 RX ADMIN — MEROPENEM 500 MG: 500 INJECTION, POWDER, FOR SOLUTION INTRAVENOUS at 09:10

## 2024-10-12 RX ADMIN — AMLODIPINE BESYLATE 5 MG: 5 TABLET ORAL at 12:10

## 2024-10-12 RX ADMIN — ENOXAPARIN SODIUM 30 MG: 30 INJECTION SUBCUTANEOUS at 05:10

## 2024-10-12 RX ADMIN — HYDRALAZINE HYDROCHLORIDE 25 MG: 25 TABLET ORAL at 05:10

## 2024-10-12 RX ADMIN — ASPIRIN 81 MG: 81 TABLET, COATED ORAL at 09:10

## 2024-10-12 RX ADMIN — FUROSEMIDE 40 MG: 10 INJECTION, SOLUTION INTRAMUSCULAR; INTRAVENOUS at 09:10

## 2024-10-12 RX ADMIN — LEVOTHYROXINE SODIUM 100 MCG: 100 TABLET ORAL at 05:10

## 2024-10-12 RX ADMIN — Medication 6 MG: at 09:10

## 2024-10-12 RX ADMIN — HYDRALAZINE HYDROCHLORIDE 50 MG: 25 TABLET ORAL at 02:10

## 2024-10-12 RX ADMIN — HYDROCODONE BITARTRATE AND ACETAMINOPHEN 1 TABLET: 5; 325 TABLET ORAL at 09:10

## 2024-10-12 RX ADMIN — HYDROCODONE BITARTRATE AND ACETAMINOPHEN 1 TABLET: 5; 325 TABLET ORAL at 12:10

## 2024-10-12 NOTE — PROGRESS NOTES
Replaced by [facility] Medicine  Progress Note    Patient Name: Andressa Benedict  MRN: [id number]  Patient Class: IP- Inpatient   Admission Date: 10/11/2024  Length of Stay: 1 days  Attending Physician: Mazin Blanchard MD  Primary Care Provider: Aureliano Morocho MD        Subjective:     Principal Problem:Acute on chronic diastolic heart failure        HPI:  89 yo female with history of AFib, CKD, hyperlipidemia, diastolic HF and thyroid disease presenting with generalized weakness, decreased oral intake, shortness of breath, and bilateral lower extremity swelling.  Patient can not recall how long she had the symptoms for.  Patient is hard of hearing, so interview is very difficult.    In the ER, vitals showed a blood pressure of 187/99, heart rate of 76, respiratory rate of 16, afebrile satting 92% on room air. CBC is wnl. CMP showed sodium of 135, creatinine of 2.1, compared to baseline of 1.5.  BNP is elevated at more than 4700, 1st troponin is 67.  TSH is within normal limits.  EKG showed sinus rhythm with premature ventricular complexes in a pattern of bigeminal.  Chest x-ray showed enlarged cardiomediastinal silhouette and prominent central hilar vascular structures which may reflect pulmonary vascular congestion. Mild bilateral perihilar interstitial thickening could reflect mild pulmonary edema in the proper clinical setting.  Patient was given 40 mg of Lasix IV, and will be admitted to Medicine for heart failure exacerbation.      Overview/Hospital Course:  Patient is a 89 year old female admitted with progressively worsening SOB and weakness. Patient was treated for acute on chronic HFpEF, ALTON on CKD and bradycardia. Home metoprolol was held. Patient was started on Meropenum on admission for positive UA. However she does not have any dysuria. Her urine culture repeatedly including this time has grown mixed growth. Meropenum was discontinued. Patient was treated with IV Lasix. PT, OT  was consulted.     Interval History: Patient denies any SOB this morning. States that her breathing feels much better with oxygen in the hospital than at home. She does have oxygen at home as well. States baseline she walks with a walker. HR continue to be low on the telemetry, but patient denies any dizziness or lightheadedness.     Review of Systems  Objective:     Vital Signs (Most Recent):  Temp: 97.8 °F (36.6 °C) (10/12/24 0801)  Pulse: 95 (10/12/24 0801)  Resp: 18 (10/12/24 0801)  BP: (!) 181/63 (10/12/24 0801)  SpO2: 96 % (10/12/24 0801) Vital Signs (24h Range):  Temp:  [97 °F (36.1 °C)-98.6 °F (37 °C)] 97.8 °F (36.6 °C)  Pulse:  [31-95] 95  Resp:  [12-21] 18  SpO2:  [93 %-97 %] 96 %  BP: (132-187)/(63-90) 181/63     Weight: 69.8 kg (153 lb 14.1 oz)  Body mass index is 30.05 kg/m².    Intake/Output Summary (Last 24 hours) at 10/12/2024 1126  Last data filed at 10/12/2024 0507  Gross per 24 hour   Intake --   Output 800 ml   Net -800 ml         Physical Exam  Vitals reviewed.   Constitutional:       Appearance: Normal appearance.   HENT:      Head: Normocephalic and atraumatic.      Mouth/Throat: there is cystic lump on the left side of the neck, completely mobile.      Mouth: Mucous membranes are moist.   Eyes:      Extraocular Movements: Extraocular movements intact.      Conjunctiva/sclera: Conjunctivae normal.      Pupils: Pupils are equal, round, and reactive to light.   Cardiovascular:      Rate and Rhythm: Regular rhythm. Bradycardia present.   Pulmonary:      Effort: Pulmonary effort is normal.      Breath sounds: Normal breath sounds.   Abdominal:      General: Abdomen is flat. Bowel sounds are normal. There is no distension.      Palpations: Abdomen is soft.      Tenderness: There is no abdominal tenderness.   Musculoskeletal:      Cervical back: Normal range of motion and neck supple.      Comments: trace bilateral LE swelling.    Skin:     General: Skin is warm.  Neurological:      Mental Status:  She is alert and oriented to person, place, and time.   Psychiatric:         Mood and Affect: Mood normal.         Behavior: Behavior normal.     Significant Labs: All pertinent labs within the past 24 hours have been reviewed.  CBC:   Recent Labs   Lab 10/11/24  1151   WBC 9.50   HGB 12.4   HCT 37.6        CMP:   Recent Labs   Lab 10/11/24  1151   *   K 5.0      CO2 18*   *   BUN 46*   CREATININE 2.1*   CALCIUM 10.0   PROT 7.9   ALBUMIN 4.2   BILITOT 1.4*   ALKPHOS 70   AST 38   ALT 31   ANIONGAP 15       Significant Imaging: I have reviewed all pertinent imaging results/findings within the past 24 hours.    Assessment/Plan:      * Acute on chronic diastolic heart failure  Acute on chronic HFpEF  Echo from Aug 2023 shows EF 50%  - Cont IV Lasix   - Strict I/O  - fluid restriction   - holding metoprolol given bradycardia   - Follow cardiology recommendations         Hypertension  Patients blood pressure range in the last 24 hours was: BP  Min: 132/63  Max: 187/74.  Hold pt's entresto, and aldactone given renal failure  Hold metropolol given bradycardia  Increase hydralazine 50 mg q8h.   Add Norvasc     Acute renal failure  ALTON on CKD   Suspect cardio renal.  Baseline Cr 1.5, 2.1 on admission     Most recent creatinine and eGFR are listed below.  Recent Labs     10/11/24  1151   CREATININE 2.1*   EGFRNORACEVR 22.0*     Monitor Cr with diuresis   Strict I/O  Holding Aldactone for now       Paroxysmal A-fib  Bradycardia  Hold metoprolol given bradycardia  Patient is asymptomatic from bradycardia   Pt is not on AC  Cont to monitor telemetry   Follow cardiology recommendations    Elevated troponin  Demand ischemia   Troponin stable   Cardiology was consulted on admission       VTE Risk Mitigation (From admission, onward)           Ordered     enoxaparin injection 30 mg  Every 24 hours         10/11/24 1521     IP VTE HIGH RISK PATIENT  Once         10/11/24 1454     Place sequential compression  device  Until discontinued         10/11/24 1454                    Discharge Planning   HEMANTH:      Code Status: Full Code   Is the patient medically ready for discharge?:     Reason for patient still in hospital (select all that apply): Treatment  Discharge Plan A: Home Health                  Mazin Blanchard MD  Department of Hospital Medicine   [facility]

## 2024-10-12 NOTE — ASSESSMENT & PLAN NOTE
Patients blood pressure range in the last 24 hours was: BP  Min: 132/63  Max: 187/74.  Hold pt's entresto, and aldactone given renal failure  Hold metropolol given bradycardia  Increase hydralazine 50 mg q8h.   Add Norvasc

## 2024-10-12 NOTE — PT/OT/SLP EVAL
Occupational Therapy   Evaluation    Name: Andressa Benedict  MRN: [id number]  Admitting Diagnosis: Acute on chronic diastolic heart failure  Recent Surgery: * No surgery found *      Recommendations:     Discharge Recommendations: Low Intensity Therapy  Discharge Equipment Recommendations:  other (see comments) (Handheld Shower Head)  Barriers to discharge:  None    Assessment:     Andressa Benedict is a 89 y.o. female with a medical diagnosis of Acute on chronic diastolic heart failure.  She presents with debility, and impaired endurance impacting her independence with ADLs and IADLs. Performance deficits affecting function: weakness, impaired endurance, impaired self care skills, impaired functional mobility, gait instability, impaired balance, decreased coordination, impaired coordination, impaired fine motor.      Rehab Prognosis: Good; patient would benefit from acute skilled OT services to address these deficits and reach maximum level of function.       Plan:     Patient to be seen 5 x/week to address the above listed problems via self-care/home management, therapeutic activities, therapeutic exercises  Plan of Care Expires: 11/11/24  Plan of Care Reviewed with: patient    Subjective     Chief Complaint: Weakness, impaired walking, and being tired  Patient/Family Comments/goals: Return home doing for herself again    Occupational Profile:  Living Environment: Patient lives in a 1 story home with son. Home steps at entrance with no rails, she holds onto the bricks for stability. Patient bathroom set-up includes a tub/shower combo with 1 grab bar, TTB, and standard commode.  Previous level of function: Independent with ADLs. She also would complete her own laundry, care for her 3 cats, and minimal cleaning. She didn't do any cooking. Patient ambulated throughout home with RW. Patient had 1 fall this year with no major injuries and she was able to get herself back up.   Roles and Routines: , but  mainly caring for herself  Equipment Used at Home: bath bench, grab bar, walker, rolling  Assistance upon Discharge: Son whom she lives with     Pain/Comfort:  Pain Rating 1: 0/10    Objective:     Communicated with: RN prior to session.  Patient found supine with PureWick, peripheral IV upon OT entry to room.    General Precautions: Standard, fall  Orthopedic Precautions: N/A  Braces: N/A  Respiratory Status: Room air    Occupational Performance:    Bed Mobility:    Patient completed Rolling/Turning to Left with  modified independence  Patient completed Rolling/Turning to Right with modified independence  Patient completed Scooting/Bridging with minimum assistance  Patient completed Supine to Sit with contact guard assistance  Patient completed Sit to Supine with contact guard assistance    Functional Mobility/Transfers:  Patient completed Sit <> Stand Transfer with contact guard assistance  with  rolling walker   Functional Mobility: Patient did not ambulate during initial evaluation.     Activities of Daily Living:  Feeding:  minimum assistance  to open items due to impaired ROM in B hands  Grooming: minimum assistance to open items due to impaired ROM in B hands  Upper Body Dressing: stand by assistance    Lower Body Dressing: minimum assistance      Cognitive/Visual Perceptual:  Cognitive/Psychosocial Skills:     -       Oriented to: Person, Place, Time, and Situation   -       Follows Commands/attention:Follows multistep  commands  -       Communication: clear/fluent  -       Safety awareness/insight to disability: intact     Physical Exam:  Balance:    -       Good unsupported sitting balance, Fair- Standing balance supported  Sensation:    -       Intact  Upper Extremity Range of Motion:     -       Right Upper Extremity: WFL  -       Left Upper Extremity: WFL  Upper Extremity Strength:    -       Right Upper Extremity: Impaired  -       Left Upper Extremity: Impaired   Strength:    -       Right Upper  Extremity: Impaired  -       Left Upper Extremity: Impaired  Fine Motor Coordination:    -       Impaired  unable to complete full flexion of digits in B hands, impacting her [facility]  Gross motor coordination:   WFL    AMPAC 6 Click ADL:  AMPA Total Score: 15    Treatment & Education:  Education focused on the following:  -Discharge plans  -POC, and purpose of skilled OT services  -AE/DME recommendations    Treatment focused on the following:  - ADL compensatory strategies  - Proper use of RW  - Transfer training focusing on proper limb placement    Patient left supine with call button in reach and bed alarm on    GOALS:   Multidisciplinary Problems       Occupational Therapy Goals          Problem: Occupational Therapy    Goal Priority Disciplines Outcome Interventions   Occupational Therapy Goal     OT, PT/OT     Description:     Goals to be met by: 11/11/2024      Patient will increase functional independence with ADLs by performing:     UE Dressing with Modified [place].  LE Dressing with Modified [place].  Grooming while seated at sink with [place].  Toileting from toilet with Modified [place] for hygiene and clothing management.   Toilet transfer to toilet with Modified [place].                             History:     Past Medical History:   Diagnosis Date    Atrial fibrillation     Bradycardia     Carotid bruit     CKD (chronic kidney disease), stage III     Hyperlipidemia     OA (osteoarthritis)     Thyroid disease          Past Surgical History:   Procedure Laterality Date    HYSTERECTOMY      KNEE SURGERY Right     SHOULDER SURGERY Right        Time Tracking:     OT Date of Treatment: 10/12/24  OT Start Time: 0934  OT Stop Time: 0951  OT Total Time (min): 17 min    Billable Minutes:Evaluation 17 mins    10/12/2024

## 2024-10-12 NOTE — PLAN OF CARE
Goals to be met by: 24     Patient will increase functional independence with mobility by performin. Supine to sit with Supervision  2. Sit to stand transfer with Supervision  3. Bed to chair transfer with Supervision using Rollator  4. Gait  x 150 feet with Supervision using Rollator

## 2024-10-12 NOTE — CARE UPDATE
10/11/24 1957   Patient Assessment/Suction   Level of Consciousness (AVPU) alert   Respiratory Effort Normal;Unlabored   Expansion/Accessory Muscles/Retractions no use of accessory muscles;no retractions   All Lung Fields Breath Sounds diminished   Cough Frequency frequent   PRE-TX-O2   Device (Oxygen Therapy) nasal cannula   $ Is the patient on Low Flow Oxygen? Yes   Flow (L/min) (Oxygen Therapy) 2   SpO2 97 %   Pulse Oximetry Type Intermittent   $ Pulse Oximetry - Multiple Charge Pulse Oximetry - Multiple   Pulse 94   Resp 18

## 2024-10-12 NOTE — PLAN OF CARE
Problem: Oral Intake Inadequate  Goal: Improved Oral Intake  Outcome: Progressing  Intervention: Promote and Optimize Oral Intake  Flowsheets (Taken 10/12/2024 0916)  Oral Nutrition Promotion: nutrition counseling provided     Recommendations  1.) Continue low Na diet, 1500mL fluid restriction.   2.) RD provided Heart Healthy and Fluid restriction diet education with handouts and contact information.     Goals: 1.) Pt to consume/tolerate >75% of meals by RD follow up.  Nutrition Goal Status: new

## 2024-10-12 NOTE — PROGRESS NOTES
UA is positive. Pt is allergic to penicillin with anaphylaxis reaction. Will avoid ceftriaxone.  Can't do cipro/levaquin given her prolong QT.  Will order meropenem.

## 2024-10-12 NOTE — ASSESSMENT & PLAN NOTE
Acute on chronic HFpEF  Echo from Aug 2023 shows EF 50%  - Cont IV Lasix   - Strict I/O  - fluid restriction   - holding metoprolol given bradycardia   - Follow cardiology recommendations

## 2024-10-12 NOTE — ASSESSMENT & PLAN NOTE
Bradycardia  Hold metoprolol given bradycardia  Patient is asymptomatic from bradycardia   Pt is not on AC  Cont to monitor telemetry   Follow cardiology recommendations

## 2024-10-12 NOTE — PLAN OF CARE
Goals to be met by: 11/11/2024     Patient will increase functional independence with ADLs by performing:    UE Dressing with Modified [place].  LE Dressing with Modified [place].  Grooming while seated at sink with [place].  Toileting from toilet with Modified [place] for hygiene and clothing management.   Toilet transfer to toilet with Modified [place].

## 2024-10-12 NOTE — SUBJECTIVE & OBJECTIVE
Interval History: Patient denies any SOB this morning. States that her breathing feels much better with oxygen in the hospital than at home. She does have oxygen at home as well. States baseline she walks with a walker. HR continue to be low on the telemetry, but patient denies any dizziness or lightheadedness.     Review of Systems  Objective:     Vital Signs (Most Recent):  Temp: 97.8 °F (36.6 °C) (10/12/24 0801)  Pulse: 95 (10/12/24 0801)  Resp: 18 (10/12/24 0801)  BP: (!) 181/63 (10/12/24 0801)  SpO2: 96 % (10/12/24 0801) Vital Signs (24h Range):  Temp:  [97 °F (36.1 °C)-98.6 °F (37 °C)] 97.8 °F (36.6 °C)  Pulse:  [31-95] 95  Resp:  [12-21] 18  SpO2:  [93 %-97 %] 96 %  BP: (132-187)/(63-90) 181/63     Weight: 69.8 kg (153 lb 14.1 oz)  Body mass index is 30.05 kg/m².    Intake/Output Summary (Last 24 hours) at 10/12/2024 1126  Last data filed at 10/12/2024 0507  Gross per 24 hour   Intake --   Output 800 ml   Net -800 ml         Physical Exam  Vitals reviewed.   Constitutional:       Appearance: Normal appearance.   HENT:      Head: Normocephalic and atraumatic.      Mouth/Throat:      Mouth: Mucous membranes are moist.   Eyes:      Extraocular Movements: Extraocular movements intact.      Conjunctiva/sclera: Conjunctivae normal.      Pupils: Pupils are equal, round, and reactive to light.   Cardiovascular:      Rate and Rhythm: Regular rhythm. Bradycardia present.   Pulmonary:      Effort: Pulmonary effort is normal.      Breath sounds: Normal breath sounds.   Abdominal:      General: Abdomen is flat. Bowel sounds are normal. There is no distension.      Palpations: Abdomen is soft.      Tenderness: There is no abdominal tenderness.   Musculoskeletal:      Cervical back: Normal range of motion and neck supple.      Comments: trace bilateral LE swelling.    Skin:     General: Skin is warm.   Neurological:      Mental Status: She is alert and oriented to person, place, and time.   Psychiatric:         Mood and  Affect: Mood normal.         Behavior: Behavior normal.     Significant Labs: All pertinent labs within the past 24 hours have been reviewed.  CBC:   Recent Labs   Lab 10/11/24  1151   WBC 9.50   HGB 12.4   HCT 37.6        CMP:   Recent Labs   Lab 10/11/24  1151   *   K 5.0      CO2 18*   *   BUN 46*   CREATININE 2.1*   CALCIUM 10.0   PROT 7.9   ALBUMIN 4.2   BILITOT 1.4*   ALKPHOS 70   AST 38   ALT 31   ANIONGAP 15       Significant Imaging: I have reviewed all pertinent imaging results/findings within the past 24 hours.

## 2024-10-12 NOTE — PT/OT/SLP EVAL
Physical Therapy Evaluation    Patient Name:  Andressa Benedict   MRN:  [id number]    Recommendations:     Discharge Recommendations: Low Intensity Therapy with Supervision at home  Discharge Equipment Recommendations: none   Barriers to discharge:  medical issues    Assessment:     Andressa Benedict is a 89 y.o. female admitted with a medical diagnosis of Acute on chronic diastolic heart failure.  She presents with the following impairments/functional limitations: impaired functional mobility, gait instability, impaired balance, impaired cardiopulmonary response to activity.    Pt found HOB elevated and agreeable to working with PT. Pt A & O x  4 and has the following co-morbidities: HTN, ARF, Bradycardia, Hearing Impaired.  Pt tolerated session well and required only CGA for safe mobilization during session today. Pt would benefit from acute PT during hospitalization to increase strength, endurance and safety with mobility and would benefit from Low Intensity Therapy upon discharge home.      Rehab Prognosis: Fair; patient would benefit from acute skilled PT services to address these deficits and reach maximum level of function.    Recent Surgery: * No surgery found *      Plan:     During this hospitalization, patient to be seen daily to address the identified rehab impairments via gait training, therapeutic activities, therapeutic exercises and progress toward the following goals:    Plan of Care Expires:  11/09/24    Subjective     Chief Complaint: Pt reported she didn't sleep well last night so she needed to catch up on her rest this morning.  Patient/Family Comments/goals: home upon discharge.  Pain/Comfort:  Pain Rating 1: 0/10  Pain Rating Post-Intervention 1: 0/10    Patients cultural, spiritual, [employer] conflicts given the current situation:      Living Environment:  Pt lives with her son in a [facility] with 1 [address]  Prior to admission, patients level of function was MI with rollator or single forearm  crutch.  Equipment used at home: bath bench, grab bar, rollator, crutches, forearm.  DME owned (not currently used): none.  Upon discharge, patient will have assistance from her son.    Objective:     Communicated with MASOOD Dimas prior to session.  Patient found HOB elevated with bed alarm, PureWick, peripheral IV, telemetry  upon PT entry to room.    General Precautions: Standard, fall  Orthopedic Precautions:N/A   Braces: N/A  Respiratory Status: Nasal cannula, flow 2 L/min    Exams:  Cognitive Exam:  Patient is oriented to Person, Place, Time, and Situation  RLE ROM: WFL  RLE Strength: grossly 4/5  LLE ROM: WFL  LLE Strength: grossly 4/5    Functional Mobility:  Bed Mobility:     Scooting: stand by assistance and in sitting  Supine to Sit: contact guard assistance and railing  Sit to Supine: contact guard assistance  Transfers:     Sit to Stand:  contact guard assistance with rolling walker  Toilet Transfer: contact guard assistance with  grab bars  using  Step Transfer  Gait: 15' x 2 with rolling walker and CGa for safety plus vc for upright posture and remaining inside RW frame(pt uses a rollator at home)      AM-PAC 6 CLICK MOBILITY  Total Score:20       Treatment & Education:  Pt was educated on the following: call light use, importance of OOB activity and functional mobility to negate the negative effects of prolonged bed rest during this hospitalization, safe transfers/ambulation and discharge planning recommendations/options.      Patient left HOB elevated with all lines intact, call button in reach, bed alarm on, and RN present.    GOALS:   Multidisciplinary Problems       Physical Therapy Goals          Problem: Physical Therapy    Goal Priority Disciplines Outcome Interventions   Physical Therapy Goal     PT, PT/OT     Description: Goals to be met by: 24     Patient will increase functional independence with mobility by performin. Supine to sit with Supervision  2. Sit to stand transfer with  Supervision  3. Bed to chair transfer with Supervision using Rollator  4. Gait  x 150 feet with Supervision using Rollator                              History:     Past Medical History:   Diagnosis Date    Atrial fibrillation     Bradycardia     Carotid bruit     CKD (chronic kidney disease), stage III     Hyperlipidemia     OA (osteoarthritis)     Thyroid disease        Past Surgical History:   Procedure Laterality Date    HYSTERECTOMY      KNEE SURGERY Right     SHOULDER SURGERY Right        Time Tracking:     PT Received On: 10/12/24  PT Start Time: 1344     PT Stop Time: 1404  PT Total Time (min): 20 min     Billable Minutes: Evaluation 10 and Therapeutic Activity 10      10/12/2024

## 2024-10-12 NOTE — PLAN OF CARE
Problem: Adult Inpatient Plan of Care  Goal: Plan of Care Review  Outcome: Progressing  Goal: Patient-Specific Goal (Individualized)  Outcome: Progressing  Goal: Absence of Hospital-Acquired Illness or Injury  Outcome: Progressing  Goal: Optimal Comfort and Wellbeing  Outcome: Progressing  Goal: Readiness for Transition of Care  Outcome: Progressing     Problem: Acute Kidney Injury/Impairment  Goal: Fluid and Electrolyte Balance  Outcome: Progressing  Goal: Improved Oral Intake  Outcome: Progressing  Goal: Effective Renal Function  Outcome: Progressing     Problem: Fall Injury Risk  Goal: Absence of Fall and Fall-Related Injury  Outcome: Progressing     Problem: Skin Injury Risk Increased  Goal: Skin Health and Integrity  Outcome: Progressing

## 2024-10-12 NOTE — CONSULTS
[facility]  Adult Nutrition   Consult Note (Nutrition Education)     SUMMARY     Recommendations  1.) Continue low Na diet, 1500mL fluid restriction.   2.) RD provided Heart Healthy and Fluid restriction diet education with handouts and contact information.    Goals: 1.) Pt to consume/tolerate >75% of meals by RD follow up.  Nutrition Goal Status: new    Nutrition Diagnosis PES Statement: Food- and nutrition-related knowledge deficit related to use for new education as evidenced by pt consuming pre-packaged foods for ease admitted with CHF requiring dietary adjustments to limit symptoms.     Dietitian Rounds Brief  Consult received for low Na, fluid restriction diet education. 89 yo female with history of AFib, CKD, hyperlipidemia, diastolic HF and thyroid disease presenting with generalized weakness, decreased oral intake, shortness of breath, and bilateral lower extremity swelling. Pt reports she has not been eating as much as she used to and lacks an appetite. Decreased appetite r/t CKD progression. She reports not using salt in her foods but tends to purchased pre-made meals. RD educated pt reading food labels to identify low Na options best for her. Encouraged using seasonings with no added salt and/or salt substitutes. Educated pt on fluid restrictions and reviewed foods containing water to monitor intake on. Provided Heart Healthy and Fluid restriction diet education with RD contact information for further questions/concerns.    Educated pt on heart healthy diet. Education focused on including healthy fats- gave examples and lowering LDL levels by excluding saturated/trans fat in the diet. Went over types of foods that have saturated/trans fat. Encouraged cooking with olive oil instead of butter. Choosing whole grains over refined grains, choosing canned foods with no salt added and plain frozen veggies instead of veggies cooked in butter and cream sauces. Encouraged patient to choose leaner  cuts of meat and decreasing high fat meats such as barker, sausage, hot dogs, etc. Educated pt on decreasing sodium in diet. To try not to cook with salt and use herbs and spices to make food more flavorful. To limit eating out-if patient chooses to eat out, informed patient to discuss with  to cook meats and veggies with no salt and olive oil instead of butter. Provided handouts on all of these topics for pt to use in the future. Also provided RD contact information for pt to call with future questions.       Nutrition Related Social Determinants of Health: SDOH: None Identified      Diet order:   Low Na, 2gm, 1500mL fluid restriction    Anthropometrics  Temp: 97.8 °F (36.6 °C)  Height Method: Stated  Height: 5' (152.4 cm)  Height (inches): 60 in  Weight Method: Bed Scale  Weight: 69.8 kg (153 lb 14.1 oz)  Weight (lb): 153.88 lb  Ideal Body Weight (IBW), Female: 100 lb  % Ideal Body Weight, Female (lb): 136.36 %  BMI (Calculated): 28.1  BMI Grade: 25 - 29.9 - overweight  Usual Body Weight (UBW), k kg  % Usual Body Weight: 102.86    Reason for Assessment  Reason For Assessment: consult (education)  Diagnosis: cardiac disease  Nutrition Discharge Planning: Cardiac diet, 1500mL fluid restrictions    Nutrition/Diet History  Patient Reported Diet/Restrictions/Preferences: low salt  Spiritual, Cultural Beliefs, Jain Practices, Values that Affect Care: no  Food Allergies: NKFA  Factors Affecting Nutritional Intake: decreased appetite    Nutrition Risk Screen  Nutrition Risk Screen: no indicators present     MST Score: 2  Have you recently lost weight without trying?: Yes: 2-13 lbs  Weight loss score: 1  Have you been eating poorly because of a decreased appetite?: Yes  Appetite score: 1       Weight History:  Wt Readings from Last 10 Encounters:   10/11/24 69.8 kg (153 lb 14.1 oz)   24 69.1 kg (152 lb 5.4 oz)   24 74.3 kg (163 lb 12.8 oz)   24 74.3 kg (163 lb 12.8 oz)   24 74.3 kg  (163 lb 14.6 oz)   11/13/23 69.6 kg (153 lb 5.3 oz)   10/09/23 70.3 kg (155 lb)   09/01/23 70.3 kg (155 lb)   08/21/23 68 kg (149 lb 14.6 oz)   08/25/23 68 kg (149 lb 14.6 oz)        Lab/Procedures/Meds: Pertinent Labs/Meds Reviewed    Medications:Pertinent Medications Reviewed  Scheduled Meds:   aspirin  81 mg Oral Daily    enoxparin  30 mg Subcutaneous Q24H (prophylaxis, 1700)    furosemide (LASIX) injection  40 mg Intravenous Q12H    hydrALAZINE  25 mg Oral Q8H    levothyroxine  100 mcg Oral Before breakfast    meropenem IV (PEDS and ADULTS)  500 mg Intravenous Q12H     Continuous Infusions:  PRN Meds:.  Current Facility-Administered Medications:     acetaminophen, 650 mg, Oral, Q8H PRN    acetaminophen, 650 mg, Oral, Q4H PRN    aluminum-magnesium hydroxide-simethicone, 30 mL, Oral, QID PRN    atropine, 1 mg, Intravenous, PRN    dextrose 50%, 12.5 g, Intravenous, PRN    dextrose 50%, 25 g, Intravenous, PRN    glucagon (human recombinant), 1 mg, Intramuscular, PRN    glucose, 16 g, Oral, PRN    glucose, 24 g, Oral, PRN    HYDROcodone-acetaminophen, 1 tablet, Oral, Q6H PRN    magnesium oxide, 800 mg, Oral, PRN    magnesium oxide, 800 mg, Oral, PRN    melatonin, 6 mg, Oral, Nightly PRN    naloxone, 0.02 mg, Intravenous, PRN    ondansetron, 4 mg, Intravenous, Q6H PRN    potassium bicarbonate, 35 mEq, Oral, PRN    potassium bicarbonate, 50 mEq, Oral, PRN    potassium bicarbonate, 60 mEq, Oral, PRN    potassium, sodium phosphates, 2 packet, Oral, PRN    potassium, sodium phosphates, 2 packet, Oral, PRN    potassium, sodium phosphates, 2 packet, Oral, PRN    senna-docusate 8.6-50 mg, 1 tablet, Oral, BID PRN    sodium chloride 0.9%, 10 mL, Intravenous, PRN    sodium chloride 0.9%, 2 mL, Intravenous, Q12H PRN    Labs: Pertinent Labs Reviewed  Clinical Chemistry:  Recent Labs   Lab 10/11/24  1151   *   K 5.0      CO2 18*   *   BUN 46*   CREATININE 2.1*   CALCIUM 10.0   PROT 7.9   ALBUMIN 4.2   BILITOT  1.4*   ALKPHOS 70   AST 38   ALT 31   ANIONGAP 15   MG 1.9     CBC:   Recent Labs   Lab 10/11/24  1151   WBC 9.50   RBC 3.62*   HGB 12.4   HCT 37.6      *   MCH 34.3*   MCHC 33.0     Cardiac Profile:  Recent Labs   Lab 10/11/24  1151   BNP >4,700*     Diabetes:  Recent Labs   Lab 10/11/24  1536   HGBA1C 5.8     Thyroid & Parathyroid:  Recent Labs   Lab 10/11/24  1151   TSH 2.377       Monitor and Evaluation  Food and Nutrient Intake: energy intake, food and beverage intake  Food and Nutrient Adminstration: diet order  Knowledge/Beliefs/Attitudes: food and nutrition knowledge/skill  Physical Activity and Function: nutrition-related ADLs and IADLs  Anthropometric Measurements: weight, weight change  Biochemical Data, Medical Tests and Procedures: electrolyte and renal panel, gastrointestinal profile, glucose/endocrine profile  Nutrition-Focused Physical Findings: overall appearance     Nutrition Risk  Level of Risk/Frequency of Follow-up:  (1x/week)     Nutrition Follow-Up  RD Follow-up?: Yes    Madonna Montiel RD 10/12/2024 9:19 AM

## 2024-10-12 NOTE — HOSPITAL COURSE
Patient is a 89 year old female admitted with progressively worsening SOB and weakness. Patient was treated for acute on chronic HFpEF, ALTON on CKD and bradycardia. Home metoprolol was held. Patient was started on Meropenum on admission for positive UA. However she does not have any dysuria. Her urine culture repeatedly including this time has grown mixed growth. Meropenum was discontinued. Patient was treated with IV Lasix. PT, OT was consulted. Recommended home health. Patient was diuresed well. Cr improved to baseline. Aldactone stopped and patient was started on 40 mg of PO Lasix daily. Entresto resumed. Metoprolol stopped due to bradycardia.

## 2024-10-12 NOTE — PROGRESS NOTES
[facility]  Department of Cardiology  Progress Note    PATIENT NAME: Andressa Benedict  MRN: [id number]  TODAY'S DATE: 10/12/2024  ADMIT DATE: 10/11/2024    SUBJECTIVE     PRINCIPLE PROBLEM: Acute on chronic diastolic heart failure    INTERVAL HISTORY:    10/12/2024  Today she feels much better.  She denies any chest pains.  Her appetite has improved.    10/11/24  HPI:      89 yo female with history of AFib, CKD, hyperlipidemia, diastolic HF and thyroid disease presenting with generalized weakness, decreased oral intake, shortness of breath, and bilateral lower extremity swelling. Hypertensive in ED and intermittent bradycardia with HR as low as 30s.  BNP 4700.  Bigeminy noted on EKG.  Pulmonary edema on CXR.  Patient was given IV lasix in ED and admitted for HF exacerbation. ALTON/CKD.      IN ED: H&H 12/37, K 5.0, Cr 2.1, Mag 1.9; BNP 4700, Troponin HS 60 >90 > 67.8.        Review of patient's allergies indicates:   Allergen Reactions    Penicillins Anaphylaxis       REVIEW OF SYSTEMS  CARDIOVASCULAR: No recent chest pain, palpitations, arm, neck, or jaw pain  RESPIRATORY: No recent fever, cough chills, SOB or congestion  : No blood in the urine  GI: No Nausea, vomiting, constipation, diarrhea, blood, or reflux.  MUSCULOSKELETAL: No myalgias  NEURO: No lightheadedness or dizziness  EYES: No Double vision, blurry, vision or headache     OBJECTIVE     VITAL SIGNS (Most Recent)  Temp: 97.5 °F (36.4 °C) (10/12/24 1132)  Pulse: 60 (10/12/24 1132)  Resp: 18 (10/12/24 1132)  BP: (!) 151/70 (10/12/24 1404)  SpO2: 95 % (10/12/24 1132)    VENTILATION STATUS  Resp: 18 (10/12/24 1132)  SpO2: 95 % (10/12/24 1132)  Oxygen Concentration (%):  [95-96] 96        I & O (Last 24H):  Intake/Output Summary (Last 24 hours) at 10/12/2024 1431  Last data filed at 10/12/2024 1244  Gross per 24 hour   Intake --   Output 1300 ml   Net -1300 ml       WEIGHTS  Wt Readings from Last 1 Encounters:   10/11/24 1842 69.8 kg (153 lb  14.1 oz)   10/11/24 1053 68 kg (150 lb)       PHYSICAL EXAM  CONSTITUTIONAL: Well built, well nourished in no apparent distress  NECK: no carotid bruit, no JVD  LUNGS: CTA  CHEST WALL: no tenderness  HEART: regular rate and rhythm, S1, S2 normal, no murmur, click, rub or gallop   ABDOMEN: soft, non-tender; bowel sounds normal; no masses,  no organomegaly  EXTREMITIES: Extremities normal, no edema  NEURO: AAO X 3    SCHEDULED MEDS:   amLODIPine  5 mg Oral Daily    aspirin  81 mg Oral Daily    enoxparin  30 mg Subcutaneous Q24H (prophylaxis, 1700)    furosemide (LASIX) injection  40 mg Intravenous Q12H    hydrALAZINE  50 mg Oral Q8H    levothyroxine  100 mcg Oral Before breakfast       CONTINUOUS INFUSIONS:    PRN MEDS:  Current Facility-Administered Medications:     acetaminophen, 650 mg, Oral, Q8H PRN    acetaminophen, 650 mg, Oral, Q4H PRN    aluminum-magnesium hydroxide-simethicone, 30 mL, Oral, QID PRN    atropine, 1 mg, Intravenous, PRN    dextrose 50%, 12.5 g, Intravenous, PRN    dextrose 50%, 25 g, Intravenous, PRN    glucagon (human recombinant), 1 mg, Intramuscular, PRN    glucose, 16 g, Oral, PRN    glucose, 24 g, Oral, PRN    HYDROcodone-acetaminophen, 1 tablet, Oral, Q6H PRN    magnesium oxide, 800 mg, Oral, PRN    magnesium oxide, 800 mg, Oral, PRN    melatonin, 6 mg, Oral, Nightly PRN    naloxone, 0.02 mg, Intravenous, PRN    ondansetron, 4 mg, Intravenous, Q6H PRN    potassium bicarbonate, 35 mEq, Oral, PRN    potassium bicarbonate, 50 mEq, Oral, PRN    potassium bicarbonate, 60 mEq, Oral, PRN    potassium, sodium phosphates, 2 packet, Oral, PRN    potassium, sodium phosphates, 2 packet, Oral, PRN    potassium, sodium phosphates, 2 packet, Oral, PRN    senna-docusate 8.6-50 mg, 1 tablet, Oral, BID PRN    sodium chloride 0.9%, 10 mL, Intravenous, PRN    sodium chloride 0.9%, 2 mL, Intravenous, Q12H PRN    LABS AND DIAGNOSTICS     CBC LAST 3 DAYS  Recent Labs   Lab 10/11/24  1151   WBC 9.50   RBC 3.62*  "  HGB 12.4   HCT 37.6   *   MCH 34.3*   MCHC 33.0   RDW 14.3      MPV 10.9   GRAN 73.6*  7.0   LYMPH 14.6*  1.4   MONO 10.4  1.0   BASO 0.05   NRBC 0       COAGULATION LAST 3 DAYS  No results for input(s): "LABPT", "INR", "APTT" in the last 168 hours.    CHEMISTRY LAST 3 DAYS  Recent Labs   Lab 10/11/24  1151   *   K 5.0      CO2 18*   ANIONGAP 15   BUN 46*   CREATININE 2.1*   *   CALCIUM 10.0   MG 1.9   ALBUMIN 4.2   BILITOT 1.4*   PROT 7.9   ALKPHOS 70   ALT 31   AST 38       CARDIAC PROFILE LAST 3 DAYS  Recent Labs   Lab 10/11/24  1151 10/11/24  1536 10/11/24  2030   BNP >4,700*  --   --    TROPONINIHS 67.8* 80.3* 67.3*       ENDOCRINE LAST 3 DAYS  Recent Labs   Lab 10/11/24  1151   TSH 2.377       LAST ARTERIAL BLOOD GAS  ABG  No results for input(s): "PH", "PO2", "PCO2", "HCO3", "BE" in the last 168 hours.    LAST 7 DAYS MICROBIOLOGY   Microbiology Results (last 7 days)       Procedure Component Value Units Date/Time    Urine culture [[id number]] Collected: 10/11/24 1444    Order Status: Completed Specimen: Urine Updated: 10/12/24 0706     Urine Culture, Routine Multiple organisms isolated. None in predominance.  Repeat if      clinically necessary.    Narrative:      Specimen Source->Urine            MOST RECENT IMAGING  US Soft Tissue Head Neck  Narrative: EXAMINATION:  US SOFT TISSUE HEAD NECK    CLINICAL HISTORY:  mass to L neck; Localized swelling, mass and lump, neck    TECHNIQUE:  Grayscale and color Doppler ultrasound of the neck soft tissues.    COMPARISON:  None.    FINDINGS:  In the left neck soft tissues region of interest there is a heterogeneous hypoechoic structure with punctate echogenicities measuring 2.6 x 1.2 x 2.4 cm.  It demonstrates posterior acoustic enhancement.  It abuts the internal jugular vein.  It is not hypervascular with color Doppler imaging.  Impression: Heterogeneous hypoechoic structure of the left neck with posterior acoustic enhancement is " felt to reflect a complex cystic lesion.  This lesion abuts the internal jugular vein.    Electronically signed by: Olaf Nelson  Date:    10/11/2024  Time:    16:48  X-Ray Chest AP Portable  Narrative: EXAMINATION:  XR CHEST AP PORTABLE    CLINICAL HISTORY:  Shortness of breath    FINDINGS:  Portable chest with comparison chest x-ray 09/28/2024.  Unchanged enlarged cardiomediastinal silhouette.  Prominent size the central hilar vascular structures again noted.  There is mild perihilar interstitial thickening.No definite confluent airspace opacities.  No acute osseous abnormality.  Impression: 1. Enlarged cardiomediastinal silhouette and prominent central hilar vascular structures may reflect pulmonary vascular congestion.  2. Mild bilateral perihilar interstitial thickening could reflect mild pulmonary edema in the proper clinical setting.    Electronically signed by: Olaf Nelson  Date:    10/11/2024  Time:    11:43      [facility]  Results for orders placed during the hospital encounter of 08/21/23    Echo    Interpretation Summary    Left Ventricle: The left ventricle is mildly dilated. Mildly increased ventricular mass. Mildly increased wall thickness. There is mild concentric hypertrophy. Normal wall motion. Septal flattening in systole consistent with right ventricular pressure overload. There is low normal systolic function. EF 50% with patient in sinus rhythm and heart rate of 52 There is diastolic dysfunction but grade cannot be determined. Elevated left ventricular filling pressure.    Right Ventricle: Normal right ventricular cavity size. Wall thickness is normal. Right ventricle wall motion  is normal. Systolic function is normal.    Mitral Valve: There is no stenosis.    Tricuspid Valve: There is mild to moderate regurgitation.    Pulmonary Artery: There is mild pulmonary hypertension. The estimated pulmonary artery systolic pressure is 58 mmHg.    IVC/SVC: Elevated venous pressure at 15 mmHg.     Pericardium: There is a small posterior effusion. Pericardial effusion is echolucent. No indication of cardiac tamponade.    Mean left atrial pressure slightly increased at 12 mm Hg      CURRENT/PREVIOUS VISIT EKG  Results for orders placed or performed during the hospital encounter of 10/11/24   EKG 12-lead    Collection Time: 10/11/24 11:04 AM   Result Value Ref Range    QRS Duration 146 ms    OHS QTC Calculation 514 ms    Narrative    Test Reason : I50.9,    Vent. Rate : 072 BPM     Atrial Rate : 072 BPM     P-R Int : 166 ms          QRS Dur : 146 ms      QT Int : 470 ms       P-R-T Axes : 048 060 027 degrees     QTc Int : 514 ms    Sinus rhythm with frequent and consecutive Premature ventricular complexes   in a pattern of bigeminy  Possible Left atrial enlargement  Right bundle branch block  Abnormal ECG  When compared with ECG of 28-SEP-2024 19:26,  Premature ventricular complexes are now Present    Referred By: [email or web address]   SELF           Confirmed By:        ASSESSMENT/PLAN:     Active Hospital Problems    Diagnosis    *Acute on chronic diastolic heart failure    Acute renal failure    Hypertension    Paroxysmal A-fib    Elevated troponin       ASSESSMENT & PLAN:   Acute on chronic HFpEF- EF 50%  Bradycardia  Hypertension  ALTON/CKD  PAF   Elevated troponin HS      RECOMMENDATIONS:  Follow renal function and electrolytes  Continue to hold Entresto, spironolactone  Continue to monitor        Aureliano Morocho MD  Date of Service: 10/12/2024  2:31 PM

## 2024-10-12 NOTE — ASSESSMENT & PLAN NOTE
ALTON on CKD   Suspect cardio renal.  Baseline Cr 1.5, 2.1 on admission     Most recent creatinine and eGFR are listed below.  Recent Labs     10/11/24  1151   CREATININE 2.1*   EGFRNORACEVR 22.0*     Monitor Cr with diuresis   Strict I/O  Holding Aldactone for now

## 2024-10-12 NOTE — PROGRESS NOTES
Pharmacist Renal Dose Adjustment Note    Andressa Benedict is a 89 y.o. female being treated with the medication Meropenem.    Patient Data:    Vital Signs (Most Recent):  Temp: 97 °F (36.1 °C) (10/11/24 1842)  Pulse: 67 (10/11/24 1842)  Resp: 18 (10/11/24 1842)  BP: (!) 187/74 (10/11/24 1842)  SpO2: 95 % (10/11/24 1805) Vital Signs (72h Range):  Temp:  [97 °F (36.1 °C)-98.1 °F (36.7 °C)]   Pulse:  [31-67]   Resp:  [12-23]   BP: (132-187)/(63-99)   SpO2:  [92 %-97 %]      Recent Labs   Lab 10/11/24  1151   CREATININE 2.1*     Serum creatinine: 2.1 mg/dL (H) 10/11/24 1151  Estimated creatinine clearance: 15.5 mL/min (A)    Meropenem 1 gram IV every 8 hours will be changed to Meropenem 500 mg IV every 12 hours due to CrCl- 10-25 ml/min.    Pharmacist's Name: Mariela Reid  Pharmacist's Extension: [phone].

## 2024-10-13 LAB
ALBUMIN SERPL BCP-MCNC: 3.6 G/DL (ref 3.5–5.2)
ALP SERPL-CCNC: 72 U/L (ref 55–135)
ALT SERPL W/O P-5'-P-CCNC: 35 U/L (ref 10–44)
ANION GAP SERPL CALC-SCNC: 8 MMOL/L (ref 8–16)
ANION GAP SERPL CALC-SCNC: 8 MMOL/L (ref 8–16)
AST SERPL-CCNC: 30 U/L (ref 10–40)
BASOPHILS # BLD AUTO: 0.06 K/UL (ref 0–0.2)
BASOPHILS NFR BLD: 0.7 % (ref 0–1.9)
BILIRUB SERPL-MCNC: 0.5 MG/DL (ref 0.1–1)
BUN SERPL-MCNC: 53 MG/DL (ref 8–23)
BUN SERPL-MCNC: 53 MG/DL (ref 8–23)
CALCIUM SERPL-MCNC: 9.4 MG/DL (ref 8.7–10.5)
CALCIUM SERPL-MCNC: 9.4 MG/DL (ref 8.7–10.5)
CHLORIDE SERPL-SCNC: 101 MMOL/L (ref 95–110)
CHLORIDE SERPL-SCNC: 101 MMOL/L (ref 95–110)
CO2 SERPL-SCNC: 29 MMOL/L (ref 23–29)
CO2 SERPL-SCNC: 29 MMOL/L (ref 23–29)
CREAT SERPL-MCNC: 2 MG/DL (ref 0.5–1.4)
CREAT SERPL-MCNC: 2 MG/DL (ref 0.5–1.4)
DIFFERENTIAL METHOD BLD: ABNORMAL
EOSINOPHIL # BLD AUTO: 0.4 K/UL (ref 0–0.5)
EOSINOPHIL NFR BLD: 4.6 % (ref 0–8)
ERYTHROCYTE [DISTWIDTH] IN BLOOD BY AUTOMATED COUNT: 14.2 % (ref 11.5–14.5)
EST. GFR  (NO RACE VARIABLE): 23.3 ML/MIN/1.73 M^2
EST. GFR  (NO RACE VARIABLE): 23.3 ML/MIN/1.73 M^2
GLUCOSE SERPL-MCNC: 114 MG/DL (ref 70–110)
GLUCOSE SERPL-MCNC: 114 MG/DL (ref 70–110)
HCT VFR BLD AUTO: 38.4 % (ref 37–48.5)
HGB BLD-MCNC: 12.2 G/DL (ref 12–16)
IMM GRANULOCYTES # BLD AUTO: 0.01 K/UL (ref 0–0.04)
IMM GRANULOCYTES NFR BLD AUTO: 0.1 % (ref 0–0.5)
LYMPHOCYTES # BLD AUTO: 1.6 K/UL (ref 1–4.8)
LYMPHOCYTES NFR BLD: 18.5 % (ref 18–48)
MAGNESIUM SERPL-MCNC: 1.6 MG/DL (ref 1.6–2.6)
MCH RBC QN AUTO: 34.4 PG (ref 27–31)
MCHC RBC AUTO-ENTMCNC: 31.8 G/DL (ref 32–36)
MCV RBC AUTO: 108 FL (ref 82–98)
MONOCYTES # BLD AUTO: 1.1 K/UL (ref 0.3–1)
MONOCYTES NFR BLD: 13.2 % (ref 4–15)
NEUTROPHILS # BLD AUTO: 5.3 K/UL (ref 1.8–7.7)
NEUTROPHILS NFR BLD: 62.9 % (ref 38–73)
NRBC BLD-RTO: 0 /100 WBC
PLATELET # BLD AUTO: 198 K/UL (ref 150–450)
PMV BLD AUTO: 10.7 FL (ref 9.2–12.9)
POTASSIUM SERPL-SCNC: 4.4 MMOL/L (ref 3.5–5.1)
POTASSIUM SERPL-SCNC: 4.4 MMOL/L (ref 3.5–5.1)
PROT SERPL-MCNC: 7 G/DL (ref 6–8.4)
RBC # BLD AUTO: 3.55 M/UL (ref 4–5.4)
SODIUM SERPL-SCNC: 138 MMOL/L (ref 136–145)
SODIUM SERPL-SCNC: 138 MMOL/L (ref 136–145)
WBC # BLD AUTO: 8.43 K/UL (ref 3.9–12.7)

## 2024-10-13 PROCEDURE — 21400001 HC TELEMETRY ROOM

## 2024-10-13 PROCEDURE — 25000003 PHARM REV CODE 250: Performed by: HOSPITALIST

## 2024-10-13 PROCEDURE — 99232 SBSQ HOSP IP/OBS MODERATE 35: CPT | Mod: ,,, | Performed by: INTERNAL MEDICINE

## 2024-10-13 PROCEDURE — 85025 COMPLETE CBC W/AUTO DIFF WBC: CPT | Performed by: HOSPITALIST

## 2024-10-13 PROCEDURE — 36415 COLL VENOUS BLD VENIPUNCTURE: CPT | Performed by: HOSPITALIST

## 2024-10-13 PROCEDURE — 94761 N-INVAS EAR/PLS OXIMETRY MLT: CPT

## 2024-10-13 PROCEDURE — 25000003 PHARM REV CODE 250: Performed by: INTERNAL MEDICINE

## 2024-10-13 PROCEDURE — 63600175 PHARM REV CODE 636 W HCPCS: Performed by: INTERNAL MEDICINE

## 2024-10-13 PROCEDURE — 97116 GAIT TRAINING THERAPY: CPT | Mod: CQ

## 2024-10-13 PROCEDURE — 80053 COMPREHEN METABOLIC PANEL: CPT | Performed by: HOSPITALIST

## 2024-10-13 PROCEDURE — 27000221 HC OXYGEN, UP TO 24 HOURS

## 2024-10-13 PROCEDURE — 83735 ASSAY OF MAGNESIUM: CPT | Performed by: HOSPITALIST

## 2024-10-13 PROCEDURE — 63600175 PHARM REV CODE 636 W HCPCS: Performed by: HOSPITALIST

## 2024-10-13 RX ORDER — MAGNESIUM SULFATE HEPTAHYDRATE 40 MG/ML
2 INJECTION, SOLUTION INTRAVENOUS ONCE
Status: COMPLETED | OUTPATIENT
Start: 2024-10-13 | End: 2024-10-13

## 2024-10-13 RX ORDER — LOPERAMIDE HYDROCHLORIDE 2 MG/1
2 CAPSULE ORAL ONCE
Status: COMPLETED | OUTPATIENT
Start: 2024-10-13 | End: 2024-10-13

## 2024-10-13 RX ADMIN — FUROSEMIDE 40 MG: 10 INJECTION, SOLUTION INTRAMUSCULAR; INTRAVENOUS at 08:10

## 2024-10-13 RX ADMIN — AMLODIPINE BESYLATE 5 MG: 5 TABLET ORAL at 08:10

## 2024-10-13 RX ADMIN — LOPERAMIDE HYDROCHLORIDE 2 MG: 2 CAPSULE ORAL at 05:10

## 2024-10-13 RX ADMIN — HYDRALAZINE HYDROCHLORIDE 50 MG: 25 TABLET ORAL at 09:10

## 2024-10-13 RX ADMIN — ASPIRIN 81 MG: 81 TABLET, COATED ORAL at 08:10

## 2024-10-13 RX ADMIN — ENOXAPARIN SODIUM 30 MG: 30 INJECTION SUBCUTANEOUS at 04:10

## 2024-10-13 RX ADMIN — FUROSEMIDE 40 MG: 10 INJECTION, SOLUTION INTRAMUSCULAR; INTRAVENOUS at 09:10

## 2024-10-13 RX ADMIN — LEVOTHYROXINE SODIUM 100 MCG: 100 TABLET ORAL at 05:10

## 2024-10-13 RX ADMIN — MAGNESIUM SULFATE HEPTAHYDRATE 2 G: 40 INJECTION, SOLUTION INTRAVENOUS at 07:10

## 2024-10-13 NOTE — ASSESSMENT & PLAN NOTE
ALTON on CKD   Suspect cardio renal.  Baseline Cr 1.5, 2.1 on admission     Most recent creatinine and eGFR are listed below.  Recent Labs     10/11/24  1151 10/13/24  0627   CREATININE 2.1* 2.0*  2.0*   EGFRNORACEVR 22.0* 23.3*  23.3*     Monitor Cr with diuresis   Strict I/O  Holding Aldactone, entresto for now

## 2024-10-13 NOTE — ASSESSMENT & PLAN NOTE
Bradycardia  Bradycardia resolved  Patient is asymptomatic from bradycardia   Holding metoprolol, Patient only takes 12.5 at home   Pt is not on AC  Cont to monitor telemetry

## 2024-10-13 NOTE — ASSESSMENT & PLAN NOTE
Acute on chronic HFpEF  Echo from Aug 2023 shows EF 50%  - Cont IV Lasix   - Strict I/O  - fluid restriction   - Holding metoprolol   - Follow cardiology recommendations

## 2024-10-13 NOTE — ASSESSMENT & PLAN NOTE
Patients blood pressure range in the last 24 hours was: BP  Min: 121/62  Max: 187/74.  Hold pt's entresto, and aldactone given renal failure  Hold metropolol given bradycardia  Increased hydralazine 50 mg q8h.   Added Norvasc

## 2024-10-13 NOTE — PROGRESS NOTES
[facility]  Department of Cardiology  Progress Note    PATIENT NAME: Andressa Benedict  MRN: [id number]  TODAY'S DATE: 10/13/2024  ADMIT DATE: 10/11/2024    SUBJECTIVE     PRINCIPLE PROBLEM: Acute on chronic diastolic heart failure    INTERVAL HISTORY:    10/13/2024  She is feeling better.  Her appetite has improved.  She feels that the Entresto was making her sick.  She feels that she will be ready to go home by tomorrow.    10/12/2024  Today she feels much better.  She denies any chest pains.  Her appetite has improved.    10/11/24  HPI:      89 yo female with history of AFib, CKD, hyperlipidemia, diastolic HF and thyroid disease presenting with generalized weakness, decreased oral intake, shortness of breath, and bilateral lower extremity swelling. Hypertensive in ED and intermittent bradycardia with HR as low as 30s.  BNP 4700.  Bigeminy noted on EKG.  Pulmonary edema on CXR.  Patient was given IV lasix in ED and admitted for HF exacerbation. ALTON/CKD.      IN ED: H&H 12/37, K 5.0, Cr 2.1, Mag 1.9; BNP 4700, Troponin HS 60 >90 > 67.8.        Review of patient's allergies indicates:   Allergen Reactions    Penicillins Anaphylaxis       REVIEW OF SYSTEMS  CARDIOVASCULAR: No recent chest pain, palpitations, arm, neck, or jaw pain  RESPIRATORY: No recent fever, cough chills, SOB or congestion  : No blood in the urine  GI: No Nausea, vomiting, constipation, diarrhea, blood, or reflux.  MUSCULOSKELETAL: No myalgias  NEURO: No lightheadedness or dizziness  EYES: No Double vision, blurry, vision or headache     OBJECTIVE     VITAL SIGNS (Most Recent)  Temp: 97.9 °F (36.6 °C) (10/13/24 0737)  Pulse: 60 (10/13/24 0737)  Resp: 18 (10/13/24 0737)  BP: (!) 185/79 (10/13/24 0737)  SpO2: 95 % (10/13/24 0737)    VENTILATION STATUS  Resp: 18 (10/13/24 0737)  SpO2: 95 % (10/13/24 0737)           I & O (Last 24H):  Intake/Output Summary (Last 24 hours) at 10/13/2024 1017  Last data filed at 10/13/2024 0907  Gross  per 24 hour   Intake 240 ml   Output 2250 ml   Net -2010 ml       WEIGHTS  Wt Readings from Last 1 Encounters:   10/11/24 1842 69.8 kg (153 lb 14.1 oz)   10/11/24 1053 68 kg (150 lb)       PHYSICAL EXAM  CONSTITUTIONAL: Well built, well nourished in no apparent distress  NECK: no carotid bruit, no JVD  LUNGS: CTA  CHEST WALL: no tenderness  HEART: regular rate and rhythm, grade 2/6 systolic murmur at the base  ABDOMEN: soft, non-tender; bowel sounds normal; no masses,  no organomegaly  EXTREMITIES: Extremities normal, no edema  NEURO: AAO X 3    SCHEDULED MEDS:   amLODIPine  5 mg Oral Daily    aspirin  81 mg Oral Daily    enoxparin  30 mg Subcutaneous Q24H (prophylaxis, 1700)    furosemide (LASIX) injection  40 mg Intravenous Q12H    hydrALAZINE  50 mg Oral Q8H    levothyroxine  100 mcg Oral Before breakfast       CONTINUOUS INFUSIONS:    PRN MEDS:  Current Facility-Administered Medications:     acetaminophen, 650 mg, Oral, Q8H PRN    acetaminophen, 650 mg, Oral, Q4H PRN    aluminum-magnesium hydroxide-simethicone, 30 mL, Oral, QID PRN    atropine, 1 mg, Intravenous, PRN    dextrose 50%, 12.5 g, Intravenous, PRN    dextrose 50%, 25 g, Intravenous, PRN    glucagon (human recombinant), 1 mg, Intramuscular, PRN    glucose, 16 g, Oral, PRN    glucose, 24 g, Oral, PRN    HYDROcodone-acetaminophen, 1 tablet, Oral, Q6H PRN    magnesium oxide, 800 mg, Oral, PRN    magnesium oxide, 800 mg, Oral, PRN    melatonin, 6 mg, Oral, Nightly PRN    naloxone, 0.02 mg, Intravenous, PRN    ondansetron, 4 mg, Intravenous, Q6H PRN    potassium bicarbonate, 35 mEq, Oral, PRN    potassium bicarbonate, 50 mEq, Oral, PRN    potassium bicarbonate, 60 mEq, Oral, PRN    potassium, sodium phosphates, 2 packet, Oral, PRN    potassium, sodium phosphates, 2 packet, Oral, PRN    potassium, sodium phosphates, 2 packet, Oral, PRN    senna-docusate 8.6-50 mg, 1 tablet, Oral, BID PRN    sodium chloride 0.9%, 10 mL, Intravenous, PRN    sodium chloride  "0.9%, 2 mL, Intravenous, Q12H PRN    LABS AND DIAGNOSTICS     CBC LAST 3 DAYS  Recent Labs   Lab 10/11/24  1151 10/13/24  0627   WBC 9.50 8.43   RBC 3.62* 3.55*   HGB 12.4 12.2   HCT 37.6 38.4   * 108*   MCH 34.3* 34.4*   MCHC 33.0 31.8*   RDW 14.3 14.2    198   MPV 10.9 10.7   GRAN 73.6*  7.0 62.9  5.3   LYMPH 14.6*  1.4 18.5  1.6   MONO 10.4  1.0 13.2  1.1*   BASO 0.05 0.06   NRBC 0 0       COAGULATION LAST 3 DAYS  No results for input(s): "LABPT", "INR", "APTT" in the last 168 hours.    CHEMISTRY LAST 3 DAYS  Recent Labs   Lab 10/11/24  1151 10/13/24  0627   * 138  138   K 5.0 4.4  4.4    101  101   CO2 18* 29  29   ANIONGAP 15 8  8   BUN 46* 53*  53*   CREATININE 2.1* 2.0*  2.0*   * 114*  114*   CALCIUM 10.0 9.4  9.4   MG 1.9 1.6   ALBUMIN 4.2 3.6   BILITOT 1.4* 0.5   PROT 7.9 7.0   ALKPHOS 70 72   ALT 31 35   AST 38 30       CARDIAC PROFILE LAST 3 DAYS  Recent Labs   Lab 10/11/24  1151 10/11/24  1536 10/11/24  2030   BNP >4,700*  --   --    TROPONINIHS 67.8* 80.3* 67.3*       ENDOCRINE LAST 3 DAYS  Recent Labs   Lab 10/11/24  1151   TSH 2.377       LAST ARTERIAL BLOOD GAS  ABG  No results for input(s): "PH", "PO2", "PCO2", "HCO3", "BE" in the last 168 hours.    LAST 7 DAYS MICROBIOLOGY   Microbiology Results (last 7 days)       Procedure Component Value Units Date/Time    Urine culture [[id number]] Collected: 10/11/24 1444    Order Status: Completed Specimen: Urine Updated: 10/12/24 0706     Urine Culture, Routine Multiple organisms isolated. None in predominance.  Repeat if      clinically necessary.    Narrative:      Specimen Source->Urine            MOST RECENT IMAGING  US Soft Tissue Head Neck  Narrative: EXAMINATION:  US SOFT TISSUE HEAD NECK    CLINICAL HISTORY:  mass to L neck; Localized swelling, mass and lump, neck    TECHNIQUE:  Grayscale and color Doppler ultrasound of the neck soft tissues.    COMPARISON:  None.    FINDINGS:  In the left neck soft " tissues region of interest there is a heterogeneous hypoechoic structure with punctate echogenicities measuring 2.6 x 1.2 x 2.4 cm.  It demonstrates posterior acoustic enhancement.  It abuts the internal jugular vein.  It is not hypervascular with color Doppler imaging.  Impression: Heterogeneous hypoechoic structure of the left neck with posterior acoustic enhancement is felt to reflect a complex cystic lesion.  This lesion abuts the internal jugular vein.    Electronically signed by: Olaf Nelson  Date:    10/11/2024  Time:    16:48  X-Ray Chest AP Portable  Narrative: EXAMINATION:  XR CHEST AP PORTABLE    CLINICAL HISTORY:  Shortness of breath    FINDINGS:  Portable chest with comparison chest x-ray 09/28/2024.  Unchanged enlarged cardiomediastinal silhouette.  Prominent size the central hilar vascular structures again noted.  There is mild perihilar interstitial thickening.No definite confluent airspace opacities.  No acute osseous abnormality.  Impression: 1. Enlarged cardiomediastinal silhouette and prominent central hilar vascular structures may reflect pulmonary vascular congestion.  2. Mild bilateral perihilar interstitial thickening could reflect mild pulmonary edema in the proper clinical setting.    Electronically signed by: Olaf Nelson  Date:    10/11/2024  Time:    11:43      [facility]  Results for orders placed during the hospital encounter of 08/21/23    Echo    Interpretation Summary    Left Ventricle: The left ventricle is mildly dilated. Mildly increased ventricular mass. Mildly increased wall thickness. There is mild concentric hypertrophy. Normal wall motion. Septal flattening in systole consistent with right ventricular pressure overload. There is low normal systolic function. EF 50% with patient in sinus rhythm and heart rate of 52 There is diastolic dysfunction but grade cannot be determined. Elevated left ventricular filling pressure.    Right Ventricle: Normal right ventricular cavity  size. Wall thickness is normal. Right ventricle wall motion  is normal. Systolic function is normal.    Mitral Valve: There is no stenosis.    Tricuspid Valve: There is mild to moderate regurgitation.    Pulmonary Artery: There is mild pulmonary hypertension. The estimated pulmonary artery systolic pressure is 58 mmHg.    IVC/SVC: Elevated venous pressure at 15 mmHg.    Pericardium: There is a small posterior effusion. Pericardial effusion is echolucent. No indication of cardiac tamponade.    Mean left atrial pressure slightly increased at 12 mm Hg      CURRENT/PREVIOUS VISIT EKG  Results for orders placed or performed during the hospital encounter of 10/11/24   EKG 12-lead    Collection Time: 10/11/24 11:04 AM   Result Value Ref Range    QRS Duration 146 ms    [facility] QTC Calculation 514 ms    Narrative    Test Reason : I50.9,    Vent. Rate : 072 BPM     Atrial Rate : 072 BPM     P-R Int : 166 ms          QRS Dur : 146 ms      QT Int : 470 ms       P-R-T Axes : 048 060 027 degrees     QTc Int : 514 ms    Sinus rhythm with frequent and consecutive Premature ventricular complexes   in a pattern of bigeminy  Possible Left atrial enlargement  Right bundle branch block  Abnormal ECG  When compared with ECG of 28-SEP-2024 19:26,  Premature ventricular complexes are now Present    Referred By: [email or web address]   SELF           Confirmed By:        ASSESSMENT/PLAN:     Active Hospital Problems    Diagnosis    *Acute on chronic diastolic heart failure    Acute renal failure    Hypertension    Paroxysmal A-fib    Elevated troponin       ASSESSMENT & PLAN:   Acute on chronic HFpEF- EF 50% did not do well on Entresto  Bradycardia improved off beta-blockers  Hypertension  ALTON/CKD improving  PAF   Elevated troponin HS      RECOMMENDATIONS:  Follow renal function and electrolytes  Continue to hold Entresto, spironolactone and beta-blocker Continue to monitor and diurese her  Possible discharge in a.m.      Aureliano Morocho MD  Date of  Service: 10/13/2024  2:31 PM

## 2024-10-13 NOTE — PT/OT/SLP PROGRESS
Physical Therapy Treatment    Patient Name:  Andressa Benedict   MRN:  [id number]    Recommendations:     Discharge Recommendations: Low Intensity Therapy  Discharge Equipment Recommendations: none  Barriers to discharge:  impaired functional mobility, gait instability, impaired balance, impaired activity tolerance.     Assessment:     Andressa Benedict is a 89 y.o. female admitted with a medical diagnosis of Acute on chronic diastolic heart failure.  She presents with the following impairments/functional limitations: impaired functional mobility, gait instability, impaired balance, impaired cardiopulmonary response to activity.    Pt found sitting EOB, agreeable to skilled physical therapy session today.     She performed sit to stand from EOB with RW and SBA. Upon standing, she demonstrated forward flexed posture that required verbal cues for correction.     She ambulated two bouts of 30ft with RW and CGA. She demonstrated inconsistent placement of AD throughout that required verbal cues for correction, along with short step length throughout.     After gait, she was agreeable to sitting up in her bed side chair. Pt left sitting up in chair, chair alarm on, call light within reach, all needs met, and RN notified.     Rehab Prognosis: Fair; patient would benefit from acute skilled PT services to address these deficits and reach maximum level of function.    Recent Surgery: * No surgery found *      Plan:     During this hospitalization, patient to be seen daily to address the identified rehab impairments via gait training, therapeutic activities, therapeutic exercises and progress toward the following goals:    Plan of Care Expires:  11/09/24    Subjective     Chief Complaint: none  Patient/Family Comments/goals: to get better and go home  Pain/Comfort:  Pain Rating 1: 0/10      Objective:     Communicated with RN prior to session.  Patient found sitting edge of bed with bed alarm, PureWick, peripheral IV,  telemetry upon PT entry to room.     General Precautions: Standard, fall  Orthopedic Precautions: N/A  Braces: N/A  Respiratory Status: Nasal cannula, flow 2 L/min     Functional Mobility:  Transfers:     Sit to Stand:  stand by assistance with rolling walker  Gait: 2x30ft with RW and CGA.       AM-PAC 6 CLICK MOBILITY          Treatment & Education:  Pt educated on importance of time OOB, importance of intermittent mobility, safe techniques for transfers/ambulation, discharge recommendations/options, and use of call light for assistance and fall prevention.    Patient left up in chair with all lines intact, call button in reach, chair alarm on, and RN notified..    GOALS:   Multidisciplinary Problems       Physical Therapy Goals          Problem: Physical Therapy    Goal Priority Disciplines Outcome Interventions   Physical Therapy Goal     PT, PT/OT     Description: Goals to be met by: 24     Patient will increase functional independence with mobility by performin. Supine to sit with Supervision  2. Sit to stand transfer with Supervision  3. Bed to chair transfer with Supervision using Rollator  4. Gait  x 150 feet with Supervision using Rollator                              Time Tracking:     PT Received On: 10/13/24  PT Start Time: 933     PT Stop Time: 946  PT Total Time (min): 13 min     Billable Minutes: Gait Training 13    Treatment Type: Treatment  PT/PTA: PTA     Number of PTA visits since last PT visit: 1     10/13/2024

## 2024-10-13 NOTE — PLAN OF CARE
Problem: Adult Inpatient Plan of Care  Goal: Plan of Care Review  Outcome: Progressing     Problem: Adult Inpatient Plan of Care  Goal: Optimal Comfort and Wellbeing  Outcome: Progressing     Problem: Acute Kidney Injury/Impairment  Goal: Fluid and Electrolyte Balance  Outcome: Progressing     Problem: Fall Injury Risk  Goal: Absence of Fall and Fall-Related Injury  Outcome: Progressing     Problem: Skin Injury Risk Increased  Goal: Skin Health and Integrity  Outcome: Progressing

## 2024-10-13 NOTE — SUBJECTIVE & OBJECTIVE
Interval History: NO new complains today. Patient is anxious to get out of the bed as she wants to go to the bathroom. HR is improved today. Patient had good urine output. Cr slightly improved.     Review of Systems  Objective:     Vital Signs (Most Recent):  Temp: 97.9 °F (36.6 °C) (10/13/24 0737)  Pulse: 60 (10/13/24 0737)  Resp: 18 (10/13/24 0737)  BP: (!) 185/79 (10/13/24 0737)  SpO2: 95 % (10/13/24 0737) Vital Signs (24h Range):  Temp:  [97.5 °F (36.4 °C)-98 °F (36.7 °C)] 97.9 °F (36.6 °C)  Pulse:  [60-64] 60  Resp:  [16-20] 18  SpO2:  [95 %-100 %] 95 %  BP: (121-185)/(61-79) 185/79     Weight: 69.8 kg (153 lb 14.1 oz)  Body mass index is 30.05 kg/m².    Intake/Output Summary (Last 24 hours) at 10/13/2024 1022  Last data filed at 10/13/2024 0907  Gross per 24 hour   Intake 240 ml   Output 2250 ml   Net -2010 ml         Physical Exam  Vitals reviewed.   Constitutional:       Appearance: Normal appearance.   HENT:      Head: Normocephalic and atraumatic.      Mouth/Throat:      Mouth: Mucous membranes are moist.   Eyes:      Extraocular Movements: Extraocular movements intact.      Conjunctiva/sclera: Conjunctivae normal.      Pupils: Pupils are equal, round, and reactive to light.   Cardiovascular:      Rate and Rhythm: Regular rhythm.   Pulmonary:      Effort: Pulmonary effort is normal.      Breath sounds: Normal breath sounds.   Abdominal:      General: Abdomen is flat. Bowel sounds are normal. There is no distension.      Palpations: Abdomen is soft.      Tenderness: There is no abdominal tenderness.   Musculoskeletal:      Cervical back: Normal range of motion and neck supple.  Skin:     General: Skin is warm.   Neurological:      Mental Status: She is alert and oriented to person, place, and time.   Psychiatric:         Mood and Affect: Mood normal.         Behavior: Behavior normal.     Significant Labs: All pertinent labs within the past 24 hours have been reviewed.  CBC:   Recent Labs   Lab  10/11/24  1151 10/13/24  0627   WBC 9.50 8.43   HGB 12.4 12.2   HCT 37.6 38.4    198     CMP:   Recent Labs   Lab 10/11/24  1151 10/13/24  0627   * 138  138   K 5.0 4.4  4.4    101  101   CO2 18* 29  29   * 114*  114*   BUN 46* 53*  53*   CREATININE 2.1* 2.0*  2.0*   CALCIUM 10.0 9.4  9.4   PROT 7.9 7.0   ALBUMIN 4.2 3.6   BILITOT 1.4* 0.5   ALKPHOS 70 72   AST 38 30   ALT 31 35   ANIONGAP 15 8  8       Significant Imaging: I have reviewed all pertinent imaging results/findings within the past 24 hours.

## 2024-10-13 NOTE — PROGRESS NOTES
[facility] Medicine  Progress Note    Patient Name: Andressa Benedict  MRN: [id number]  Patient Class: IP- Inpatient   Admission Date: 10/11/2024  Length of Stay: 2 days  Attending Physician: Mazin Blanchard MD  Primary Care Provider: Aureliano Morocho MD        Subjective:     Principal Problem:Acute on chronic diastolic heart failure        HPI:  89 yo female with history of AFib, CKD, hyperlipidemia, diastolic HF and thyroid disease presenting with generalized weakness, decreased oral intake, shortness of breath, and bilateral lower extremity swelling.  Patient can not recall how long she had the symptoms for.  Patient is hard of hearing, so interview is very difficult.    In the ER, vitals showed a blood pressure of 187/99, heart rate of 76, respiratory rate of 16, afebrile satting 92% on room air. CBC is wnl. CMP showed sodium of 135, creatinine of 2.1, compared to baseline of 1.5.  BNP is elevated at more than 4700, 1st troponin is 67.  TSH is within normal limits.  EKG showed sinus rhythm with premature ventricular complexes in a pattern of bigeminal.  Chest x-ray showed enlarged cardiomediastinal silhouette and prominent central hilar vascular structures which may reflect pulmonary vascular congestion. Mild bilateral perihilar interstitial thickening could reflect mild pulmonary edema in the proper clinical setting.  Patient was given 40 mg of Lasix IV, and will be admitted to Medicine for heart failure exacerbation.      Overview/Hospital Course:  Patient is a 89 year old female admitted with progressively worsening SOB and weakness. Patient was treated for acute on chronic HFpEF, ALTON on CKD and bradycardia. Home metoprolol was held. Patient was started on Meropenum on admission for positive UA. However she does not have any dysuria. Her urine culture repeatedly including this time has grown mixed growth. Meropenum was discontinued. Patient was treated with IV Lasix. PT, OT  was consulted.     Interval History: NO new complains today. Patient is anxious to get out of the bed as she wants to go to the bathroom. HR is improved today. Patient had good urine output. Cr slightly improved.     Review of Systems  Objective:     Vital Signs (Most Recent):  Temp: 97.9 °F (36.6 °C) (10/13/24 0737)  Pulse: 60 (10/13/24 0737)  Resp: 18 (10/13/24 0737)  BP: (!) 185/79 (10/13/24 0737)  SpO2: 95 % (10/13/24 0737) Vital Signs (24h Range):  Temp:  [97.5 °F (36.4 °C)-98 °F (36.7 °C)] 97.9 °F (36.6 °C)  Pulse:  [60-64] 60  Resp:  [16-20] 18  SpO2:  [95 %-100 %] 95 %  BP: (121-185)/(61-79) 185/79     Weight: 69.8 kg (153 lb 14.1 oz)  Body mass index is 30.05 kg/m².    Intake/Output Summary (Last 24 hours) at 10/13/2024 1022  Last data filed at 10/13/2024 0907  Gross per 24 hour   Intake 240 ml   Output 2250 ml   Net -2010 ml         Physical Exam  Vitals reviewed.   Constitutional:       Appearance: Normal appearance.   HENT:      Head: Normocephalic and atraumatic.      Mouth/Throat:      Mouth: Mucous membranes are moist.   Eyes:      Extraocular Movements: Extraocular movements intact.      Conjunctiva/sclera: Conjunctivae normal.      Pupils: Pupils are equal, round, and reactive to light.   Cardiovascular:      Rate and Rhythm: Regular rhythm.   Pulmonary:      Effort: Pulmonary effort is normal.      Breath sounds: Normal breath sounds.   Abdominal:      General: Abdomen is flat. Bowel sounds are normal. There is no distension.      Palpations: Abdomen is soft.      Tenderness: There is no abdominal tenderness.   Musculoskeletal:      Cervical back: Normal range of motion and neck supple.  Skin:     General: Skin is warm.   Neurological:      Mental Status: She is alert and oriented to person, place, and time.   Psychiatric:         Mood and Affect: Mood normal.         Behavior: Behavior normal.     Significant Labs: All pertinent labs within the past 24 hours have been reviewed.  CBC:   Recent Labs    Lab 10/11/24  1151 10/13/24  0627   WBC 9.50 8.43   HGB 12.4 12.2   HCT 37.6 38.4    198     CMP:   Recent Labs   Lab 10/11/24  1151 10/13/24  0627   * 138  138   K 5.0 4.4  4.4    101  101   CO2 18* 29  29   * 114*  114*   BUN 46* 53*  53*   CREATININE 2.1* 2.0*  2.0*   CALCIUM 10.0 9.4  9.4   PROT 7.9 7.0   ALBUMIN 4.2 3.6   BILITOT 1.4* 0.5   ALKPHOS 70 72   AST 38 30   ALT 31 35   ANIONGAP 15 8  8       Significant Imaging: I have reviewed all pertinent imaging results/findings within the past 24 hours.    Assessment/Plan:      * Acute on chronic diastolic heart failure  Acute on chronic HFpEF  Echo from Aug 2023 shows EF 50%  - Cont IV Lasix   - Strict I/O  - fluid restriction   - Holding metoprolol   - Follow cardiology recommendations         Hypertension  Patients blood pressure range in the last 24 hours was: BP  Min: 121/62  Max: 187/74.  Hold pt's entresto, and aldactone given renal failure  Hold metropolol given bradycardia  Increased hydralazine 50 mg q8h.   Added Norvasc     Acute renal failure  ALTON on CKD   Suspect cardio renal.  Baseline Cr 1.5, 2.1 on admission     Most recent creatinine and eGFR are listed below.  Recent Labs     10/11/24  1151 10/13/24  0627   CREATININE 2.1* 2.0*  2.0*   EGFRNORACEVR 22.0* 23.3*  23.3*     Monitor Cr with diuresis   Strict I/O  Holding Aldactone, entresto for now       Paroxysmal A-fib  Bradycardia  Bradycardia resolved  Patient is asymptomatic from bradycardia   Holding metoprolol, Patient only takes 12.5 at home   Pt is not on AC  Cont to monitor telemetry     Elevated troponin  Demand ischemia   Troponin stable   Cardiology was consulted on admission       VTE Risk Mitigation (From admission, onward)           Ordered     enoxaparin injection 30 mg  Every 24 hours         10/11/24 1521     IP VTE HIGH RISK PATIENT  Once         10/11/24 1454     Place sequential compression device  Until discontinued         10/11/24 1454                     Discharge Planning   HEMANTH:      Code Status: Full Code   Is the patient medically ready for discharge?:     Reason for patient still in hospital (select all that apply): Treatment  Discharge Plan A: Home Health                  Mazin Blanchard MD  Department of Hospital Medicine   [facility]

## 2024-10-14 VITALS
DIASTOLIC BLOOD PRESSURE: 51 MMHG | BODY MASS INDEX: 30.21 KG/M2 | WEIGHT: 153.88 LBS | HEART RATE: 74 BPM | HEIGHT: 60 IN | TEMPERATURE: 99 F | OXYGEN SATURATION: 98 % | SYSTOLIC BLOOD PRESSURE: 99 MMHG | RESPIRATION RATE: 18 BRPM

## 2024-10-14 LAB
ALBUMIN SERPL BCP-MCNC: 3.7 G/DL (ref 3.5–5.2)
ALP SERPL-CCNC: 60 U/L (ref 55–135)
ALT SERPL W/O P-5'-P-CCNC: 32 U/L (ref 10–44)
ANION GAP SERPL CALC-SCNC: 8 MMOL/L (ref 8–16)
ANION GAP SERPL CALC-SCNC: 8 MMOL/L (ref 8–16)
AST SERPL-CCNC: 23 U/L (ref 10–40)
BASOPHILS # BLD AUTO: 0.03 K/UL (ref 0–0.2)
BASOPHILS NFR BLD: 0.4 % (ref 0–1.9)
BILIRUB SERPL-MCNC: 0.6 MG/DL (ref 0.1–1)
BUN SERPL-MCNC: 44 MG/DL (ref 8–23)
BUN SERPL-MCNC: 44 MG/DL (ref 8–23)
CALCIUM SERPL-MCNC: 9.7 MG/DL (ref 8.7–10.5)
CALCIUM SERPL-MCNC: 9.7 MG/DL (ref 8.7–10.5)
CHLORIDE SERPL-SCNC: 96 MMOL/L (ref 95–110)
CHLORIDE SERPL-SCNC: 96 MMOL/L (ref 95–110)
CO2 SERPL-SCNC: 34 MMOL/L (ref 23–29)
CO2 SERPL-SCNC: 34 MMOL/L (ref 23–29)
CREAT SERPL-MCNC: 1.6 MG/DL (ref 0.5–1.4)
CREAT SERPL-MCNC: 1.6 MG/DL (ref 0.5–1.4)
DIFFERENTIAL METHOD BLD: ABNORMAL
EOSINOPHIL # BLD AUTO: 0.2 K/UL (ref 0–0.5)
EOSINOPHIL NFR BLD: 1.9 % (ref 0–8)
ERYTHROCYTE [DISTWIDTH] IN BLOOD BY AUTOMATED COUNT: 14.3 % (ref 11.5–14.5)
EST. GFR  (NO RACE VARIABLE): 30.4 ML/MIN/1.73 M^2
EST. GFR  (NO RACE VARIABLE): 30.4 ML/MIN/1.73 M^2
GLUCOSE SERPL-MCNC: 99 MG/DL (ref 70–110)
GLUCOSE SERPL-MCNC: 99 MG/DL (ref 70–110)
HCT VFR BLD AUTO: 39.1 % (ref 37–48.5)
HGB BLD-MCNC: 12.7 G/DL (ref 12–16)
IMM GRANULOCYTES # BLD AUTO: 0.01 K/UL (ref 0–0.04)
IMM GRANULOCYTES NFR BLD AUTO: 0.1 % (ref 0–0.5)
LYMPHOCYTES # BLD AUTO: 0.7 K/UL (ref 1–4.8)
LYMPHOCYTES NFR BLD: 8.1 % (ref 18–48)
MAGNESIUM SERPL-MCNC: 1.9 MG/DL (ref 1.6–2.6)
MCH RBC QN AUTO: 34 PG (ref 27–31)
MCHC RBC AUTO-ENTMCNC: 32.5 G/DL (ref 32–36)
MCV RBC AUTO: 105 FL (ref 82–98)
MONOCYTES # BLD AUTO: 0.9 K/UL (ref 0.3–1)
MONOCYTES NFR BLD: 10.7 % (ref 4–15)
NEUTROPHILS # BLD AUTO: 6.6 K/UL (ref 1.8–7.7)
NEUTROPHILS NFR BLD: 78.8 % (ref 38–73)
NRBC BLD-RTO: 0 /100 WBC
PLATELET # BLD AUTO: 227 K/UL (ref 150–450)
PMV BLD AUTO: 10.8 FL (ref 9.2–12.9)
POTASSIUM SERPL-SCNC: 4.1 MMOL/L (ref 3.5–5.1)
POTASSIUM SERPL-SCNC: 4.1 MMOL/L (ref 3.5–5.1)
PROT SERPL-MCNC: 6.8 G/DL (ref 6–8.4)
RBC # BLD AUTO: 3.73 M/UL (ref 4–5.4)
SODIUM SERPL-SCNC: 138 MMOL/L (ref 136–145)
SODIUM SERPL-SCNC: 138 MMOL/L (ref 136–145)
WBC # BLD AUTO: 8.38 K/UL (ref 3.9–12.7)

## 2024-10-14 PROCEDURE — 27000221 HC OXYGEN, UP TO 24 HOURS

## 2024-10-14 PROCEDURE — 83735 ASSAY OF MAGNESIUM: CPT | Performed by: HOSPITALIST

## 2024-10-14 PROCEDURE — 85025 COMPLETE CBC W/AUTO DIFF WBC: CPT | Performed by: HOSPITALIST

## 2024-10-14 PROCEDURE — 99900031 HC PATIENT EDUCATION (STAT)

## 2024-10-14 PROCEDURE — 97116 GAIT TRAINING THERAPY: CPT

## 2024-10-14 PROCEDURE — 94761 N-INVAS EAR/PLS OXIMETRY MLT: CPT

## 2024-10-14 PROCEDURE — 80053 COMPREHEN METABOLIC PANEL: CPT | Performed by: HOSPITALIST

## 2024-10-14 PROCEDURE — 25000003 PHARM REV CODE 250: Performed by: HOSPITALIST

## 2024-10-14 PROCEDURE — 63600175 PHARM REV CODE 636 W HCPCS: Performed by: INTERNAL MEDICINE

## 2024-10-14 PROCEDURE — 99233 SBSQ HOSP IP/OBS HIGH 50: CPT | Mod: ,,, | Performed by: INTERNAL MEDICINE

## 2024-10-14 PROCEDURE — 36415 COLL VENOUS BLD VENIPUNCTURE: CPT | Performed by: HOSPITALIST

## 2024-10-14 RX ORDER — HYDRALAZINE HYDROCHLORIDE 50 MG/1
50 TABLET, FILM COATED ORAL EVERY 8 HOURS
Qty: 90 TABLET | Refills: 0 | Status: SHIPPED | OUTPATIENT
Start: 2024-10-14 | End: 2024-11-13

## 2024-10-14 RX ORDER — FUROSEMIDE 40 MG/1
40 TABLET ORAL DAILY
Qty: 30 TABLET | Refills: 0 | Status: SHIPPED | OUTPATIENT
Start: 2024-10-14 | End: 2024-11-13

## 2024-10-14 RX ADMIN — ASPIRIN 81 MG: 81 TABLET, COATED ORAL at 09:10

## 2024-10-14 RX ADMIN — FUROSEMIDE 40 MG: 10 INJECTION, SOLUTION INTRAMUSCULAR; INTRAVENOUS at 09:10

## 2024-10-14 RX ADMIN — LEVOTHYROXINE SODIUM 100 MCG: 100 TABLET ORAL at 05:10

## 2024-10-14 NOTE — PROGRESS NOTES
Critical access hospital  Department of Cardiology  Progress Note    PATIENT NAME: Andressa Benedict  MRN: [id number]  TODAY'S DATE: 10/14/2024  ADMIT DATE: 10/11/2024    SUBJECTIVE     PRINCIPLE PROBLEM: Acute on chronic diastolic heart failure    INTERVAL HISTORY:      10/14/2024  Resting in bed. 2L NC.  Still reports decreased appetite.  Euvolemic. Renal function improving.       10/13/24  She is feeling better.  Her appetite has improved.  She feels that the Entresto was making her sick.  She feels that she will be ready to go home by tomorrow.    10/12/2024  Today she feels much better.  She denies any chest pains.  Her appetite has improved.    10/11/24  HPI:      89 yo female with history of AFib, CKD, hyperlipidemia, diastolic HF and thyroid disease presenting with generalized weakness, decreased oral intake, shortness of breath, and bilateral lower extremity swelling. Hypertensive in ED and intermittent bradycardia with HR as low as 30s.  BNP 4700.  Bigeminy noted on EKG.  Pulmonary edema on CXR.  Patient was given IV lasix in ED and admitted for HF exacerbation. ALTON/CKD.      IN ED: H&H 12/37, K 5.0, Cr 2.1, Mag 1.9; BNP 4700, Troponin HS 60 >90 > 67.8.        Review of patient's allergies indicates:   Allergen Reactions    Penicillins Anaphylaxis       REVIEW OF SYSTEMS  CARDIOVASCULAR: No recent chest pain, palpitations, arm, neck, or jaw pain  RESPIRATORY: No recent fever, cough chills, SOB or congestion  : No blood in the urine  GI: No Nausea, vomiting, constipation, diarrhea, blood, or reflux.  MUSCULOSKELETAL: No myalgias  NEURO: No lightheadedness or dizziness  EYES: No Double vision, blurry, vision or headache     OBJECTIVE     VITAL SIGNS (Most Recent)  Temp: 99 °F (37.2 °C) (10/14/24 0741)  Pulse: 74 (10/14/24 0741)  Resp: 18 (10/14/24 0741)  BP: (!) 99/51 (10/14/24 0741)  SpO2: 98 % (10/14/24 0806)    VENTILATION STATUS  Resp: 18 (10/14/24 0741)  SpO2: 98 % (10/14/24 0806)           I & O  (Last 24H):  Intake/Output Summary (Last 24 hours) at 10/14/2024 1341  Last data filed at 10/14/2024 0903  Gross per 24 hour   Intake 290 ml   Output 1000 ml   Net -710 ml       WEIGHTS  Wt Readings from Last 1 Encounters:   10/11/24 1842 69.8 kg (153 lb 14.1 oz)   10/11/24 1053 68 kg (150 lb)       PHYSICAL EXAM  CONSTITUTIONAL: Well built, well nourished in no apparent distress  NECK: no carotid bruit, no JVD  LUNGS: CTA  CHEST WALL: no tenderness  HEART: regular rate and rhythm, grade 2/6 systolic murmur at the base  ABDOMEN: soft, non-tender; bowel sounds normal; no masses,  no organomegaly  EXTREMITIES: Extremities normal, no edema  NEURO: AAO X 3    SCHEDULED MEDS:   amLODIPine  5 mg Oral Daily    aspirin  81 mg Oral Daily    enoxparin  30 mg Subcutaneous Q24H (prophylaxis, 1700)    furosemide (LASIX) injection  40 mg Intravenous Q12H    hydrALAZINE  50 mg Oral Q8H    levothyroxine  100 mcg Oral Before breakfast       CONTINUOUS INFUSIONS:    PRN MEDS:  Current Facility-Administered Medications:     acetaminophen, 650 mg, Oral, Q8H PRN    acetaminophen, 650 mg, Oral, Q4H PRN    aluminum-magnesium hydroxide-simethicone, 30 mL, Oral, QID PRN    atropine, 1 mg, Intravenous, PRN    dextrose 50%, 12.5 g, Intravenous, PRN    dextrose 50%, 25 g, Intravenous, PRN    glucagon (human recombinant), 1 mg, Intramuscular, PRN    glucose, 16 g, Oral, PRN    glucose, 24 g, Oral, PRN    HYDROcodone-acetaminophen, 1 tablet, Oral, Q6H PRN    magnesium oxide, 800 mg, Oral, PRN    magnesium oxide, 800 mg, Oral, PRN    melatonin, 6 mg, Oral, Nightly PRN    naloxone, 0.02 mg, Intravenous, PRN    ondansetron, 4 mg, Intravenous, Q6H PRN    potassium bicarbonate, 35 mEq, Oral, PRN    potassium bicarbonate, 50 mEq, Oral, PRN    potassium bicarbonate, 60 mEq, Oral, PRN    potassium, sodium phosphates, 2 packet, Oral, PRN    potassium, sodium phosphates, 2 packet, Oral, PRN    potassium, sodium phosphates, 2 packet, Oral, PRN     "senna-docusate 8.6-50 mg, 1 tablet, Oral, BID PRN    sodium chloride 0.9%, 10 mL, Intravenous, PRN    sodium chloride 0.9%, 2 mL, Intravenous, Q12H PRN    LABS AND DIAGNOSTICS     CBC LAST 3 DAYS  Recent Labs   Lab 10/11/24  1151 10/13/24  0627 10/14/24  0516   WBC 9.50 8.43 8.38   RBC 3.62* 3.55* 3.73*   HGB 12.4 12.2 12.7   HCT 37.6 38.4 39.1   * 108* 105*   MCH 34.3* 34.4* 34.0*   MCHC 33.0 31.8* 32.5   RDW 14.3 14.2 14.3    198 227   MPV 10.9 10.7 10.8   GRAN 73.6*  7.0 62.9  5.3 78.8*  6.6   LYMPH 14.6*  1.4 18.5  1.6 8.1*  0.7*   MONO 10.4  1.0 13.2  1.1* 10.7  0.9   BASO 0.05 0.06 0.03   NRBC 0 0 0       COAGULATION LAST 3 DAYS  No results for input(s): "LABPT", "INR", "APTT" in the last 168 hours.    CHEMISTRY LAST 3 DAYS  Recent Labs   Lab 10/11/24  1151 10/13/24  0627 10/14/24  0516   * 138  138 138  138   K 5.0 4.4  4.4 4.1  4.1    101  101 96  96   CO2 18* 29  29 34*  34*   ANIONGAP 15 8  8 8  8   BUN 46* 53*  53* 44*  44*   CREATININE 2.1* 2.0*  2.0* 1.6*  1.6*   * 114*  114* 99  99   CALCIUM 10.0 9.4  9.4 9.7  9.7   MG 1.9 1.6 1.9   ALBUMIN 4.2 3.6 3.7   BILITOT 1.4* 0.5 0.6   PROT 7.9 7.0 6.8   ALKPHOS 70 72 60   ALT 31 35 32   AST 38 30 23       CARDIAC PROFILE LAST 3 DAYS  Recent Labs   Lab 10/11/24  1151 10/11/24  1536 10/11/24  2030   BNP >4,700*  --   --    TROPONINIHS 67.8* 80.3* 67.3*       ENDOCRINE LAST 3 DAYS  Recent Labs   Lab 10/11/24  1151   TSH 2.377       LAST ARTERIAL BLOOD GAS  ABG  No results for input(s): "PH", "PO2", "PCO2", "HCO3", "BE" in the last 168 hours.    LAST 7 DAYS MICROBIOLOGY   Microbiology Results (last 7 days)       Procedure Component Value Units Date/Time    Urine culture [[id number]] Collected: 10/11/24 1444    Order Status: Completed Specimen: Urine Updated: 10/12/24 0706     Urine Culture, Routine Multiple organisms isolated. None in predominance.  Repeat if      clinically necessary.    Narrative:      " Specimen Source->Urine            MOST RECENT IMAGING  US Soft Tissue Head Neck  Narrative: EXAMINATION:  US SOFT TISSUE HEAD NECK    CLINICAL HISTORY:  mass to L neck; Localized swelling, mass and lump, neck    TECHNIQUE:  Grayscale and color Doppler ultrasound of the neck soft tissues.    COMPARISON:  None.    FINDINGS:  In the left neck soft tissues region of interest there is a heterogeneous hypoechoic structure with punctate echogenicities measuring 2.6 x 1.2 x 2.4 cm.  It demonstrates posterior acoustic enhancement.  It abuts the internal jugular vein.  It is not hypervascular with color Doppler imaging.  Impression: Heterogeneous hypoechoic structure of the left neck with posterior acoustic enhancement is felt to reflect a complex cystic lesion.  This lesion abuts the internal jugular vein.    Electronically signed by: Olaf Nelson  Date:    10/11/2024  Time:    16:48  X-Ray Chest AP Portable  Narrative: EXAMINATION:  XR CHEST AP PORTABLE    CLINICAL HISTORY:  Shortness of breath    FINDINGS:  Portable chest with comparison chest x-ray 09/28/2024.  Unchanged enlarged cardiomediastinal silhouette.  Prominent size the central hilar vascular structures again noted.  There is mild perihilar interstitial thickening.No definite confluent airspace opacities.  No acute osseous abnormality.  Impression: 1. Enlarged cardiomediastinal silhouette and prominent central hilar vascular structures may reflect pulmonary vascular congestion.  2. Mild bilateral perihilar interstitial thickening could reflect mild pulmonary edema in the proper clinical setting.    Electronically signed by: Olaf Nelson  Date:    10/11/2024  Time:    11:43      [facility]  Results for orders placed during the hospital encounter of 08/21/23    Echo    Interpretation Summary    Left Ventricle: The left ventricle is mildly dilated. Mildly increased ventricular mass. Mildly increased wall thickness. There is mild concentric hypertrophy. Normal wall  motion. Septal flattening in systole consistent with right ventricular pressure overload. There is low normal systolic function. EF 50% with patient in sinus rhythm and heart rate of 52 There is diastolic dysfunction but grade cannot be determined. Elevated left ventricular filling pressure.    Right Ventricle: Normal right ventricular cavity size. Wall thickness is normal. Right ventricle wall motion  is normal. Systolic function is normal.    Mitral Valve: There is no stenosis.    Tricuspid Valve: There is mild to moderate regurgitation.    Pulmonary Artery: There is mild pulmonary hypertension. The estimated pulmonary artery systolic pressure is 58 mmHg.    IVC/SVC: Elevated venous pressure at 15 mmHg.    Pericardium: There is a small posterior effusion. Pericardial effusion is echolucent. No indication of cardiac tamponade.    Mean left atrial pressure slightly increased at 12 mm Hg      CURRENT/PREVIOUS VISIT EKG  Results for orders placed or performed during the hospital encounter of 10/11/24   EKG 12-lead    Collection Time: 10/11/24 11:04 AM   Result Value Ref Range    QRS Duration 146 ms    [facility] QTC Calculation 514 ms    Narrative    Test Reason : I50.9,    Vent. Rate : 072 BPM     Atrial Rate : 072 BPM     P-R Int : 166 ms          QRS Dur : 146 ms      QT Int : 470 ms       P-R-T Axes : 048 060 027 degrees     QTc Int : 514 ms    Sinus rhythm with frequent and consecutive Premature ventricular complexes   in a pattern of bigeminy  Possible Left atrial enlargement  Right bundle branch block  Abnormal ECG  When compared with ECG of 28-SEP-2024 19:26,  Premature ventricular complexes are now Present    Referred By: [email or web address]   SELF           Confirmed By:        ASSESSMENT/PLAN:     Active Hospital Problems    Diagnosis    *Acute on chronic diastolic heart failure    Acute renal failure    Hypertension    Paroxysmal A-fib    Elevated troponin       ASSESSMENT & PLAN:   Acute on chronic HFpEF- EF 50% did not  do well on Entresto  Bradycardia improved off beta-blockers  Hypertension  ALTON/CKD improving  PAF   Elevated troponin HS      RECOMMENDATIONS:  Follow renal function and electrolytes  Continue amlodipine 5 mg daily.  Continue hydralazine 50 mg q 8 hrs.  Transition to PO lasix.  2g salt restriction. 1.5 L fluid restriction. Daily weights.   Continue to hold Entresto, spironolactone and beta-blocker.  No further recommendations from cardiac standpoint.  We will sign off.  Patient to follow up outpatient in next few weeks.      Luz Maria Hamilton NP  Cardiology  Date of Service: 10/14/2024

## 2024-10-14 NOTE — ASSESSMENT & PLAN NOTE
Patients blood pressure range in the last 24 hours was: BP  Min: 99/51  Max: 187/74.  Resume entresto  PO Lasix   Hold metropolol given bradycardia  Increased hydralazine 50 mg q8h. New medication this admission

## 2024-10-14 NOTE — PLAN OF CARE
SW sent HH ref to pt's previous HH ([facility]) via [employer].      10/14/24 5866   Post-Acute Status   Post-Acute Authorization Home Health   Home Health Status Referrals Sent   Discharge Plan   Discharge Plan A Home Health

## 2024-10-14 NOTE — ASSESSMENT & PLAN NOTE
Acute on chronic HFpEF  Echo from Aug 2023 shows EF 50%  - started PO Lasix 40 mg daily on discharge   - Resume entresto  - Stop Metoprolol given bradycardia

## 2024-10-14 NOTE — PT/OT/SLP PROGRESS
Physical Therapy Treatment    Patient Name:  Andressa Benedict   MRN:  [id number]    Recommendations:     Discharge Recommendations: Low Intensity Therapy  Discharge Equipment Recommendations: pediatric walker  Barriers to discharge:  medical status    Assessment:     Andressa Benedict is a 89 y.o. female admitted with a medical diagnosis of Acute on chronic diastolic heart failure.  She presents with the following impairments/functional limitations: impaired functional mobility, gait instability, impaired balance, impaired cardiopulmonary response to activity.    The mobility limitation cannot be sufficiently resolved by the use of a cane. The patient's functional mobility deficit can be sufficiently resolved with the use of a pediatric rolling walker. The patient's mobility limitation significantly impairs their ability to participate in one or more activities of daily living. The use of a pediatric rolling walker will significantly improve the patient's ability to participate in mobility-related activities of daily living and the patient will use it on a regular basis in the home.      Pt found in bed with Hob elevated. Pt agreeable to visit. Pt requires supervision with bed mobility and transfers to RW. Pt ambulated 100 ft with RW and CGA with mod VI for proximity to RW due to heigh of standard RW too tall. Recommending pediatric RW for improved RW management at baseline.     Rehab Prognosis: Fair; patient would benefit from acute skilled PT services to address these deficits and reach maximum level of function.    Recent Surgery: * No surgery found *      Plan:     During this hospitalization, patient to be seen 5 x/week to address the identified rehab impairments via gait training, therapeutic activities, therapeutic exercises and progress toward the following goals:    Plan of Care Expires:  11/14/24    Subjective     Chief Complaint: none stated  Patient/Family Comments/goals: go home  Pain/Comfort:  Pain  Rating 1: 0/10      Objective:     Communicated with RN prior to session.  Patient found HOB elevated with peripheral IV, telemetry upon PT entry to room.     General Precautions: Standard, fall  Orthopedic Precautions: N/A  Braces: N/A  Respiratory Status: Room air     Functional Mobility:  Bed Mobility:     Supine to Sit: supervision  Transfers:     Sit to Stand:  supervision with rolling walker  Gait: 100 ft with RW and CGA mod VI RW management      AM-PAC 6 CLICK MOBILITY          Treatment & Education:  Pt educated on POC, discharge recommendation, importance of time OOB, DME needs, need for assist with mobility, use of call bell to seek assistance as needed and fall prevention      Patient left up in chair with all lines intact, call button in reach, and chair alarm on..    GOALS:   Multidisciplinary Problems       Physical Therapy Goals          Problem: Physical Therapy    Goal Priority Disciplines Outcome Interventions   Physical Therapy Goal     PT, PT/OT     Description: Goals to be met by: 24     Patient will increase functional independence with mobility by performin. Supine to sit with Supervision  2. Sit to stand transfer with Supervision  3. Bed to chair transfer with Supervision using Rollator  4. Gait  x 150 feet with Supervision using Rollator                              Time Tracking:     PT Received On: 10/14/24  PT Start Time: 924     PT Stop Time: 938  PT Total Time (min): 14 min     Billable Minutes: Gait Training 14    Treatment Type: Treatment  PT/PTA: PT     Number of PTA visits since last PT visit: 0     10/14/2024

## 2024-10-14 NOTE — DISCHARGE SUMMARY
[facility]  Discharge Summary      Patient Name: Andressa Benedict  MRN: [id number]  ELIJAH: [id number]  Patient Class: IP- Inpatient  Admission Date: 10/11/2024  Hospital Length of Stay: 3 days  Discharge Date and Time:  10/14/2024 10:25 AM  Attending Physician: Mazin Blanchard MD   Discharging Provider: Mazin Blanchard MD  Primary Care Provider: Aureliano Morocho MD    Primary Care Team: Networked reference to record PCT     HPI:   89 yo female with history of AFib, CKD, hyperlipidemia, diastolic HF and thyroid disease presenting with generalized weakness, decreased oral intake, shortness of breath, and bilateral lower extremity swelling.  Patient can not recall how long she had the symptoms for.  Patient is hard of hearing, so interview is very difficult.    In the ER, vitals showed a blood pressure of 187/99, heart rate of 76, respiratory rate of 16, afebrile satting 92% on room air. CBC is wnl. CMP showed sodium of 135, creatinine of 2.1, compared to baseline of 1.5.  BNP is elevated at more than 4700, 1st troponin is 67.  TSH is within normal limits.  EKG showed sinus rhythm with premature ventricular complexes in a pattern of bigeminal.  Chest x-ray showed enlarged cardiomediastinal silhouette and prominent central hilar vascular structures which may reflect pulmonary vascular congestion. Mild bilateral perihilar interstitial thickening could reflect mild pulmonary edema in the proper clinical setting.  Patient was given 40 mg of Lasix IV, and will be admitted to Medicine for heart failure exacerbation.      * No surgery found *      Hospital Course:   Patient is a 89 year old female admitted with progressively worsening SOB and weakness. Patient was treated for acute on chronic HFpEF, ALTON on CKD and bradycardia. Home metoprolol was held. Patient was started on Meropenum on admission for positive UA. However she does not have any dysuria. Her urine culture  repeatedly including this time has grown mixed growth. Meropenum was discontinued. Patient was treated with IV Lasix. PT, OT was consulted. Recommended home health. Patient was diuresed well. Cr improved to baseline. Aldactone stopped and patient was started on 40 mg of PO Lasix daily. Entresto resumed. Metoprolol stopped due to bradycardia.      Goals of Care Treatment Preferences:  Code Status: Full Code    Physical Exam  Vitals reviewed.   Constitutional:       Appearance: Normal appearance.   HENT:      Head: Normocephalic and atraumatic.      Mouth/Throat:      Mouth: Mucous membranes are moist.   Eyes:      Extraocular Movements: Extraocular movements intact.      Conjunctiva/sclera: Conjunctivae normal.      Pupils: Pupils are equal, round, and reactive to light.   Cardiovascular:      Rate and Rhythm: Regular rhythm.   Pulmonary:      Effort: Pulmonary effort is normal.      Breath sounds: Normal breath sounds.   Abdominal:      General: Abdomen is flat. Bowel sounds are normal. There is no distension.      Palpations: Abdomen is soft.      Tenderness: There is no abdominal tenderness.   Musculoskeletal:      Cervical back: Normal range of motion and neck supple.  Skin:     General: Skin is warm.   Neurological:      Mental Status: She is alert and oriented to person, place, and time.   Psychiatric:         Mood and Affect: Mood normal.         Behavior: Behavior normal.     SDOH Screening:  The patient was screened for utility difficulties, food insecurity, transport difficulties, housing insecurity, and interpersonal safety and there were no concerns identified this admission.     Consults:   Consults (From admission, onward)          Status Ordering Provider     Inpatient consult to Social Work/Case Management  Once        Provider:  (Not yet assigned)    Completed DOMINIK GAMINO     Inpatient consult to Registered Dietitian/Nutritionist  Once        Provider:  (Not yet assigned)    Completed DOMINIK GAMINO  S.     Inpatient consult to Cardiology  Once        Provider:  Jim Donohue MD    Completed DOMINIK GAMINO            Cardiac/Vascular  * Acute on chronic diastolic heart failure  Acute on chronic HFpEF  Echo from Aug 2023 shows EF 50%  - started PO Lasix 40 mg daily on discharge   - Resume entresto  - Stop Metoprolol given bradycardia         Hypertension  Patients blood pressure range in the last 24 hours was: BP  Min: 99/51  Max: 187/74.  Resume entresto  PO Lasix   Hold metropolol given bradycardia  Increased hydralazine 50 mg q8h. New medication this admission     Paroxysmal A-fib  Bradycardia  Bradycardia resolved  Patient is asymptomatic from bradycardia   Holding metoprolol, Patient only takes 12.5 at home   Pt is not on AC    Elevated troponin  Demand ischemia   Troponin stable     Renal/  Acute renal failure  ALTON on CKD   Suspect cardio renal.  Baseline Cr 1.5, 2.1 on admission     Most recent creatinine and eGFR are listed below.  Recent Labs     10/11/24  1151 10/13/24  0627 10/14/24  0516   CREATININE 2.1* 2.0*  2.0* 1.6*  1.6*   EGFRNORACEVR 22.0* 23.3*  23.3* 30.4*  30.4*     Cr improved with diuresis  Holding Aldactone as patient started on Lasix  resume entresto for now         Final Active Diagnoses:    Diagnosis Date Noted POA    PRINCIPAL PROBLEM:  Acute on chronic diastolic heart failure [I50.33] 10/11/2024 Yes    Acute renal failure [N17.9] 10/11/2024 Yes    Hypertension [I10] 10/11/2024 Yes    Paroxysmal A-fib [I48.0] 09/29/2024 Yes    Elevated troponin [R79.89] 09/29/2024 Yes      Problems Resolved During this Admission:    Diagnosis Date Noted Date Resolved POA    Bradycardia [R00.1] 10/11/2024 10/12/2024 Yes     Patient need a walker  1. The patient has mobility limitation that significantly impairs her abiity to participate in one or more mobility-related activities of daily living (MRADL) in the home; and prevents the beneficiary from completing the MRADL within a reasonable time  frame;   2. The beneficiary is able to safely use the walker; and we have ruled out the use of a cane.   3. The functional mobility deficit can be sufficiently resolved with the use of a walker.     Discharged Condition: good    Disposition: Home-Health Care [facility]    Follow Up:   Follow-up Information       Aureliano Morocho MD. Go on 10/15/2024.    Specialties: Interventional Cardiology, Cardiovascular Disease, Cardiology  Why: hospital f/u 10/15/2024 @ 9:20 AM  Contact information:  [address] [place] [address]  [address]  CARDIOLOGY [facility] [address]  [phone]               Aureliano Morocho MD Follow up in 1 week(s).    Specialties: Interventional Cardiology, Cardiovascular Disease, Cardiology  Contact information:  [address] [place] [address] [address]  [facility] [address]  [phone]                           Patient Instructions:      Ambulatory referral/consult to Home Health   Standing Status: Future   Referral Priority: Routine Referral Type: Home Health   Referral Reason: Specialty Services Required   Requested Specialty: Home Health Services   Number of Visits Requested: 1       Significant Diagnostic Studies: Labs: CMP   Recent Labs   Lab 10/13/24  0627 10/14/24  0516     138 138  138   K 4.4  4.4 4.1  4.1     101 96  96   CO2 29  29 34*  34*   *  114* 99  99   BUN 53*  53* 44*  44*   CREATININE 2.0*  2.0* 1.6*  1.6*   CALCIUM 9.4  9.4 9.7  9.7   PROT 7.0 6.8   ALBUMIN 3.6 3.7   BILITOT 0.5 0.6   ALKPHOS 72 60   AST 30 23   ALT 35 32   ANIONGAP 8  8 8  8    and CBC   Recent Labs   Lab 10/13/24  0627 10/14/24  0516   WBC 8.43 8.38   HGB 12.2 12.7   HCT 38.4 39.1    227       Pending Diagnostic Studies:       None           Medications:  Reconciled Home Medications:      Medication List        START taking these medications      furosemide 40 MG tablet  Commonly known as: LASIX  Take 1 tablet (40 mg total) by mouth once daily.     hydrALAZINE 50  MG tablet  Commonly known as: APRESOLINE  Take 1 tablet (50 mg total) by mouth every 8 (eight) hours.            CHANGE how you take these medications      ENTRESTO 24-26 mg per tablet  Generic drug: sacubitriL-valsartan  TAKE 1 TABLET BY MOUTH TWICE DAILY  What changed:   when to take this  reasons to take this     levothyroxine 100 MCG tablet  Commonly known as: SYNTHROID  TAKE 1 TABLET BY MOUTH EVERY DAY  What changed: when to take this            CONTINUE taking these medications      aspirin 81 MG EC tablet  Commonly known as: ECOTRIN  Take 81 mg by mouth once daily.     betaxolol 0.5% 0.5 % Drop  Commonly known as: BETOPTIC-S  Place 1 drop into both eyes 2 (two) times daily.     CO Q-10 100 mg capsule  Generic drug: coenzyme Q10  Take 100 mg by mouth once daily.     FISH OIL ORAL  Take 1 capsule by mouth Daily.     FLAXSEED OIL ORAL  Take 1 capsule by mouth Daily.     GLUCOS-CHOND-MSM (WITH ANTIOX) ORAL  Take 1 capsule by mouth Daily.     multivitamin per tablet  Commonly known as: THERAGRAN  Take 1 tablet by mouth once daily.     VITAMIN C 250 mg Chew  Generic drug: ascorbic acid (vitamin C)  Take 1 tablet by mouth Daily.     VITAMIN D ORAL  Take 1 tablet by mouth Daily.            STOP taking these medications      metoprolol succinate 25 MG 24 hr tablet  Commonly known as: TOPROL-XL     spironolactone 25 MG tablet  Commonly known as: ALDACTONE              Indwelling Lines/Drains at time of discharge:   Lines/Drains/Airways       None                   Time spent on the discharge of patient: 40 minutes         Mazin Blanchard MD  Department of Hospital Medicine  [facility]

## 2024-10-14 NOTE — PLAN OF CARE
Problem: Adult Inpatient Plan of Care  Goal: Plan of Care Review  Outcome: Met  Goal: Patient-Specific Goal (Individualized)  Outcome: Met  Goal: Absence of Hospital-Acquired Illness or Injury  Outcome: Met  Goal: Optimal Comfort and Wellbeing  Outcome: Met  Goal: Readiness for Transition of Care  Outcome: Met     Problem: Acute Kidney Injury/Impairment  Goal: Fluid and Electrolyte Balance  Outcome: Met  Goal: Improved Oral Intake  Outcome: Met  Goal: Effective Renal Function  Outcome: Met     Problem: Fall Injury Risk  Goal: Absence of Fall and Fall-Related Injury  Outcome: Met     Problem: Skin Injury Risk Increased  Goal: Skin Health and Integrity  Outcome: Met     Problem: Oral Intake Inadequate  Goal: Improved Oral Intake  Outcome: Met     Problem: Occupational Therapy  Goal: Occupational Therapy Goal  Description:     Goals to be met by: 2024      Patient will increase functional independence with ADLs by performing:     UE Dressing with Modified [place].  LE Dressing with Modified [place].  Grooming while seated at sink with [place].  Toileting from toilet with Modified [place] for hygiene and clothing management.   Toilet transfer to toilet with Modified [place].        Outcome: Met     Problem: Physical Therapy  Goal: Physical Therapy Goal  Description: Goals to be met by: 24     Patient will increase functional independence with mobility by performin. Supine to sit with Supervision  2. Sit to stand transfer with Supervision  3. Bed to chair transfer with Supervision using Rollator  4. Gait  x 150 feet with Supervision using Rollator         Outcome: Met

## 2024-10-14 NOTE — ASSESSMENT & PLAN NOTE
Bradycardia  Bradycardia resolved  Patient is asymptomatic from bradycardia   Holding metoprolol, Patient only takes 12.5 at home   Pt is not on AC

## 2024-10-14 NOTE — PT/OT/SLP PROGRESS
Occupational Therapy      Patient Name:  Andressa Benedict   MRN:  [id number]    Patient not seen today secondary to patient pulling out IV upon OT arrival in the AM and pt prepping for d/c in PM . Will follow-up next service date.    10/14/2024

## 2024-10-14 NOTE — NURSING
Discharge instructions given to patient. Patient verbalized understanding of medication changes and follow ups. Spoke with son, Adarsh who will be here to pick patient up at approximately 1245. PIV removed without difficulty, catheter tip intact. Tele removed and returned to monitor room. Patient to discharge home via private vehicle.

## 2024-10-14 NOTE — PLAN OF CARE
Spoke with Adarsh Holguin (Son)- [phone], regarding discharge IMM, Understands statement , and IMM will be scanned to chart.

## 2024-10-14 NOTE — PLAN OF CARE
Hospital f/u scheduled with PCP and details added to pt's AVS.     10/14/24 0943   Post-Acute Status   Hospital Resources/Appts/Education Provided Appointments scheduled and added to AVS

## 2024-10-14 NOTE — ASSESSMENT & PLAN NOTE
ALTON on CKD   Suspect cardio renal.  Baseline Cr 1.5, 2.1 on admission     Most recent creatinine and eGFR are listed below.  Recent Labs     10/11/24  1151 10/13/24  0627 10/14/24  0516   CREATININE 2.1* 2.0*  2.0* 1.6*  1.6*   EGFRNORACEVR 22.0* 23.3*  23.3* 30.4*  30.4*     Cr improved with diuresis  Holding Aldactone as patient started on Lasix  resume entresto for now

## 2024-10-14 NOTE — CARE UPDATE
10/13/24 2028   Patient Assessment/Suction   Level of Consciousness (AVPU) alert   Respiratory Effort Normal;Unlabored   Expansion/Accessory Muscles/Retractions no use of accessory muscles;no retractions   All Lung Fields Breath Sounds diminished   Cough Frequency infrequent   PRE-TX-O2   Device (Oxygen Therapy) nasal cannula   Flow (L/min) (Oxygen Therapy) 1   SpO2 96 %   Pulse Oximetry Type Intermittent   Pulse 67   Resp 18

## 2024-10-14 NOTE — PLAN OF CARE
Charts and orders reviewed. [facility] 10[facility]15/2024).  called Pt's PCP office to schedule Pt follow-up appointment; appointment scheduled (10/15/2024 @ 9:20 AM) and PCP details added to AVS. Pt has no other needs to be addressed by case management. Pt cleared to discharge from case management standpoint.     Pt's son to transport pt home [facility].  SW has sent referral and orders to [employer] for Pediatric Walker. Per [employer] they will deliver walker to pt's house.     10/14/24 1122   Final Note   Assessment Type Final Discharge Note   Anticipated Discharge Disposition Home-Health   What phone number can be called within the next 1-3 days to see how you are doing after discharge? [phone]   Hospital Resources/Appts/Education Provided Appointments scheduled and added to AVS;Post-Acute resouces added to AVS   Post-Acute Status   Post-Acute Authorization Home Health;HME   HME Status (!) Pending Delivery  ([place])   Home Health Status Set-up Complete/Auth obtained   Coverage [employer] MANAGED MEDICARE - [employer] MEDICARE PLAN PPO   Patient choice form signed by patient/caregiver List with quality metrics by geographic area provided   Discharge Delays None known at this time

## 2024-10-18 DIAGNOSIS — E83.42 HYPOMAGNESEMIA: Primary | ICD-10-CM

## 2024-10-18 DIAGNOSIS — I10 ESSENTIAL HYPERTENSION: ICD-10-CM

## 2024-10-18 RX ORDER — LEVOTHYROXINE SODIUM 100 UG/1
100 TABLET ORAL
Qty: 90 TABLET | Refills: 1 | Status: SHIPPED | OUTPATIENT
Start: 2024-10-18

## 2024-11-04 ENCOUNTER — LAB VISIT (OUTPATIENT)
Dept: LAB | Facility: HOSPITAL | Age: 89
End: 2024-11-04
Attending: INTERNAL MEDICINE
Payer: MEDICARE

## 2024-11-04 DIAGNOSIS — N18.32 STAGE 3B CHRONIC KIDNEY DISEASE: ICD-10-CM

## 2024-11-04 DIAGNOSIS — I50.9 CONGESTIVE HEART FAILURE, UNSPECIFIED HF CHRONICITY, UNSPECIFIED HEART FAILURE TYPE: ICD-10-CM

## 2024-11-04 DIAGNOSIS — I50.9 ACUTE ON CHRONIC CONGESTIVE HEART FAILURE, UNSPECIFIED HEART FAILURE TYPE: ICD-10-CM

## 2024-11-04 LAB
ANION GAP SERPL CALC-SCNC: 8 MMOL/L (ref 8–16)
BNP SERPL-MCNC: 694 PG/ML (ref 0–99)
BUN SERPL-MCNC: 40 MG/DL (ref 8–23)
CALCIUM SERPL-MCNC: 10 MG/DL (ref 8.7–10.5)
CHLORIDE SERPL-SCNC: 102 MMOL/L (ref 95–110)
CO2 SERPL-SCNC: 28 MMOL/L (ref 23–29)
CREAT SERPL-MCNC: 1.4 MG/DL (ref 0.5–1.4)
ERYTHROCYTE [DISTWIDTH] IN BLOOD BY AUTOMATED COUNT: 13.5 % (ref 11.5–14.5)
EST. GFR  (NO RACE VARIABLE): 35.7 ML/MIN/1.73 M^2
GLUCOSE SERPL-MCNC: 139 MG/DL (ref 70–110)
HCT VFR BLD AUTO: 39 % (ref 37–48.5)
HGB BLD-MCNC: 12.4 G/DL (ref 12–16)
MCH RBC QN AUTO: 32.7 PG (ref 27–31)
MCHC RBC AUTO-ENTMCNC: 31.8 G/DL (ref 32–36)
MCV RBC AUTO: 103 FL (ref 82–98)
PLATELET # BLD AUTO: 232 K/UL (ref 150–450)
PMV BLD AUTO: 10.8 FL (ref 9.2–12.9)
POTASSIUM SERPL-SCNC: 4.3 MMOL/L (ref 3.5–5.1)
RBC # BLD AUTO: 3.79 M/UL (ref 4–5.4)
SODIUM SERPL-SCNC: 138 MMOL/L (ref 136–145)
WBC # BLD AUTO: 7.78 K/UL (ref 3.9–12.7)

## 2024-11-04 PROCEDURE — 85027 COMPLETE CBC AUTOMATED: CPT | Performed by: INTERNAL MEDICINE

## 2024-11-04 PROCEDURE — 83880 ASSAY OF NATRIURETIC PEPTIDE: CPT | Performed by: INTERNAL MEDICINE

## 2024-11-04 PROCEDURE — 80048 BASIC METABOLIC PNL TOTAL CA: CPT | Performed by: INTERNAL MEDICINE

## 2024-11-04 PROCEDURE — 36415 COLL VENOUS BLD VENIPUNCTURE: CPT | Performed by: INTERNAL MEDICINE

## 2024-11-07 ENCOUNTER — OFFICE VISIT (OUTPATIENT)
Dept: CARDIOLOGY | Facility: CLINIC | Age: 89
End: 2024-11-07
Payer: MEDICARE

## 2024-11-07 VITALS
HEIGHT: 60 IN | HEART RATE: 63 BPM | BODY MASS INDEX: 29.54 KG/M2 | OXYGEN SATURATION: 96 % | DIASTOLIC BLOOD PRESSURE: 70 MMHG | WEIGHT: 150.44 LBS | SYSTOLIC BLOOD PRESSURE: 122 MMHG

## 2024-11-07 DIAGNOSIS — Z86.79 HISTORY OF ATRIAL FIBRILLATION: Chronic | ICD-10-CM

## 2024-11-07 DIAGNOSIS — E83.42 HYPOMAGNESEMIA: Primary | ICD-10-CM

## 2024-11-07 DIAGNOSIS — I50.33 ACUTE ON CHRONIC HEART FAILURE WITH PRESERVED EJECTION FRACTION: ICD-10-CM

## 2024-11-07 DIAGNOSIS — I50.9 CONGESTIVE HEART FAILURE, UNSPECIFIED HF CHRONICITY, UNSPECIFIED HEART FAILURE TYPE: ICD-10-CM

## 2024-11-07 DIAGNOSIS — I10 ESSENTIAL HYPERTENSION: Chronic | ICD-10-CM

## 2024-11-07 DIAGNOSIS — N18.32 STAGE 3B CHRONIC KIDNEY DISEASE: ICD-10-CM

## 2024-11-07 DIAGNOSIS — E89.0 POSTABLATIVE HYPOTHYROIDISM: Chronic | ICD-10-CM

## 2024-11-07 PROCEDURE — 99214 OFFICE O/P EST MOD 30 MIN: CPT | Mod: S$GLB,,, | Performed by: INTERNAL MEDICINE

## 2024-11-07 PROCEDURE — 1101F PT FALLS ASSESS-DOCD LE1/YR: CPT | Mod: CPTII,S$GLB,, | Performed by: INTERNAL MEDICINE

## 2024-11-07 PROCEDURE — 1126F AMNT PAIN NOTED NONE PRSNT: CPT | Mod: CPTII,S$GLB,, | Performed by: INTERNAL MEDICINE

## 2024-11-07 PROCEDURE — 1111F DSCHRG MED/CURRENT MED MERGE: CPT | Mod: CPTII,S$GLB,, | Performed by: INTERNAL MEDICINE

## 2024-11-07 PROCEDURE — 3288F FALL RISK ASSESSMENT DOCD: CPT | Mod: CPTII,S$GLB,, | Performed by: INTERNAL MEDICINE

## 2024-11-07 PROCEDURE — 1159F MED LIST DOCD IN RCRD: CPT | Mod: CPTII,S$GLB,, | Performed by: INTERNAL MEDICINE

## 2024-11-07 PROCEDURE — 99999 PR PBB SHADOW E&M-EST. PATIENT-LVL IV: CPT | Mod: PBBFAC,,, | Performed by: INTERNAL MEDICINE

## 2024-11-07 PROCEDURE — 1160F RVW MEDS BY RX/DR IN RCRD: CPT | Mod: CPTII,S$GLB,, | Performed by: INTERNAL MEDICINE

## 2024-11-07 RX ORDER — FUROSEMIDE 40 MG/1
40 TABLET ORAL DAILY
Qty: 90 TABLET | Refills: 3 | Status: SHIPPED | OUTPATIENT
Start: 2024-11-07 | End: 2025-11-02

## 2024-11-07 RX ORDER — HYDRALAZINE HYDROCHLORIDE 50 MG/1
50 TABLET, FILM COATED ORAL EVERY 8 HOURS
Qty: 270 TABLET | Refills: 3 | Status: SHIPPED | OUTPATIENT
Start: 2024-11-07 | End: 2025-11-02

## 2024-11-07 NOTE — PROGRESS NOTES
Patient ID:  Andressa Benedict is a 89 y.o. female who presents for follow-up of Congestive Heart Failure, Atrial Fibrillation, and Results (labs)      (HFpEF) heart failure with preserved ejection fraction  She had acute heart failure.  She has been on spironolactone every other day will increase it on daily basis.     Essential hypertension  Controlled on Entresto, Toprol-XL, spironolactone     History of atrial fibrillation  Controlled on Toprol-XL     CKD (chronic kidney disease), stage III  Stable     Postablative hypothyroidism  On thyroid replacement     She was recently admitted to [facility] with dehydration and deterioration of her renal function.  Her potassium was within normal limits but on the high side.  There was a question if she could tolerate Entresto although she is tolerating it well now.  Currently she is on Lasix 40 mg daily Entresto 04/20/2026 b.i.d. hydralazine 50 mg q.8 and levothyroxine.  She has not been coughing.  Her BNP was 4700 a proximally 3 weeks ago 3 days ago her BNP was 694.  She denies any palpitations in general she feels much better        Past Medical History:   Diagnosis Date    Atrial fibrillation     Bradycardia     Carotid bruit     CKD (chronic kidney disease), stage III     Hyperlipidemia     OA (osteoarthritis)     Thyroid disease         Past Surgical History:   Procedure Laterality Date    HYSTERECTOMY      KNEE SURGERY Right     SHOULDER SURGERY Right           Current Outpatient Medications   Medication Instructions    ascorbic acid, vitamin C, (VITAMIN C) 250 mg Chew 1 tablet, Daily    aspirin (ECOTRIN) 81 mg, Daily    betaxolol 0.5% (BETOPTIC-S) 0.5 % Drop 1 drop, 2 times daily    coenzyme Q10 (CO Q-10) 100 mg, Daily    docosahexaenoic acid/epa (FISH OIL ORAL) 1 capsule, Daily    ENTRESTO 24-26 mg per tablet 1 tablet, Oral, 2 times daily    ergocalciferol, vitamin D2, (VITAMIN D ORAL) 1 tablet, Daily    FLAXSEED OIL ORAL 1 capsule, Daily     furosemide (LASIX) 40 mg, Oral, Daily    glucosam/msm/chond/yef014/hyal (GLUCOS-CHOND-MSM, WITH ANTIOX, ORAL) 1 capsule, Daily    hydrALAZINE (APRESOLINE) 50 mg, Oral, Every 8 hours    levothyroxine (SYNTHROID) 100 mcg, Oral    multivitamin (THERAGRAN) per tablet 1 tablet, Daily        Review of patient's allergies indicates:   Allergen Reactions    Penicillins Anaphylaxis        Review of Systems   Cardiovascular:  Negative for chest pain, dyspnea on exertion and palpitations.   Respiratory:  Negative for cough and shortness of breath.         Objective:     Vitals:    11/07/24 1344   BP: 122/70   BP Location: Left arm   Pulse: 63   SpO2: 96%   Weight: 68.2 kg (150 lb 7.4 oz)   Height: 5' (1.524 m)       Physical Exam  Vitals and nursing note reviewed.   Constitutional:       Appearance: She is well-developed.   HENT:      Head: Normocephalic and atraumatic.   Eyes:      Conjunctiva/sclera: Conjunctivae normal.   Cardiovascular:      Rate and Rhythm: Normal rate and regular rhythm.      Heart sounds: Normal heart sounds.   Pulmonary:      Effort: Pulmonary effort is normal.      Breath sounds: Normal breath sounds.   Abdominal:      General: Bowel sounds are normal.      Palpations: Abdomen is soft.   Musculoskeletal:         General: Normal range of motion.   Skin:     General: Skin is warm and dry.   Neurological:      Mental Status: She is alert and oriented to person, place, and time.   Psychiatric:         Behavior: Behavior normal.         Thought Content: Thought content normal.         Judgment: Judgment normal.       CMP  Sodium   Date Value Ref Range Status   11/04/2024 138 136 - 145 mmol/L Final     Potassium   Date Value Ref Range Status   11/04/2024 4.3 3.5 - 5.1 mmol/L Final     Chloride   Date Value Ref Range Status   11/04/2024 102 95 - 110 mmol/L Final     CO2   Date Value Ref Range Status   11/04/2024 28 23 - 29 mmol/L Final     Glucose   Date Value Ref Range Status   11/04/2024 139 (H) 70 - 110  "mg/dL Final     BUN   Date Value Ref Range Status   11/04/2024 40 (H) 8 - 23 mg/dL Final     Creatinine   Date Value Ref Range Status   11/04/2024 1.4 0.5 - 1.4 mg/dL Final     Calcium   Date Value Ref Range Status   11/04/2024 10.0 8.7 - 10.5 mg/dL Final     Total Protein   Date Value Ref Range Status   10/14/2024 6.8 6.0 - 8.4 g/dL Final     Albumin   Date Value Ref Range Status   10/14/2024 3.7 3.5 - 5.2 g/dL Final     Total Bilirubin   Date Value Ref Range Status   10/14/2024 0.6 0.1 - 1.0 mg/dL Final     Comment:     For infants and newborns, interpretation of results should be based  on gestational age, weight and in agreement with clinical  observations.    Premature Infant recommended reference ranges:  Up to 24 hours.............<8.0 mg/dL  Up to 48 hours............<12.0 mg/dL  3-5 days..................<15.0 mg/dL  6-29 days.................<15.0 mg/dL       Alkaline Phosphatase   Date Value Ref Range Status   10/14/2024 60 55 - 135 U/L Final     AST   Date Value Ref Range Status   10/14/2024 23 10 - 40 U/L Final     ALT   Date Value Ref Range Status   10/14/2024 32 10 - 44 U/L Final     Anion Gap   Date Value Ref Range Status   11/04/2024 8 8 - 16 mmol/L Final     eGFR if non    Date Value Ref Range Status   05/28/2021 30 (L) >59 mL/min/1.73 Final      BMP  Lab Results   Component Value Date     11/04/2024    K 4.3 11/04/2024     11/04/2024    CO2 28 11/04/2024    BUN 40 (H) 11/04/2024    CREATININE 1.4 11/04/2024    CALCIUM 10.0 11/04/2024    ANIONGAP 8 11/04/2024    EGFRNONAA 30 (L) 05/28/2021      BNP  @LABRCNTIP(BNP,BNPTRIAGEBLO)@   Lab Results   Component Value Date    CHOL 152 06/02/2022     Lab Results   Component Value Date    HDL 51 06/02/2022     Lab Results   Component Value Date    LDLCALC 88 06/02/2022     Lab Results   Component Value Date    TRIG 66 06/02/2022     No results found for: "CHOLHDL"   Lab Results   Component Value Date    TSH 2.377 10/11/2024    " FREET4 1.21 08/26/2023     Lab Results   Component Value Date    HGBA1C 5.8 10/11/2024     Lab Results   Component Value Date    WBC 7.78 11/04/2024    HGB 12.4 11/04/2024    HCT 39.0 11/04/2024     (H) 11/04/2024     11/04/2024         Results for orders placed during the hospital encounter of 08/21/23    Echo    Interpretation Summary    Left Ventricle: The left ventricle is mildly dilated. Mildly increased ventricular mass. Mildly increased wall thickness. There is mild concentric hypertrophy. Normal wall motion. Septal flattening in systole consistent with right ventricular pressure overload. There is low normal systolic function. EF 50% with patient in sinus rhythm and heart rate of 52 There is diastolic dysfunction but grade cannot be determined. Elevated left ventricular filling pressure.    Right Ventricle: Normal right ventricular cavity size. Wall thickness is normal. Right ventricle wall motion  is normal. Systolic function is normal.    Mitral Valve: There is no stenosis.    Tricuspid Valve: There is mild to moderate regurgitation.    Pulmonary Artery: There is mild pulmonary hypertension. The estimated pulmonary artery systolic pressure is 58 mmHg.    IVC/SVC: Elevated venous pressure at 15 mmHg.    Pericardium: There is a small posterior effusion. Pericardial effusion is echolucent. No indication of cardiac tamponade.    Mean left atrial pressure slightly increased at 12 mm Hg     No results found for this or any previous visit.     EKG  Results for orders placed or performed during the hospital encounter of 10/11/24   EKG 12-lead    Collection Time: 10/11/24 11:04 AM   Result Value Ref Range    QRS Duration 146 ms    OHS QTC Calculation 514 ms    Narrative    Test Reason : I50.9,    Vent. Rate : 072 BPM     Atrial Rate : 072 BPM     P-R Int : 166 ms          QRS Dur : 146 ms      QT Int : 470 ms       P-R-T Axes : 048 060 027 degrees     QTc Int : 514 ms    Sinus rhythm with frequent and  consecutive Premature ventricular complexes   in a pattern of bigeminy  Possible Left atrial enlargement  Right bundle branch block  Abnormal ECG  When compared with ECG of 28-SEP-2024 19:26,  Premature ventricular complexes are now Present  Confirmed by hSelli VILLARREAL, Geovany TATE ([id number]) on 10/31/2024 11:40:02 AM    Referred By: [email or web address]   SELF           Confirmed By:Geovany Marques MD      Stress  No results found for this or any previous visit.             Assessment:       (HFpEF) heart failure with preserved ejection fraction  She is doing much better on the Lasix 40 mg daily and Entresto 05/20/2026 b.i.d. as well as hydralazine 50 q.8 hours    Essential hypertension  Better controlled on Entresto, Lasix, hydralazine    History of atrial fibrillation  She has remained in normal sinus rhythm    CKD (chronic kidney disease), stage III  Stable    Postablative hypothyroidism  On thyroid replacement       Plan:       She is to continue the Lasix 40 mg daily, Entresto b.i.d. as well as the hydralazine 50 mg q.8 hours and levothyroxine.  She will be seen in the office in 2 months with a BMP see BNP and a CBC.

## 2024-11-11 NOTE — ASSESSMENT & PLAN NOTE
She is doing much better on the Lasix 40 mg daily and Entresto 05/20/2026 b.i.d. as well as hydralazine 50 q.8 hours

## 2024-12-02 ENCOUNTER — DOCUMENT SCAN (OUTPATIENT)
Dept: HOME HEALTH SERVICES | Facility: HOSPITAL | Age: 89
End: 2024-12-02
Payer: MEDICARE

## 2024-12-14 PROCEDURE — G0179 MD RECERTIFICATION HHA PT: HCPCS | Mod: ,,, | Performed by: INTERNAL MEDICINE

## 2025-01-01 ENCOUNTER — EXTERNAL HOME HEALTH (OUTPATIENT)
Dept: HOME HEALTH SERVICES | Facility: HOSPITAL | Age: OVER 89
End: 2025-01-01
Payer: MEDICARE

## 2025-01-01 ENCOUNTER — HOSPITAL ENCOUNTER (INPATIENT)
Facility: HOSPITAL | Age: OVER 89
LOS: 1 days | DRG: 871 | End: 2025-07-06
Attending: EMERGENCY MEDICINE | Admitting: INTERNAL MEDICINE
Payer: MEDICARE

## 2025-01-01 VITALS
DIASTOLIC BLOOD PRESSURE: 47 MMHG | HEART RATE: 76 BPM | BODY MASS INDEX: 31.77 KG/M2 | HEIGHT: 60 IN | RESPIRATION RATE: 10 BRPM | WEIGHT: 161.81 LBS | TEMPERATURE: 97 F | SYSTOLIC BLOOD PRESSURE: 62 MMHG | OXYGEN SATURATION: 62 %

## 2025-01-01 DIAGNOSIS — I50.9 ACUTE CONGESTIVE HEART FAILURE, UNSPECIFIED HEART FAILURE TYPE: Primary | ICD-10-CM

## 2025-01-01 DIAGNOSIS — J96.21 ACUTE ON CHRONIC RESPIRATORY FAILURE WITH HYPOXIA AND HYPERCAPNIA: ICD-10-CM

## 2025-01-01 DIAGNOSIS — Z51.5 HOSPICE CARE: ICD-10-CM

## 2025-01-01 DIAGNOSIS — J96.22 ACUTE ON CHRONIC RESPIRATORY FAILURE WITH HYPOXIA AND HYPERCAPNIA: ICD-10-CM

## 2025-01-01 DIAGNOSIS — N17.9 ACUTE RENAL FAILURE, UNSPECIFIED ACUTE RENAL FAILURE TYPE: ICD-10-CM

## 2025-01-01 DIAGNOSIS — Z13.6 SCREENING FOR CARDIOVASCULAR CONDITION: ICD-10-CM

## 2025-01-01 DIAGNOSIS — R07.9 CHEST PAIN: ICD-10-CM

## 2025-01-01 DIAGNOSIS — E87.5 HYPERKALEMIA: ICD-10-CM

## 2025-01-01 LAB
ABSOLUTE EOSINOPHIL (SMH): 0.03 K/UL
ABSOLUTE MONOCYTE (SMH): 0.85 K/UL (ref 0.3–1)
ABSOLUTE NEUTROPHIL COUNT (SMH): 8.4 K/UL (ref 1.8–7.7)
ALBUMIN SERPL-MCNC: 3.9 G/DL (ref 3.5–5.2)
ALLENS TEST: ABNORMAL
ALP SERPL-CCNC: 48 UNIT/L (ref 55–135)
ALT SERPL-CCNC: 41 UNIT/L (ref 10–44)
ANION GAP (SMH): 16 MMOL/L (ref 8–16)
AST SERPL-CCNC: 42 UNIT/L (ref 10–40)
BACTERIA #/AREA URNS AUTO: ABNORMAL /HPF
BASOPHILS # BLD AUTO: 0.02 K/UL
BASOPHILS NFR BLD AUTO: 0.2 %
BILIRUB SERPL-MCNC: 1.4 MG/DL (ref 0.1–1)
BILIRUB UR QL STRIP.AUTO: NEGATIVE
BNP SERPL-MCNC: 2934 PG/ML
BUN SERPL-MCNC: 147 MG/DL (ref 8–23)
CALCIUM SERPL-MCNC: 10.2 MG/DL (ref 8.7–10.5)
CHLORIDE SERPL-SCNC: 99 MMOL/L (ref 95–110)
CLARITY UR: CLEAR
CO2 SERPL-SCNC: 23 MMOL/L (ref 23–29)
COLOR UR AUTO: YELLOW
CREAT SERPL-MCNC: 6.6 MG/DL (ref 0.5–1.4)
DELSYS: ABNORMAL
ERYTHROCYTE [DISTWIDTH] IN BLOOD BY AUTOMATED COUNT: 16.9 % (ref 11.5–14.5)
FLOW: 15
GFR SERPLBLD CREATININE-BSD FMLA CKD-EPI: 6 ML/MIN/1.73/M2
GLUCOSE SERPL-MCNC: 160 MG/DL (ref 70–110)
GLUCOSE UR QL STRIP: NEGATIVE
HCO3 UR-SCNC: 23.5 MMOL/L (ref 24–28)
HCT VFR BLD AUTO: 47.6 % (ref 37–48.5)
HCV AB SERPL QL IA: REACTIVE
HGB BLD-MCNC: 15.4 GM/DL (ref 12–16)
HGB UR QL STRIP: NEGATIVE
HIV 1+2 AB+HIV1 P24 AG SERPL QL IA: NORMAL
IMM GRANULOCYTES # BLD AUTO: 0.03 K/UL (ref 0–0.04)
IMM GRANULOCYTES NFR BLD AUTO: 0.3 % (ref 0–0.5)
KETONES UR QL STRIP: NEGATIVE
LDH SERPL L TO P-CCNC: 3.04 MMOL/L (ref 0.5–2.2)
LEUKOCYTE ESTERASE UR QL STRIP: ABNORMAL
LIPASE SERPL-CCNC: 27 U/L (ref 4–60)
LYMPHOCYTES # BLD AUTO: 0.66 K/UL (ref 1–4.8)
MCH RBC QN AUTO: 33.5 PG (ref 27–31)
MCHC RBC AUTO-ENTMCNC: 32.4 G/DL (ref 32–36)
MCV RBC AUTO: 104 FL (ref 82–98)
MICROSCOPIC COMMENT: ABNORMAL
MODE: ABNORMAL
NITRITE UR QL STRIP: NEGATIVE
NUCLEATED RBC (/100WBC) (SMH): 0 /100 WBC
OHS QRS DURATION: 168 MS
OHS QTC CALCULATION: 506 MS
PCO2 BLDA: 67 MMHG (ref 35–45)
PH SMN: 7.15 [PH] (ref 7.35–7.45)
PH UR STRIP: 5 [PH]
PLATELET # BLD AUTO: 112 K/UL (ref 150–450)
PMV BLD AUTO: 11 FL (ref 9.2–12.9)
PO2 BLDA: 219 MMHG (ref 80–100)
POC BE: -5 MMOL/L (ref -2–2)
POC SATURATED O2: 100 % (ref 95–100)
POC TCO2: 25 MMOL/L (ref 23–27)
POTASSIUM SERPL-SCNC: 6.2 MMOL/L (ref 3.5–5.1)
PROT SERPL-MCNC: 7.4 GM/DL (ref 6–8.4)
PROT UR QL STRIP: NEGATIVE
RBC # BLD AUTO: 4.6 M/UL (ref 4–5.4)
RBC #/AREA URNS AUTO: 1 /HPF
RELATIVE EOSINOPHIL (SMH): 0.3 % (ref 0–8)
RELATIVE LYMPHOCYTE (SMH): 6.6 % (ref 18–48)
RELATIVE MONOCYTE (SMH): 8.5 % (ref 4–15)
RELATIVE NEUTROPHIL (SMH): 84.1 % (ref 38–73)
SAMPLE: ABNORMAL
SAMPLE: ABNORMAL
SITE: ABNORMAL
SODIUM SERPL-SCNC: 138 MMOL/L (ref 136–145)
SP GR UR STRIP: 1.01
SQUAMOUS #/AREA URNS AUTO: <1 /HPF
TROPONIN HIGH SENSITIVE (SMH): 1424.5 PG/ML
UROBILINOGEN UR STRIP-ACNC: NEGATIVE EU/DL
WBC # BLD AUTO: 10.02 K/UL (ref 3.9–12.7)
WBC #/AREA URNS AUTO: 19 /HPF

## 2025-01-01 PROCEDURE — 83690 ASSAY OF LIPASE: CPT | Performed by: EMERGENCY MEDICINE

## 2025-01-01 PROCEDURE — 11000001 HC ACUTE MED/SURG PRIVATE ROOM

## 2025-01-01 PROCEDURE — 51702 INSERT TEMP BLADDER CATH: CPT

## 2025-01-01 PROCEDURE — 82803 BLOOD GASES ANY COMBINATION: CPT

## 2025-01-01 PROCEDURE — 99900035 HC TECH TIME PER 15 MIN (STAT)

## 2025-01-01 PROCEDURE — 96365 THER/PROPH/DIAG IV INF INIT: CPT

## 2025-01-01 PROCEDURE — 27000221 HC OXYGEN, UP TO 24 HOURS

## 2025-01-01 PROCEDURE — 63600175 PHARM REV CODE 636 W HCPCS: Mod: JZ | Performed by: INTERNAL MEDICINE

## 2025-01-01 PROCEDURE — 63600175 PHARM REV CODE 636 W HCPCS: Performed by: INTERNAL MEDICINE

## 2025-01-01 PROCEDURE — 36600 WITHDRAWAL OF ARTERIAL BLOOD: CPT

## 2025-01-01 PROCEDURE — 87040 BLOOD CULTURE FOR BACTERIA: CPT | Performed by: EMERGENCY MEDICINE

## 2025-01-01 PROCEDURE — 25000003 PHARM REV CODE 250: Performed by: INTERNAL MEDICINE

## 2025-01-01 PROCEDURE — 84484 ASSAY OF TROPONIN QUANT: CPT | Performed by: EMERGENCY MEDICINE

## 2025-01-01 PROCEDURE — 99291 CRITICAL CARE FIRST HOUR: CPT

## 2025-01-01 PROCEDURE — 96361 HYDRATE IV INFUSION ADD-ON: CPT

## 2025-01-01 PROCEDURE — 93005 ELECTROCARDIOGRAM TRACING: CPT | Performed by: GENERAL PRACTICE

## 2025-01-01 PROCEDURE — 94799 UNLISTED PULMONARY SVC/PX: CPT

## 2025-01-01 PROCEDURE — 80053 COMPREHEN METABOLIC PANEL: CPT | Performed by: EMERGENCY MEDICINE

## 2025-01-01 PROCEDURE — 81001 URINALYSIS AUTO W/SCOPE: CPT | Performed by: EMERGENCY MEDICINE

## 2025-01-01 PROCEDURE — 83880 ASSAY OF NATRIURETIC PEPTIDE: CPT | Performed by: EMERGENCY MEDICINE

## 2025-01-01 PROCEDURE — 85025 COMPLETE CBC W/AUTO DIFF WBC: CPT | Performed by: EMERGENCY MEDICINE

## 2025-01-01 PROCEDURE — 94761 N-INVAS EAR/PLS OXIMETRY MLT: CPT | Mod: XB

## 2025-01-01 PROCEDURE — 86803 HEPATITIS C AB TEST: CPT | Performed by: EMERGENCY MEDICINE

## 2025-01-01 PROCEDURE — G0179 MD RECERTIFICATION HHA PT: HCPCS | Mod: ,,, | Performed by: INTERNAL MEDICINE

## 2025-01-01 PROCEDURE — 93010 ELECTROCARDIOGRAM REPORT: CPT | Mod: ,,, | Performed by: GENERAL PRACTICE

## 2025-01-01 PROCEDURE — 99900031 HC PATIENT EDUCATION (STAT)

## 2025-01-01 PROCEDURE — 36415 COLL VENOUS BLD VENIPUNCTURE: CPT | Performed by: EMERGENCY MEDICINE

## 2025-01-01 PROCEDURE — 87086 URINE CULTURE/COLONY COUNT: CPT | Performed by: EMERGENCY MEDICINE

## 2025-01-01 PROCEDURE — 99406 BEHAV CHNG SMOKING 3-10 MIN: CPT

## 2025-01-01 PROCEDURE — 96375 TX/PRO/DX INJ NEW DRUG ADDON: CPT

## 2025-01-01 PROCEDURE — 87389 HIV-1 AG W/HIV-1&-2 AB AG IA: CPT | Performed by: EMERGENCY MEDICINE

## 2025-01-01 PROCEDURE — 87522 HEPATITIS C REVRS TRNSCRPJ: CPT | Performed by: EMERGENCY MEDICINE

## 2025-01-01 PROCEDURE — 25000003 PHARM REV CODE 250: Performed by: EMERGENCY MEDICINE

## 2025-01-01 RX ORDER — HYDROMORPHONE HYDROCHLORIDE 1 MG/ML
0.5 INJECTION, SOLUTION INTRAMUSCULAR; INTRAVENOUS; SUBCUTANEOUS
Status: DISCONTINUED | OUTPATIENT
Start: 2025-01-01 | End: 2025-01-01 | Stop reason: HOSPADM

## 2025-01-01 RX ORDER — TALC
6 POWDER (GRAM) TOPICAL NIGHTLY PRN
Status: DISCONTINUED | OUTPATIENT
Start: 2025-01-01 | End: 2025-01-01

## 2025-01-01 RX ORDER — LEVOFLOXACIN 5 MG/ML
750 INJECTION, SOLUTION INTRAVENOUS ONCE
Status: COMPLETED | OUTPATIENT
Start: 2025-01-01 | End: 2025-01-01

## 2025-01-01 RX ORDER — ALUMINUM HYDROXIDE, MAGNESIUM HYDROXIDE, AND SIMETHICONE 1200; 120; 1200 MG/30ML; MG/30ML; MG/30ML
30 SUSPENSION ORAL 4 TIMES DAILY PRN
Status: DISCONTINUED | OUTPATIENT
Start: 2025-01-01 | End: 2025-01-01

## 2025-01-01 RX ORDER — MUPIROCIN 20 MG/G
OINTMENT TOPICAL 2 TIMES DAILY
Status: DISCONTINUED | OUTPATIENT
Start: 2025-01-01 | End: 2025-01-01 | Stop reason: HOSPADM

## 2025-01-01 RX ORDER — LEVOFLOXACIN 5 MG/ML
250 INJECTION, SOLUTION INTRAVENOUS
Status: DISCONTINUED | OUTPATIENT
Start: 2025-01-01 | End: 2025-01-01

## 2025-01-01 RX ORDER — PANTOPRAZOLE SODIUM 40 MG/1
40 TABLET, DELAYED RELEASE ORAL DAILY
Status: DISCONTINUED | OUTPATIENT
Start: 2025-01-01 | End: 2025-01-01

## 2025-01-01 RX ORDER — SODIUM CHLORIDE 9 MG/ML
INJECTION, SOLUTION INTRAVENOUS CONTINUOUS
Status: DISCONTINUED | OUTPATIENT
Start: 2025-01-01 | End: 2025-01-01 | Stop reason: HOSPADM

## 2025-01-01 RX ORDER — PANTOPRAZOLE SODIUM 40 MG/1
40 TABLET, DELAYED RELEASE ORAL DAILY
Status: DISCONTINUED | OUTPATIENT
Start: 2025-01-01 | End: 2025-01-01 | Stop reason: HOSPADM

## 2025-01-01 RX ORDER — LEVOFLOXACIN 5 MG/ML
500 INJECTION, SOLUTION INTRAVENOUS
Status: DISCONTINUED | OUTPATIENT
Start: 2025-07-07 | End: 2025-01-01 | Stop reason: HOSPADM

## 2025-01-01 RX ORDER — LORAZEPAM 2 MG/ML
1 INJECTION INTRAMUSCULAR
Status: DISCONTINUED | OUTPATIENT
Start: 2025-01-01 | End: 2025-01-01 | Stop reason: HOSPADM

## 2025-01-01 RX ORDER — ACETAMINOPHEN 325 MG/1
650 TABLET ORAL EVERY 8 HOURS PRN
Status: DISCONTINUED | OUTPATIENT
Start: 2025-01-01 | End: 2025-01-01

## 2025-01-01 RX ORDER — ACETAMINOPHEN 325 MG/1
650 TABLET ORAL EVERY 4 HOURS PRN
Status: DISCONTINUED | OUTPATIENT
Start: 2025-01-01 | End: 2025-01-01

## 2025-01-01 RX ORDER — CALCIUM GLUCONATE 20 MG/ML
1 INJECTION, SOLUTION INTRAVENOUS ONCE
Status: COMPLETED | OUTPATIENT
Start: 2025-01-01 | End: 2025-01-01

## 2025-01-01 RX ADMIN — CALCIUM GLUCONATE 1 G: 20 INJECTION, SOLUTION INTRAVENOUS at 12:07

## 2025-01-01 RX ADMIN — INSULIN HUMAN 5 UNITS: 100 INJECTION, SOLUTION PARENTERAL at 12:07

## 2025-01-01 RX ADMIN — HYDROMORPHONE HYDROCHLORIDE 0.5 MG: 1 INJECTION, SOLUTION INTRAMUSCULAR; INTRAVENOUS; SUBCUTANEOUS at 08:07

## 2025-01-01 RX ADMIN — HYDROMORPHONE HYDROCHLORIDE 0.5 MG: 1 INJECTION, SOLUTION INTRAMUSCULAR; INTRAVENOUS; SUBCUTANEOUS at 02:07

## 2025-01-01 RX ADMIN — LORAZEPAM 1 MG: 2 INJECTION INTRAMUSCULAR; INTRAVENOUS at 08:07

## 2025-01-01 RX ADMIN — SODIUM CHLORIDE: 9 INJECTION, SOLUTION INTRAVENOUS at 03:07

## 2025-01-01 RX ADMIN — LEVOFLOXACIN 750 MG: 750 INJECTION, SOLUTION INTRAVENOUS at 01:07

## 2025-01-01 RX ADMIN — LORAZEPAM 1 MG: 2 INJECTION INTRAMUSCULAR; INTRAVENOUS at 05:07

## 2025-01-01 RX ADMIN — SODIUM CHLORIDE 500 ML: 9 INJECTION, SOLUTION INTRAVENOUS at 10:07

## 2025-01-01 RX ADMIN — DEXTROSE MONOHYDRATE 25 G: 25 INJECTION, SOLUTION INTRAVENOUS at 12:07

## 2025-01-01 RX ADMIN — LORAZEPAM 1 MG: 2 INJECTION INTRAMUSCULAR; INTRAVENOUS at 01:07

## 2025-01-01 RX ADMIN — SODIUM CHLORIDE: 9 INJECTION, SOLUTION INTRAVENOUS at 12:07

## 2025-01-07 ENCOUNTER — EXTERNAL HOME HEALTH (OUTPATIENT)
Dept: HOME HEALTH SERVICES | Facility: HOSPITAL | Age: OVER 89
End: 2025-01-07
Payer: MEDICARE

## 2025-01-09 ENCOUNTER — LAB VISIT (OUTPATIENT)
Dept: LAB | Facility: HOSPITAL | Age: OVER 89
End: 2025-01-09
Attending: INTERNAL MEDICINE
Payer: MEDICARE

## 2025-01-09 DIAGNOSIS — N18.32 STAGE 3B CHRONIC KIDNEY DISEASE: ICD-10-CM

## 2025-01-09 DIAGNOSIS — E83.42 HYPOMAGNESEMIA: ICD-10-CM

## 2025-01-09 DIAGNOSIS — I50.9 CONGESTIVE HEART FAILURE, UNSPECIFIED HF CHRONICITY, UNSPECIFIED HEART FAILURE TYPE: ICD-10-CM

## 2025-01-09 LAB
ANION GAP SERPL CALC-SCNC: 8 MMOL/L (ref 8–16)
BNP SERPL-MCNC: 456 PG/ML (ref 0–99)
BUN SERPL-MCNC: 58 MG/DL (ref 8–23)
CALCIUM SERPL-MCNC: 9.7 MG/DL (ref 8.7–10.5)
CHLORIDE SERPL-SCNC: 103 MMOL/L (ref 95–110)
CO2 SERPL-SCNC: 28 MMOL/L (ref 23–29)
CREAT SERPL-MCNC: 1.7 MG/DL (ref 0.5–1.4)
EST. GFR  (NO RACE VARIABLE): 28.3 ML/MIN/1.73 M^2
GLUCOSE SERPL-MCNC: 102 MG/DL (ref 70–110)
MAGNESIUM SERPL-MCNC: 1.8 MG/DL (ref 1.6–2.6)
POTASSIUM SERPL-SCNC: 4 MMOL/L (ref 3.5–5.1)
SODIUM SERPL-SCNC: 139 MMOL/L (ref 136–145)

## 2025-01-09 PROCEDURE — 83735 ASSAY OF MAGNESIUM: CPT | Performed by: INTERNAL MEDICINE

## 2025-01-09 PROCEDURE — 36415 COLL VENOUS BLD VENIPUNCTURE: CPT | Performed by: INTERNAL MEDICINE

## 2025-01-09 PROCEDURE — 83880 ASSAY OF NATRIURETIC PEPTIDE: CPT | Performed by: INTERNAL MEDICINE

## 2025-01-09 PROCEDURE — 80048 BASIC METABOLIC PNL TOTAL CA: CPT | Performed by: INTERNAL MEDICINE

## 2025-01-13 ENCOUNTER — OFFICE VISIT (OUTPATIENT)
Dept: CARDIOLOGY | Facility: CLINIC | Age: OVER 89
End: 2025-01-13
Payer: MEDICARE

## 2025-01-13 ENCOUNTER — TELEPHONE (OUTPATIENT)
Dept: CARDIOLOGY | Facility: CLINIC | Age: OVER 89
End: 2025-01-13
Payer: MEDICARE

## 2025-01-13 VITALS
DIASTOLIC BLOOD PRESSURE: 70 MMHG | HEIGHT: 60 IN | SYSTOLIC BLOOD PRESSURE: 122 MMHG | HEART RATE: 71 BPM | BODY MASS INDEX: 29.95 KG/M2 | WEIGHT: 152.56 LBS | OXYGEN SATURATION: 94 %

## 2025-01-13 DIAGNOSIS — I50.33 ACUTE ON CHRONIC HEART FAILURE WITH PRESERVED EJECTION FRACTION: ICD-10-CM

## 2025-01-13 DIAGNOSIS — Z86.79 HISTORY OF ATRIAL FIBRILLATION: Chronic | ICD-10-CM

## 2025-01-13 DIAGNOSIS — N18.32 STAGE 3B CHRONIC KIDNEY DISEASE: Primary | ICD-10-CM

## 2025-01-13 DIAGNOSIS — E89.0 POSTABLATIVE HYPOTHYROIDISM: Chronic | ICD-10-CM

## 2025-01-13 PROCEDURE — 1101F PT FALLS ASSESS-DOCD LE1/YR: CPT | Mod: CPTII,S$GLB,, | Performed by: INTERNAL MEDICINE

## 2025-01-13 PROCEDURE — 3288F FALL RISK ASSESSMENT DOCD: CPT | Mod: CPTII,S$GLB,, | Performed by: INTERNAL MEDICINE

## 2025-01-13 PROCEDURE — 99999 PR PBB SHADOW E&M-EST. PATIENT-LVL IV: CPT | Mod: PBBFAC,,, | Performed by: INTERNAL MEDICINE

## 2025-01-13 PROCEDURE — 1126F AMNT PAIN NOTED NONE PRSNT: CPT | Mod: CPTII,S$GLB,, | Performed by: INTERNAL MEDICINE

## 2025-01-13 PROCEDURE — 1159F MED LIST DOCD IN RCRD: CPT | Mod: CPTII,S$GLB,, | Performed by: INTERNAL MEDICINE

## 2025-01-13 PROCEDURE — 99213 OFFICE O/P EST LOW 20 MIN: CPT | Mod: S$GLB,,, | Performed by: INTERNAL MEDICINE

## 2025-01-13 PROCEDURE — 1160F RVW MEDS BY RX/DR IN RCRD: CPT | Mod: CPTII,S$GLB,, | Performed by: INTERNAL MEDICINE

## 2025-01-13 NOTE — PROGRESS NOTES
Patient ID:  Andressa Benedict is a 89 y.o. female who presents for follow-up of Congestive Heart Failure and Fatigue      (HFpEF) heart failure with preserved ejection fraction  She had acute heart failure.  She has been on spironolactone every other day will increase it on daily basis.     Essential hypertension  Controlled on Entresto, Toprol-XL, spironolactone     History of atrial fibrillation  Controlled on Toprol-XL     CKD (chronic kidney disease), stage III  Stable     Postablative hypothyroidism  On thyroid replacement     11/07/2024  She was recently admitted to [facility] with dehydration and deterioration of her renal function.  Her potassium was within normal limits but on the high side.  There was a question if she could tolerate Entresto although she is tolerating it well now.  Currently she is on Lasix 40 mg daily Entresto 04/20/2026 b.i.d. hydralazine 50 mg q.8 and levothyroxine.  She has not been coughing.  Her BNP was 4700 a proximally 3 weeks ago 3 days ago her BNP was 694.  She denies any palpitations in general she feels much better  01/13/2025  She has been doing very well.  Denies any shortness of breath any chest pains any leg edema.  She feels that she is a little bit dehydrated.  The laboratory data 2 months ago her BNP was 694 her BUN was 40 creatinine 1.4 GFR of 35.  Laboratory data done 4 days ago revealed a BNP of 456 BUN of 58 creatinine of 1.7 GFR of 28.  There has been a decrease in her creatinine.  Will decrease the Lasix to 20 mg daily.          Past Medical History:   Diagnosis Date    Atrial fibrillation     Bradycardia     Carotid bruit     CKD (chronic kidney disease), stage III     Hyperlipidemia     OA (osteoarthritis)     Thyroid disease         Past Surgical History:   Procedure Laterality Date    HYSTERECTOMY      KNEE SURGERY Right     SHOULDER SURGERY Right           Current Outpatient Medications   Medication Instructions    ascorbic acid,  vitamin C, (VITAMIN C) 250 mg Chew 1 tablet, Daily    aspirin (ECOTRIN) 81 mg, Daily    betaxolol 0.5% (BETOPTIC-S) 0.5 % Drop 1 drop, 2 times daily    coenzyme Q10 (CO Q-10) 100 mg, Daily    docosahexaenoic acid/epa (FISH OIL ORAL) 1 capsule, Daily    ENTRESTO 24-26 mg per tablet 1 tablet, Oral, 2 times daily    ergocalciferol, vitamin D2, (VITAMIN D ORAL) 1 tablet, Daily    FLAXSEED OIL ORAL 1 capsule, Daily    furosemide (LASIX) 40 mg, Oral, Daily    glucosam/msm/chond/frb896/hyal (GLUCOS-CHOND-MSM, WITH ANTIOX, ORAL) 1 capsule, Daily    hydrALAZINE (APRESOLINE) 50 mg, Oral, Every 8 hours    levothyroxine (SYNTHROID) 100 mcg, Oral    multivitamin (THERAGRAN) per tablet 1 tablet, Daily        Review of patient's allergies indicates:   Allergen Reactions    Penicillins Anaphylaxis        Review of Systems   HENT:  Positive for hearing loss.    Cardiovascular:  Negative for chest pain, dyspnea on exertion and palpitations.   Respiratory:  Negative for cough and shortness of breath.         Objective:     Vitals:    01/13/25 1343   BP: 122/70   BP Location: Left arm   Patient Position: Sitting   Pulse: 71   SpO2: (!) 94%   Weight: 69.2 kg (152 lb 8.9 oz)   Height: 5' (1.524 m)       Physical Exam  Vitals and nursing note reviewed.   Constitutional:       Appearance: She is well-developed.   HENT:      Head: Normocephalic and atraumatic.   Eyes:      Conjunctiva/sclera: Conjunctivae normal.   Cardiovascular:      Rate and Rhythm: Normal rate and regular rhythm.      Heart sounds: Normal heart sounds.   Pulmonary:      Effort: Pulmonary effort is normal.      Breath sounds: Normal breath sounds.   Abdominal:      General: Bowel sounds are normal.      Palpations: Abdomen is soft.   Musculoskeletal:         General: Normal range of motion.   Skin:     General: Skin is warm and dry.   Neurological:      Mental Status: She is alert and oriented to person, place, and time.   Psychiatric:         Behavior: Behavior normal.          Thought Content: Thought content normal.         Judgment: Judgment normal.       CMP  Sodium   Date Value Ref Range Status   01/09/2025 139 136 - 145 mmol/L Final     Potassium   Date Value Ref Range Status   01/09/2025 4.0 3.5 - 5.1 mmol/L Final     Chloride   Date Value Ref Range Status   01/09/2025 103 95 - 110 mmol/L Final     CO2   Date Value Ref Range Status   01/09/2025 28 23 - 29 mmol/L Final     Glucose   Date Value Ref Range Status   01/09/2025 102 70 - 110 mg/dL Final     BUN   Date Value Ref Range Status   01/09/2025 58 (H) 8 - 23 mg/dL Final     Creatinine   Date Value Ref Range Status   01/09/2025 1.7 (H) 0.5 - 1.4 mg/dL Final     Calcium   Date Value Ref Range Status   01/09/2025 9.7 8.7 - 10.5 mg/dL Final     Total Protein   Date Value Ref Range Status   10/14/2024 6.8 6.0 - 8.4 g/dL Final     Albumin   Date Value Ref Range Status   10/14/2024 3.7 3.5 - 5.2 g/dL Final     Total Bilirubin   Date Value Ref Range Status   10/14/2024 0.6 0.1 - 1.0 mg/dL Final     Comment:     For infants and newborns, interpretation of results should be based  on gestational age, weight and in agreement with clinical  observations.    Premature Infant recommended reference ranges:  Up to 24 hours.............<8.0 mg/dL  Up to 48 hours............<12.0 mg/dL  3-5 days..................<15.0 mg/dL  6-29 days.................<15.0 mg/dL       Alkaline Phosphatase   Date Value Ref Range Status   10/14/2024 60 55 - 135 U/L Final     AST   Date Value Ref Range Status   10/14/2024 23 10 - 40 U/L Final     ALT   Date Value Ref Range Status   10/14/2024 32 10 - 44 U/L Final     Anion Gap   Date Value Ref Range Status   01/09/2025 8 8 - 16 mmol/L Final     eGFR if non    Date Value Ref Range Status   05/28/2021 30 (L) >59 mL/min/1.73 Final      BMP  Lab Results   Component Value Date     01/09/2025    K 4.0 01/09/2025     01/09/2025    CO2 28 01/09/2025    BUN 58 (H) 01/09/2025    CREATININE 1.7  "(H) 01/09/2025    CALCIUM 9.7 01/09/2025    ANIONGAP 8 01/09/2025    EGFRNONAA 30 (L) 05/28/2021      BNP  @LABRCNTIP(BNP,BNPTRIAGEBLO)@   Lab Results   Component Value Date    CHOL 152 06/02/2022     Lab Results   Component Value Date    HDL 51 06/02/2022     Lab Results   Component Value Date    LDLCALC 88 06/02/2022     Lab Results   Component Value Date    TRIG 66 06/02/2022     No results found for: "CHOLHDL"   Lab Results   Component Value Date    TSH 2.377 10/11/2024    FREET4 1.21 08/26/2023     Lab Results   Component Value Date    HGBA1C 5.8 10/11/2024     Lab Results   Component Value Date    WBC 7.78 11/04/2024    HGB 12.4 11/04/2024    HCT 39.0 11/04/2024     (H) 11/04/2024     11/04/2024         Results for orders placed during the hospital encounter of 08/21/23    Echo    Interpretation Summary    Left Ventricle: The left ventricle is mildly dilated. Mildly increased ventricular mass. Mildly increased wall thickness. There is mild concentric hypertrophy. Normal wall motion. Septal flattening in systole consistent with right ventricular pressure overload. There is low normal systolic function. EF 50% with patient in sinus rhythm and heart rate of 52 There is diastolic dysfunction but grade cannot be determined. Elevated left ventricular filling pressure.    Right Ventricle: Normal right ventricular cavity size. Wall thickness is normal. Right ventricle wall motion  is normal. Systolic function is normal.    Mitral Valve: There is no stenosis.    Tricuspid Valve: There is mild to moderate regurgitation.    Pulmonary Artery: There is mild pulmonary hypertension. The estimated pulmonary artery systolic pressure is 58 mmHg.    IVC/SVC: Elevated venous pressure at 15 mmHg.    Pericardium: There is a small posterior effusion. Pericardial effusion is echolucent. No indication of cardiac tamponade.    Mean left atrial pressure slightly increased at 12 mm Hg     No results found for this or any " previous visit.     EKG  Results for orders placed or performed during the hospital encounter of 10/11/24   EKG 12-lead    Collection Time: 10/11/24 11:04 AM   Result Value Ref Range    QRS Duration 146 ms    OHS QTC Calculation 514 ms    Narrative    Test Reason : I50.9,    Vent. Rate : 072 BPM     Atrial Rate : 072 BPM     P-R Int : 166 ms          QRS Dur : 146 ms      QT Int : 470 ms       P-R-T Axes : 048 060 027 degrees     QTc Int : 514 ms    Sinus rhythm with frequent and consecutive Premature ventricular complexes   in a pattern of bigeminy  Possible Left atrial enlargement  Right bundle branch block  Abnormal ECG  When compared with ECG of 28-SEP-2024 19:26,  Premature ventricular complexes are now Present  Confirmed by Shelli VILLARREAL, Geovany TATE ([id number]) on 10/31/2024 11:40:02 AM    Referred By: [email or web address]   SELF           Confirmed By:Geovany Marques MD      Stress  No results found for this or any previous visit.             Assessment:       (HFpEF) heart failure with preserved ejection fraction  Will decrease the diuretic because of the increase of BUN and creatinine.    History of atrial fibrillation  She remains in normal sinus rhythm    Hypertension  Controlled on hydralazine, Entresto    CKD (chronic kidney disease), stage III  Her GFR has decreased she appears to be dehydrated will decrease diuretics.  Follow her renal function closely    Postablative hypothyroidism  On thyroid replacement       Plan:       Decrease the doses of Lasix to 20 mg p.o. daily she will be seen in the office in 2 months with a BMP as well as a BNP.

## 2025-01-13 NOTE — TELEPHONE ENCOUNTER
----- Message from Tracy sent at 1/13/2025 10:07 AM CST -----  Regarding: advice  Type:  Needs Medical Advice    Who Called: soraida mireles     Best Call Back Number: [phone]    Additional Information: soraida wants to know if  would continue pt hh? please call to discuss.

## 2025-01-24 NOTE — ASSESSMENT & PLAN NOTE
Her GFR has decreased she appears to be dehydrated will decrease diuretics.  Follow her renal function closely

## 2025-01-29 ENCOUNTER — TELEPHONE (OUTPATIENT)
Dept: CARDIOLOGY | Facility: CLINIC | Age: OVER 89
End: 2025-01-29
Payer: MEDICARE

## 2025-01-29 NOTE — TELEPHONE ENCOUNTER
----- Message from Zandra sent at 1/29/2025 10:08 AM CST -----  Contact: KIRSTEN HERNANDEZ  [place]  Type:  Needs Medical Advice    Who Called:  KIRSTEN HERNANDEZ [facility]  Symptoms (please be specific):  PT HAS A LARGE GOLF BALL SIZE CYST ON HER NECK THAT'S DRAINING. SHE DOES NOT HAVE A PCP AND REFUSES TO GO THE ER / URGENT CARE DUE TO THE COST   How long has patient had these symptoms: SEVERAL YEARS - CYST HAS GOTTEN BIGGER AND BUST  YESTERDAY   Pharmacy name and phone #:    [id number] - [place], [place] - [address] AT [facility] [address]  [address] [place] [address]  Phone: [phone] Fax: [phone]  Would the patient rather a call back or a response via [facility]? CALL   Best Call Back Number:  [phone] , MARY   Additional Information: THANK YOU

## 2025-01-29 NOTE — TELEPHONE ENCOUNTER
S/w HH nurse & advised she needs to go to urgent care or ER. She said pt refused but got an appt with ENT dr

## 2025-02-24 ENCOUNTER — EXTERNAL HOME HEALTH (OUTPATIENT)
Dept: HOME HEALTH SERVICES | Facility: HOSPITAL | Age: OVER 89
End: 2025-02-24
Payer: MEDICARE

## 2025-03-03 ENCOUNTER — TELEPHONE (OUTPATIENT)
Dept: CARDIOLOGY | Facility: CLINIC | Age: OVER 89
End: 2025-03-03
Payer: MEDICARE

## 2025-03-03 NOTE — TELEPHONE ENCOUNTER
----- Message from Kriss sent at 3/3/2025  2:06 PM CST -----  Regarding: reschedule  Contact: patient  Type:  Sooner Appointment RequestCaller is requesting a sooner appointment.  Caller declined first available appointment listed below.  Caller will not accept being placed on the waitlist and is requesting a message be sent to doctor.Name of Caller:  patientWhen is the first available appointment?  03/07/25Symptoms:  follow upWould the patient rather a call back or a response via [facility]? [employer] Call Back Number:   [phone] Information:  Patient states she will have transportation on 03/20/25.  Please call patient to reschedule.  Thanks!

## 2025-03-03 NOTE — TELEPHONE ENCOUNTER
----- Message from Tracy sent at 3/3/2025  3:50 PM CST -----  Regarding: rt call  Type:  Patient Returning CallWho Called:ptWho Left Message for Patient:unknownDoes the patient know what this is regarding?:yesBest Call Back Number:[phone] Information: pt wants to be scheduled with dr galindo. She st she only wants to see him.

## 2025-03-06 ENCOUNTER — TELEPHONE (OUTPATIENT)
Dept: CARDIOLOGY | Facility: CLINIC | Age: OVER 89
End: 2025-03-06
Payer: MEDICARE

## 2025-03-06 NOTE — TELEPHONE ENCOUNTER
----- Message from Kriss sent at 3/6/2025 11:52 AM CST -----  Regarding: reschedule  Contact: patient  Type:  Sooner Appointment RequestCaller is requesting a sooner appointment.  Caller declined first available appointment listed below.  Caller will not accept being placed on the waitlist and is requesting a message be sent to doctor.Name of Caller:  patientWhen is the first available appointment?  03/20/25Symptoms:  follow upWould the patient rather a call back or a response via [facility]? [employer] Call Back Number:  [phone] (home) Additional Information:  Patient only wants to see Dr. Morocho.  Thanks!

## 2025-03-12 ENCOUNTER — LAB VISIT (OUTPATIENT)
Dept: LAB | Facility: HOSPITAL | Age: OVER 89
End: 2025-03-12
Attending: INTERNAL MEDICINE
Payer: MEDICARE

## 2025-03-12 DIAGNOSIS — I50.33 ACUTE ON CHRONIC HEART FAILURE WITH PRESERVED EJECTION FRACTION: ICD-10-CM

## 2025-03-12 DIAGNOSIS — N18.32 STAGE 3B CHRONIC KIDNEY DISEASE: ICD-10-CM

## 2025-03-12 LAB
ANION GAP SERPL CALC-SCNC: 8 MMOL/L (ref 8–16)
BNP SERPL-MCNC: 1022 PG/ML (ref 0–99)
BUN SERPL-MCNC: 40 MG/DL (ref 8–23)
CALCIUM SERPL-MCNC: 9.8 MG/DL (ref 8.7–10.5)
CHLORIDE SERPL-SCNC: 104 MMOL/L (ref 95–110)
CO2 SERPL-SCNC: 28 MMOL/L (ref 23–29)
CREAT SERPL-MCNC: 1.5 MG/DL (ref 0.5–1.4)
EST. GFR  (NO RACE VARIABLE): 32.9 ML/MIN/1.73 M^2
GLUCOSE SERPL-MCNC: 109 MG/DL (ref 70–110)
POTASSIUM SERPL-SCNC: 4.1 MMOL/L (ref 3.5–5.1)
SODIUM SERPL-SCNC: 140 MMOL/L (ref 136–145)

## 2025-03-12 PROCEDURE — 80048 BASIC METABOLIC PNL TOTAL CA: CPT | Performed by: INTERNAL MEDICINE

## 2025-03-12 PROCEDURE — 36415 COLL VENOUS BLD VENIPUNCTURE: CPT | Performed by: INTERNAL MEDICINE

## 2025-03-12 PROCEDURE — 83880 ASSAY OF NATRIURETIC PEPTIDE: CPT | Performed by: INTERNAL MEDICINE

## 2025-03-13 ENCOUNTER — OFFICE VISIT (OUTPATIENT)
Dept: CARDIOLOGY | Facility: CLINIC | Age: OVER 89
End: 2025-03-13
Payer: MEDICARE

## 2025-03-13 VITALS
OXYGEN SATURATION: 95 % | DIASTOLIC BLOOD PRESSURE: 84 MMHG | HEART RATE: 84 BPM | SYSTOLIC BLOOD PRESSURE: 193 MMHG | WEIGHT: 156.75 LBS | BODY MASS INDEX: 30.77 KG/M2 | HEIGHT: 60 IN

## 2025-03-13 DIAGNOSIS — N18.32 STAGE 3B CHRONIC KIDNEY DISEASE: Primary | ICD-10-CM

## 2025-03-13 DIAGNOSIS — E89.0 POSTABLATIVE HYPOTHYROIDISM: ICD-10-CM

## 2025-03-13 DIAGNOSIS — I50.33 ACUTE ON CHRONIC HEART FAILURE WITH PRESERVED EJECTION FRACTION: ICD-10-CM

## 2025-03-13 DIAGNOSIS — I49.9 CARDIAC ARRHYTHMIA, UNSPECIFIED CARDIAC ARRHYTHMIA TYPE: ICD-10-CM

## 2025-03-13 DIAGNOSIS — E83.42 HYPOMAGNESEMIA: ICD-10-CM

## 2025-03-13 DIAGNOSIS — I15.1 HYPERTENSION SECONDARY TO OTHER RENAL DISORDERS: ICD-10-CM

## 2025-03-13 DIAGNOSIS — I48.0 PAROXYSMAL ATRIAL FIBRILLATION: ICD-10-CM

## 2025-03-13 PROCEDURE — 1126F AMNT PAIN NOTED NONE PRSNT: CPT | Mod: CPTII,S$GLB,, | Performed by: INTERNAL MEDICINE

## 2025-03-13 PROCEDURE — 1160F RVW MEDS BY RX/DR IN RCRD: CPT | Mod: CPTII,S$GLB,, | Performed by: INTERNAL MEDICINE

## 2025-03-13 PROCEDURE — 1159F MED LIST DOCD IN RCRD: CPT | Mod: CPTII,S$GLB,, | Performed by: INTERNAL MEDICINE

## 2025-03-13 PROCEDURE — 3288F FALL RISK ASSESSMENT DOCD: CPT | Mod: CPTII,S$GLB,, | Performed by: INTERNAL MEDICINE

## 2025-03-13 PROCEDURE — 99213 OFFICE O/P EST LOW 20 MIN: CPT | Mod: S$GLB,,, | Performed by: INTERNAL MEDICINE

## 2025-03-13 PROCEDURE — 1101F PT FALLS ASSESS-DOCD LE1/YR: CPT | Mod: CPTII,S$GLB,, | Performed by: INTERNAL MEDICINE

## 2025-03-13 PROCEDURE — 99999 PR PBB SHADOW E&M-EST. PATIENT-LVL IV: CPT | Mod: PBBFAC,,, | Performed by: INTERNAL MEDICINE

## 2025-03-13 NOTE — PROGRESS NOTES
Patient ID:  Andressa Benedict is a 89 y.o. female who presents for follow-up of Follow-up, Hypertension, and Atrial Fibrillation      (HFpEF) heart failure with preserved ejection fraction  She had acute heart failure.  She has been on spironolactone every other day will increase it on daily basis.     Essential hypertension  Controlled on Entresto, Toprol-XL, spironolactone     History of atrial fibrillation  Controlled on Toprol-XL     CKD (chronic kidney disease), stage III  Stable     Postablative hypothyroidism  On thyroid replacement     11/07/2024  She was recently admitted to [facility] with dehydration and deterioration of her renal function.  Her potassium was within normal limits but on the high side.  There was a question if she could tolerate Entresto although she is tolerating it well now.  Currently she is on Lasix 40 mg daily Entresto 04/20/2026 b.i.d. hydralazine 50 mg q.8 and levothyroxine.  She has not been coughing.  Her BNP was 4700 a proximally 3 weeks ago 3 days ago her BNP was 694.  She denies any palpitations in general she feels much better  01/13/2025  She has been doing very well.  Denies any shortness of breath any chest pains any leg edema.  She feels that she is a little bit dehydrated.  The laboratory data 2 months ago her BNP was 694 her BUN was 40 creatinine 1.4 GFR of 35.  Laboratory data done 4 days ago revealed a BNP of 456 BUN of 58 creatinine of 1.7 GFR of 28.  There has been a decrease in her creatinine.  Will decrease the Lasix to 20 mg daily.  03/13/2025  She usually sleeps until around 11:00 a.m..  She is up and about until 2:00 a.m..  This morning she did not take her medication.  Few days ago he home health checked on her and told her that her blood pressure was too low.  She has stopped taking her medicines at times.  She denies any dizziness weakness.  Her breathing is doing fairly well.  During her last office visit the Lasix was decreased to 20  mg daily.  Her BNP has increased to 1022        Past Medical History:   Diagnosis Date    Atrial fibrillation     Bradycardia     Carotid bruit     CKD (chronic kidney disease), stage III     Hyperlipidemia     OA (osteoarthritis)     Thyroid disease         Past Surgical History:   Procedure Laterality Date    HYSTERECTOMY      KNEE SURGERY Right     SHOULDER SURGERY Right           Current Outpatient Medications   Medication Instructions    ascorbic acid, vitamin C, (VITAMIN C) 250 mg Chew 1 tablet, Daily    aspirin (ECOTRIN) 81 mg, Daily    betaxolol 0.5% (BETOPTIC-S) 0.5 % Drop 1 drop, 2 times daily    coenzyme Q10 (CO Q-10) 100 mg, Daily    docosahexaenoic acid/epa (FISH OIL ORAL) 1 capsule, Daily    ENTRESTO 24-26 mg per tablet 1 tablet, Oral, 2 times daily    ergocalciferol, vitamin D2, (VITAMIN D ORAL) 1 tablet, Daily    FLAXSEED OIL ORAL 1 capsule, Daily    furosemide (LASIX) 40 mg, Oral, Daily    glucosam/msm/chond/thj281/hyal (GLUCOS-CHOND-MSM, WITH ANTIOX, ORAL) 1 capsule, Daily    hydrALAZINE (APRESOLINE) 50 mg, Oral, Every 8 hours    levothyroxine (SYNTHROID) 100 mcg, Oral    multivitamin (THERAGRAN) per tablet 1 tablet, Daily        Review of patient's allergies indicates:   Allergen Reactions    Penicillins Anaphylaxis        Review of Systems   Cardiovascular:  Negative for chest pain, dyspnea on exertion, leg swelling and palpitations.   Respiratory:  Negative for cough and shortness of breath.         Objective:     Vitals:    03/13/25 0920   BP: (!) 193/84  Comment: didnt take medication this morning   BP Location: Left arm   Patient Position: Sitting   Pulse: 84   SpO2: 95%   Weight: 71.1 kg (156 lb 12 oz)   Height: 5' (1.524 m)       Physical Exam  Vitals and nursing note reviewed.   Constitutional:       Appearance: She is well-developed.   HENT:      Head: Normocephalic and atraumatic.   Eyes:      Conjunctiva/sclera: Conjunctivae normal.   Cardiovascular:      Rate and Rhythm: Normal rate  and regular rhythm.      Heart sounds: Normal heart sounds.   Pulmonary:      Effort: Pulmonary effort is normal.      Breath sounds: Normal breath sounds.   Abdominal:      General: Bowel sounds are normal.      Palpations: Abdomen is soft.   Musculoskeletal:         General: Normal range of motion.   Skin:     General: Skin is warm and dry.   Neurological:      Mental Status: She is alert and oriented to person, place, and time.   Psychiatric:         Behavior: Behavior normal.         Thought Content: Thought content normal.         Judgment: Judgment normal.       CMP  Sodium   Date Value Ref Range Status   03/12/2025 140 136 - 145 mmol/L Final     Potassium   Date Value Ref Range Status   03/12/2025 4.1 3.5 - 5.1 mmol/L Final     Chloride   Date Value Ref Range Status   03/12/2025 104 95 - 110 mmol/L Final     CO2   Date Value Ref Range Status   03/12/2025 28 23 - 29 mmol/L Final     Glucose   Date Value Ref Range Status   03/12/2025 109 70 - 110 mg/dL Final     BUN   Date Value Ref Range Status   03/12/2025 40 (H) 8 - 23 mg/dL Final     Creatinine   Date Value Ref Range Status   03/12/2025 1.5 (H) 0.5 - 1.4 mg/dL Final     Calcium   Date Value Ref Range Status   03/12/2025 9.8 8.7 - 10.5 mg/dL Final     Total Protein   Date Value Ref Range Status   10/14/2024 6.8 6.0 - 8.4 g/dL Final     Albumin   Date Value Ref Range Status   10/14/2024 3.7 3.5 - 5.2 g/dL Final     Total Bilirubin   Date Value Ref Range Status   10/14/2024 0.6 0.1 - 1.0 mg/dL Final     Comment:     For infants and newborns, interpretation of results should be based  on gestational age, weight and in agreement with clinical  observations.    Premature Infant recommended reference ranges:  Up to 24 hours.............<8.0 mg/dL  Up to 48 hours............<12.0 mg/dL  3-5 days..................<15.0 mg/dL  6-29 days.................<15.0 mg/dL       Alkaline Phosphatase   Date Value Ref Range Status   10/14/2024 60 55 - 135 U/L Final     AST  "  Date Value Ref Range Status   10/14/2024 23 10 - 40 U/L Final     ALT   Date Value Ref Range Status   10/14/2024 32 10 - 44 U/L Final     Anion Gap   Date Value Ref Range Status   03/12/2025 8 8 - 16 mmol/L Final     eGFR if non    Date Value Ref Range Status   05/28/2021 30 (L) >59 mL/min/1.73 Final      BMP  Lab Results   Component Value Date     03/12/2025    K 4.1 03/12/2025     03/12/2025    CO2 28 03/12/2025    BUN 40 (H) 03/12/2025    CREATININE 1.5 (H) 03/12/2025    CALCIUM 9.8 03/12/2025    ANIONGAP 8 03/12/2025    EGFRNONAA 30 (L) 05/28/2021      BNP  @LABRCNTIP(BNP,BNPTRIAGEBLO)@   Lab Results   Component Value Date    CHOL 152 06/02/2022     Lab Results   Component Value Date    HDL 51 06/02/2022     Lab Results   Component Value Date    LDLCALC 88 06/02/2022     Lab Results   Component Value Date    TRIG 66 06/02/2022     No results found for: "CHOLHDL"   Lab Results   Component Value Date    TSH 2.377 10/11/2024    FREET4 1.21 08/26/2023     Lab Results   Component Value Date    HGBA1C 5.8 10/11/2024     Lab Results   Component Value Date    WBC 7.78 11/04/2024    HGB 12.4 11/04/2024    HCT 39.0 11/04/2024     (H) 11/04/2024     11/04/2024         Results for orders placed during the hospital encounter of 08/21/23    Echo    Interpretation Summary    Left Ventricle: The left ventricle is mildly dilated. Mildly increased ventricular mass. Mildly increased wall thickness. There is mild concentric hypertrophy. Normal wall motion. Septal flattening in systole consistent with right ventricular pressure overload. There is low normal systolic function. EF 50% with patient in sinus rhythm and heart rate of 52 There is diastolic dysfunction but grade cannot be determined. Elevated left ventricular filling pressure.    Right Ventricle: Normal right ventricular cavity size. Wall thickness is normal. Right ventricle wall motion  is normal. Systolic function is normal.    " Mitral Valve: There is no stenosis.    Tricuspid Valve: There is mild to moderate regurgitation.    Pulmonary Artery: There is mild pulmonary hypertension. The estimated pulmonary artery systolic pressure is 58 mmHg.    IVC/SVC: Elevated venous pressure at 15 mmHg.    Pericardium: There is a small posterior effusion. Pericardial effusion is echolucent. No indication of cardiac tamponade.    Mean left atrial pressure slightly increased at 12 mm Hg     No results found for this or any previous visit.     EKG  Results for orders placed or performed during the hospital encounter of 10/11/24   EKG 12-lead    Collection Time: 10/11/24 11:04 AM   Result Value Ref Range    QRS Duration 146 ms    OHS QTC Calculation 514 ms    Narrative    Test Reason : I50.9,    Vent. Rate : 072 BPM     Atrial Rate : 072 BPM     P-R Int : 166 ms          QRS Dur : 146 ms      QT Int : 470 ms       P-R-T Axes : 048 060 027 degrees     QTc Int : 514 ms    Sinus rhythm with frequent and consecutive Premature ventricular complexes   in a pattern of bigeminy  Possible Left atrial enlargement  Right bundle branch block  Abnormal ECG  When compared with ECG of 28-SEP-2024 19:26,  Premature ventricular complexes are now Present  Confirmed by Shelli VILLARREAL, Geovany TATE ([id number]) on 10/31/2024 11:40:02 AM    Referred By: [email or web address]   SELF           Confirmed By:Geovany Marques MD      Stress  No results found for this or any previous visit.             Assessment:       (HFpEF) heart failure with preserved ejection fraction  At times she has not been compliant with her medications.  She is to take her medicines.    Hypertension  Controlled on Entresto, Lasix, hydralazine 50 mg q.8 hours.    CKD (chronic kidney disease), stage III  Stable will follow the renal function closely    Postablative hypothyroidism  On thyroid replacement       Plan:       She is to take her Entresto, Lasix, hydralazine.  She is to return to the office in 3 months with a BMP BNP and a  CBC.

## 2025-04-04 DIAGNOSIS — I10 ESSENTIAL HYPERTENSION: ICD-10-CM

## 2025-04-04 DIAGNOSIS — E83.42 HYPOMAGNESEMIA: ICD-10-CM

## 2025-04-08 RX ORDER — LEVOTHYROXINE SODIUM 100 UG/1
100 TABLET ORAL
Qty: 90 TABLET | Refills: 3 | Status: SHIPPED | OUTPATIENT
Start: 2025-04-08

## 2025-04-13 ENCOUNTER — DOCUMENT SCAN (OUTPATIENT)
Dept: HOME HEALTH SERVICES | Facility: HOSPITAL | Age: OVER 89
End: 2025-04-13
Payer: MEDICARE

## 2025-04-29 ENCOUNTER — EXTERNAL HOME HEALTH (OUTPATIENT)
Dept: HOME HEALTH SERVICES | Facility: HOSPITAL | Age: OVER 89
End: 2025-04-29
Payer: MEDICARE

## 2025-05-15 ENCOUNTER — HOSPITAL ENCOUNTER (INPATIENT)
Facility: HOSPITAL | Age: OVER 89
LOS: 2 days | Discharge: HOME HEALTH | DRG: 291 | End: 2025-05-18
Attending: EMERGENCY MEDICINE | Admitting: HOSPITALIST
Payer: MEDICARE

## 2025-05-15 DIAGNOSIS — R07.9 CHEST PAIN: ICD-10-CM

## 2025-05-15 DIAGNOSIS — R10.84 ABDOMINAL PAIN, GENERALIZED: ICD-10-CM

## 2025-05-15 DIAGNOSIS — R06.02 SHORTNESS OF BREATH: ICD-10-CM

## 2025-05-15 DIAGNOSIS — I21.4 NON-ST ELEVATION MYOCARDIAL INFARCTION (NSTEMI): ICD-10-CM

## 2025-05-15 DIAGNOSIS — I50.9 ACUTE ON CHRONIC CONGESTIVE HEART FAILURE, UNSPECIFIED HEART FAILURE TYPE: ICD-10-CM

## 2025-05-15 DIAGNOSIS — I50.43 ACUTE ON CHRONIC COMBINED SYSTOLIC AND DIASTOLIC HEART FAILURE: Primary | ICD-10-CM

## 2025-05-15 DIAGNOSIS — R00.2 PALPITATIONS: ICD-10-CM

## 2025-05-15 DIAGNOSIS — R07.9 CHEST PAIN, UNSPECIFIED TYPE: ICD-10-CM

## 2025-05-15 PROBLEM — N17.9 ACUTE RENAL FAILURE: Status: RESOLVED | Noted: 2024-10-11 | Resolved: 2025-05-15

## 2025-05-15 PROBLEM — I50.33 ACUTE ON CHRONIC DIASTOLIC HEART FAILURE: Status: RESOLVED | Noted: 2024-10-11 | Resolved: 2025-05-15

## 2025-05-15 PROBLEM — I24.89 DEMAND ISCHEMIA: Status: ACTIVE | Noted: 2025-05-15

## 2025-05-15 PROBLEM — R79.89 ELEVATED TROPONIN: Status: RESOLVED | Noted: 2024-09-29 | Resolved: 2025-05-15

## 2025-05-15 LAB
ABORH RETYPE: NORMAL
ABSOLUTE EOSINOPHIL (SMH): 0.12 K/UL
ABSOLUTE MONOCYTE (SMH): 0.69 K/UL (ref 0.3–1)
ABSOLUTE NEUTROPHIL COUNT (SMH): 4.3 K/UL (ref 1.8–7.7)
ALBUMIN SERPL-MCNC: 4 G/DL (ref 3.5–5.2)
ALP SERPL-CCNC: 56 UNIT/L (ref 55–135)
ALT SERPL-CCNC: 14 UNIT/L (ref 10–44)
ANION GAP (SMH): 9 MMOL/L (ref 8–16)
APTT PPP: 27.7 SECONDS (ref 21–32)
AST SERPL-CCNC: 15 UNIT/L (ref 10–40)
BASOPHILS # BLD AUTO: 0.04 K/UL
BASOPHILS NFR BLD AUTO: 0.6 %
BILIRUB SERPL-MCNC: 0.9 MG/DL (ref 0.1–1)
BNP SERPL-MCNC: 3015 PG/ML
BUN SERPL-MCNC: 37 MG/DL (ref 8–23)
CALCIUM SERPL-MCNC: 9.8 MG/DL (ref 8.7–10.5)
CHLORIDE SERPL-SCNC: 100 MMOL/L (ref 95–110)
CHOLEST SERPL-MCNC: 133 MG/DL (ref 120–199)
CHOLEST/HDLC SERPL: 2.6 {RATIO} (ref 2–5)
CO2 SERPL-SCNC: 28 MMOL/L (ref 23–29)
CREAT SERPL-MCNC: 1.6 MG/DL (ref 0.5–1.4)
ERYTHROCYTE [DISTWIDTH] IN BLOOD BY AUTOMATED COUNT: 14.7 % (ref 11.5–14.5)
GFR SERPLBLD CREATININE-BSD FMLA CKD-EPI: 31 ML/MIN/1.73/M2
GLUCOSE SERPL-MCNC: 113 MG/DL (ref 70–110)
HCT VFR BLD AUTO: 34.6 % (ref 37–48.5)
HDLC SERPL-MCNC: 51 MG/DL (ref 40–75)
HDLC SERPL: 38.3 % (ref 20–50)
HGB BLD-MCNC: 11.2 GM/DL (ref 12–16)
IMM GRANULOCYTES # BLD AUTO: 0.02 K/UL (ref 0–0.04)
IMM GRANULOCYTES NFR BLD AUTO: 0.3 % (ref 0–0.5)
INDIRECT COOMBS: NORMAL
INFLUENZA A MOLECULAR (OHS): NEGATIVE
INFLUENZA B MOLECULAR (OHS): NEGATIVE
INR PPP: 1.1 (ref 0.8–1.2)
LDLC SERPL CALC-MCNC: 69 MG/DL (ref 63–159)
LYMPHOCYTES # BLD AUTO: 1.1 K/UL (ref 1–4.8)
MAGNESIUM SERPL-MCNC: 1.7 MG/DL (ref 1.6–2.6)
MCH RBC QN AUTO: 33.4 PG (ref 27–31)
MCHC RBC AUTO-ENTMCNC: 32.4 G/DL (ref 32–36)
MCV RBC AUTO: 103 FL (ref 82–98)
NONHDLC SERPL-MCNC: 82 MG/DL
NUCLEATED RBC (/100WBC) (SMH): 0 /100 WBC
PLATELET # BLD AUTO: 203 K/UL (ref 150–450)
PMV BLD AUTO: 10.6 FL (ref 9.2–12.9)
POTASSIUM SERPL-SCNC: 4 MMOL/L (ref 3.5–5.1)
PROT SERPL-MCNC: 7.1 GM/DL (ref 6–8.4)
PROTHROMBIN TIME: 12.1 SECONDS (ref 9–12.5)
RBC # BLD AUTO: 3.35 M/UL (ref 4–5.4)
RELATIVE EOSINOPHIL (SMH): 1.9 % (ref 0–8)
RELATIVE LYMPHOCYTE (SMH): 17.6 % (ref 18–48)
RELATIVE MONOCYTE (SMH): 11 % (ref 4–15)
RELATIVE NEUTROPHIL (SMH): 68.6 % (ref 38–73)
RH BLD: NORMAL
SARS-COV-2 RDRP RESP QL NAA+PROBE: NEGATIVE
SODIUM SERPL-SCNC: 137 MMOL/L (ref 136–145)
SPECIMEN OUTDATE: NORMAL
TRIGL SERPL-MCNC: 65 MG/DL (ref 30–150)
TROPONIN HIGH SENSITIVE (SMH): 48.3 PG/ML
TROPONIN HIGH SENSITIVE (SMH): 52 PG/ML
TROPONIN HIGH SENSITIVE (SMH): 52.4 PG/ML
WBC # BLD AUTO: 6.25 K/UL (ref 3.9–12.7)

## 2025-05-15 PROCEDURE — 96372 THER/PROPH/DIAG INJ SC/IM: CPT | Performed by: NURSE PRACTITIONER

## 2025-05-15 PROCEDURE — 83735 ASSAY OF MAGNESIUM: CPT | Performed by: NURSE PRACTITIONER

## 2025-05-15 PROCEDURE — 27000221 HC OXYGEN, UP TO 24 HOURS

## 2025-05-15 PROCEDURE — 96374 THER/PROPH/DIAG INJ IV PUSH: CPT

## 2025-05-15 PROCEDURE — 83036 HEMOGLOBIN GLYCOSYLATED A1C: CPT | Performed by: NURSE PRACTITIONER

## 2025-05-15 PROCEDURE — 63600175 PHARM REV CODE 636 W HCPCS: Performed by: EMERGENCY MEDICINE

## 2025-05-15 PROCEDURE — U0002 COVID-19 LAB TEST NON-CDC: HCPCS | Performed by: EMERGENCY MEDICINE

## 2025-05-15 PROCEDURE — 99900035 HC TECH TIME PER 15 MIN (STAT)

## 2025-05-15 PROCEDURE — 87502 INFLUENZA DNA AMP PROBE: CPT | Performed by: EMERGENCY MEDICINE

## 2025-05-15 PROCEDURE — 36415 COLL VENOUS BLD VENIPUNCTURE: CPT | Performed by: INTERNAL MEDICINE

## 2025-05-15 PROCEDURE — 80061 LIPID PANEL: CPT | Performed by: NURSE PRACTITIONER

## 2025-05-15 PROCEDURE — 99285 EMERGENCY DEPT VISIT HI MDM: CPT | Mod: 25

## 2025-05-15 PROCEDURE — 85730 THROMBOPLASTIN TIME PARTIAL: CPT | Performed by: NURSE PRACTITIONER

## 2025-05-15 PROCEDURE — 83880 ASSAY OF NATRIURETIC PEPTIDE: CPT | Performed by: EMERGENCY MEDICINE

## 2025-05-15 PROCEDURE — 84484 ASSAY OF TROPONIN QUANT: CPT | Mod: 91 | Performed by: NURSE PRACTITIONER

## 2025-05-15 PROCEDURE — 25000003 PHARM REV CODE 250: Performed by: EMERGENCY MEDICINE

## 2025-05-15 PROCEDURE — G0378 HOSPITAL OBSERVATION PER HR: HCPCS

## 2025-05-15 PROCEDURE — 86850 RBC ANTIBODY SCREEN: CPT | Performed by: NURSE PRACTITIONER

## 2025-05-15 PROCEDURE — 80053 COMPREHEN METABOLIC PANEL: CPT | Performed by: EMERGENCY MEDICINE

## 2025-05-15 PROCEDURE — 85025 COMPLETE CBC W/AUTO DIFF WBC: CPT | Performed by: EMERGENCY MEDICINE

## 2025-05-15 PROCEDURE — 84484 ASSAY OF TROPONIN QUANT: CPT | Performed by: EMERGENCY MEDICINE

## 2025-05-15 PROCEDURE — 36415 COLL VENOUS BLD VENIPUNCTURE: CPT | Performed by: NURSE PRACTITIONER

## 2025-05-15 PROCEDURE — 25000003 PHARM REV CODE 250: Performed by: NURSE PRACTITIONER

## 2025-05-15 PROCEDURE — 85610 PROTHROMBIN TIME: CPT | Performed by: NURSE PRACTITIONER

## 2025-05-15 PROCEDURE — 63600175 PHARM REV CODE 636 W HCPCS: Performed by: NURSE PRACTITIONER

## 2025-05-15 PROCEDURE — 94761 N-INVAS EAR/PLS OXIMETRY MLT: CPT

## 2025-05-15 PROCEDURE — 99900031 HC PATIENT EDUCATION (STAT)

## 2025-05-15 RX ORDER — ATORVASTATIN CALCIUM 40 MG/1
80 TABLET, FILM COATED ORAL DAILY
Status: DISCONTINUED | OUTPATIENT
Start: 2025-05-15 | End: 2025-05-18 | Stop reason: HOSPADM

## 2025-05-15 RX ORDER — GLUCAGON 1 MG
1 KIT INJECTION
Status: DISCONTINUED | OUTPATIENT
Start: 2025-05-15 | End: 2025-05-18 | Stop reason: HOSPADM

## 2025-05-15 RX ORDER — NAPROXEN SODIUM 220 MG/1
81 TABLET, FILM COATED ORAL DAILY
Status: DISCONTINUED | OUTPATIENT
Start: 2025-05-16 | End: 2025-05-15

## 2025-05-15 RX ORDER — PANTOPRAZOLE SODIUM 40 MG/1
40 TABLET, DELAYED RELEASE ORAL DAILY
Status: DISCONTINUED | OUTPATIENT
Start: 2025-05-16 | End: 2025-05-18 | Stop reason: HOSPADM

## 2025-05-15 RX ORDER — SODIUM CHLORIDE 0.9 % (FLUSH) 0.9 %
10 SYRINGE (ML) INJECTION
Status: DISCONTINUED | OUTPATIENT
Start: 2025-05-15 | End: 2025-05-18 | Stop reason: HOSPADM

## 2025-05-15 RX ORDER — FUROSEMIDE 10 MG/ML
20 INJECTION INTRAMUSCULAR; INTRAVENOUS EVERY 12 HOURS
Status: DISCONTINUED | OUTPATIENT
Start: 2025-05-15 | End: 2025-05-15

## 2025-05-15 RX ORDER — ASPIRIN 81 MG/1
81 TABLET ORAL DAILY
Status: DISCONTINUED | OUTPATIENT
Start: 2025-05-16 | End: 2025-05-18 | Stop reason: HOSPADM

## 2025-05-15 RX ORDER — NITROGLYCERIN 20 MG/G
1 OINTMENT TOPICAL
Status: DISPENSED | OUTPATIENT
Start: 2025-05-15 | End: 2025-05-15

## 2025-05-15 RX ORDER — NITROGLYCERIN 0.4 MG/1
0.4 TABLET SUBLINGUAL EVERY 5 MIN PRN
Status: DISCONTINUED | OUTPATIENT
Start: 2025-05-15 | End: 2025-05-18 | Stop reason: HOSPADM

## 2025-05-15 RX ORDER — LEVOTHYROXINE SODIUM 100 UG/1
100 TABLET ORAL
Status: DISCONTINUED | OUTPATIENT
Start: 2025-05-16 | End: 2025-05-18 | Stop reason: HOSPADM

## 2025-05-15 RX ORDER — ACETAMINOPHEN 325 MG/1
650 TABLET ORAL EVERY 4 HOURS PRN
Status: DISCONTINUED | OUTPATIENT
Start: 2025-05-15 | End: 2025-05-18 | Stop reason: HOSPADM

## 2025-05-15 RX ORDER — FUROSEMIDE 10 MG/ML
20 INJECTION INTRAMUSCULAR; INTRAVENOUS
Status: COMPLETED | OUTPATIENT
Start: 2025-05-15 | End: 2025-05-15

## 2025-05-15 RX ORDER — TALC
9 POWDER (GRAM) TOPICAL NIGHTLY PRN
Status: DISCONTINUED | OUTPATIENT
Start: 2025-05-15 | End: 2025-05-18 | Stop reason: HOSPADM

## 2025-05-15 RX ORDER — FUROSEMIDE 20 MG/1
20 TABLET ORAL DAILY
Status: DISCONTINUED | OUTPATIENT
Start: 2025-05-16 | End: 2025-05-15

## 2025-05-15 RX ORDER — SIMETHICONE 125 MG
125 TABLET,CHEWABLE ORAL EVERY 6 HOURS PRN
COMMUNITY

## 2025-05-15 RX ORDER — SODIUM,POTASSIUM PHOSPHATES 280-250MG
2 POWDER IN PACKET (EA) ORAL
Status: DISCONTINUED | OUTPATIENT
Start: 2025-05-15 | End: 2025-05-18 | Stop reason: HOSPADM

## 2025-05-15 RX ORDER — ALUMINUM HYDROXIDE, MAGNESIUM HYDROXIDE, AND SIMETHICONE 1200; 120; 1200 MG/30ML; MG/30ML; MG/30ML
30 SUSPENSION ORAL ONCE
Status: COMPLETED | OUTPATIENT
Start: 2025-05-15 | End: 2025-05-15

## 2025-05-15 RX ORDER — IBUPROFEN 200 MG
16 TABLET ORAL
Status: DISCONTINUED | OUTPATIENT
Start: 2025-05-15 | End: 2025-05-18 | Stop reason: HOSPADM

## 2025-05-15 RX ORDER — ENOXAPARIN SODIUM 100 MG/ML
40 INJECTION SUBCUTANEOUS EVERY 24 HOURS
Status: DISCONTINUED | OUTPATIENT
Start: 2025-05-15 | End: 2025-05-16

## 2025-05-15 RX ORDER — IBUPROFEN 200 MG
200 TABLET ORAL EVERY 6 HOURS PRN
COMMUNITY

## 2025-05-15 RX ORDER — LANOLIN ALCOHOL/MO/W.PET/CERES
800 CREAM (GRAM) TOPICAL
Status: DISCONTINUED | OUTPATIENT
Start: 2025-05-15 | End: 2025-05-18 | Stop reason: HOSPADM

## 2025-05-15 RX ORDER — ONDANSETRON HYDROCHLORIDE 2 MG/ML
4 INJECTION, SOLUTION INTRAVENOUS EVERY 6 HOURS PRN
Status: DISCONTINUED | OUTPATIENT
Start: 2025-05-15 | End: 2025-05-18 | Stop reason: HOSPADM

## 2025-05-15 RX ORDER — LIDOCAINE HYDROCHLORIDE 20 MG/ML
15 SOLUTION OROPHARYNGEAL ONCE
Status: COMPLETED | OUTPATIENT
Start: 2025-05-15 | End: 2025-05-15

## 2025-05-15 RX ORDER — NALOXONE HCL 0.4 MG/ML
0.02 VIAL (ML) INJECTION
Status: DISCONTINUED | OUTPATIENT
Start: 2025-05-15 | End: 2025-05-18 | Stop reason: HOSPADM

## 2025-05-15 RX ORDER — FUROSEMIDE 20 MG/1
20 TABLET ORAL DAILY
Status: DISCONTINUED | OUTPATIENT
Start: 2025-05-16 | End: 2025-05-16

## 2025-05-15 RX ORDER — ALUMINUM HYDROXIDE, MAGNESIUM HYDROXIDE, AND SIMETHICONE 1200; 120; 1200 MG/30ML; MG/30ML; MG/30ML
30 SUSPENSION ORAL
Status: DISCONTINUED | OUTPATIENT
Start: 2025-05-16 | End: 2025-05-16

## 2025-05-15 RX ORDER — IBUPROFEN 200 MG
24 TABLET ORAL
Status: DISCONTINUED | OUTPATIENT
Start: 2025-05-15 | End: 2025-05-18 | Stop reason: HOSPADM

## 2025-05-15 RX ORDER — AMOXICILLIN 250 MG
1 CAPSULE ORAL 2 TIMES DAILY PRN
Status: DISCONTINUED | OUTPATIENT
Start: 2025-05-15 | End: 2025-05-18 | Stop reason: HOSPADM

## 2025-05-15 RX ORDER — ASPIRIN 325 MG
325 TABLET ORAL
Status: COMPLETED | OUTPATIENT
Start: 2025-05-15 | End: 2025-05-15

## 2025-05-15 RX ORDER — HYDROCODONE BITARTRATE AND ACETAMINOPHEN 5; 325 MG/1; MG/1
1 TABLET ORAL EVERY 6 HOURS PRN
Refills: 0 | Status: DISCONTINUED | OUTPATIENT
Start: 2025-05-15 | End: 2025-05-18 | Stop reason: HOSPADM

## 2025-05-15 RX ORDER — POLYETHYLENE GLYCOL 3350 17 G/17G
17 POWDER, FOR SOLUTION ORAL DAILY
COMMUNITY

## 2025-05-15 RX ADMIN — LIDOCAINE HYDROCHLORIDE 15 ML: 20 SOLUTION ORAL at 04:05

## 2025-05-15 RX ADMIN — ASPIRIN 325 MG ORAL TABLET 325 MG: 325 PILL ORAL at 12:05

## 2025-05-15 RX ADMIN — FUROSEMIDE 20 MG: 10 INJECTION, SOLUTION INTRAMUSCULAR; INTRAVENOUS at 06:05

## 2025-05-15 RX ADMIN — ATORVASTATIN CALCIUM 80 MG: 40 TABLET, FILM COATED ORAL at 04:05

## 2025-05-15 RX ADMIN — ENOXAPARIN SODIUM 40 MG: 40 INJECTION SUBCUTANEOUS at 04:05

## 2025-05-15 RX ADMIN — ALUMINUM HYDROXIDE, MAGNESIUM HYDROXIDE, AND DIMETHICONE 30 ML: 200; 20; 200 SUSPENSION ORAL at 04:05

## 2025-05-15 NOTE — ASSESSMENT & PLAN NOTE
Patient's blood pressure range in the last 24 hours was: BP  Min: 138/77  Max: 172/92.The patient's inpatient anti-hypertensive regimen is listed below:  Current Antihypertensives  nitroGLYCERIN 2% TD oint ointment 1 inch, ED 1 Time, Topical (Top)  furosemide injection 20 mg, ED 1 Time, Intravenous  nitroGLYCERIN SL tablet 0.4 mg, Every 5 min PRN, Sublingual  furosemide injection 20 mg, Every 12 hours, Intravenous    Plan  - BP is controlled, no changes needed to their regimen

## 2025-05-15 NOTE — HPI
Andressa Benedict is a 89 y.o. year-old patient with history of  has a past medical history of Atrial fibrillation, Bradycardia, Carotid bruit, CKD (chronic kidney disease), stage III, Hyperlipidemia, OA (osteoarthritis), and Thyroid disease. who presented to the ED with c/o shortness of breath and chest pain.  Patient states that over the last 3 weeks she has gotten progressively worsening shortness of breath especially with activities.  She states that for the last week she has had irregular pounding heart beats.  Patient also reports that in the last 2-3 days she has begun with a chest tightness that typically occurs about 30 minutes after eating and occurs in waves each wave lasting 7 seconds.  Patient with intermittent irregular heart beat from 30s to 70s, patient denies EKG awaiting a room.  Patient to be admitted for further evaluation and treatment.    Triage vitals with pulse 91, /92, temp 98.1, SpO2 91 percent on room air.  Blood work performed in the ED with hemoglobin 11.2, hematocrit 34.6, glucose 113, BUN 37, creatinine 1.6, GFR 31, BNP 3015, initial troponin 48.3.  Chest x-ray with cardiomegaly and findings of mild interstitial pulmonary edema.

## 2025-05-15 NOTE — ED PROVIDER NOTES
Encounter Date: 5/15/2025       History     Chief Complaint   Patient presents with    Shortness of Breath    Chest Pain     Pt c/o sob and fast beating heart with some chest pains, she states all her vitals at home are good. She refused to do an EKG until she gets a room due to not having on underpants.      89-year-old female presented emergency department with complaints of chest tightness and shortness of breath.  Patient states she is short of breath and is needing more oxygen and has to sit up more as she is more short of breath when she lays down.  Denies fever or chills or nausea vomiting or abdominal pain.  Denies any focal weakness however has generalized malaise and weakness.  Patient has history of hypertension.  Also has history of atrial fibrillation.  Denies any dysuria or hematuria      Review of patient's allergies indicates:   Allergen Reactions    Penicillins Anaphylaxis     Past Medical History:   Diagnosis Date    Atrial fibrillation     Bradycardia     Carotid bruit     CKD (chronic kidney disease), stage III     Hyperlipidemia     OA (osteoarthritis)     Thyroid disease      Past Surgical History:   Procedure Laterality Date    HYSTERECTOMY      KNEE SURGERY Right     SHOULDER SURGERY Right      Family History   Problem Relation Name Age of Onset    Heart disease Father       Social History[1]  Review of Systems   Constitutional: Negative.    HENT: Negative.     Eyes: Negative.    Respiratory:  Positive for shortness of breath.    Cardiovascular:  Positive for chest pain.   Gastrointestinal: Negative.    Endocrine: Negative.    Genitourinary: Negative.    Musculoskeletal: Negative.    Skin: Negative.    Allergic/Immunologic: Negative.    Neurological: Negative.    Hematological: Negative.    Psychiatric/Behavioral: Negative.     All other systems reviewed and are negative.      Physical Exam     Initial Vitals [05/15/25 1103]   BP Pulse Resp Temp SpO2   (!) 172/92 91 20 98.1 °F (36.7 °C) (!)  91 %      MAP       --         Physical Exam    Nursing note and vitals reviewed.  Constitutional: She appears well-developed and well-nourished.   HENT:   Head: Normocephalic and atraumatic.   Nose: Nose normal. Mouth/Throat: Oropharynx is clear and moist.   Eyes: Conjunctivae and EOM are normal. Pupils are equal, round, and reactive to light.   Neck: Neck supple. No thyromegaly present. No tracheal deviation present. No JVD present.   Normal range of motion.  Cardiovascular:  Normal rate, normal heart sounds and intact distal pulses.           No murmur heard.  Irregular rhythm   Pulmonary/Chest: No stridor. No respiratory distress. She has no wheezes. She has rales.   Abdominal: Abdomen is soft. Bowel sounds are normal. There is no abdominal tenderness.   Musculoskeletal:         General: Edema present. Normal range of motion.      Cervical back: Normal range of motion and neck supple.     Neurological: She is alert and oriented to person, place, and time. GCS score is 15. GCS eye subscore is 4. GCS verbal subscore is 5. GCS motor subscore is 6.   Skin: Skin is warm. Capillary refill takes less than 2 seconds.   Psychiatric: She has a normal mood and affect. Thought content normal.         ED Course   Procedures  Labs Reviewed   COMPREHENSIVE METABOLIC PANEL - Abnormal       Result Value    Sodium 137      Potassium 4.0      Chloride 100      CO2 28      Glucose 113 (*)     BUN 37 (*)     Creatinine 1.6 (*)     Calcium 9.8      Protein Total 7.1      Albumin 4.0      Bilirubin Total 0.9      ALP 56      AST 15      ALT 14      Anion Gap 9      eGFR 31 (*)    TROPONIN I HIGH SENSITIVITY - Abnormal    Troponin High Sensitive 48.3 (*)    B-TYPE NATRIURETIC PEPTIDE - Abnormal    BNP 3,015 (*)    CBC WITH DIFFERENTIAL - Abnormal    WBC 6.25      RBC 3.35 (*)     Hgb 11.2 (*)     Hct 34.6 (*)      (*)     MCH 33.4 (*)     MCHC 32.4      RDW 14.7 (*)     Platelet Count 203      MPV 10.6      Nucleated RBC 0       Neut % 68.6      Lymph % 17.6 (*)     Mono % 11.0      Eos % 1.9      Basophil % 0.6      Imm Grans % 0.3      Neut # 4.3      Lymph # 1.10      Mono # 0.69      Eos # 0.12      Baso # 0.04      Imm Grans # 0.02     INFLUENZA A & B BY MOLECULAR - Normal    INFLUENZA A MOLECULAR Negative      INFLUENZA B MOLECULAR  Negative     SARS-COV-2 RNA AMPLIFICATION, QUAL - Normal    SARS COV-2 Molecular Negative     CBC W/ AUTO DIFFERENTIAL    Narrative:     The following orders were created for panel order CBC auto differential.  Procedure                               Abnormality         Status                     ---------                               -----------         ------                     CBC with Differential[[id number]]       Abnormal            Final result                 Please view results for these tests on the individual orders.     EKG Readings: (Independently Interpreted)   Initial Reading: No STEMI. Rhythm: Normal Sinus Rhythm. Ectopy: PVCs Bigeminy. Conduction: Normal.       Imaging Results              X-Ray Chest AP Portable (Final result)  Result time 05/15/25 11:28:32      Final result by Krish Antunez MD (05/15/25 11:28:32)                   Impression:      Cardiomegaly and findings of mild interstitial pulmonary edema.      Electronically signed by: Krish Antunez  Date:    05/15/2025  Time:    11:28               Narrative:    CLINICAL HISTORY:  ([id number] y/o  (9/23/1934) F    CHF;    TECHNIQUE:  (A#[id number], exam time 5/15/2025 11:25)    XR CHEST AP PORTABLE [id number]    COMPARISON:  Radiograph from 10/11/2024    FINDINGS:  Perihilar pulmonary vascular prominence with mildly increased central hilar interstitial lung markings bilaterally.  There is blunting of both costophrenic angles consistent with trace pleural effusions and adjacent atelectasis. No pneumothorax is identified. Moderate to severe cardiopericardial silhouette enlargement. Atheromatous calcifications are seen at the  aortic arch. Osseous structures show degenerative disc disease and degenerative changes in the shoulders. The visualized upper abdomen is unremarkable.                                    X-Rays:   Independently Interpreted Readings:   Other Readings:  Chest x-ray shows cardiomegaly and pulmonary vascular congestion    Medications   nitroGLYCERIN 2% TD oint ointment 1 inch (1 inch Topical (Top) Not Given 5/15/25 1115)   furosemide injection 20 mg (has no administration in time range)   aspirin tablet 325 mg (325 mg Oral Given by Other 5/15/25 1232)     Medical Decision Making  89-year-old female with chest pain and shortness of breath and orthopnea and presentation consistent with CHF exacerbation.  Aspirin and nitroglycerin given.  Lasix given.  Patient does have improvement of symptoms while in the emergency department.  Elevated troponin and BNP noted and given patient's presentation and evidence of pulmonary edema noted, Hospital Medicine consulted for evaluation for further management and treatment.    Amount and/or Complexity of Data Reviewed  Labs: ordered. Decision-making details documented in ED Course.  Radiology: ordered. Decision-making details documented in ED Course.  ECG/medicine tests:  Decision-making details documented in ED Course.    Risk  OTC drugs.  Prescription drug management.  Decision regarding hospitalization.                                      Clinical Impression:  Final diagnoses:  [R06.02] Shortness of breath  [R07.9] Chest pain, unspecified type (Primary)  [I50.9] Acute on chronic congestive heart failure, unspecified heart failure type          ED Disposition Condition    Admit                       [1]   Social History  Tobacco Use    Smoking status: Never    Smokeless tobacco: Never   Substance Use Topics    Alcohol use: Never    Drug use: Never        China Mathews MD  05/15/25 4803

## 2025-05-15 NOTE — ED NOTES
There is Lasix ordered, pt is in the back hallway no where near a bathroom or suction available for a Versette external catheter

## 2025-05-15 NOTE — ASSESSMENT & PLAN NOTE
Patient has paroxysmal (<7 days) atrial fibrillation. Patient is currently in sinus on telemetry however patient declining 12 lead until in room. [id number] Score: 4. The patients heart rate in the last 24 hours is as follows:  Pulse  Min: 74  Max: 91     Antiarrhythmics   Summary patient is not currently on anti arrhythmics    Anticoagulants  enoxaparin injection 40 mg, Every 24 hours, Subcutaneous    Plan  - Replete lytes with a goal of K>4, Mg >2  - Patient is anticoagulated, see medications listed above.  - Patient's afib is currently controlled

## 2025-05-15 NOTE — ASSESSMENT & PLAN NOTE
Trend troponin  Give  mg  Continue ASA 81 mg  Telemetry  Echocardiogram ordered  EKG PRN  Nitroglycerin 0.4 mcg SL PRN  Consult cardiology for further risk stratification with non-invasive stress test.

## 2025-05-15 NOTE — H&P
[facility] - Emergency Dept  Hospital Medicine  History & Physical    Patient Name: Andressa Benedict  MRN: [id number]  Patient Class: OP- Observation  Admission Date: 5/15/2025  Attending Physician: Miguel Pickett MD   Primary Care Provider: Reina Primary Doctor         Patient information was obtained from patient and ER records.     Subjective:     Principal Problem:<principal problem not specified>    Chief Complaint:   Chief Complaint   Patient presents with    Shortness of Breath    Chest Pain     Pt c/o sob and fast beating heart with some chest pains, she states all her vitals at home are good. She refused to do an EKG until she gets a room due to not having on underpants.         HPI: Andressa Benedict is a 89 y.o. year-old patient with history of  has a past medical history of Atrial fibrillation, Bradycardia, Carotid bruit, CKD (chronic kidney disease), stage III, Hyperlipidemia, OA (osteoarthritis), and Thyroid disease. who presented to the ED with c/o shortness of breath and chest pain.  Patient states that over the last 3 weeks she has gotten progressively worsening shortness of breath especially with activities.  She states that for the last week she has had irregular pounding heart beats.  Patient also reports that in the last 2-3 days she has begun with a chest tightness that typically occurs about 30 minutes after eating and occurs in waves each wave lasting 7 seconds.  Patient with intermittent irregular heart beat from 30s to 70s, patient denies EKG awaiting a room.  Patient to be admitted for further evaluation and treatment.    Triage vitals with pulse 91, /92, temp 98.1, SpO2 91 percent on room air.  Blood work performed in the ED with hemoglobin 11.2, hematocrit 34.6, glucose 113, BUN 37, creatinine 1.6, GFR 31, BNP 3015, initial troponin 48.3.  Chest x-ray with cardiomegaly and findings of mild interstitial pulmonary edema.      Past Medical History:   Diagnosis Date     Atrial fibrillation     Bradycardia     Carotid bruit     CKD (chronic kidney disease), stage III     Hyperlipidemia     OA (osteoarthritis)     Thyroid disease        Past Surgical History:   Procedure Laterality Date    HYSTERECTOMY      KNEE SURGERY Right     SHOULDER SURGERY Right        Review of patient's allergies indicates:   Allergen Reactions    Penicillins Anaphylaxis       No current facility-administered medications on file prior to encounter.     Current Outpatient Medications on File Prior to Encounter   Medication Sig    ascorbic acid, vitamin C, (VITAMIN C) 250 mg Chew Take 1 tablet by mouth Daily.    aspirin (ECOTRIN) 81 MG EC tablet Take 81 mg by mouth once daily.    betaxolol 0.5% (BETOPTIC-S) 0.5 % Drop Place 1 drop into both eyes 2 (two) times daily.    coenzyme Q10 (CO Q-10) 100 mg capsule Take 100 mg by mouth once daily.    docosahexaenoic acid/epa (FISH OIL ORAL) Take 1 capsule by mouth Daily.    ENTRESTO 24-26 mg per tablet TAKE 1 TABLET BY MOUTH TWICE DAILY    ergocalciferol, vitamin D2, (VITAMIN D ORAL) Take 1 tablet by mouth Daily.    FLAXSEED OIL ORAL Take 1 capsule by mouth Daily.    furosemide (LASIX) 40 MG tablet Take 1 tablet (40 mg total) by mouth once daily.    glucosam/msm/chond/lqt552/hyal (GLUCOS-CHOND-MSM, WITH ANTIOX, ORAL) Take 1 capsule by mouth Daily.    hydrALAZINE (APRESOLINE) 50 MG tablet Take 1 tablet (50 mg total) by mouth every 8 (eight) hours.    ibuprofen (ADVIL,MOTRIN) 200 MG tablet Take 200 mg by mouth every 6 (six) hours as needed for Pain.    levothyroxine (SYNTHROID) 100 MCG tablet TAKE 1 TABLET BY MOUTH EVERY DAY (Patient taking differently: Take 100 mcg by mouth once daily.)    multivitamin (THERAGRAN) per tablet Take 1 tablet by mouth once daily.    polyethylene glycol (GLYCOLAX) 17 gram/dose powder Take 17 g by mouth once daily.    simethicone (MYLICON) 125 MG chewable tablet Take 125 mg by mouth every 6 (six) hours as needed for Flatulence.    UNABLE TO  FIND Take 1 tablet by mouth once daily. medication name: Prevagen supplement     Family History       Problem Relation (Age of Onset)    Heart disease Father          Tobacco Use    Smoking status: Never    Smokeless tobacco: Never   Substance and Sexual Activity    Alcohol use: Never    Drug use: Never    Sexual activity: Not on file     Review of Systems   Respiratory:  Positive for shortness of breath.    Cardiovascular:  Positive for chest pain.     Objective:     Vital Signs (Most Recent):  Temp: 98.1 °F (36.7 °C) (05/15/25 1103)  Pulse: 74 (05/15/25 1420)  Resp: 16 (05/15/25 1420)  BP: (!) 156/60 (05/15/25 1420)  SpO2: (!) 88 % (05/15/25 1420) Vital Signs (24h Range):  Temp:  [98.1 °F (36.7 °C)] 98.1 °F (36.7 °C)  Pulse:  [74-91] 74  Resp:  [16-20] 16  SpO2:  [88 %-91 %] 88 %  BP: (138-172)/(60-92) 156/60     Weight: 68.9 kg (152 lb)  Body mass index is 29.69 kg/m².     Physical Exam  Vitals reviewed.   Constitutional:       General: She is not in acute distress.     Appearance: Normal appearance. She is not toxic-appearing.   HENT:      Head: Normocephalic and atraumatic.      Mouth/Throat:      Mouth: Mucous membranes are moist.      Pharynx: Oropharynx is clear.   Eyes:      Extraocular Movements: Extraocular movements intact.      Pupils: Pupils are equal, round, and reactive to light.   Cardiovascular:      Rate and Rhythm: Bradycardia present. Rhythm irregular.      Pulses: Normal pulses.      Heart sounds: Normal heart sounds.   Pulmonary:      Effort: Pulmonary effort is normal.      Breath sounds: Normal breath sounds.   Abdominal:      General: Bowel sounds are normal. There is no distension.      Palpations: Abdomen is soft.      Tenderness: There is no abdominal tenderness.   Musculoskeletal:         General: No swelling. Normal range of motion.      Cervical back: Normal range of motion and neck supple.      Right lower leg: Edema present.      Left lower leg: Edema present.   Skin:     General:  Skin is warm and dry.      Capillary Refill: Capillary refill takes less than 2 seconds.   Neurological:      General: No focal deficit present.      Mental Status: She is alert and oriented to person, place, and time. Mental status is at baseline.   Psychiatric:         Mood and Affect: Mood normal.         Behavior: Behavior normal.         Judgment: Judgment normal.              CRANIAL NERVES     CN III, IV, VI   Pupils are equal, round, and reactive to light.       Significant Labs: All pertinent labs within the past 24 hours have been reviewed.  Recent Lab Results         05/15/25  1155        Influenza A, Molecular Negative       Influenza B, Molecular Negative       Albumin 4.0       ALP 56       ALT 14       Anion Gap 9       AST 15       Baso # 0.04       Basophil % 0.6       BILIRUBIN TOTAL 0.9  Comment: For infants and newborns, interpretation of results should be based   on gestational age, weight and in agreement with clinical   observations.    Premature Infant recommended reference ranges:   0-24 hours:  <8.0 mg/dL   24-48 hours: <12.0 mg/dL   3-5 days:    <15.0 mg/dL   6-29 days:   <15.0 mg/dL       BNP 3,015  Comment: Values of less than 100 pg/ml are consistent with non-CHF populations.       BUN 37       Calcium 9.8       Chloride 100       CO2 28       SARS COV-2 MOLECULAR Negative  Comment: This test utilizes isothermal nucleic acid amplification technology to detect the SARS-CoV-2 RdRp nucleic acid segment. The analytical sensitivity (limit of detection) is 500 copies/swab.     A POSITIVE result is indicative of the presence of SARS-CoV-2 RNA; clinical correlation with patient history and other diagnostic information is necessary to determine patient infection status.     A NEGATIVE result means that SARS-CoV-2 nucleic acids are not present above the limit of detection. A NEGATIVE result should be treated as presumptive. It does not rule out the possibility of COVID-19 and should not be the  sole basis for treatment decisions. If COVID-19 is strongly suspected based on clinical and exposure history, re-testing using an alternate molecular assay should be considered.     This test is FDA approved.     Performance characteristics of this test has been independently verified by [facility] in conjunction with [facility] Department of Pathology and Laboratory Medicine.          Creatinine 1.6       eGFR 31       Eos # 0.12       Eos % 1.9       Glucose 113       Hematocrit 34.6       Hemoglobin 11.2       Immature Grans (Abs) 0.02  Comment: Mild elevation in immature granulocytes is non specific and can be seen in a variety of conditions including stress response, acute inflammation, trauma and pregnancy. Correlation with other laboratory and clinical findings is essential.       Immature Granulocytes 0.3       Lymph # 1.10       LYMPH % 17.6       MCH 33.4       MCHC 32.4              Mono # 0.69       Mono % 11.0       MPV 10.6       Neut # 4.3       Neut % 68.6       nRBC 0       Platelet Count 203       Potassium 4.0       PROTEIN TOTAL 7.1       RBC 3.35       RDW 14.7       Sodium 137       Troponin I High Sensitivity 48.3  Comment: Troponin results differ between methods. Do not use   results between Troponin methods interchangeably.    Alkaline Phospatase levels above 400 U/L may   cause false positive results.    Access hsTnI should not be used for patients taking   Asfotase isaac (Strensiq).       WBC 6.25               Significant Imaging: I have reviewed all pertinent imaging results/findings within the past 24 hours.    X-Ray Chest AP Portable  Narrative: CLINICAL HISTORY:  ([id number])89 y/o  (9/23/1934) F    CHF;    TECHNIQUE:  (A#[id number], exam time 5/15/2025 11:25)    XR CHEST AP PORTABLE [id number]    COMPARISON:  Radiograph from 10/11/2024    FINDINGS:  Perihilar pulmonary vascular prominence with mildly increased central hilar interstitial lung markings  bilaterally.  There is blunting of both costophrenic angles consistent with trace pleural effusions and adjacent atelectasis. No pneumothorax is identified. Moderate to severe cardiopericardial silhouette enlargement. Atheromatous calcifications are seen at the aortic arch. Osseous structures show degenerative disc disease and degenerative changes in the shoulders. The visualized upper abdomen is unremarkable.  Impression: Cardiomegaly and findings of mild interstitial pulmonary edema.    Electronically signed by: Krish Antunez  Date:    05/15/2025  Time:    11:28       Assessment/Plan:     Assessment & Plan  Acute heart failure with preserved ejection fraction (HFpEF)  Supplemental O2 via nasal canula; titrate O2 saturation to >92%.  Serial Cardiac enzymes × 3.  Diuretic administration. Monitor electrolytes for hypokalemia and hypomagnesemia.  Cardiology Consultation.  2-D Echocardiogram for evaluation of left ventricle systolic function or any valvular heart disease.  Daily weights.  Strict I/Os.  Fluid restriction.  Na restriction <2g/d.    CKD (chronic kidney disease), stage III  Creatine stable for now. BMP reviewed- noted Estimated Creatinine Clearance: 20.3 mL/min (A) (based on SCr of 1.6 mg/dL (H)). according to latest data. Based on current GFR, CKD stage is stage 3 - GFR 30-59.  Monitor UOP and serial BMP and adjust therapy as needed. Renally dose meds. Avoid nephrotoxic medications and procedures.  Essential hypertension  Patient's blood pressure range in the last 24 hours was: BP  Min: 138/77  Max: 172/92.The patient's inpatient anti-hypertensive regimen is listed below:  Current Antihypertensives  nitroGLYCERIN 2% TD oint ointment 1 inch, ED 1 Time, Topical (Top)  furosemide injection 20 mg, ED 1 Time, Intravenous  nitroGLYCERIN SL tablet 0.4 mg, Every 5 min PRN, Sublingual  furosemide injection 20 mg, Every 12 hours, Intravenous    Plan  - BP is controlled, no changes needed to their  regimen  Paroxysmal A-fib  Patient has paroxysmal (<7 days) atrial fibrillation. Patient is currently in sinus on telemetry however patient declining 12 lead until in room. [id number] Score: 4. The patients heart rate in the last 24 hours is as follows:  Pulse  Min: 74  Max: 91     Antiarrhythmics   Summary patient is not currently on anti arrhythmics    Anticoagulants  enoxaparin injection 40 mg, Every 24 hours, Subcutaneous    Plan  - Replete lytes with a goal of K>4, Mg >2  - Patient is anticoagulated, see medications listed above.  - Patient's afib is currently controlled      Demand ischemia  Trend troponin  Give  mg  Continue ASA 81 mg  Telemetry  Echocardiogram ordered  EKG PRN  Nitroglycerin 0.4 mcg SL PRN  Consult cardiology for further risk stratification with non-invasive stress test.     VTE Risk Mitigation (From admission, onward)           Ordered     enoxaparin injection 40 mg  Daily         05/15/25 1436     IP VTE HIGH RISK PATIENT  Once         05/15/25 1435     Place sequential compression device  Until discontinued         05/15/25 1435                         On 05/15/2025, patient should be placed in hospital observation services under my care in collaboration with Tiana Bonilla NP  Department of Hospital Medicine  [facility] - Emergency Dept

## 2025-05-15 NOTE — SUBJECTIVE & OBJECTIVE
Past Medical History:   Diagnosis Date    Atrial fibrillation     Bradycardia     Carotid bruit     CKD (chronic kidney disease), stage III     Hyperlipidemia     OA (osteoarthritis)     Thyroid disease        Past Surgical History:   Procedure Laterality Date    HYSTERECTOMY      KNEE SURGERY Right     SHOULDER SURGERY Right        Review of patient's allergies indicates:   Allergen Reactions    Penicillins Anaphylaxis       No current facility-administered medications on file prior to encounter.     Current Outpatient Medications on File Prior to Encounter   Medication Sig    ascorbic acid, vitamin C, (VITAMIN C) 250 mg Chew Take 1 tablet by mouth Daily.    aspirin (ECOTRIN) 81 MG EC tablet Take 81 mg by mouth once daily.    betaxolol 0.5% (BETOPTIC-S) 0.5 % Drop Place 1 drop into both eyes 2 (two) times daily.    coenzyme Q10 (CO Q-10) 100 mg capsule Take 100 mg by mouth once daily.    docosahexaenoic acid/epa (FISH OIL ORAL) Take 1 capsule by mouth Daily.    ENTRESTO 24-26 mg per tablet TAKE 1 TABLET BY MOUTH TWICE DAILY    ergocalciferol, vitamin D2, (VITAMIN D ORAL) Take 1 tablet by mouth Daily.    FLAXSEED OIL ORAL Take 1 capsule by mouth Daily.    furosemide (LASIX) 40 MG tablet Take 1 tablet (40 mg total) by mouth once daily.    glucosam/msm/chond/emo870/hyal (GLUCOS-CHOND-MSM, WITH ANTIOX, ORAL) Take 1 capsule by mouth Daily.    hydrALAZINE (APRESOLINE) 50 MG tablet Take 1 tablet (50 mg total) by mouth every 8 (eight) hours.    ibuprofen (ADVIL,MOTRIN) 200 MG tablet Take 200 mg by mouth every 6 (six) hours as needed for Pain.    levothyroxine (SYNTHROID) 100 MCG tablet TAKE 1 TABLET BY MOUTH EVERY DAY (Patient taking differently: Take 100 mcg by mouth once daily.)    multivitamin (THERAGRAN) per tablet Take 1 tablet by mouth once daily.    polyethylene glycol (GLYCOLAX) 17 gram/dose powder Take 17 g by mouth once daily.    simethicone (MYLICON) 125 MG chewable tablet Take 125 mg by mouth every 6 (six)  hours as needed for Flatulence.    UNABLE TO FIND Take 1 tablet by mouth once daily. medication name: Prevagen supplement     Family History       Problem Relation (Age of Onset)    Heart disease Father          Tobacco Use    Smoking status: Never    Smokeless tobacco: Never   Substance and Sexual Activity    Alcohol use: Never    Drug use: Never    Sexual activity: Not on file     Review of Systems   Respiratory:  Positive for shortness of breath.    Cardiovascular:  Positive for chest pain.     Objective:     Vital Signs (Most Recent):  Temp: 98.1 °F (36.7 °C) (05/15/25 1103)  Pulse: 74 (05/15/25 1420)  Resp: 16 (05/15/25 1420)  BP: (!) 156/60 (05/15/25 1420)  SpO2: (!) 88 % (05/15/25 1420) Vital Signs (24h Range):  Temp:  [98.1 °F (36.7 °C)] 98.1 °F (36.7 °C)  Pulse:  [74-91] 74  Resp:  [16-20] 16  SpO2:  [88 %-91 %] 88 %  BP: (138-172)/(60-92) 156/60     Weight: 68.9 kg (152 lb)  Body mass index is 29.69 kg/m².     Physical Exam  Vitals reviewed.   Constitutional:       General: She is not in acute distress.     Appearance: Normal appearance. She is not toxic-appearing.   HENT:      Head: Normocephalic and atraumatic.      Mouth/Throat:      Mouth: Mucous membranes are moist.      Pharynx: Oropharynx is clear.   Eyes:      Extraocular Movements: Extraocular movements intact.      Pupils: Pupils are equal, round, and reactive to light.   Cardiovascular:      Rate and Rhythm: Bradycardia present. Rhythm irregular.      Pulses: Normal pulses.      Heart sounds: Normal heart sounds.   Pulmonary:      Effort: Pulmonary effort is normal.      Breath sounds: Normal breath sounds.   Abdominal:      General: Bowel sounds are normal. There is no distension.      Palpations: Abdomen is soft.      Tenderness: There is no abdominal tenderness.   Musculoskeletal:         General: No swelling. Normal range of motion.      Cervical back: Normal range of motion and neck supple.      Right lower leg: Edema present.      Left  lower leg: Edema present.   Skin:     General: Skin is warm and dry.      Capillary Refill: Capillary refill takes less than 2 seconds.   Neurological:      General: No focal deficit present.      Mental Status: She is alert and oriented to person, place, and time. Mental status is at baseline.   Psychiatric:         Mood and Affect: Mood normal.         Behavior: Behavior normal.         Judgment: Judgment normal.              CRANIAL NERVES     CN III, IV, VI   Pupils are equal, round, and reactive to light.       Significant Labs: All pertinent labs within the past 24 hours have been reviewed.  Recent Lab Results         05/15/25  1155        Influenza A, Molecular Negative       Influenza B, Molecular Negative       Albumin 4.0       ALP 56       ALT 14       Anion Gap 9       AST 15       Baso # 0.04       Basophil % 0.6       BILIRUBIN TOTAL 0.9  Comment: For infants and newborns, interpretation of results should be based   on gestational age, weight and in agreement with clinical   observations.    Premature Infant recommended reference ranges:   0-24 hours:  <8.0 mg/dL   24-48 hours: <12.0 mg/dL   3-5 days:    <15.0 mg/dL   6-29 days:   <15.0 mg/dL       BNP 3,015  Comment: Values of less than 100 pg/ml are consistent with non-CHF populations.       BUN 37       Calcium 9.8       Chloride 100       CO2 28       SARS COV-2 MOLECULAR Negative  Comment: This test utilizes isothermal nucleic acid amplification technology to detect the SARS-CoV-2 RdRp nucleic acid segment. The analytical sensitivity (limit of detection) is 500 copies/swab.     A POSITIVE result is indicative of the presence of SARS-CoV-2 RNA; clinical correlation with patient history and other diagnostic information is necessary to determine patient infection status.     A NEGATIVE result means that SARS-CoV-2 nucleic acids are not present above the limit of detection. A NEGATIVE result should be treated as presumptive. It does not rule out the  possibility of COVID-19 and should not be the sole basis for treatment decisions. If COVID-19 is strongly suspected based on clinical and exposure history, re-testing using an alternate molecular assay should be considered.     This test is FDA approved.     Performance characteristics of this test has been independently verified by [facility] in conjunction with [facility] Department of Pathology and Laboratory Medicine.          Creatinine 1.6       eGFR 31       Eos # 0.12       Eos % 1.9       Glucose 113       Hematocrit 34.6       Hemoglobin 11.2       Immature Grans (Abs) 0.02  Comment: Mild elevation in immature granulocytes is non specific and can be seen in a variety of conditions including stress response, acute inflammation, trauma and pregnancy. Correlation with other laboratory and clinical findings is essential.       Immature Granulocytes 0.3       Lymph # 1.10       LYMPH % 17.6       MCH 33.4       MCHC 32.4              Mono # 0.69       Mono % 11.0       MPV 10.6       Neut # 4.3       Neut % 68.6       nRBC 0       Platelet Count 203       Potassium 4.0       PROTEIN TOTAL 7.1       RBC 3.35       RDW 14.7       Sodium 137       Troponin I High Sensitivity 48.3  Comment: Troponin results differ between methods. Do not use   results between Troponin methods interchangeably.    Alkaline Phospatase levels above 400 U/L may   cause false positive results.    Access hsTnI should not be used for patients taking   Asfotase isaac (Strensiq).       WBC 6.25               Significant Imaging: I have reviewed all pertinent imaging results/findings within the past 24 hours.    X-Ray Chest AP Portable  Narrative: CLINICAL HISTORY:  ([id number])89 y/o  (9/23/1934) F    CHF;    TECHNIQUE:  (A#[id number], exam time 5/15/2025 11:25)    XR CHEST AP PORTABLE [id number]    COMPARISON:  Radiograph from 10/11/2024    FINDINGS:  Perihilar pulmonary vascular prominence with mildly  increased central hilar interstitial lung markings bilaterally.  There is blunting of both costophrenic angles consistent with trace pleural effusions and adjacent atelectasis. No pneumothorax is identified. Moderate to severe cardiopericardial silhouette enlargement. Atheromatous calcifications are seen at the aortic arch. Osseous structures show degenerative disc disease and degenerative changes in the shoulders. The visualized upper abdomen is unremarkable.  Impression: Cardiomegaly and findings of mild interstitial pulmonary edema.    Electronically signed by: Krish Antunez  Date:    05/15/2025  Time:    11:28

## 2025-05-15 NOTE — ASSESSMENT & PLAN NOTE
Supplemental O2 via nasal canula; titrate O2 saturation to >92%.  Serial Cardiac enzymes × 3.  Diuretic administration. Monitor electrolytes for hypokalemia and hypomagnesemia.  Cardiology Consultation.  2-D Echocardiogram for evaluation of left ventricle systolic function or any valvular heart disease.  Daily weights.  Strict I/Os.  Fluid restriction.  Na restriction <2g/d.

## 2025-05-15 NOTE — ED NOTES
Pulse ox increased to 95% on room air. The pt states that she does have home O2 and uses it only at night until the last two days where she has been needing it constantly.

## 2025-05-15 NOTE — ASSESSMENT & PLAN NOTE
Creatine stable for now. BMP reviewed- noted Estimated Creatinine Clearance: 20.3 mL/min (A) (based on SCr of 1.6 mg/dL (H)). according to latest data. Based on current GFR, CKD stage is stage 3 - GFR 30-59.  Monitor UOP and serial BMP and adjust therapy as needed. Renally dose meds. Avoid nephrotoxic medications and procedures.

## 2025-05-16 ENCOUNTER — CLINICAL SUPPORT (OUTPATIENT)
Dept: CARDIOLOGY | Facility: HOSPITAL | Age: OVER 89
DRG: 291 | End: 2025-05-16
Attending: EMERGENCY MEDICINE
Payer: MEDICARE

## 2025-05-16 PROBLEM — R07.89 CHEST TIGHTNESS: Status: ACTIVE | Noted: 2025-05-15

## 2025-05-16 LAB
ALBUMIN SERPL-MCNC: 3.8 G/DL (ref 3.5–5.2)
ALP SERPL-CCNC: 59 UNIT/L (ref 55–135)
ALT SERPL-CCNC: 14 UNIT/L (ref 10–44)
ANION GAP (SMH): 10 MMOL/L (ref 8–16)
AORTIC ROOT ANNULUS: 2.7 CM
AORTIC SIZE INDEX: 2.1 CM/M2
AORTIC VALVE CUSP SEPERATION: 1.4 CM
APICAL FOUR CHAMBER EJECTION FRACTION: 66 %
ASCENDING AORTA: 3.6 CM
AST SERPL-CCNC: 14 UNIT/L (ref 10–40)
AV INDEX (PROSTH): 0.75
AV MEAN GRADIENT: 10 MMHG
AV PEAK GRADIENT: 16 MMHG
AV VALVE AREA BY VELOCITY RATIO: 1.8 CM²
AV VALVE AREA: 2.1 CM²
AV VELOCITY RATIO: 0.65
BILIRUB SERPL-MCNC: 0.8 MG/DL (ref 0.1–1)
BSA FOR ECHO PROCEDURE: 1.77 M2
BUN SERPL-MCNC: 39 MG/DL (ref 8–23)
CALCIUM SERPL-MCNC: 9.5 MG/DL (ref 8.7–10.5)
CHLORIDE SERPL-SCNC: 101 MMOL/L (ref 95–110)
CHOLEST SERPL-MCNC: 128 MG/DL (ref 120–199)
CHOLEST/HDLC SERPL: 2.6 {RATIO} (ref 2–5)
CO2 SERPL-SCNC: 25 MMOL/L (ref 23–29)
CREAT SERPL-MCNC: 1.6 MG/DL (ref 0.5–1.4)
CV ECHO LV RWT: 0.41 CM
DOP CALC AO PEAK VEL: 2 M/S
DOP CALC AO VTI: 38.3 CM
DOP CALC LVOT AREA: 2.8 CM2
DOP CALC LVOT DIAMETER: 1.9 CM
DOP CALC LVOT PEAK VEL: 1.3 M/S
DOP CALC LVOT STROKE VOLUME: 81.3 CM3
DOP CALC MV VTI: 41.5 CM
DOP CALCLVOT PEAK VEL VTI: 28.7 CM
E WAVE DECELERATION TIME: 151 MSEC
E/A RATIO: 1.21
E/E' RATIO: 15 M/S
EAG (SMH): 108 MG/DL (ref 68–131)
ECHO LV POSTERIOR WALL: 1.2 CM (ref 0.6–1.1)
ERYTHROCYTE [DISTWIDTH] IN BLOOD BY AUTOMATED COUNT: 14.5 % (ref 11.5–14.5)
FRACTIONAL SHORTENING: 19 % (ref 28–44)
GFR SERPLBLD CREATININE-BSD FMLA CKD-EPI: 31 ML/MIN/1.73/M2
GLUCOSE SERPL-MCNC: 122 MG/DL (ref 70–110)
HBA1C MFR BLD: 5.4 % (ref 4.5–6.2)
HCT VFR BLD AUTO: 34.2 % (ref 37–48.5)
HDLC SERPL-MCNC: 50 MG/DL (ref 40–75)
HDLC SERPL: 39.1 % (ref 20–50)
HGB BLD-MCNC: 11 GM/DL (ref 12–16)
INTERVENTRICULAR SEPTUM: 1.1 CM (ref 0.6–1.1)
IVC DIAMETER: 2.4 CM
LDLC SERPL CALC-MCNC: 63.6 MG/DL (ref 63–159)
LEFT ATRIUM AREA SYSTOLIC (APICAL 4 CHAMBER): 35.5 CM2
LEFT ATRIUM SIZE: 5.1 CM
LEFT INTERNAL DIMENSION IN SYSTOLE: 4.7 CM (ref 2.1–4)
LEFT VENTRICLE DIASTOLIC VOLUME INDEX: 97.66 ML/M2
LEFT VENTRICLE DIASTOLIC VOLUME: 167 ML
LEFT VENTRICLE END DIASTOLIC VOLUME APICAL 4 CHAMBER: 141 ML
LEFT VENTRICLE END SYSTOLIC VOLUME APICAL 4 CHAMBER: 126 ML
LEFT VENTRICLE MASS INDEX: 164 G/M2
LEFT VENTRICLE SYSTOLIC VOLUME INDEX: 59.6 ML/M2
LEFT VENTRICLE SYSTOLIC VOLUME: 102 ML
LEFT VENTRICULAR INTERNAL DIMENSION IN DIASTOLE: 5.8 CM (ref 3.5–6)
LEFT VENTRICULAR MASS: 280.4 G
LV LATERAL E/E' RATIO: 13.6 M/S
LV SEPTAL E/E' RATIO: 16.7 M/S
LVED V (TEICH): 167 ML
LVES V (TEICH): 102 ML
LVOT MG: 4 MMHG
LVOT MV: 0.83 CM/S
MAGNESIUM SERPL-MCNC: 1.7 MG/DL (ref 1.6–2.6)
MCH RBC QN AUTO: 33.3 PG (ref 27–31)
MCHC RBC AUTO-ENTMCNC: 32.2 G/DL (ref 32–36)
MCV RBC AUTO: 104 FL (ref 82–98)
MR PISA EROA: 0.29 CM2
MV MEAN GRADIENT: 3 MMHG
MV PEAK A VEL: 1.24 M/S
MV PEAK E VEL: 1.5 M/S
MV PEAK GRADIENT: 10 MMHG
MV STENOSIS PRESSURE HALF TIME: 53 MS
MV VALVE AREA BY CONTINUITY EQUATION: 1.96 CM2
MV VALVE AREA P 1/2 METHOD: 4.15 CM2
NONHDLC SERPL-MCNC: 78 MG/DL
OHS CV RV/LV RATIO: 0.64 CM
OHS QRS DURATION: 154 MS
OHS QRS DURATION: 156 MS
OHS QRS DURATION: 158 MS
OHS QTC CALCULATION: 465 MS
OHS QTC CALCULATION: 518 MS
OHS QTC CALCULATION: 522 MS
PISA MRMAX VEL: 5.37 M/S
PISA RADIUS: 0.8 CM
PISA TR MAX VEL: 4.3 M/S
PISA VN NYQUIST MS: 0.39 M/S
PISA VN NYQUIST: 0.39 M/S
PLATELET # BLD AUTO: 180 K/UL (ref 150–450)
PMV BLD AUTO: 10.7 FL (ref 9.2–12.9)
POTASSIUM SERPL-SCNC: 4.2 MMOL/L (ref 3.5–5.1)
PROT SERPL-MCNC: 6.7 GM/DL (ref 6–8.4)
PV MV: 0.71 M/S
PV PEAK GRADIENT: 5 MMHG
PV PEAK VELOCITY: 1.07 M/S
RA PRESSURE ESTIMATED: 15 MMHG
RBC # BLD AUTO: 3.3 M/UL (ref 4–5.4)
RIGHT ATRIUM VOLUME AREA LENGTH APICAL 4 CHAMBER: 60.3 ML
RIGHT VENTRICLE DIASTOLIC BASEL DIMENSION: 3.7 CM
RIGHT VENTRICULAR END-DIASTOLIC DIMENSION: 3.7 CM
RV TB RVSP: 19 MMHG
RV TISSUE DOPPLER FREE WALL SYSTOLIC VELOCITY 1 (APICAL 4 CHAMBER VIEW): 15.4 CM/S
SODIUM SERPL-SCNC: 136 MMOL/L (ref 136–145)
TDI LATERAL: 0.11 M/S
TDI SEPTAL: 0.09 M/S
TDI: 0.1 M/S
TR MAX PG: 75 MMHG
TRICUSPID ANNULAR PLANE SYSTOLIC EXCURSION: 2.2 CM
TRIGL SERPL-MCNC: 72 MG/DL (ref 30–150)
TROPONIN HIGH SENSITIVE (SMH): 53.7 PG/ML
TV REST PULMONARY ARTERY PRESSURE: 89 MMHG
WBC # BLD AUTO: 7.28 K/UL (ref 3.9–12.7)
Z-SCORE OF LEFT VENTRICULAR DIMENSION IN END DIASTOLE: 2
Z-SCORE OF LEFT VENTRICULAR DIMENSION IN END SYSTOLE: 3.71

## 2025-05-16 PROCEDURE — 63600175 PHARM REV CODE 636 W HCPCS: Performed by: HOSPITALIST

## 2025-05-16 PROCEDURE — 27000221 HC OXYGEN, UP TO 24 HOURS

## 2025-05-16 PROCEDURE — 93306 TTE W/DOPPLER COMPLETE: CPT | Mod: 26,,, | Performed by: INTERNAL MEDICINE

## 2025-05-16 PROCEDURE — 85027 COMPLETE CBC AUTOMATED: CPT | Performed by: INTERNAL MEDICINE

## 2025-05-16 PROCEDURE — 36415 COLL VENOUS BLD VENIPUNCTURE: CPT | Performed by: INTERNAL MEDICINE

## 2025-05-16 PROCEDURE — 11000001 HC ACUTE MED/SURG PRIVATE ROOM

## 2025-05-16 PROCEDURE — 80053 COMPREHEN METABOLIC PANEL: CPT | Performed by: INTERNAL MEDICINE

## 2025-05-16 PROCEDURE — 80061 LIPID PANEL: CPT | Performed by: NURSE PRACTITIONER

## 2025-05-16 PROCEDURE — 84484 ASSAY OF TROPONIN QUANT: CPT | Performed by: INTERNAL MEDICINE

## 2025-05-16 PROCEDURE — 94799 UNLISTED PULMONARY SVC/PX: CPT

## 2025-05-16 PROCEDURE — 83735 ASSAY OF MAGNESIUM: CPT | Performed by: INTERNAL MEDICINE

## 2025-05-16 PROCEDURE — 99900035 HC TECH TIME PER 15 MIN (STAT)

## 2025-05-16 PROCEDURE — 25000003 PHARM REV CODE 250: Performed by: INTERNAL MEDICINE

## 2025-05-16 PROCEDURE — 93306 TTE W/DOPPLER COMPLETE: CPT

## 2025-05-16 PROCEDURE — 94761 N-INVAS EAR/PLS OXIMETRY MLT: CPT

## 2025-05-16 PROCEDURE — 99900031 HC PATIENT EDUCATION (STAT)

## 2025-05-16 PROCEDURE — 25000003 PHARM REV CODE 250: Performed by: HOSPITALIST

## 2025-05-16 PROCEDURE — 99223 1ST HOSP IP/OBS HIGH 75: CPT | Mod: 25,,, | Performed by: INTERNAL MEDICINE

## 2025-05-16 PROCEDURE — 25000003 PHARM REV CODE 250: Performed by: NURSE PRACTITIONER

## 2025-05-16 RX ORDER — FUROSEMIDE 10 MG/ML
20 INJECTION INTRAMUSCULAR; INTRAVENOUS EVERY 12 HOURS
Status: DISCONTINUED | OUTPATIENT
Start: 2025-05-16 | End: 2025-05-17

## 2025-05-16 RX ORDER — ALUMINUM HYDROXIDE, MAGNESIUM HYDROXIDE, AND SIMETHICONE 1200; 120; 1200 MG/30ML; MG/30ML; MG/30ML
30 SUSPENSION ORAL EVERY 6 HOURS PRN
Status: DISCONTINUED | OUTPATIENT
Start: 2025-05-16 | End: 2025-05-18 | Stop reason: HOSPADM

## 2025-05-16 RX ORDER — ENOXAPARIN SODIUM 100 MG/ML
30 INJECTION SUBCUTANEOUS EVERY 24 HOURS
Status: DISCONTINUED | OUTPATIENT
Start: 2025-05-16 | End: 2025-05-18 | Stop reason: HOSPADM

## 2025-05-16 RX ADMIN — ALUMINUM HYDROXIDE, MAGNESIUM HYDROXIDE, AND DIMETHICONE 30 ML: 200; 20; 200 SUSPENSION ORAL at 05:05

## 2025-05-16 RX ADMIN — ASPIRIN 81 MG: 81 TABLET ORAL at 07:05

## 2025-05-16 RX ADMIN — FUROSEMIDE 20 MG: 10 INJECTION, SOLUTION INTRAMUSCULAR; INTRAVENOUS at 09:05

## 2025-05-16 RX ADMIN — ATORVASTATIN CALCIUM 80 MG: 40 TABLET, FILM COATED ORAL at 07:05

## 2025-05-16 RX ADMIN — Medication 800 MG: at 05:05

## 2025-05-16 RX ADMIN — PANTOPRAZOLE SODIUM 40 MG: 40 TABLET, DELAYED RELEASE ORAL at 05:05

## 2025-05-16 RX ADMIN — FUROSEMIDE 20 MG: 20 TABLET ORAL at 07:05

## 2025-05-16 RX ADMIN — FUROSEMIDE 20 MG: 10 INJECTION, SOLUTION INTRAMUSCULAR; INTRAVENOUS at 12:05

## 2025-05-16 RX ADMIN — LEVOTHYROXINE SODIUM 100 MCG: 0.1 TABLET ORAL at 05:05

## 2025-05-16 NOTE — HOSPITAL COURSE
Admitted with palpitations and chest pain diagnosed with heart failure exacerbation.  BNP 3000.  Troponins trended 48-->52-->52. EKG:  Sinus rhythm with PACs and right bundle-branch block no acute ischemic changes.  She was diuresed with IV Lasix.  Cardiology was consulted and recommended a stress test.  She had an echo that revealed for 30-40% grade 2 diastolic dysfunction severe mitral regurg moderate to severe tricuspid regurg pulmonary hypertension with a PASP of 89 mmHg, and a small posterior pericardial effusion.  Nuclear medicine stress test was negative for reversible ischemia. IV Lasix transitioned to oral on 5/17.  She reports chronic abdominal pain that is worse after eating associated with belching and intermittent constipation and diarrhea.  Son states she has never had a colonoscopy however she is 89.  We will continue Protonix and add Carafate, and recommend GI follow-up outpatient.  KUB normal appearing. Felt to be at euvolemic state. Recommended to increase her outpatient lasix (1/2 tab of 40mg daily) to a whole 40mg total dose daily. Set up with home health. Encouraged f/u with PCP and cardiology. By time of discharge, the patient was tolerating an appropriate diet with resolving admission symptoms. Patient seen and examined on day of discharge.    Physical exam on day of discharge:  General - Patient alert and oriented x3 in NAD  CV - Regular rate and rhythm   Resp - Lungs CTA Bilaterally, No increased WOB   Extrem-  No cyanosis, clubbing, edema.   Skin -  No obvious masses, rashes or lesions

## 2025-05-16 NOTE — SUBJECTIVE & OBJECTIVE
"Interval History:   Seen and examined multidisciplinary rounds.  No fever. -1.1 L, feel better, still fluid overload.  Patient has any history of " congestive heart failure" has been drinking plenty of water at home.  Also taking diuretics.  Patient is on Entresto ?  She wants to talk to her cardiologist Dr Donohue.  Chest tightness resolved.   Patient has refused EKGs morning.     Review of Systems   Respiratory:  Positive for chest tightness and shortness of breath.    Cardiovascular:  Positive for leg swelling.     Objective:     Vital Signs (Most Recent):  Temp: 97.9 °F (36.6 °C) (05/16/25 1128)  Pulse: 72 (05/16/25 1128)  Resp: 18 (05/16/25 1128)  BP: (!) 148/74 (05/16/25 1128)  SpO2: (!) 93 % (05/16/25 1128) Vital Signs (24h Range):  Temp:  [97.3 °F (36.3 °C)-98 °F (36.7 °C)] 97.9 °F (36.6 °C)  Pulse:  [38-89] 72  Resp:  [15-19] 18  SpO2:  [85 %-96 %] 93 %  BP: (131-158)/() 148/74     Weight: 73.7 kg (162 lb 7.7 oz)  Body mass index is 31.73 kg/m².    Intake/Output Summary (Last 24 hours) at 5/16/2025 1328  Last data filed at 5/16/2025 1014  Gross per 24 hour   Intake 120 ml   Output 1302 ml   Net -1182 ml         Physical Exam  Constitutional:       Appearance: Normal appearance.   HENT:      Head: Normocephalic and atraumatic.   Cardiovascular:      Rate and Rhythm: Normal rate and regular rhythm.      Heart sounds: No murmur heard.  Pulmonary:      Effort: Pulmonary effort is normal.      Breath sounds: Rales present.   Abdominal:      General: Abdomen is flat.      Palpations: Abdomen is soft.   Musculoskeletal:         General: Swelling present. Normal range of motion.   Skin:     General: Skin is warm and dry.   Neurological:      General: No focal deficit present.      Mental Status: She is alert and oriented to person, place, and time.               Significant Labs: All pertinent labs within the past 24 hours have been reviewed.  CBC:   Recent Labs   Lab 05/15/25  1155 05/16/25  0412   WBC 6.25 " 7.28   HGB 11.2* 11.0*   HCT 34.6* 34.2*    180     CMP:   Recent Labs   Lab 05/15/25  1155 05/16/25  0412    136   K 4.0 4.2    101   CO2 28 25   * 122*   BUN 37* 39*   CREATININE 1.6* 1.6*   CALCIUM 9.8 9.5   PROT 7.1 6.7   ALBUMIN 4.0 3.8   BILITOT 0.9 0.8   ALKPHOS 56 59   AST 15 14   ALT 14 14   ANIONGAP 9 10     Cardiac Markers:   Recent Labs   Lab 05/15/25  1155   BNP 3,015*       Significant Imaging: I have reviewed all pertinent imaging results/findings within the past 24 hours.  I have reviewed and interpreted all pertinent imaging results/findings within the past 24 hours.      Scheduled Meds:   aspirin  81 mg Oral Daily    atorvastatin  80 mg Oral Daily    enoxparin  30 mg Subcutaneous Daily    furosemide (LASIX) injection  20 mg Intravenous Q12H    levothyroxine  100 mcg Oral Before breakfast    pantoprazole  40 mg Oral Daily     Continuous Infusions:  PRN Meds:.  Current Facility-Administered Medications:     acetaminophen, 650 mg, Oral, Q4H PRN    aluminum-magnesium hydroxide-simethicone, 30 mL, Oral, Q6H PRN    dextrose 50%, 12.5 g, Intravenous, PRN    dextrose 50%, 25 g, Intravenous, PRN    glucagon (human recombinant), 1 mg, Intramuscular, PRN    glucose, 16 g, Oral, PRN    glucose, 24 g, Oral, PRN    HYDROcodone-acetaminophen, 1 tablet, Oral, Q6H PRN    magnesium oxide, 800 mg, Oral, PRN    magnesium oxide, 800 mg, Oral, PRN    melatonin, 9 mg, Oral, Nightly PRN    naloxone, 0.02 mg, Intravenous, PRN    nitroGLYCERIN, 0.4 mg, Sublingual, Q5 Min PRN    ondansetron, 4 mg, Intravenous, Q6H PRN    potassium bicarbonate, 35 mEq, Oral, PRN    potassium bicarbonate, 50 mEq, Oral, PRN    potassium bicarbonate, 60 mEq, Oral, PRN    potassium, sodium phosphates, 2 packet, Oral, PRN    potassium, sodium phosphates, 2 packet, Oral, PRN    potassium, sodium phosphates, 2 packet, Oral, PRN    senna-docusate, 1 tablet, Oral, BID PRN    sodium chloride 0.9%, 10 mL, Intravenous,  PRN    X-Ray Chest AP Portable  Result Date: 5/15/2025  CLINICAL HISTORY: ([id number])89 y/o  (9/23/1934) F CHF; TECHNIQUE: (A#[id number], exam time 5/15/2025 11:25) XR CHEST AP PORTABLE [id number] COMPARISON: Radiograph from 10/11/2024 FINDINGS: Perihilar pulmonary vascular prominence with mildly increased central hilar interstitial lung markings bilaterally.  There is blunting of both costophrenic angles consistent with trace pleural effusions and adjacent atelectasis. No pneumothorax is identified. Moderate to severe cardiopericardial silhouette enlargement. Atheromatous calcifications are seen at the aortic arch. Osseous structures show degenerative disc disease and degenerative changes in the shoulders. The visualized upper abdomen is unremarkable.     Cardiomegaly and findings of mild interstitial pulmonary edema. Electronically signed by: Krish Antunez Date:    05/15/2025 Time:    11:28  - pulls last radiology orders

## 2025-05-16 NOTE — CONSULTS
[facility]  Adult Nutrition  Education Short Note      Nutrition Education    Previous education: no    Diet at home: Regular, low sodium    Handouts provided: Low Sodium Nutrition Therapy, Fluid-Restricted Nutrition Therapy      Discussed importance of a low sodium diet. Reviewed high sodium foods that should be avoided. Food labels, salt free seasonings, and recommended sodium intake reviewed. Encouraged healthy, fresh foods that are low in sodium that are good for consumption. Fluid intake and conversions discussed. Foods considered fluids were reviewed and encouraged to monitor. General healthy diet encouraged. All questions/concerns were addressed.    Comments:   89 yo F admitted with c/o SOB & chest pain. Reports good appetite and intake PTA. Denies any wt loss.     Discussed with: patient    Educational Need? yes    Barriers: none identified    Interventions: General healthful diet, Fluid modified diet, and Sodium modified diet    Patient and/or family comprehend instructions: yes    Outcome: Verbalizes understanding     Thanks for the consult!    Patrick Henley RD 05/16/2025 3:14 PM

## 2025-05-16 NOTE — CARE UPDATE
05/16/25 0653   Patient Assessment/Suction   Level of Consciousness (AVPU) alert   Respiratory Effort Unlabored   Expansion/Accessory Muscles/Retractions no use of accessory muscles   All Lung Fields Breath Sounds clear   Rhythm/Pattern, Respiratory depth regular;pattern regular;unlabored   Cough Frequency no cough   Skin Integrity   $ Wound Care Tech Time 15 min   Area Observed Left;Right;Behind ear;Nares   Skin Appearance without discoloration   PRE-TX-O2   Device (Oxygen Therapy) nasal cannula   $ Is the patient on Low Flow Oxygen? Yes   Flow (L/min) (Oxygen Therapy) 2   SpO2 (!) 94 %   Pulse Oximetry Type Intermittent   $ Pulse Oximetry - Multiple Charge Pulse Oximetry - Multiple   Pulse 89   Resp 15   Tobacco Cessation Intervention   Do you use any type of tobacco product? No   Respiratory Evaluation   $ Care Plan Tech Time 15 min   $ Respiratory Evaluation Complete   Evaluation For New Orders   Admitting Diagnosis chest pain   Cardiac Diagnosis A Fib   Pulmonary Diagnosis NA   Current Surgeries NA   Home Oxygen   Has Home Oxygen? Yes   Liter Flow 2   Duration with sleep;as needed   Route nasal cannula   Mode continuous   Device home concentrator   Home Aerosol, MDI, DPI, and Other Treatments/Therapies   Home Respiratory Therapy Per Patient/Review of Chart No   Oxygen Care Plan   Oxygen Care Plan Per Protocol   SPO2 Goal (%) 95% cardiac   Rationale Shortness of Breath   Bronchodilator Care Plan   Rationale No Rationale found   Atelectasis Care Plan   Rationale No Rational Found   Airway Clearance Care Plan   Rationale No rationale found

## 2025-05-16 NOTE — PROGRESS NOTES
[facility] Medicine  Progress Note    Patient Name: Andressa Benedict  MRN: [id number]  Patient Class: IP- Inpatient   Admission Date: 5/15/2025  Length of Stay: 0 days  Attending Physician: Melani Cohn MD  Primary Care Provider: Reina, Primary Doctor        Subjective     Principal Problem:Acute heart failure with preserved ejection fraction (HFpEF)        HPI:  Andressa Benedict is a 89 y.o. year-old patient with history of  has a past medical history of Atrial fibrillation, Bradycardia, Carotid bruit, CKD (chronic kidney disease), stage III, Hyperlipidemia, OA (osteoarthritis), and Thyroid disease. who presented to the ED with c/o shortness of breath and chest pain.  Patient states that over the last 3 weeks she has gotten progressively worsening shortness of breath especially with activities.  She states that for the last week she has had irregular pounding heart beats.  Patient also reports that in the last 2-3 days she has begun with a chest tightness that typically occurs about 30 minutes after eating and occurs in waves each wave lasting 7 seconds.  Patient with intermittent irregular heart beat from 30s to 70s, patient denies EKG awaiting a room.  Patient to be admitted for further evaluation and treatment.    Triage vitals with pulse 91, /92, temp 98.1, SpO2 91 percent on room air.  Blood work performed in the ED with hemoglobin 11.2, hematocrit 34.6, glucose 113, BUN 37, creatinine 1.6, GFR 31, BNP 3015, initial troponin 48.3.  Chest x-ray with cardiomegaly and findings of mild interstitial pulmonary edema.      Overview/Hospital Course:  89-year-old female history of ?  Diastolic  Congestive heart failure, CKD with baseline creatinine around 1.4, hypertension is admitted for CHF exacerbation,  received IV Lasix.  Cardiology consulted.  Pending 2D echo.  Also complaining of intermittent chest pain, troponin mild elevated.     Interval History:   Seen and examined  "multidisciplinary rounds.  No fever. -1.1 L, feel better, still fluid overload.  Patient has any history of " congestive heart failure" has been drinking plenty of water at home.  Also taking diuretics.  Patient is on Entresto ?  She wants to talk to her cardiologist Dr Donohue.  Chest tightness resolved.   Patient has refused EKGs morning.     Review of Systems   Respiratory:  Positive for chest tightness and shortness of breath.    Cardiovascular:  Positive for leg swelling.     Objective:     Vital Signs (Most Recent):  Temp: 97.9 °F (36.6 °C) (05/16/25 1128)  Pulse: 72 (05/16/25 1128)  Resp: 18 (05/16/25 1128)  BP: (!) 148/74 (05/16/25 1128)  SpO2: (!) 93 % (05/16/25 1128) Vital Signs (24h Range):  Temp:  [97.3 °F (36.3 °C)-98 °F (36.7 °C)] 97.9 °F (36.6 °C)  Pulse:  [38-89] 72  Resp:  [15-19] 18  SpO2:  [85 %-96 %] 93 %  BP: (131-158)/() 148/74     Weight: 73.7 kg (162 lb 7.7 oz)  Body mass index is 31.73 kg/m².    Intake/Output Summary (Last 24 hours) at 5/16/2025 1328  Last data filed at 5/16/2025 1014  Gross per 24 hour   Intake 120 ml   Output 1302 ml   Net -1182 ml         Physical Exam  Constitutional:       Appearance: Normal appearance.   HENT:      Head: Normocephalic and atraumatic.   Cardiovascular:      Rate and Rhythm: Normal rate and regular rhythm.      Heart sounds: No murmur heard.  Pulmonary:      Effort: Pulmonary effort is normal.      Breath sounds: Rales present.   Abdominal:      General: Abdomen is flat.      Palpations: Abdomen is soft.   Musculoskeletal:         General: Swelling present. Normal range of motion.   Skin:     General: Skin is warm and dry.   Neurological:      General: No focal deficit present.      Mental Status: She is alert and oriented to person, place, and time.               Significant Labs: All pertinent labs within the past 24 hours have been reviewed.  CBC:   Recent Labs   Lab 05/15/25  1155 05/16/25  0412   WBC 6.25 7.28   HGB 11.2* 11.0*   HCT 34.6* 34.2* "    180     CMP:   Recent Labs   Lab 05/15/25  1155 05/16/25  0412    136   K 4.0 4.2    101   CO2 28 25   * 122*   BUN 37* 39*   CREATININE 1.6* 1.6*   CALCIUM 9.8 9.5   PROT 7.1 6.7   ALBUMIN 4.0 3.8   BILITOT 0.9 0.8   ALKPHOS 56 59   AST 15 14   ALT 14 14   ANIONGAP 9 10     Cardiac Markers:   Recent Labs   Lab 05/15/25  1155   BNP 3,015*       Significant Imaging: I have reviewed all pertinent imaging results/findings within the past 24 hours.  I have reviewed and interpreted all pertinent imaging results/findings within the past 24 hours.      Scheduled Meds:   aspirin  81 mg Oral Daily    atorvastatin  80 mg Oral Daily    enoxparin  30 mg Subcutaneous Daily    furosemide (LASIX) injection  20 mg Intravenous Q12H    levothyroxine  100 mcg Oral Before breakfast    pantoprazole  40 mg Oral Daily     Continuous Infusions:  PRN Meds:.  Current Facility-Administered Medications:     acetaminophen, 650 mg, Oral, Q4H PRN    aluminum-magnesium hydroxide-simethicone, 30 mL, Oral, Q6H PRN    dextrose 50%, 12.5 g, Intravenous, PRN    dextrose 50%, 25 g, Intravenous, PRN    glucagon (human recombinant), 1 mg, Intramuscular, PRN    glucose, 16 g, Oral, PRN    glucose, 24 g, Oral, PRN    HYDROcodone-acetaminophen, 1 tablet, Oral, Q6H PRN    magnesium oxide, 800 mg, Oral, PRN    magnesium oxide, 800 mg, Oral, PRN    melatonin, 9 mg, Oral, Nightly PRN    naloxone, 0.02 mg, Intravenous, PRN    nitroGLYCERIN, 0.4 mg, Sublingual, Q5 Min PRN    ondansetron, 4 mg, Intravenous, Q6H PRN    potassium bicarbonate, 35 mEq, Oral, PRN    potassium bicarbonate, 50 mEq, Oral, PRN    potassium bicarbonate, 60 mEq, Oral, PRN    potassium, sodium phosphates, 2 packet, Oral, PRN    potassium, sodium phosphates, 2 packet, Oral, PRN    potassium, sodium phosphates, 2 packet, Oral, PRN    senna-docusate, 1 tablet, Oral, BID PRN    sodium chloride 0.9%, 10 mL, Intravenous, PRN    X-Ray Chest AP Portable  Result Date:  5/15/2025  CLINICAL HISTORY: ([id number])89 y/o  (9/23/1934) F CHF; TECHNIQUE: (A#[id number], exam time 5/15/2025 11:25) XR CHEST AP PORTABLE [id number] COMPARISON: Radiograph from 10/11/2024 FINDINGS: Perihilar pulmonary vascular prominence with mildly increased central hilar interstitial lung markings bilaterally.  There is blunting of both costophrenic angles consistent with trace pleural effusions and adjacent atelectasis. No pneumothorax is identified. Moderate to severe cardiopericardial silhouette enlargement. Atheromatous calcifications are seen at the aortic arch. Osseous structures show degenerative disc disease and degenerative changes in the shoulders. The visualized upper abdomen is unremarkable.     Cardiomegaly and findings of mild interstitial pulmonary edema. Electronically signed by: Krish Antunez Date:    05/15/2025 Time:    11:28  - pulls last radiology orders        Assessment & Plan  Acute heart failure with preserved ejection fraction (HFpEF)    Patient is identified as having Diastolic (HFpEF) heart failure that is Acute on Chronic. CHF is currently uncontrolled due to volume overload due to: Continued edema of extremities and JVD, Rales/crackles on pulmonary exam, and Pulmonary edema/pleural effusion on CXR. Latest ECHO performed and demonstrates- Results for orders placed during the hospital encounter of 08/21/23    Echo    Interpretation Summary    Left Ventricle: The left ventricle is mildly dilated. Mildly increased ventricular mass. Mildly increased wall thickness. There is mild concentric hypertrophy. Normal wall motion. Septal flattening in systole consistent with right ventricular pressure overload. There is low normal systolic function. EF 50% with patient in sinus rhythm and heart rate of 52 There is diastolic dysfunction but grade cannot be determined. Elevated left ventricular filling pressure.    Right Ventricle: Normal right ventricular cavity size. Wall thickness is normal. Right  ventricle wall motion  is normal. Systolic function is normal.    Mitral Valve: There is no stenosis.    Tricuspid Valve: There is mild to moderate regurgitation.    Pulmonary Artery: There is mild pulmonary hypertension. The estimated pulmonary artery systolic pressure is 58 mmHg.    IVC/SVC: Elevated venous pressure at 15 mmHg.    Pericardium: There is a small posterior effusion. Pericardial effusion is echolucent. No indication of cardiac tamponade.    Mean left atrial pressure slightly increased at 12 mm Hg  . Continue Furosemide and monitor clinical status closely. Monitor on telemetry. Patient is on CHF pathway.  Monitor strict Is&Os and daily weights.  Place on fluid restriction of 1.5 L. Cardiology is consulted. Continue to stress to patient importance of self efficacy and  on diet for CHF. Last BNP reviewed- and noted below   Recent Labs   Lab 05/15/25  1155   BNP 3,015*   .      Remains fluid overload, continue IV Lasix 20 b.i.d..   Strict intake output.   Follow up cardiologist.   Unclear if ever had ischemia workup or not.   Pending repeat 2D echo.   CKD (chronic kidney disease), stage III  Baseline creatinine is 1.4. Most recent creatinine and eGFR are listed below.  Recent Labs     05/15/25  1155 05/16/25  0412   CREATININE 1.6* 1.6*   EGFRNORACEVR 31* 31*      Plan  - ALTON is stable  - Avoid nephrotoxins and renally dose meds for GFR listed above  - Monitor urine output, serial BMP, and adjust therapy as needed  -     Continue IV Lasix, monitor kidney function closely    Essential hypertension  Patient's blood pressure range in the last 24 hours was: BP  Min: 131/72  Max: 158/90.The patient's inpatient anti-hypertensive regimen is listed below:  Current Antihypertensives  nitroGLYCERIN SL tablet 0.4 mg, Every 5 min PRN, Sublingual  furosemide injection 20 mg, Every 12 hours, Intravenous    Plan  - BP is controlled, no changes needed to their regimen  Paroxysmal A-fib  Patient has paroxysmal (<7  days) atrial fibrillation. Patient is currently in sinus on telemetry however patient declining 12 lead until in room. [id number] Score: 4. The patients heart rate in the last 24 hours is as follows:  Pulse  Min: 38  Max: 89     Antiarrhythmics   Summary patient is not currently on anti arrhythmics    Anticoagulants  enoxaparin injection 30 mg, Every 24 hours, Subcutaneous    Plan  - Replete lytes with a goal of K>4, Mg >2  - Patient is anticoagulated, see medications listed above.  - Patient's afib is currently controlled      Patient has a history of Afib?  Not on anticoagulation.       Chest tightness  Mild elevated troponin secondary to CHF  Patient refused EKG  Telemetry reviewed- no significant arrhythmia  Unclear if ever had ischemia workup.   May need stress test, consult cardiologist  VTE Risk Mitigation (From admission, onward)           Ordered     enoxaparin injection 30 mg  Daily        Note to Pharmacy: Ht: 5' (1.524 m)  Wt: 73.7 kg (162 lb 7.7 oz)  Estimated Creatinine Clearance: 21 mL/min (A) (based on SCr of 1.6 mg/dL (H)).  Body mass index is 31.73 kg/m².    05/16/25 0001     IP VTE HIGH RISK PATIENT  Once         05/15/25 1435     Place sequential compression device  Until discontinued         05/15/25 1435                    Discharge Planning   HEMANTH: 5/17/2025     Code Status: Full Code   Medical Readiness for Discharge Date:   Discharge Plan A: Home Health                        Melani Cohn MD  Department of Hospital Medicine   [facility]

## 2025-05-16 NOTE — ASSESSMENT & PLAN NOTE
Baseline creatinine is 1.4. Most recent creatinine and eGFR are listed below.  Recent Labs     05/15/25  1155 05/16/25  0412   CREATININE 1.6* 1.6*   EGFRNORACEVR 31* 31*      Plan  - ALTON is stable  - Avoid nephrotoxins and renally dose meds for GFR listed above  - Monitor urine output, serial BMP, and adjust therapy as needed  -     Continue IV Lasix, monitor kidney function closely     Discharged

## 2025-05-16 NOTE — ASSESSMENT & PLAN NOTE
Patient has paroxysmal (<7 days) atrial fibrillation. Patient is currently in sinus on telemetry however patient declining 12 lead until in room. [id number] Score: 4. The patients heart rate in the last 24 hours is as follows:  Pulse  Min: 38  Max: 89     Antiarrhythmics   Summary patient is not currently on anti arrhythmics    Anticoagulants  enoxaparin injection 30 mg, Every 24 hours, Subcutaneous    Plan  - Replete lytes with a goal of K>4, Mg >2  - Patient is anticoagulated, see medications listed above.  - Patient's afib is currently controlled      Patient has a history of Afib?  Not on anticoagulation.

## 2025-05-16 NOTE — ASSESSMENT & PLAN NOTE
Patient's blood pressure range in the last 24 hours was: BP  Min: 131/72  Max: 158/90.The patient's inpatient anti-hypertensive regimen is listed below:  Current Antihypertensives  nitroGLYCERIN SL tablet 0.4 mg, Every 5 min PRN, Sublingual  furosemide injection 20 mg, Every 12 hours, Intravenous    Plan  - BP is controlled, no changes needed to their regimen

## 2025-05-16 NOTE — PLAN OF CARE
[facility]  Initial Discharge Assessment       Primary Care Provider: No, Primary Doctor    Admission Diagnosis: Chest pain, unspecified type [R07.9]    Admission Date: 5/15/2025  Expected Discharge Date: 5/17/2025    SW met with patient bedside. SW verified demographics, insurance, supports, and PCP.  Patient reported she lives in the home with her grandson and family brings her to appointments. SW assessed patient's needs. Patient is able to complete ADLs independently with equipment. Patient verified at home DME: rolling walker, crutch.  Patient verified No HH, No Dialysis, No Blood Thinners, and Oxygen. Patient reported she uses 2L oxygen with her concentrator as needed. Patient reported she would like HH services at discharge.    Patient verified pharmacy of choice: [facility]  Patient confirmed her son, Adarsh, will be source of transportation at the time of discharge.     Transition of Care Barriers: None    Payor: [employer] MANAGED MEDICARE / Plan: [employer] MEDICARE PLAN PPO / Product Type: Medicare Advantage /     Extended Emergency Contact Information  Primary Emergency Contact: Adarsh Holguin  Home Phone: [phone]  Mobile Phone: [phone]  Relation: Son   needed? No    Discharge Plan A: Home Health  Discharge Plan B: Home Health      [id number] - [place] - [address] AT [facility] [address]  [address] [address]  Phone: [phone] Fax: [phone]      Initial Assessment (most recent)       Adult Discharge Assessment - 05/16/25 1144          Discharge Assessment    Assessment Type Discharge Planning Assessment     Confirmed/corrected address, phone number and insurance Yes     Confirmed Demographics Correct on Facesheet     Source of Information patient     Communicated HEMANTH with patient/caregiver Date not available/Unable to determine     Reason For Admission Chest pain     People in Home grandchild(nicolás)      Do you expect to return to your current living situation? Yes     Do you have help at home or someone to help you manage your care at home? No     Prior to hospitilization cognitive status: Unable to Assess     Current cognitive status: Alert/Oriented     Walking or Climbing Stairs Difficulty yes     Walking or Climbing Stairs ambulation difficulty, requires equipment     Mobility Management rolling walker, crutch     Dressing/Bathing Difficulty no     Home Accessibility wheelchair accessible     Home Layout Able to live on 1st floor     Equipment Currently Used at Home crutches;walker, rolling;oxygen     Readmission within 30 days? No     Patient currently being followed by outpatient case management? No     Do you currently have service(s) that help you manage your care at home? No     Do you take prescription medications? Yes     Do you have prescription coverage? Yes     Do you have any problems affording any of your prescribed medications? No     Is the patient taking medications as prescribed? yes     Who is going to help you get home at discharge? son     How do you get to doctors appointments? family or friend will provide     Are you on dialysis? No     Do you take coumadin? No     Discharge Plan A Home Health     Discharge Plan B Home Health     DME Needed Upon Discharge  none     Discharge Plan discussed with: Patient     Transition of Care Barriers None

## 2025-05-16 NOTE — CARE UPDATE
05/15/25 1952   Patient Assessment/Suction   Level of Consciousness (AVPU) alert   Respiratory Effort Normal   Expansion/Accessory Muscles/Retractions no use of accessory muscles   All Lung Fields Breath Sounds clear   Rhythm/Pattern, Respiratory unlabored   Skin Integrity   $ Wound Care Tech Time 15 min   Area Observed Left;Right;Cheek;Nares   Skin Appearance without discoloration   PRE-TX-O2   Device (Oxygen Therapy) nasal cannula   $ Is the patient on Low Flow Oxygen? Yes   Flow (L/min) (Oxygen Therapy) 2   SpO2 (!) 94 %   Pulse Oximetry Type Intermittent   $ Pulse Oximetry - Multiple Charge Pulse Oximetry - Multiple   Pulse 70   Resp 19   Education   $ Education Oxygen;15 min   Respiratory Evaluation   $ Care Plan Tech Time 15 min

## 2025-05-16 NOTE — ASSESSMENT & PLAN NOTE
Patient is identified as having Diastolic (HFpEF) heart failure that is Acute on Chronic. CHF is currently uncontrolled due to volume overload due to: Continued edema of extremities and JVD, Rales/crackles on pulmonary exam, and Pulmonary edema/pleural effusion on CXR. Latest ECHO performed and demonstrates- Results for orders placed during the hospital encounter of 08/21/23    Echo    Interpretation Summary    Left Ventricle: The left ventricle is mildly dilated. Mildly increased ventricular mass. Mildly increased wall thickness. There is mild concentric hypertrophy. Normal wall motion. Septal flattening in systole consistent with right ventricular pressure overload. There is low normal systolic function. EF 50% with patient in sinus rhythm and heart rate of 52 There is diastolic dysfunction but grade cannot be determined. Elevated left ventricular filling pressure.    Right Ventricle: Normal right ventricular cavity size. Wall thickness is normal. Right ventricle wall motion  is normal. Systolic function is normal.    Mitral Valve: There is no stenosis.    Tricuspid Valve: There is mild to moderate regurgitation.    Pulmonary Artery: There is mild pulmonary hypertension. The estimated pulmonary artery systolic pressure is 58 mmHg.    IVC/SVC: Elevated venous pressure at 15 mmHg.    Pericardium: There is a small posterior effusion. Pericardial effusion is echolucent. No indication of cardiac tamponade.    Mean left atrial pressure slightly increased at 12 mm Hg  . Continue Furosemide and monitor clinical status closely. Monitor on telemetry. Patient is on CHF pathway.  Monitor strict Is&Os and daily weights.  Place on fluid restriction of 1.5 L. Cardiology is consulted. Continue to stress to patient importance of self efficacy and  on diet for CHF. Last BNP reviewed- and noted below   Recent Labs   Lab 05/15/25  1155   BNP 3,015*   .      Remains fluid overload, continue IV Lasix 20 b.i.d..   Strict intake  output.   Follow up cardiologist.   Unclear if ever had ischemia workup or not.   Pending repeat 2D echo.

## 2025-05-16 NOTE — CONSULTS
[facility]  Department of Cardiology  Consult Note      PATIENT NAME: Andressa Benedict  MRN: [id number]  TODAY'S DATE: 05/16/2025  ADMIT DATE: 5/15/2025                          CONSULT REQUESTED BY: Melani Cohn MD    SUBJECTIVE     PRINCIPAL PROBLEM: Acute heart failure with preserved ejection fraction (HFpEF)      REASON FOR CONSULT:  Acute chf      HPI:    89 y.o. year-old patient with history of has a past medical history of Atrial fibrillation, Bradycardia, Carotid bruit, CKD (chronic kidney disease), stage III, Hyperlipidemia, OA (osteoarthritis), and Thyroid disease. who presented to the ED with c/o shortness of breath and chest pain and admitted for CHF exacerbation.  She has noticed increased DIALLO and atypical chest pains.  She notes CP after she eats and drinks at times.    No acute ST-T wave changes on EKG.  She is agreeable to a stress test next am      Review of patient's allergies indicates:   Allergen Reactions    Penicillins Anaphylaxis       Past Medical History:   Diagnosis Date    Atrial fibrillation     Bradycardia     Carotid bruit     CKD (chronic kidney disease), stage III     Hyperlipidemia     OA (osteoarthritis)     Thyroid disease      Past Surgical History:   Procedure Laterality Date    HYSTERECTOMY      KNEE SURGERY Right     SHOULDER SURGERY Right      Social History[1]     REVIEW OF SYSTEMS    As mentioned in HPI    OBJECTIVE     VITAL SIGNS (Most Recent)  Temp: 97.9 °F (36.6 °C) (05/16/25 1128)  Pulse: 72 (05/16/25 1128)  Resp: 18 (05/16/25 1128)  BP: (!) 148/74 (05/16/25 1128)  SpO2: (!) 93 % (05/16/25 1128)    VENTILATION STATUS  Resp: 18 (05/16/25 1128)  SpO2: (!) 93 % (05/16/25 1128)           I & O (Last 24H):  Intake/Output Summary (Last 24 hours) at 5/16/2025 1450  Last data filed at 5/16/2025 1014  Gross per 24 hour   Intake 120 ml   Output 1302 ml   Net -1182 ml       WEIGHTS  Wt Readings from Last 3 Encounters:   05/16/25 0400 73.7 kg (162 lb 7.7 oz)    05/15/25 1815 73.7 kg (162 lb 7.7 oz)   05/15/25 1105 68.9 kg (152 lb)   03/13/25 0920 71.1 kg (156 lb 12 oz)   01/13/25 1343 69.2 kg (152 lb 8.9 oz)       PHYSICAL EXAM    CONSTITUTIONAL: NAD  HEENT: Normocephalic. No pallor  NECK: no JVD  LUNGS: Crackles BLL  HEART: regular rate and rhythm, S1, S2 normal, no murmur   ABDOMEN: soft, non-tender, bowel sounds normal  EXTREMITIES: No edema  SKIN: No rash  NEURO: AAO X 3  PSYCH: normal affect      HOME MEDICATIONS:Medications Ordered Prior to Encounter[2]    SCHEDULED MEDS:   aspirin  81 mg Oral Daily    atorvastatin  80 mg Oral Daily    enoxparin  30 mg Subcutaneous Daily    furosemide (LASIX) injection  20 mg Intravenous Q12H    levothyroxine  100 mcg Oral Before breakfast    pantoprazole  40 mg Oral Daily       CONTINUOUS INFUSIONS:    PRN MEDS:  Current Facility-Administered Medications:     acetaminophen, 650 mg, Oral, Q4H PRN    aluminum-magnesium hydroxide-simethicone, 30 mL, Oral, Q6H PRN    dextrose 50%, 12.5 g, Intravenous, PRN    dextrose 50%, 25 g, Intravenous, PRN    glucagon (human recombinant), 1 mg, Intramuscular, PRN    glucose, 16 g, Oral, PRN    glucose, 24 g, Oral, PRN    HYDROcodone-acetaminophen, 1 tablet, Oral, Q6H PRN    magnesium oxide, 800 mg, Oral, PRN    magnesium oxide, 800 mg, Oral, PRN    melatonin, 9 mg, Oral, Nightly PRN    naloxone, 0.02 mg, Intravenous, PRN    nitroGLYCERIN, 0.4 mg, Sublingual, Q5 Min PRN    ondansetron, 4 mg, Intravenous, Q6H PRN    potassium bicarbonate, 35 mEq, Oral, PRN    potassium bicarbonate, 50 mEq, Oral, PRN    potassium bicarbonate, 60 mEq, Oral, PRN    potassium, sodium phosphates, 2 packet, Oral, PRN    potassium, sodium phosphates, 2 packet, Oral, PRN    potassium, sodium phosphates, 2 packet, Oral, PRN    senna-docusate, 1 tablet, Oral, BID PRN    sodium chloride 0.9%, 10 mL, Intravenous, PRN    LABS AND DIAGNOSTICS     CBC LAST 3 DAYS  Recent Labs   Lab 05/15/25  1155 05/16/25  0412   WBC 6.25 7.28  "  RBC 3.35* 3.30*   HGB 11.2* 11.0*   HCT 34.6* 34.2*   * 104*   MCH 33.4* 33.3*   MCHC 32.4 32.2   RDW 14.7* 14.5    180   MPV 10.6 10.7   NRBC 0  --        COAGULATION LAST 3 DAYS  Recent Labs   Lab 05/15/25  1743   INR 1.1   APTT 27.7       CHEMISTRY LAST 3 DAYS  Recent Labs   Lab 05/15/25  1155 05/15/25  1743 05/16/25  0412     --  136   K 4.0  --  4.2     --  101   CO2 28  --  25   ANIONGAP 9  --  10   BUN 37*  --  39*   CREATININE 1.6*  --  1.6*   *  --  122*   CALCIUM 9.8  --  9.5   MG  --  1.7 1.7   ALBUMIN 4.0  --  3.8   PROT 7.1  --  6.7   ALKPHOS 56  --  59   ALT 14  --  14   AST 15  --  14   BILITOT 0.9  --  0.8       CARDIAC PROFILE LAST 3 DAYS  Recent Labs   Lab 05/15/25  1155   BNP 3,015*       ENDOCRINE LAST 3 DAYS  No results for input(s): "TSH", "PROCAL" in the last 168 hours.    LAST ARTERIAL BLOOD GAS  ABG  No results for input(s): "PH", "PO2", "PCO2", "HCO3", "BE" in the last 168 hours.    LAST 7 DAYS MICROBIOLOGY   Microbiology Results (last 7 days)       Procedure Component Value Units Date/Time    Influenza A & B by Molecular [[id number]]  (Normal) Collected: 05/15/25 1155    Order Status: Completed Specimen: Nasal Swab Updated: 05/15/25 1256     INFLUENZA A MOLECULAR Negative     INFLUENZA B MOLECULAR  Negative            MOST RECENT IMAGING  X-Ray Chest AP Portable  Narrative: CLINICAL HISTORY:  ([id number])89 y/o  (9/23/1934) F    CHF;    TECHNIQUE:  (A#[id number], exam time 5/15/2025 11:25)    XR CHEST AP PORTABLE [id number]    COMPARISON:  Radiograph from 10/11/2024    FINDINGS:  Perihilar pulmonary vascular prominence with mildly increased central hilar interstitial lung markings bilaterally.  There is blunting of both costophrenic angles consistent with trace pleural effusions and adjacent atelectasis. No pneumothorax is identified. Moderate to severe cardiopericardial silhouette enlargement. Atheromatous calcifications are seen at the aortic arch. Osseous " structures show degenerative disc disease and degenerative changes in the shoulders. The visualized upper abdomen is unremarkable.  Impression: Cardiomegaly and findings of mild interstitial pulmonary edema.    Electronically signed by: Krish Antunez  Date:    05/15/2025  Time:    11:28      ECHOCARDIOGRAM RESULTS (last 5)  Results for orders placed during the hospital encounter of 08/21/23    Echo    Interpretation Summary    Left Ventricle: The left ventricle is mildly dilated. Mildly increased ventricular mass. Mildly increased wall thickness. There is mild concentric hypertrophy. Normal wall motion. Septal flattening in systole consistent with right ventricular pressure overload. There is low normal systolic function. EF 50% with patient in sinus rhythm and heart rate of 52 There is diastolic dysfunction but grade cannot be determined. Elevated left ventricular filling pressure.    Right Ventricle: Normal right ventricular cavity size. Wall thickness is normal. Right ventricle wall motion  is normal. Systolic function is normal.    Mitral Valve: There is no stenosis.    Tricuspid Valve: There is mild to moderate regurgitation.    Pulmonary Artery: There is mild pulmonary hypertension. The estimated pulmonary artery systolic pressure is 58 mmHg.    IVC/SVC: Elevated venous pressure at 15 mmHg.    Pericardium: There is a small posterior effusion. Pericardial effusion is echolucent. No indication of cardiac tamponade.    Mean left atrial pressure slightly increased at 12 mm Hg      Transesophageal echo (ISAURO) with possible cardioversion    Interpretation Summary    Left Ventricle: The left ventricle is mildly dilated. Normal wall thickness. Mild global hypokinesis present. There is mildly reduced systolic function. EF  50% There is normal diastolic function.    Left Atrium: The left atrial appendage appears dilated. The left atrial appendage has a windsock morphology. There is no thrombus in the left atrial  appendage.    Aortic Valve: There is mild aortic valve sclerosis.    Mitral Valve: There is no stenosis. There is mild regurgitation.    Aorta: Grade 1 small calcified atherosclerosis of the ascending aorta.    Pericardium: There is a small posterior effusion.      Echo    Interpretation Summary    Left Ventricle: The left ventricle is normal in size. Mildly increased ventricular mass. Mildly increased wall thickness. There is mild concentric hypertrophy. Normal wall motion.  Atrial fibrillation is present There is mildly reduced systolic function. EF 45% Unable to assess due to atrial fibrillation. Elevated left ventricular filling pressure. Tissue Doppler velocity is reduced.  Mean left atrial pressure proximally 16    Right Ventricle: Normal right ventricular cavity size. Wall thickness is normal. Right ventricle wall motion  is normal. Systolic function is normal.    Aortic Valve: There is mild aortic valve sclerosis.    Tricuspid Valve: There is mild to moderate regurgitation.    Pulmonary Artery: There is mild pulmonary hypertension.    Pericardium: There is a small effusion adjacent to the right ventricle. Pericardial effusion is echolucent. No indication of cardiac tamponade.    Patient has atrial fibrillation, depressed ejection fraction, mild pulmonary hypertension, mean left atrial pressure 16 mm Hg      Results for orders placed during the hospital encounter of 07/19/23    Echo    Interpretation Summary  · The left ventricle is mildly enlarged with normal systolic function.  · Indeterminate left ventricular diastolic function.  · The estimated ejection fraction is 55%.  · Normal right ventricular size with normal right ventricular systolic function.  · Mild left atrial enlargement.  · Mild right atrial enlargement.  · Moderate mitral regurgitation.  · Moderate tricuspid regurgitation.  · Mild aortic regurgitation.  · Intermediate central venous pressure (8 mmHg).  · The estimated PA systolic pressure is  55 mmHg.  · There is moderate pulmonary hypertension.  · Trivial circumferential pericardial effusion.      CURRENT/PREVIOUS VISIT EKG  Results for orders placed or performed during the hospital encounter of 05/15/25   EKG 12-lead    Collection Time: 05/16/25  1:45 PM   Result Value Ref Range    QRS Duration 156 ms    [facility] QTC Calculation 522 ms    Narrative    Test Reason : R07.9,    Vent. Rate :  77 BPM     Atrial Rate :  77 BPM     P-R Int : 186 ms          QRS Dur : 156 ms      QT Int : 462 ms       P-R-T Axes :  33  69  51 degrees    QTcB Int : 522 ms    Normal sinus rhythm  Possible Left atrial enlargement  Right bundle branch block  ST elevation consider inferior injury or acute infarct    ACUTE MI / STEMI    Abnormal ECG  When compared with ECG of 11-Oct-2024 11:04,  Premature ventricular complexes are no longer Present    Referred By: AAAREFERRAL SELF           Confirmed By:            ASSESSMENT/PLAN:     Active Hospital Problems    Diagnosis    *Acute heart failure with preserved ejection fraction (HFpEF)    Chest tightness    Paroxysmal A-fib    Essential hypertension    CKD (chronic kidney disease), stage III       ASSESSMENT & PLAN:     Acute on chronic CHF  Chest pain  Elevated troponin   Hypertension  CKD  H/o AFib    RECOMMENDATIONS:    Continue IV lasix today.  If euvolemic transition to PO tomorrow. Her lasix was recently decreased by cardiologist due to worsening renal function and dehydration.  Stress test next am. NPO at midnight  Echo pending  EKG reviewed. No acute ST-T wave changes. Repeat EKG  No acute events on tele. Continue close cardiac monitoring  Continue aspirin and statin therapy  We will continue to follow.      Luz Maria Hamilton NP  Department of Cardiology  Date of Service: 05/16/2025      I have personally interviewed and examined the patient, I have reviewed the Nurse Practitioner's history and physical, assessment, and plan. I have personally evaluated the patient at bedside and  agree with the findings and made appropriate changes as necessary in recommendations.  All pertinent recent labs, imaging and EKGs independently reviewed and interpreted.     Roberto Stevenson MD [facility]  Department of Cardiology  [facility]  5/16/2025         [1]   Social History  Tobacco Use    Smoking status: Never    Smokeless tobacco: Never   Substance Use Topics    Alcohol use: Never    Drug use: Never   [2]   No current facility-administered medications on file prior to encounter.     Current Outpatient Medications on File Prior to Encounter   Medication Sig Dispense Refill    ascorbic acid, vitamin C, (VITAMIN C) 250 mg Chew Take 1 tablet by mouth Daily.      aspirin (ECOTRIN) 81 MG EC tablet Take 81 mg by mouth once daily.      betaxolol 0.5% (BETOPTIC-S) 0.5 % Drop Place 1 drop into both eyes 2 (two) times daily.      coenzyme Q10 (CO Q-10) 100 mg capsule Take 100 mg by mouth once daily.      docosahexaenoic acid/epa (FISH OIL ORAL) Take 1 capsule by mouth Daily.      ENTRESTO 24-26 mg per tablet TAKE 1 TABLET BY MOUTH TWICE DAILY 180 tablet 11    ergocalciferol, vitamin D2, (VITAMIN D ORAL) Take 1 tablet by mouth Daily.      FLAXSEED OIL ORAL Take 1 capsule by mouth Daily.      furosemide (LASIX) 40 MG tablet Take 1 tablet (40 mg total) by mouth once daily. 90 tablet 3    glucosam/msm/chond/koc220/hyal (GLUCOS-CHOND-MSM, WITH ANTIOX, ORAL) Take 1 capsule by mouth Daily.      hydrALAZINE (APRESOLINE) 50 MG tablet Take 1 tablet (50 mg total) by mouth every 8 (eight) hours. 270 tablet 3    ibuprofen (ADVIL,MOTRIN) 200 MG tablet Take 200 mg by mouth every 6 (six) hours as needed for Pain.      levothyroxine (SYNTHROID) 100 MCG tablet TAKE 1 TABLET BY MOUTH EVERY DAY (Patient taking differently: Take 100 mcg by mouth once daily.) 90 tablet 3    multivitamin (THERAGRAN) per tablet Take 1 tablet by mouth once daily.      polyethylene glycol (GLYCOLAX) 17 gram/dose powder Take 17 g by mouth once  daily.      simethicone (MYLICON) 125 MG chewable tablet Take 125 mg by mouth every 6 (six) hours as needed for Flatulence.      UNABLE TO FIND Take 1 tablet by mouth once daily. medication name: Prevagen supplement

## 2025-05-16 NOTE — ASSESSMENT & PLAN NOTE
Mild elevated troponin secondary to CHF  Patient refused EKG  Telemetry reviewed- no significant arrhythmia  Unclear if ever had ischemia workup.   May need stress test, consult cardiologist

## 2025-05-17 ENCOUNTER — CLINICAL SUPPORT (OUTPATIENT)
Dept: CARDIOLOGY | Facility: HOSPITAL | Age: OVER 89
DRG: 291 | End: 2025-05-17
Attending: EMERGENCY MEDICINE
Payer: MEDICARE

## 2025-05-17 PROBLEM — R07.89 CHEST TIGHTNESS: Status: RESOLVED | Noted: 2025-05-15 | Resolved: 2025-05-17

## 2025-05-17 LAB
ANION GAP (SMH): 11 MMOL/L (ref 8–16)
BUN SERPL-MCNC: 39 MG/DL (ref 8–23)
CALCIUM SERPL-MCNC: 9.7 MG/DL (ref 8.7–10.5)
CHLORIDE SERPL-SCNC: 100 MMOL/L (ref 95–110)
CO2 SERPL-SCNC: 27 MMOL/L (ref 23–29)
CREAT SERPL-MCNC: 1.7 MG/DL (ref 0.5–1.4)
CV PHARM DOSE: 0.4 MG
CV STRESS BASE HR: 60 BPM
DIASTOLIC BLOOD PRESSURE: 76 MMHG
ERYTHROCYTE [DISTWIDTH] IN BLOOD BY AUTOMATED COUNT: 14.7 % (ref 11.5–14.5)
FOLATE SERPL-MCNC: >22.3 NG/ML (ref 4–24)
GFR SERPLBLD CREATININE-BSD FMLA CKD-EPI: 28 ML/MIN/1.73/M2
GLUCOSE SERPL-MCNC: 111 MG/DL (ref 70–110)
HCT VFR BLD AUTO: 36.5 % (ref 37–48.5)
HGB BLD-MCNC: 11.5 GM/DL (ref 12–16)
MAGNESIUM SERPL-MCNC: 1.8 MG/DL (ref 1.6–2.6)
MCH RBC QN AUTO: 33.7 PG (ref 27–31)
MCHC RBC AUTO-ENTMCNC: 31.5 G/DL (ref 32–36)
MCV RBC AUTO: 107 FL (ref 82–98)
OHS CV CPX 1 MINUTE RECOVERY HEART RATE: 86 BPM
OHS CV CPX 85 PERCENT MAX PREDICTED HEART RATE MALE: 111
OHS CV CPX MAX PREDICTED HEART RATE: 130
OHS CV CPX PATIENT IS FEMALE: 1
OHS CV CPX PATIENT IS MALE: 0
OHS CV CPX PEAK DIASTOLIC BLOOD PRESSURE: 89 MMHG
OHS CV CPX PEAK HEAR RATE: 99 BPM
OHS CV CPX PEAK RATE PRESSURE PRODUCT: NORMAL
OHS CV CPX PEAK SYSTOLIC BLOOD PRESSURE: 159 MMHG
OHS CV CPX PERCENT MAX PREDICTED HEART RATE ACHIEVED: 78
OHS CV CPX RATE PRESSURE PRODUCT PRESENTING: 8340
PHOSPHATE SERPL-MCNC: 3.6 MG/DL (ref 2.7–4.5)
PLATELET # BLD AUTO: 172 K/UL (ref 150–450)
PMV BLD AUTO: 11.1 FL (ref 9.2–12.9)
POTASSIUM SERPL-SCNC: 3.8 MMOL/L (ref 3.5–5.1)
RBC # BLD AUTO: 3.41 M/UL (ref 4–5.4)
SODIUM SERPL-SCNC: 138 MMOL/L (ref 136–145)
SYSTOLIC BLOOD PRESSURE: 139 MMHG
T4 FREE SERPL-MCNC: 1.41 NG/DL (ref 0.71–1.51)
TSH SERPL-ACNC: 1.03 UIU/ML (ref 0.34–5.6)
VIT B12 SERPL-MCNC: 1344 PG/ML (ref 210–950)
WBC # BLD AUTO: 7.55 K/UL (ref 3.9–12.7)

## 2025-05-17 PROCEDURE — 63600175 PHARM REV CODE 636 W HCPCS: Performed by: INTERNAL MEDICINE

## 2025-05-17 PROCEDURE — 99900035 HC TECH TIME PER 15 MIN (STAT)

## 2025-05-17 PROCEDURE — 84100 ASSAY OF PHOSPHORUS: CPT | Performed by: HOSPITALIST

## 2025-05-17 PROCEDURE — 99900031 HC PATIENT EDUCATION (STAT)

## 2025-05-17 PROCEDURE — A9502 TC99M TETROFOSMIN: HCPCS | Performed by: FAMILY MEDICINE

## 2025-05-17 PROCEDURE — 25000003 PHARM REV CODE 250: Performed by: NURSE PRACTITIONER

## 2025-05-17 PROCEDURE — 36415 COLL VENOUS BLD VENIPUNCTURE: CPT | Performed by: INTERNAL MEDICINE

## 2025-05-17 PROCEDURE — 83735 ASSAY OF MAGNESIUM: CPT | Performed by: INTERNAL MEDICINE

## 2025-05-17 PROCEDURE — 63600175 PHARM REV CODE 636 W HCPCS: Performed by: HOSPITALIST

## 2025-05-17 PROCEDURE — 93017 CV STRESS TEST TRACING ONLY: CPT

## 2025-05-17 PROCEDURE — 27000221 HC OXYGEN, UP TO 24 HOURS

## 2025-05-17 PROCEDURE — 84443 ASSAY THYROID STIM HORMONE: CPT | Performed by: HOSPITALIST

## 2025-05-17 PROCEDURE — 99499 UNLISTED E&M SERVICE: CPT | Mod: ,,, | Performed by: INTERNAL MEDICINE

## 2025-05-17 PROCEDURE — 94761 N-INVAS EAR/PLS OXIMETRY MLT: CPT

## 2025-05-17 PROCEDURE — 11000001 HC ACUTE MED/SURG PRIVATE ROOM

## 2025-05-17 PROCEDURE — 84439 ASSAY OF FREE THYROXINE: CPT | Performed by: HOSPITALIST

## 2025-05-17 PROCEDURE — 63600175 PHARM REV CODE 636 W HCPCS: Performed by: FAMILY MEDICINE

## 2025-05-17 PROCEDURE — 25000003 PHARM REV CODE 250: Performed by: INTERNAL MEDICINE

## 2025-05-17 PROCEDURE — 85027 COMPLETE CBC AUTOMATED: CPT | Performed by: INTERNAL MEDICINE

## 2025-05-17 PROCEDURE — 82607 VITAMIN B-12: CPT | Performed by: NURSE PRACTITIONER

## 2025-05-17 PROCEDURE — 82746 ASSAY OF FOLIC ACID SERUM: CPT | Performed by: NURSE PRACTITIONER

## 2025-05-17 PROCEDURE — 82310 ASSAY OF CALCIUM: CPT | Performed by: HOSPITALIST

## 2025-05-17 RX ORDER — SUCRALFATE 1 G/10ML
1 SUSPENSION ORAL EVERY 6 HOURS
Status: DISCONTINUED | OUTPATIENT
Start: 2025-05-17 | End: 2025-05-18 | Stop reason: HOSPADM

## 2025-05-17 RX ORDER — FUROSEMIDE 40 MG/1
40 TABLET ORAL DAILY
Status: DISCONTINUED | OUTPATIENT
Start: 2025-05-17 | End: 2025-05-18 | Stop reason: HOSPADM

## 2025-05-17 RX ORDER — REGADENOSON 0.08 MG/ML
0.4 INJECTION, SOLUTION INTRAVENOUS ONCE
Status: COMPLETED | OUTPATIENT
Start: 2025-05-17 | End: 2025-05-17

## 2025-05-17 RX ADMIN — ENOXAPARIN SODIUM 30 MG: 30 INJECTION SUBCUTANEOUS at 04:05

## 2025-05-17 RX ADMIN — PANTOPRAZOLE SODIUM 40 MG: 40 TABLET, DELAYED RELEASE ORAL at 05:05

## 2025-05-17 RX ADMIN — ASPIRIN 81 MG: 81 TABLET ORAL at 08:05

## 2025-05-17 RX ADMIN — FUROSEMIDE 40 MG: 40 TABLET ORAL at 04:05

## 2025-05-17 RX ADMIN — SUCRALFATE ORAL 1 G: 1 SUSPENSION ORAL at 05:05

## 2025-05-17 RX ADMIN — FUROSEMIDE 20 MG: 10 INJECTION, SOLUTION INTRAMUSCULAR; INTRAVENOUS at 08:05

## 2025-05-17 RX ADMIN — LEVOTHYROXINE SODIUM 100 MCG: 0.1 TABLET ORAL at 05:05

## 2025-05-17 RX ADMIN — REGADENOSON 0.4 MG: 0.08 INJECTION, SOLUTION INTRAVENOUS at 11:05

## 2025-05-17 RX ADMIN — TETROFOSMIN 9.7 MILLICURIE: 1.38 INJECTION, POWDER, LYOPHILIZED, FOR SOLUTION INTRAVENOUS at 12:05

## 2025-05-17 RX ADMIN — ATORVASTATIN CALCIUM 80 MG: 40 TABLET, FILM COATED ORAL at 08:05

## 2025-05-17 RX ADMIN — SACUBITRIL AND VALSARTAN 1 TABLET: 24; 26 TABLET, FILM COATED ORAL at 09:05

## 2025-05-17 RX ADMIN — TETROFOSMIN 24.9 MILLICURIE: 1.38 INJECTION, POWDER, LYOPHILIZED, FOR SOLUTION INTRAVENOUS at 12:05

## 2025-05-17 NOTE — PROGRESS NOTES
[facility] Medicine  Progress Note    Patient Name: Andressa Benedict  MRN: [id number]  Patient Class: IP- Inpatient   Admission Date: 5/15/2025  Length of Stay: 1 days  Attending Physician: Dacia Barker DO  Primary Care Provider: Reina, Primary Doctor        Subjective     Principal Problem:Acute heart failure with preserved ejection fraction (HFpEF)        HPI:  Andressa Benedict is a 89 y.o. year-old patient with history of  has a past medical history of Atrial fibrillation, Bradycardia, Carotid bruit, CKD (chronic kidney disease), stage III, Hyperlipidemia, OA (osteoarthritis), and Thyroid disease. who presented to the ED with c/o shortness of breath and chest pain.  Patient states that over the last 3 weeks she has gotten progressively worsening shortness of breath especially with activities.  She states that for the last week she has had irregular pounding heart beats.  Patient also reports that in the last 2-3 days she has begun with a chest tightness that typically occurs about 30 minutes after eating and occurs in waves each wave lasting 7 seconds.  Patient with intermittent irregular heart beat from 30s to 70s, patient denies EKG awaiting a room.  Patient to be admitted for further evaluation and treatment.    Triage vitals with pulse 91, /92, temp 98.1, SpO2 91 percent on room air.  Blood work performed in the ED with hemoglobin 11.2, hematocrit 34.6, glucose 113, BUN 37, creatinine 1.6, GFR 31, BNP 3015, initial troponin 48.3.  Chest x-ray with cardiomegaly and findings of mild interstitial pulmonary edema.      Overview/Hospital Course:  Ms. Benedict has a monitor closely during her hospitalization.  She was admitted on 5/15/25 with palpitations and chest pain.  BNP 3000.  Troponins trended 48-->52-->52 EKG:  Sinus rhythm with PACs and right bundle-branch block no acute ischemic changes.  She was diuresed with IV Lasix.  Cardiology was consulted and recommended a stress  test.  She had an echo that revealed for 30-40% grade 2 diastolic dysfunction severe mitral regurg moderate to severe tricuspid regurg pulmonary hypertension with a PASP of 89 mmHg, and a small posterior pericardial effusion.  Nuclear medicine stress test was negative for reversible ischemia. IV Lasix transitioned to oral on 5/17.  She reports chronic abdominal pain that is worse after eating associated with belching and intermittent constipation and diarrhea.  Son states she has never had a colonoscopy however she is 89.  We will continue Protonix and add Carafate, and recommend GI follow-up outpatient.  KUB ordered and pending.    Interval History:   No acute events overnight.  Transitioned to oral Lasix, evaluate response and if stable can likely be discharged with home health in the morning    Review of Systems   Respiratory:  Negative for chest tightness and shortness of breath.    Cardiovascular:  Positive for leg swelling.   Gastrointestinal:  Positive for constipation and diarrhea.     Objective:     Vital Signs (Most Recent):  Temp: 97.7 °F (36.5 °C) (05/17/25 0729)  Pulse: 93 (05/17/25 1033)  Resp: 18 (05/17/25 0729)  BP: (!) 154/91 (05/17/25 0729)  SpO2: 96 % (05/17/25 0900) Vital Signs (24h Range):  Temp:  [97.5 °F (36.4 °C)-97.7 °F (36.5 °C)] 97.7 °F (36.5 °C)  Pulse:  [59-93] 93  Resp:  [14-19] 18  SpO2:  [90 %-98 %] 96 %  BP: (118-154)/(65-91) 154/91     Weight: 73.7 kg (162 lb 7.7 oz)  Body mass index is 31.73 kg/m².    Intake/Output Summary (Last 24 hours) at 5/17/2025 1523  Last data filed at 5/17/2025 1323  Gross per 24 hour   Intake 120 ml   Output 2550 ml   Net -2430 ml         Physical Exam  Constitutional:       Appearance: Normal appearance.   HENT:      Head: Normocephalic and atraumatic.   Cardiovascular:      Rate and Rhythm: Normal rate and regular rhythm.      Heart sounds: Murmur heard.   Pulmonary:      Effort: Pulmonary effort is normal.      Breath sounds: No rales.      Comments:  "Diminished in the bases  Abdominal:      General: Abdomen is flat.      Palpations: Abdomen is soft.   Musculoskeletal:         General: Normal range of motion.      Right lower leg: No edema.      Left lower leg: No edema.   Skin:     General: Skin is warm and dry.   Neurological:      General: No focal deficit present.      Mental Status: She is alert and oriented to person, place, and time.               Significant Labs: All pertinent labs within the past 24 hours have been reviewed.  CBC:   Recent Labs   Lab 05/16/25 0412 05/17/25  0352   WBC 7.28 7.55   HGB 11.0* 11.5*   HCT 34.2* 36.5*    172     CMP:   Recent Labs   Lab 05/16/25 0412 05/17/25  0352    138   K 4.2 3.8    100   CO2 25 27   * 111*   BUN 39* 39*   CREATININE 1.6* 1.7*   CALCIUM 9.5 9.7   PROT 6.7  --    ALBUMIN 3.8  --    BILITOT 0.8  --    ALKPHOS 59  --    AST 14  --    ALT 14  --    ANIONGAP 10 11     Cardiac Markers:   No results for input(s): "CKMB", "MYOGLOBIN", "BNP", "TROPISTAT" in the last 48 hours.      Significant Imaging: I have reviewed all pertinent imaging results/findings within the past 24 hours.  I have reviewed and interpreted all pertinent imaging results/findings within the past 24 hours.      Scheduled Meds:   aspirin  81 mg Oral Daily    atorvastatin  80 mg Oral Daily    enoxparin  30 mg Subcutaneous Daily    furosemide  40 mg Oral Daily    levothyroxine  100 mcg Oral Before breakfast    pantoprazole  40 mg Oral Daily    sucralfate  1 g Oral Q6H     Continuous Infusions:  PRN Meds:.  Current Facility-Administered Medications:     acetaminophen, 650 mg, Oral, Q4H PRN    aluminum-magnesium hydroxide-simethicone, 30 mL, Oral, Q6H PRN    dextrose 50%, 12.5 g, Intravenous, PRN    dextrose 50%, 25 g, Intravenous, PRN    glucagon (human recombinant), 1 mg, Intramuscular, PRN    glucose, 16 g, Oral, PRN    glucose, 24 g, Oral, PRN    HYDROcodone-acetaminophen, 1 tablet, Oral, Q6H PRN    magnesium oxide, " 800 mg, Oral, PRN    magnesium oxide, 800 mg, Oral, PRN    melatonin, 9 mg, Oral, Nightly PRN    naloxone, 0.02 mg, Intravenous, PRN    nitroGLYCERIN, 0.4 mg, Sublingual, Q5 Min PRN    ondansetron, 4 mg, Intravenous, Q6H PRN    potassium bicarbonate, 35 mEq, Oral, PRN    potassium bicarbonate, 50 mEq, Oral, PRN    potassium bicarbonate, 60 mEq, Oral, PRN    potassium, sodium phosphates, 2 packet, Oral, PRN    potassium, sodium phosphates, 2 packet, Oral, PRN    potassium, sodium phosphates, 2 packet, Oral, PRN    senna-docusate, 1 tablet, Oral, BID PRN    sodium chloride 0.9%, 10 mL, Intravenous, PRN    NM Myocardial Perfusion Spect Multi Pharmacologic  Result Date: 5/17/2025  CLINICAL HISTORY: ([id number])89 y/o  (9/23/1934) F Chest pain/anginal equiv, intermediate CAD risk, not treadmill candidate; Chest pain, unspecified TECHNIQUE: (A#[id number], exam time 5/17/2025 13:14) NM MYOCARDIAL PERFUSION SPECT MULTI PHARM [id number] A total of 9.7 millicuries was utilized for the rest portion of the examination with 24.9 millicuries for the stress portion of the examination following a 1 day protocol. The patient was stressed with Lexiscan, 0.4 milligrams intravenously and achieved a maximum heart rate of 99 beats per minute. COMPARISON: Radiograph from 05/15/2025 FINDINGS: There is a moderate size relative defect in the anterior, apical and adjacent apical-inferior ventricular myocardial wall seen on stress and rest imaging suggestive of a prior infarct.  No convincing mismatched defect is seen to suggest reversible ischemia. The polar map shows that this area corresponds to 32% of the overall left ventricular volume, without reversibility. There is borderline global hypokinesis with an estimated ejection fraction of 44%.     1. No scintigraphic evidence of pharmacologically induced reversible ischemia 2.  Estimated ejection fraction of 44%, with relative global hypokinesis. 3.  Findings compatible with a prior infarct  involving the anterior, apical and adjacent apical-inferior left ventricular myocardial wall. Electronically signed by: Krish Antunez Date:    05/17/2025 Time:    13:25    Nuclear Stress Test  Result Date: 5/17/2025    The ECG portion of the study is negative for ischemia.   The patient reported no chest pain during the stress test.   There were no arrhythmias during stress.   The nuclear portion of this study will be reported separately.     Echo  Result Date: 5/16/2025    Left Ventricle: The left ventricle is mildly dilated. There is eccentric hypertrophy. Septal motion is consistent with bundle branch block. There is moderately reduced systolic function with a visually estimated ejection fraction of 30 - 40%. Grade II diastolic dysfunction.   Right Ventricle: The right ventricle is normal in size Systolic function is normal.   Left Atrium: The left atrium is severely dilated   Right Atrium: The right atrium is mildly dilated .   Aortic Valve: There is aortic valve sclerosis. There is mild aortic regurgitation.   Mitral Valve: There is bileaflet sclerosis. There is severe regurgitation with an eccentric jet.   Tricuspid Valve: There is moderate to severe regurgitation.   Pulmonary Artery: There is pulmonary hypertension. The estimated pulmonary artery systolic pressure is 89 mmHg.   IVC/SVC: Elevated venous pressure at 15 mmHg.   Pericardium: There is a small posterior effusion.     X-Ray Chest AP Portable  Result Date: 5/15/2025  CLINICAL HISTORY: ([id number])89 y/o  (9/23/1934) F CHF; TECHNIQUE: (A#[id number], exam time 5/15/2025 11:25) XR CHEST AP PORTABLE [id number] COMPARISON: Radiograph from 10/11/2024 FINDINGS: Perihilar pulmonary vascular prominence with mildly increased central hilar interstitial lung markings bilaterally.  There is blunting of both costophrenic angles consistent with trace pleural effusions and adjacent atelectasis. No pneumothorax is identified. Moderate to severe cardiopericardial  silhouette enlargement. Atheromatous calcifications are seen at the aortic arch. Osseous structures show degenerative disc disease and degenerative changes in the shoulders. The visualized upper abdomen is unremarkable.     Cardiomegaly and findings of mild interstitial pulmonary edema. Electronically signed by: Krish Antunez Date:    05/15/2025 Time:    11:28  - pulls last radiology orders        Assessment & Plan  Acute heart failure with preserved ejection fraction (HFpEF)    Patient is identified as having Diastolic (HFpEF) heart failure that is Acute on Chronic. CHF is currently uncontrolled due to volume overload due to: Continued edema of extremities and JVD, Rales/crackles on pulmonary exam, and Pulmonary edema/pleural effusion on CXR. Latest ECHO performed and demonstrates- Results for orders placed during the hospital encounter of 08/21/23    Echo    Interpretation Summary    Left Ventricle: The left ventricle is mildly dilated. Mildly increased ventricular mass. Mildly increased wall thickness. There is mild concentric hypertrophy. Normal wall motion. Septal flattening in systole consistent with right ventricular pressure overload. There is low normal systolic function. EF 50% with patient in sinus rhythm and heart rate of 52 There is diastolic dysfunction but grade cannot be determined. Elevated left ventricular filling pressure.    Right Ventricle: Normal right ventricular cavity size. Wall thickness is normal. Right ventricle wall motion  is normal. Systolic function is normal.    Mitral Valve: There is no stenosis.    Tricuspid Valve: There is mild to moderate regurgitation.    Pulmonary Artery: There is mild pulmonary hypertension. The estimated pulmonary artery systolic pressure is 58 mmHg.    IVC/SVC: Elevated venous pressure at 15 mmHg.    Pericardium: There is a small posterior effusion. Pericardial effusion is echolucent. No indication of cardiac tamponade.    Mean left atrial pressure  slightly increased at 12 mm Hg  . Continue Furosemide and monitor clinical status closely. Monitor on telemetry. Patient is on CHF pathway.  Monitor strict Is&Os and daily weights.  Place on fluid restriction of 1.5 L. Cardiology is consulted. Continue to stress to patient importance of self efficacy and  on diet for CHF. Last BNP reviewed- and noted below   Recent Labs   Lab 05/15/25  1155   BNP 3,015*   .      Appears euvolemic -2400mL out   Transition IV Lasix to p.o.  Strict intake output.   Follow up cardiologist.   Nuclear medicine stress test is negative for reversible ischemia  CKD (chronic kidney disease), stage III  Baseline creatinine is 1.4. Most recent creatinine and eGFR are listed below.  Recent Labs     05/15/25  1155 05/16/25  0412 05/17/25  0352   CREATININE 1.6* 1.6* 1.7*   EGFRNORACEVR 31* 31* 28*      Plan  - ALTON is stable  - Avoid nephrotoxins and renally dose meds for GFR listed above  - Monitor urine output, serial BMP, and adjust therapy as needed  -     Continue IV Lasix, monitor kidney function closely    Essential hypertension  Patient's blood pressure range in the last 24 hours was: BP  Min: 118/72  Max: 154/91.The patient's inpatient anti-hypertensive regimen is listed below:  Current Antihypertensives  nitroGLYCERIN SL tablet 0.4 mg, Every 5 min PRN, Sublingual  furosemide tablet 40 mg, Daily, Oral    Plan  - BP is controlled, no changes needed to their regimen  Paroxysmal A-fib  Patient has paroxysmal (<7 days) atrial fibrillation. Patient is currently in sinus on telemetry however patient declining 12 lead until in room. [id number] Score: 4. The patients heart rate in the last 24 hours is as follows:  Pulse  Min: 59  Max: 93     Antiarrhythmics   Summary patient is not currently on anti arrhythmics    Anticoagulants  enoxaparin injection 30 mg, Every 24 hours, Subcutaneous    Plan  - Replete lytes with a goal of K>4, Mg >2  - Patient is anticoagulated, see medications listed  above.  - Patient's afib is currently controlled      Patient has a history of Afib?  Not on anticoagulation.       VTE Risk Mitigation (From admission, onward)           Ordered     enoxaparin injection 30 mg  Daily        Note to Pharmacy: Ht: 5' (1.524 m)  Wt: 73.7 kg (162 lb 7.7 oz)  Estimated Creatinine Clearance: 21 mL/min (A) (based on SCr of 1.6 mg/dL (H)).  Body mass index is 31.73 kg/m².    05/16/25 0001     IP VTE HIGH RISK PATIENT  Once         05/15/25 1435     Place sequential compression device  Until discontinued         05/15/25 1435                    Discharge Planning   HEMANTH: 5/18/2025     Code Status: Full Code   Medical Readiness for Discharge Date:   Discharge Plan A: Home, Home Health                        Rosa Peterson NP  Department of Hospital Medicine   [facility]

## 2025-05-17 NOTE — PROGRESS NOTES
[facility]  Department of Cardiology  Progress Note      PATIENT NAME: Andressa Benedict  MRN: [id number]  TODAY'S DATE: 05/17/2025  ADMIT DATE: 5/15/2025                          CONSULT REQUESTED BY: Dacia Barker DO SUBJECTIVE     PRINCIPAL PROBLEM: Acute heart failure with preserved ejection fraction (HFpEF)    05/17/2025  Patient seen resting in bed in stress lab. Feeling nervous but no cardiac symptoms.       REASON FOR CONSULT:  Acute chf      HPI:    89 y.o. year-old patient with history of has a past medical history of Atrial fibrillation, Bradycardia, Carotid bruit, CKD (chronic kidney disease), stage III, Hyperlipidemia, OA (osteoarthritis), and Thyroid disease. who presented to the ED with c/o shortness of breath and chest pain and admitted for CHF exacerbation.  She has noticed increased DIALLO and atypical chest pains.  She notes CP after she eats and drinks at times.    No acute ST-T wave changes on EKG.  She is agreeable to a stress test next am      Review of patient's allergies indicates:   Allergen Reactions    Penicillins Anaphylaxis       Past Medical History:   Diagnosis Date    Atrial fibrillation     Bradycardia     Carotid bruit     CKD (chronic kidney disease), stage III     Hyperlipidemia     OA (osteoarthritis)     Thyroid disease      Past Surgical History:   Procedure Laterality Date    HYSTERECTOMY      KNEE SURGERY Right     SHOULDER SURGERY Right      Social History[1]     REVIEW OF SYSTEMS    As mentioned in HPI    OBJECTIVE     VITAL SIGNS (Most Recent)  Temp: 97.7 °F (36.5 °C) (05/17/25 0729)  Pulse: 93 (05/17/25 1033)  Resp: 18 (05/17/25 0729)  BP: (!) 154/91 (05/17/25 0729)  SpO2: 96 % (05/17/25 0900)    VENTILATION STATUS  Resp: 18 (05/17/25 0729)  SpO2: 96 % (05/17/25 0900)           I & O (Last 24H):  Intake/Output Summary (Last 24 hours) at 5/17/2025 1319  Last data filed at 5/17/2025 0926  Gross per 24 hour   Intake 120 ml   Output 2250 ml   Net -2130 ml        WEIGHTS  Wt Readings from Last 3 Encounters:   05/17/25 0400 73.7 kg (162 lb 7.7 oz)   05/16/25 0400 73.7 kg (162 lb 7.7 oz)   05/15/25 1815 73.7 kg (162 lb 7.7 oz)   05/15/25 1105 68.9 kg (152 lb)   03/13/25 0920 71.1 kg (156 lb 12 oz)   01/13/25 1343 69.2 kg (152 lb 8.9 oz)       PHYSICAL EXAM    CONSTITUTIONAL: NAD  HEENT: Normocephalic. No pallor  NECK: no JVD  LUNGS: Crackles BLL  HEART: regular rate and rhythm, S1, S2 normal, no murmur   ABDOMEN: soft, non-tender, bowel sounds normal  EXTREMITIES: No edema  SKIN: No rash  NEURO: AAO X 3  PSYCH: normal affect      HOME MEDICATIONS:Medications Ordered Prior to Encounter[2]    SCHEDULED MEDS:   aspirin  81 mg Oral Daily    atorvastatin  80 mg Oral Daily    enoxparin  30 mg Subcutaneous Daily    furosemide (LASIX) injection  20 mg Intravenous Q12H    levothyroxine  100 mcg Oral Before breakfast    pantoprazole  40 mg Oral Daily       CONTINUOUS INFUSIONS:    PRN MEDS:  Current Facility-Administered Medications:     acetaminophen, 650 mg, Oral, Q4H PRN    aluminum-magnesium hydroxide-simethicone, 30 mL, Oral, Q6H PRN    dextrose 50%, 12.5 g, Intravenous, PRN    dextrose 50%, 25 g, Intravenous, PRN    glucagon (human recombinant), 1 mg, Intramuscular, PRN    glucose, 16 g, Oral, PRN    glucose, 24 g, Oral, PRN    HYDROcodone-acetaminophen, 1 tablet, Oral, Q6H PRN    magnesium oxide, 800 mg, Oral, PRN    magnesium oxide, 800 mg, Oral, PRN    melatonin, 9 mg, Oral, Nightly PRN    naloxone, 0.02 mg, Intravenous, PRN    nitroGLYCERIN, 0.4 mg, Sublingual, Q5 Min PRN    ondansetron, 4 mg, Intravenous, Q6H PRN    potassium bicarbonate, 35 mEq, Oral, PRN    potassium bicarbonate, 50 mEq, Oral, PRN    potassium bicarbonate, 60 mEq, Oral, PRN    potassium, sodium phosphates, 2 packet, Oral, PRN    potassium, sodium phosphates, 2 packet, Oral, PRN    potassium, sodium phosphates, 2 packet, Oral, PRN    senna-docusate, 1 tablet, Oral, BID PRN    sodium chloride 0.9%, 10  "mL, Intravenous, PRN    LABS AND DIAGNOSTICS     CBC LAST 3 DAYS  Recent Labs   Lab 05/15/25  1155 05/16/25  0412 05/17/25  0352   WBC 6.25 7.28 7.55   RBC 3.35* 3.30* 3.41*   HGB 11.2* 11.0* 11.5*   HCT 34.6* 34.2* 36.5*   * 104* 107*   MCH 33.4* 33.3* 33.7*   MCHC 32.4 32.2 31.5*   RDW 14.7* 14.5 14.7*    180 172   MPV 10.6 10.7 11.1   NRBC 0  --   --        COAGULATION LAST 3 DAYS  Recent Labs   Lab 05/15/25  1743   INR 1.1   APTT 27.7       CHEMISTRY LAST 3 DAYS  Recent Labs   Lab 05/15/25  1155 05/15/25  1743 05/16/25  0412 05/17/25  0352     --  136 138   K 4.0  --  4.2 3.8     --  101 100   CO2 28  --  25 27   ANIONGAP 9  --  10 11   BUN 37*  --  39* 39*   CREATININE 1.6*  --  1.6* 1.7*   *  --  122* 111*   CALCIUM 9.8  --  9.5 9.7   MG  --  1.7 1.7 1.8   ALBUMIN 4.0  --  3.8  --    PROT 7.1  --  6.7  --    ALKPHOS 56  --  59  --    ALT 14  --  14  --    AST 15  --  14  --    BILITOT 0.9  --  0.8  --        CARDIAC PROFILE LAST 3 DAYS  Recent Labs   Lab 05/15/25  1155   BNP 3,015*       ENDOCRINE LAST 3 DAYS  Recent Labs   Lab 05/17/25  0352   TSH 1.031       LAST ARTERIAL BLOOD GAS  ABG  No results for input(s): "PH", "PO2", "PCO2", "HCO3", "BE" in the last 168 hours.    LAST 7 DAYS MICROBIOLOGY   Microbiology Results (last 7 days)       Procedure Component Value Units Date/Time    Influenza A & B by Molecular [[id number]]  (Normal) Collected: 05/15/25 1155    Order Status: Completed Specimen: Nasal Swab Updated: 05/15/25 1256     INFLUENZA A MOLECULAR Negative     INFLUENZA B MOLECULAR  Negative            MOST RECENT IMAGING  Nuclear Stress Test    The ECG portion of the study is negative for ischemia.    The patient reported no chest pain during the stress test.    There were no arrhythmias during stress.    The nuclear portion of this study will be reported separately.      ECHOCARDIOGRAM RESULTS (last 5)  Results for orders placed during the hospital encounter of " 08/21/23    Echo    Interpretation Summary    Left Ventricle: The left ventricle is mildly dilated. Mildly increased ventricular mass. Mildly increased wall thickness. There is mild concentric hypertrophy. Normal wall motion. Septal flattening in systole consistent with right ventricular pressure overload. There is low normal systolic function. EF 50% with patient in sinus rhythm and heart rate of 52 There is diastolic dysfunction but grade cannot be determined. Elevated left ventricular filling pressure.    Right Ventricle: Normal right ventricular cavity size. Wall thickness is normal. Right ventricle wall motion  is normal. Systolic function is normal.    Mitral Valve: There is no stenosis.    Tricuspid Valve: There is mild to moderate regurgitation.    Pulmonary Artery: There is mild pulmonary hypertension. The estimated pulmonary artery systolic pressure is 58 mmHg.    IVC/SVC: Elevated venous pressure at 15 mmHg.    Pericardium: There is a small posterior effusion. Pericardial effusion is echolucent. No indication of cardiac tamponade.    Mean left atrial pressure slightly increased at 12 mm Hg      Transesophageal echo (ISUARO) with possible cardioversion    Interpretation Summary    Left Ventricle: The left ventricle is mildly dilated. Normal wall thickness. Mild global hypokinesis present. There is mildly reduced systolic function. EF  50% There is normal diastolic function.    Left Atrium: The left atrial appendage appears dilated. The left atrial appendage has a windsock morphology. There is no thrombus in the left atrial appendage.    Aortic Valve: There is mild aortic valve sclerosis.    Mitral Valve: There is no stenosis. There is mild regurgitation.    Aorta: Grade 1 small calcified atherosclerosis of the ascending aorta.    Pericardium: There is a small posterior effusion.      Echo    Interpretation Summary    Left Ventricle: The left ventricle is normal in size. Mildly increased ventricular mass.  Mildly increased wall thickness. There is mild concentric hypertrophy. Normal wall motion.  Atrial fibrillation is present There is mildly reduced systolic function. EF 45% Unable to assess due to atrial fibrillation. Elevated left ventricular filling pressure. Tissue Doppler velocity is reduced.  Mean left atrial pressure proximally 16    Right Ventricle: Normal right ventricular cavity size. Wall thickness is normal. Right ventricle wall motion  is normal. Systolic function is normal.    Aortic Valve: There is mild aortic valve sclerosis.    Tricuspid Valve: There is mild to moderate regurgitation.    Pulmonary Artery: There is mild pulmonary hypertension.    Pericardium: There is a small effusion adjacent to the right ventricle. Pericardial effusion is echolucent. No indication of cardiac tamponade.    Patient has atrial fibrillation, depressed ejection fraction, mild pulmonary hypertension, mean left atrial pressure 16 mm Hg      Results for orders placed during the hospital encounter of 07/19/23    Echo    Interpretation Summary  · The left ventricle is mildly enlarged with normal systolic function.  · Indeterminate left ventricular diastolic function.  · The estimated ejection fraction is 55%.  · Normal right ventricular size with normal right ventricular systolic function.  · Mild left atrial enlargement.  · Mild right atrial enlargement.  · Moderate mitral regurgitation.  · Moderate tricuspid regurgitation.  · Mild aortic regurgitation.  · Intermediate central venous pressure (8 mmHg).  · The estimated PA systolic pressure is 55 mmHg.  · There is moderate pulmonary hypertension.  · Trivial circumferential pericardial effusion.      CURRENT/PREVIOUS VISIT EKG  Results for orders placed or performed during the hospital encounter of 05/15/25   EKG 12-lead    Collection Time: 05/16/25  3:19 PM   Result Value Ref Range    QRS Duration 158 ms    [facility] QTC Calculation 465 ms    Narrative    Test Reason :  R07.9,    Vent. Rate :  65 BPM     Atrial Rate :  65 BPM     P-R Int : 198 ms          QRS Dur : 158 ms      QT Int : 448 ms       P-R-T Axes :  67  80  87 degrees    QTcB Int : 465 ms    Sinus rhythm with Premature atrial complexes  Possible Left atrial enlargement  Right bundle branch block  Abnormal ECG  When compared with ECG of 16-May-2025 13:45,  Premature atrial complexes are now Present  QT has shortened    Referred By: AAAREFERRAL SELF           Confirmed By:            ASSESSMENT/PLAN:     Active Hospital Problems    Diagnosis    *Acute heart failure with preserved ejection fraction (HFpEF)    Chest tightness    Paroxysmal A-fib    Essential hypertension    CKD (chronic kidney disease), stage III       ASSESSMENT & PLAN:     Acute on chronic CHF  Chest pain  Elevated troponin   Hypertension  CKD  H/o AFib    RECOMMENDATIONS:    Echocardiogram shows ejection fraction 30-40% with severe mitral regurgitation and moderate to severe tricuspid regurgitation.  PA pressure is 89 mmHg.  Nuclear stress test pending interpretation.  Recommend conservative medical management for valvular disease given age and fragility.  Interventions could be considered outpatient if patient desires.      Katharina Tucker NP  Department of Cardiology  Date of Service: 05/17/2025    Patient has a noninvasive testing today did not show any EKG changes on the pharmacologic induced hyperemia  Myocardial perfusion study did not show any evidence of ischemia.  Impression:     1. No scintigraphic evidence of pharmacologically induced reversible ischemia     2.  Estimated ejection fraction of 44%, with relative global hypokinesis.     3.  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        Electronically signed by:Krish Antunez  Recommend to optimize medical therapy  Recommend to add Entresto to her regimen.  Monitor renal function  Continue on  Lasix  Appears to be nearly euvolemic state.  I have personally interviewed and examined the patient, I have reviewed the Nurse Practitioner's history and physical, assessment, and plan. I have personally evaluated the patient at bedside and agree with the findings and made appropriate changes as necessary in recommendations.  All pertinent recent labs, imaging and EKGs independently reviewed and interpreted.     Mandeep Montalvo MD  Department of Cardiology  [facility]  5/17/2025           [1]   Social History  Tobacco Use    Smoking status: Never    Smokeless tobacco: Never   Substance Use Topics    Alcohol use: Never    Drug use: Never   [2]   No current facility-administered medications on file prior to encounter.     Current Outpatient Medications on File Prior to Encounter   Medication Sig Dispense Refill    ascorbic acid, vitamin C, (VITAMIN C) 250 mg Chew Take 1 tablet by mouth Daily.      aspirin (ECOTRIN) 81 MG EC tablet Take 81 mg by mouth once daily.      betaxolol 0.5% (BETOPTIC-S) 0.5 % Drop Place 1 drop into both eyes 2 (two) times daily.      coenzyme Q10 (CO Q-10) 100 mg capsule Take 100 mg by mouth once daily.      docosahexaenoic acid/epa (FISH OIL ORAL) Take 1 capsule by mouth Daily.      ENTRESTO 24-26 mg per tablet TAKE 1 TABLET BY MOUTH TWICE DAILY 180 tablet 11    ergocalciferol, vitamin D2, (VITAMIN D ORAL) Take 1 tablet by mouth Daily.      FLAXSEED OIL ORAL Take 1 capsule by mouth Daily.      furosemide (LASIX) 40 MG tablet Take 1 tablet (40 mg total) by mouth once daily. 90 tablet 3    glucosam/msm/chond/yzx383/hyal (GLUCOS-CHOND-MSM, WITH ANTIOX, ORAL) Take 1 capsule by mouth Daily.      hydrALAZINE (APRESOLINE) 50 MG tablet Take 1 tablet (50 mg total) by mouth every 8 (eight) hours. 270 tablet 3    ibuprofen (ADVIL,MOTRIN) 200 MG tablet Take 200 mg by mouth every 6 (six) hours as needed for Pain.      levothyroxine (SYNTHROID) 100 MCG tablet TAKE 1 TABLET BY MOUTH EVERY  DAY (Patient taking differently: Take 100 mcg by mouth once daily.) 90 tablet 3    multivitamin (THERAGRAN) per tablet Take 1 tablet by mouth once daily.      polyethylene glycol (GLYCOLAX) 17 gram/dose powder Take 17 g by mouth once daily.      simethicone (MYLICON) 125 MG chewable tablet Take 125 mg by mouth every 6 (six) hours as needed for Flatulence.      UNABLE TO FIND Take 1 tablet by mouth once daily. medication name: Prevagen supplement

## 2025-05-17 NOTE — PLAN OF CARE
list provided to patient bedside for review.        05/17/25 0547   Post-Acute Status   Post-Acute Authorization Home Health   Patient choice form signed by patient/caregiver List with quality metrics by geographic area provided

## 2025-05-17 NOTE — CARE UPDATE
05/17/25 0706   PRE-TX-O2   Device (Oxygen Therapy) nasal cannula   $ Is the patient on Low Flow Oxygen? Yes   Flow (L/min) (Oxygen Therapy) 2   SpO2 96 %   Pulse Oximetry Type Intermittent   $ Pulse Oximetry - Multiple Charge Pulse Oximetry - Multiple   Pulse 75   Resp 15   Respiratory Evaluation   $ Care Plan Tech Time 15 min

## 2025-05-17 NOTE — ASSESSMENT & PLAN NOTE
Patient is identified as having Diastolic (HFpEF) heart failure that is Acute on Chronic. CHF is currently uncontrolled due to volume overload due to: Continued edema of extremities and JVD, Rales/crackles on pulmonary exam, and Pulmonary edema/pleural effusion on CXR. Latest ECHO performed and demonstrates- Results for orders placed during the hospital encounter of 08/21/23    Echo    Interpretation Summary    Left Ventricle: The left ventricle is mildly dilated. Mildly increased ventricular mass. Mildly increased wall thickness. There is mild concentric hypertrophy. Normal wall motion. Septal flattening in systole consistent with right ventricular pressure overload. There is low normal systolic function. EF 50% with patient in sinus rhythm and heart rate of 52 There is diastolic dysfunction but grade cannot be determined. Elevated left ventricular filling pressure.    Right Ventricle: Normal right ventricular cavity size. Wall thickness is normal. Right ventricle wall motion  is normal. Systolic function is normal.    Mitral Valve: There is no stenosis.    Tricuspid Valve: There is mild to moderate regurgitation.    Pulmonary Artery: There is mild pulmonary hypertension. The estimated pulmonary artery systolic pressure is 58 mmHg.    IVC/SVC: Elevated venous pressure at 15 mmHg.    Pericardium: There is a small posterior effusion. Pericardial effusion is echolucent. No indication of cardiac tamponade.    Mean left atrial pressure slightly increased at 12 mm Hg  . Continue Furosemide and monitor clinical status closely. Monitor on telemetry. Patient is on CHF pathway.  Monitor strict Is&Os and daily weights.  Place on fluid restriction of 1.5 L. Cardiology is consulted. Continue to stress to patient importance of self efficacy and  on diet for CHF. Last BNP reviewed- and noted below   Recent Labs   Lab 05/15/25  1155   BNP 3,015*   .      Appears euvolemic -2400mL out   Transition IV Lasix to p.o.  Strict  intake output.   Follow up cardiologist.   Nuclear medicine stress test is negative for reversible ischemia

## 2025-05-17 NOTE — ASSESSMENT & PLAN NOTE
Patient's blood pressure range in the last 24 hours was: BP  Min: 118/72  Max: 154/91.The patient's inpatient anti-hypertensive regimen is listed below:  Current Antihypertensives  nitroGLYCERIN SL tablet 0.4 mg, Every 5 min PRN, Sublingual  furosemide tablet 40 mg, Daily, Oral    Plan  - BP is controlled, no changes needed to their regimen

## 2025-05-17 NOTE — ASSESSMENT & PLAN NOTE
Patient has paroxysmal (<7 days) atrial fibrillation. Patient is currently in sinus on telemetry however patient declining 12 lead until in room. [id number] Score: 4. The patients heart rate in the last 24 hours is as follows:  Pulse  Min: 59  Max: 93     Antiarrhythmics   Summary patient is not currently on anti arrhythmics    Anticoagulants  enoxaparin injection 30 mg, Every 24 hours, Subcutaneous    Plan  - Replete lytes with a goal of K>4, Mg >2  - Patient is anticoagulated, see medications listed above.  - Patient's afib is currently controlled      Patient has a history of Afib?  Not on anticoagulation.

## 2025-05-17 NOTE — SUBJECTIVE & OBJECTIVE
Interval History:   No acute events overnight.  Transitioned to oral Lasix, evaluate response and if stable can likely be discharged with home health in the morning    Review of Systems   Respiratory:  Negative for chest tightness and shortness of breath.    Cardiovascular:  Positive for leg swelling.   Gastrointestinal:  Positive for constipation and diarrhea.     Objective:     Vital Signs (Most Recent):  Temp: 97.7 °F (36.5 °C) (05/17/25 0729)  Pulse: 93 (05/17/25 1033)  Resp: 18 (05/17/25 0729)  BP: (!) 154/91 (05/17/25 0729)  SpO2: 96 % (05/17/25 0900) Vital Signs (24h Range):  Temp:  [97.5 °F (36.4 °C)-97.7 °F (36.5 °C)] 97.7 °F (36.5 °C)  Pulse:  [59-93] 93  Resp:  [14-19] 18  SpO2:  [90 %-98 %] 96 %  BP: (118-154)/(65-91) 154/91     Weight: 73.7 kg (162 lb 7.7 oz)  Body mass index is 31.73 kg/m².    Intake/Output Summary (Last 24 hours) at 5/17/2025 1523  Last data filed at 5/17/2025 1323  Gross per 24 hour   Intake 120 ml   Output 2550 ml   Net -2430 ml         Physical Exam  Constitutional:       Appearance: Normal appearance.   HENT:      Head: Normocephalic and atraumatic.   Cardiovascular:      Rate and Rhythm: Normal rate and regular rhythm.      Heart sounds: Murmur heard.   Pulmonary:      Effort: Pulmonary effort is normal.      Breath sounds: No rales.      Comments: Diminished in the bases  Abdominal:      General: Abdomen is flat.      Palpations: Abdomen is soft.   Musculoskeletal:         General: Normal range of motion.      Right lower leg: No edema.      Left lower leg: No edema.   Skin:     General: Skin is warm and dry.   Neurological:      General: No focal deficit present.      Mental Status: She is alert and oriented to person, place, and time.               Significant Labs: All pertinent labs within the past 24 hours have been reviewed.  CBC:   Recent Labs   Lab 05/16/25  0412 05/17/25  0352   WBC 7.28 7.55   HGB 11.0* 11.5*   HCT 34.2* 36.5*    172     CMP:   Recent Labs   Lab  "05/16/25  0412 05/17/25  0352    138   K 4.2 3.8    100   CO2 25 27   * 111*   BUN 39* 39*   CREATININE 1.6* 1.7*   CALCIUM 9.5 9.7   PROT 6.7  --    ALBUMIN 3.8  --    BILITOT 0.8  --    ALKPHOS 59  --    AST 14  --    ALT 14  --    ANIONGAP 10 11     Cardiac Markers:   No results for input(s): "CKMB", "MYOGLOBIN", "BNP", "TROPISTAT" in the last 48 hours.      Significant Imaging: I have reviewed all pertinent imaging results/findings within the past 24 hours.  I have reviewed and interpreted all pertinent imaging results/findings within the past 24 hours.      Scheduled Meds:   aspirin  81 mg Oral Daily    atorvastatin  80 mg Oral Daily    enoxparin  30 mg Subcutaneous Daily    furosemide  40 mg Oral Daily    levothyroxine  100 mcg Oral Before breakfast    pantoprazole  40 mg Oral Daily    sucralfate  1 g Oral Q6H     Continuous Infusions:  PRN Meds:.  Current Facility-Administered Medications:     acetaminophen, 650 mg, Oral, Q4H PRN    aluminum-magnesium hydroxide-simethicone, 30 mL, Oral, Q6H PRN    dextrose 50%, 12.5 g, Intravenous, PRN    dextrose 50%, 25 g, Intravenous, PRN    glucagon (human recombinant), 1 mg, Intramuscular, PRN    glucose, 16 g, Oral, PRN    glucose, 24 g, Oral, PRN    HYDROcodone-acetaminophen, 1 tablet, Oral, Q6H PRN    magnesium oxide, 800 mg, Oral, PRN    magnesium oxide, 800 mg, Oral, PRN    melatonin, 9 mg, Oral, Nightly PRN    naloxone, 0.02 mg, Intravenous, PRN    nitroGLYCERIN, 0.4 mg, Sublingual, Q5 Min PRN    ondansetron, 4 mg, Intravenous, Q6H PRN    potassium bicarbonate, 35 mEq, Oral, PRN    potassium bicarbonate, 50 mEq, Oral, PRN    potassium bicarbonate, 60 mEq, Oral, PRN    potassium, sodium phosphates, 2 packet, Oral, PRN    potassium, sodium phosphates, 2 packet, Oral, PRN    potassium, sodium phosphates, 2 packet, Oral, PRN    senna-docusate, 1 tablet, Oral, BID PRN    sodium chloride 0.9%, 10 mL, Intravenous, PRN    NM Myocardial Perfusion Spect " Multi Pharmacologic  Result Date: 5/17/2025  CLINICAL HISTORY: ([id number])89 y/o  (9/23/1934) F Chest pain/anginal equiv, intermediate CAD risk, not treadmill candidate; Chest pain, unspecified TECHNIQUE: (MICHOACANO#[id number], exam time 5/17/2025 13:14) NM MYOCARDIAL PERFUSION SPECT MULTI PHARM [id number] A total of 9.7 millicuries was utilized for the rest portion of the examination with 24.9 millicuries for the stress portion of the examination following a 1 day protocol. The patient was stressed with Lexiscan, 0.4 milligrams intravenously and achieved a maximum heart rate of 99 beats per minute. COMPARISON: Radiograph from 05/15/2025 FINDINGS: There is a moderate size relative defect in the anterior, apical and adjacent apical-inferior ventricular myocardial wall seen on stress and rest imaging suggestive of a prior infarct.  No convincing mismatched defect is seen to suggest reversible ischemia. The polar map shows that this area corresponds to 32% of the overall left ventricular volume, without reversibility. There is borderline global hypokinesis with an estimated ejection fraction of 44%.     1. No scintigraphic evidence of pharmacologically induced reversible ischemia 2.  Estimated ejection fraction of 44%, with relative global hypokinesis. 3.  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 Electronically signed by: Krish Antunez Date:    05/17/2025 Time:    13:25    Nuclear Stress Test  Result Date: 5/17/2025    The ECG portion of the study is negative for ischemia.   The patient reported no chest pain during the stress test.   There were no arrhythmias during stress.   The nuclear portion of this study will be reported separately.     Echo  Result Date: 5/16/2025    Left Ventricle: The left ventricle is mildly dilated. There is eccentric hypertrophy. Septal motion is consistent with bundle branch block. There is moderately  reduced systolic function with a visually estimated ejection fraction of 30 - 40%. Grade II diastolic dysfunction.   Right Ventricle: The right ventricle is normal in size Systolic function is normal.   Left Atrium: The left atrium is severely dilated   Right Atrium: The right atrium is mildly dilated .   Aortic Valve: There is aortic valve sclerosis. There is mild aortic regurgitation.   Mitral Valve: There is bileaflet sclerosis. There is severe regurgitation with an eccentric jet.   Tricuspid Valve: There is moderate to severe regurgitation.   Pulmonary Artery: There is pulmonary hypertension. The estimated pulmonary artery systolic pressure is 89 mmHg.   IVC/SVC: Elevated venous pressure at 15 mmHg.   Pericardium: There is a small posterior effusion.     X-Ray Chest AP Portable  Result Date: 5/15/2025  CLINICAL HISTORY: ([id number])89 y/o  (9/23/1934) F CHF; TECHNIQUE: (A#[id number], exam time 5/15/2025 11:25) XR CHEST AP PORTABLE [id number] COMPARISON: Radiograph from 10/11/2024 FINDINGS: Perihilar pulmonary vascular prominence with mildly increased central hilar interstitial lung markings bilaterally.  There is blunting of both costophrenic angles consistent with trace pleural effusions and adjacent atelectasis. No pneumothorax is identified. Moderate to severe cardiopericardial silhouette enlargement. Atheromatous calcifications are seen at the aortic arch. Osseous structures show degenerative disc disease and degenerative changes in the shoulders. The visualized upper abdomen is unremarkable.     Cardiomegaly and findings of mild interstitial pulmonary edema. Electronically signed by: Krish Antunez Date:    05/15/2025 Time:    11:28  - pulls last radiology orders

## 2025-05-17 NOTE — PLAN OF CARE
Problem: Acute Coronary Syndrome  Goal: Optimal Adaptation to Illness  Outcome: Progressing  Goal: Absence of Cardiac-Related Pain  Outcome: Progressing  Goal: Normalized Cardiac Rhythm  Outcome: Progressing  Goal: Effective Cardiac Pump Function  Outcome: Progressing  Goal: Adequate Tissue Perfusion  Outcome: Progressing     Problem: Cardiac Catheterization (Diagnostic/Interventional)  Goal: Absence of Bleeding  Outcome: Progressing  Goal: Absence of Contrast-Induced Injury  Outcome: Progressing  Goal: Stable Heart Rate and Rhythm  Outcome: Progressing  Goal: Absence of Embolism Signs and Symptoms  Outcome: Progressing  Goal: Anesthesia/Sedation Recovery  Outcome: Progressing  Goal: Optimal Pain Control and Function  Outcome: Progressing  Goal: Absence of Vascular Access Complication  Outcome: Progressing     Problem: Adult Inpatient Plan of Care  Goal: Plan of Care Review  Outcome: Progressing  Goal: Patient-Specific Goal (Individualized)  Outcome: Progressing  Goal: Absence of Hospital-Acquired Illness or Injury  Outcome: Progressing  Goal: Optimal Comfort and Wellbeing  Outcome: Progressing  Goal: Readiness for Transition of Care  Outcome: Progressing     Problem: Fall Injury Risk  Goal: Absence of Fall and Fall-Related Injury  Outcome: Progressing

## 2025-05-17 NOTE — CARE UPDATE
05/16/25 2015   Patient Assessment/Suction   Level of Consciousness (AVPU) alert   Respiratory Effort Normal   Expansion/Accessory Muscles/Retractions no use of accessory muscles   All Lung Fields Breath Sounds clear   Rhythm/Pattern, Respiratory unlabored   Cough Frequency no cough   Skin Integrity   $ Wound Care Tech Time 15 min   Area Observed Left;Right;Behind ear;Cheek;Nares   Skin Appearance without discoloration   PRE-TX-O2   Device (Oxygen Therapy) nasal cannula   Flow (L/min) (Oxygen Therapy) 2   SpO2 98 %   Pulse Oximetry Type Intermittent   $ Pulse Oximetry - Multiple Charge Pulse Oximetry - Multiple   Pulse 73   Resp 19   Education   $ Education Oxygen;15 min   Respiratory Evaluation   $ Care Plan Tech Time 15 min

## 2025-05-17 NOTE — ASSESSMENT & PLAN NOTE
Baseline creatinine is 1.4. Most recent creatinine and eGFR are listed below.  Recent Labs     05/15/25  1155 05/16/25  0412 05/17/25  0352   CREATININE 1.6* 1.6* 1.7*   EGFRNORACEVR 31* 31* 28*      Plan  - ALTON is stable  - Avoid nephrotoxins and renally dose meds for GFR listed above  - Monitor urine output, serial BMP, and adjust therapy as needed  -     Continue IV Lasix, monitor kidney function closely

## 2025-05-17 NOTE — PLAN OF CARE
Met with Adarsh(son) [phone] over the phone to review discharge recommendation of Home Health and they are agreeable to plan.    Patient/family provided with a list of facilities in-network with patient's payor plan. Providers that are owned, operated, or affiliated with [facility] are included on the list.     Notified that referrals will be sent to the below listed facilities from in-network list based on proximity to home/family support:   [facility]  2.  3.  4.  5. (can send more than 5)    Patient/family instructed to identify preference.    Preferred Facility: (if more than 1, listed in order of descending preference)  1.[facility]    If an additional preferred facility not listed above is identified, additional referral to be sent. If above facilities unable to accept, will send additional referrals to in-network providers.    Per son, patient was just discharged approx 1 week ago from their services.

## 2025-05-18 VITALS
RESPIRATION RATE: 17 BRPM | HEIGHT: 60 IN | OXYGEN SATURATION: 96 % | DIASTOLIC BLOOD PRESSURE: 89 MMHG | TEMPERATURE: 98 F | WEIGHT: 162.5 LBS | BODY MASS INDEX: 31.9 KG/M2 | HEART RATE: 72 BPM | SYSTOLIC BLOOD PRESSURE: 155 MMHG

## 2025-05-18 PROBLEM — I50.43 ACUTE ON CHRONIC COMBINED SYSTOLIC AND DIASTOLIC HEART FAILURE: Status: ACTIVE | Noted: 2024-09-29

## 2025-05-18 LAB
ANION GAP (SMH): 9 MMOL/L (ref 8–16)
BUN SERPL-MCNC: 37 MG/DL (ref 8–23)
CALCIUM SERPL-MCNC: 9.8 MG/DL (ref 8.7–10.5)
CHLORIDE SERPL-SCNC: 98 MMOL/L (ref 95–110)
CO2 SERPL-SCNC: 33 MMOL/L (ref 23–29)
CREAT SERPL-MCNC: 1.5 MG/DL (ref 0.5–1.4)
ERYTHROCYTE [DISTWIDTH] IN BLOOD BY AUTOMATED COUNT: 14.6 % (ref 11.5–14.5)
GFR SERPLBLD CREATININE-BSD FMLA CKD-EPI: 33 ML/MIN/1.73/M2
GLUCOSE SERPL-MCNC: 112 MG/DL (ref 70–110)
HCT VFR BLD AUTO: 35.5 % (ref 37–48.5)
HGB BLD-MCNC: 11.5 GM/DL (ref 12–16)
MAGNESIUM SERPL-MCNC: 1.7 MG/DL (ref 1.6–2.6)
MCH RBC QN AUTO: 33.3 PG (ref 27–31)
MCHC RBC AUTO-ENTMCNC: 32.4 G/DL (ref 32–36)
MCV RBC AUTO: 103 FL (ref 82–98)
PHOSPHATE SERPL-MCNC: 3.7 MG/DL (ref 2.7–4.5)
PLATELET # BLD AUTO: 160 K/UL (ref 150–450)
PMV BLD AUTO: 11 FL (ref 9.2–12.9)
POTASSIUM SERPL-SCNC: 3.5 MMOL/L (ref 3.5–5.1)
RBC # BLD AUTO: 3.45 M/UL (ref 4–5.4)
SODIUM SERPL-SCNC: 140 MMOL/L (ref 136–145)
WBC # BLD AUTO: 7.44 K/UL (ref 3.9–12.7)

## 2025-05-18 PROCEDURE — 25000003 PHARM REV CODE 250: Performed by: NURSE PRACTITIONER

## 2025-05-18 PROCEDURE — 25000003 PHARM REV CODE 250: Performed by: INTERNAL MEDICINE

## 2025-05-18 PROCEDURE — 80048 BASIC METABOLIC PNL TOTAL CA: CPT | Performed by: HOSPITALIST

## 2025-05-18 PROCEDURE — 27000221 HC OXYGEN, UP TO 24 HOURS

## 2025-05-18 PROCEDURE — 84100 ASSAY OF PHOSPHORUS: CPT | Performed by: HOSPITALIST

## 2025-05-18 PROCEDURE — 83735 ASSAY OF MAGNESIUM: CPT | Performed by: INTERNAL MEDICINE

## 2025-05-18 PROCEDURE — 85027 COMPLETE CBC AUTOMATED: CPT | Performed by: INTERNAL MEDICINE

## 2025-05-18 PROCEDURE — 99900031 HC PATIENT EDUCATION (STAT)

## 2025-05-18 PROCEDURE — 94761 N-INVAS EAR/PLS OXIMETRY MLT: CPT

## 2025-05-18 PROCEDURE — 36415 COLL VENOUS BLD VENIPUNCTURE: CPT | Performed by: HOSPITALIST

## 2025-05-18 RX ORDER — FUROSEMIDE 40 MG/1
40 TABLET ORAL DAILY
Qty: 90 TABLET | Refills: 3 | Status: SHIPPED | OUTPATIENT
Start: 2025-05-18 | End: 2026-05-13

## 2025-05-18 RX ORDER — PANTOPRAZOLE SODIUM 40 MG/1
40 TABLET, DELAYED RELEASE ORAL DAILY
Qty: 90 TABLET | Refills: 0 | Status: SHIPPED | OUTPATIENT
Start: 2025-05-19

## 2025-05-18 RX ORDER — SUCRALFATE 1 G/10ML
1 SUSPENSION ORAL EVERY 6 HOURS
Qty: 400 ML | Refills: 0 | Status: SHIPPED | OUTPATIENT
Start: 2025-05-18 | End: 2025-05-28

## 2025-05-18 RX ADMIN — Medication 800 MG: at 10:05

## 2025-05-18 RX ADMIN — ASPIRIN 81 MG: 81 TABLET ORAL at 08:05

## 2025-05-18 RX ADMIN — PANTOPRAZOLE SODIUM 40 MG: 40 TABLET, DELAYED RELEASE ORAL at 05:05

## 2025-05-18 RX ADMIN — SUCRALFATE ORAL 1 G: 1 SUSPENSION ORAL at 05:05

## 2025-05-18 RX ADMIN — POTASSIUM BICARBONATE 50 MEQ: 978 TABLET, EFFERVESCENT ORAL at 05:05

## 2025-05-18 RX ADMIN — SACUBITRIL AND VALSARTAN 1 TABLET: 24; 26 TABLET, FILM COATED ORAL at 08:05

## 2025-05-18 RX ADMIN — ATORVASTATIN CALCIUM 80 MG: 40 TABLET, FILM COATED ORAL at 08:05

## 2025-05-18 RX ADMIN — SUCRALFATE ORAL 1 G: 1 SUSPENSION ORAL at 11:05

## 2025-05-18 RX ADMIN — LEVOTHYROXINE SODIUM 100 MCG: 0.1 TABLET ORAL at 05:05

## 2025-05-18 RX ADMIN — Medication 800 MG: at 05:05

## 2025-05-18 RX ADMIN — FUROSEMIDE 40 MG: 40 TABLET ORAL at 08:05

## 2025-05-18 NOTE — PLAN OF CARE
Pt clear to discharge home with [facility] home health services. Pt son to provide portable oxygen for transport home. Referral sent to [facility] via email r/t pt not having a PCP. Secure chat sent to discharge clinic for CHF follow up appointments. Referral for cardiomems noted. [facility] and home health agency added to AVS for reference. No further needs known at this time. Son, Adarsh, to provide transport home.      05/18/25 1056   Final Note   Assessment Type Final Discharge Note   Anticipated Discharge Disposition Home-Health   What phone number can be called within the next 1-3 days to see how you are doing after discharge? [phone]   Hospital Resources/Appts/Education Provided Provided patient/caregiver with written discharge plan information;Appointments scheduled and added to AVS;Post-Acute resouces added to AVS   Post-Acute Status   Post-Acute Authorization Home Health   Home Health Status Set-up Complete/Auth obtained   Discharge Delays None known at this time

## 2025-05-18 NOTE — CARE UPDATE
05/18/25 0920   Patient Assessment/Suction   Level of Consciousness (AVPU) alert   Respiratory Effort Unlabored   Expansion/Accessory Muscles/Retractions no use of accessory muscles   All Lung Fields Breath Sounds clear   Rhythm/Pattern, Respiratory unlabored   Cough Frequency no cough   PRE-TX-O2   Device (Oxygen Therapy) nasal cannula   $ Is the patient on Low Flow Oxygen? Yes   Flow (L/min) (Oxygen Therapy) 2   SpO2 96 %   Pulse Oximetry Type Intermittent   $ Pulse Oximetry - Multiple Charge Pulse Oximetry - Multiple   Pulse 72   Resp 17   Education   $ Education Oxygen;15 min

## 2025-05-18 NOTE — DISCHARGE SUMMARY
[facility]  Discharge Summary      Patient Name: Andressa Benedict  MRN: [id number]  [facility]: [id number]  Patient Class: IP- Inpatient  Admission Date: 5/15/2025  Hospital Length of Stay: 2 days  Discharge Date and Time: 5/18/2025 11:42 AM  Attending Physician: Martin Goode MD   Discharging Provider: Martin Goode MD  Primary Care Provider: Reina, Primary Doctor    Primary Care Team: Networked reference to record PCT     HPI:   Andressa Benedict is a 89 y.o. year-old patient with history of  has a past medical history of Atrial fibrillation, Bradycardia, Carotid bruit, CKD (chronic kidney disease), stage III, Hyperlipidemia, OA (osteoarthritis), and Thyroid disease. who presented to the ED with c/o shortness of breath and chest pain.  Patient states that over the last 3 weeks she has gotten progressively worsening shortness of breath especially with activities.  She states that for the last week she has had irregular pounding heart beats.  Patient also reports that in the last 2-3 days she has begun with a chest tightness that typically occurs about 30 minutes after eating and occurs in waves each wave lasting 7 seconds.  Patient with intermittent irregular heart beat from 30s to 70s, patient denies EKG awaiting a room.  Patient to be admitted for further evaluation and treatment.    Triage vitals with pulse 91, /92, temp 98.1, SpO2 91 percent on room air.  Blood work performed in the ED with hemoglobin 11.2, hematocrit 34.6, glucose 113, BUN 37, creatinine 1.6, GFR 31, BNP 3015, initial troponin 48.3.  Chest x-ray with cardiomegaly and findings of mild interstitial pulmonary edema.      * No surgery found *      Hospital Course:   Admitted with palpitations and chest pain diagnosed with heart failure exacerbation.  BNP 3000.  Troponins trended 48-->52-->52. EKG:  Sinus rhythm with PACs and right bundle-branch block no acute ischemic changes.  She was diuresed with IV Lasix.   Cardiology was consulted and recommended a stress test.  She had an echo that revealed for 30-40% grade 2 diastolic dysfunction severe mitral regurg moderate to severe tricuspid regurg pulmonary hypertension with a PASP of 89 mmHg, and a small posterior pericardial effusion.  Nuclear medicine stress test was negative for reversible ischemia. IV Lasix transitioned to oral on 5/17.  She reports chronic abdominal pain that is worse after eating associated with belching and intermittent constipation and diarrhea.  Son states she has never had a colonoscopy however she is 89.  We will continue Protonix and add Carafate, and recommend GI follow-up outpatient.  KUB normal appearing. Felt to be at euvolemic state. Recommended to increase her outpatient lasix (1/2 tab of 40mg daily) to a whole 40mg total dose daily. Set up with home health. Encouraged f/u with PCP and cardiology. By time of discharge, the patient was tolerating an appropriate diet with resolving admission symptoms. Patient seen and examined on day of discharge.    Physical exam on day of discharge:  General - Patient alert and oriented x3 in NAD  CV - Regular rate and rhythm   Resp - Lungs CTA Bilaterally, No increased WOB   Extrem-  No cyanosis, clubbing, edema.   Skin -  No obvious masses, rashes or lesions       Goals of Care Treatment Preferences:  Code Status: Full Code      SDOH Screening:  The patient was screened for utility difficulties, food insecurity, transport difficulties, housing insecurity, and interpersonal safety and there were no concerns identified this admission.     Consults:   Consults (From admission, onward)          Status Ordering Provider     Inpatient consult to Cardiology  Once        Provider:  Jim Donohue MD    Completed JAMI AGUIRRE     Inpatient consult to Social Work/Case Management  Once        Provider:  (Not yet assigned)    MARISOL Morgan     Inpatient consult to Registered Dietitian/Nutritionist  Once         Provider:  (Not yet assigned)    Completed MARISOL BALDWIN A     Inpatient consult to Registered Dietitian/Nutritionist  Once        Provider:  (Not yet assigned)    Completed MARISOL BALDWIN A     Inpatient consult to Social Work/Case Management  Once        Provider:  (Not yet assigned)    Completed MARISOL BALDWIN            Final Active Diagnoses:    Diagnosis Date Noted POA    PRINCIPAL PROBLEM:  Acute on chronic combined systolic and diastolic heart failure [I50.43] 09/29/2024 Yes    Paroxysmal A-fib [I48.0] 09/29/2024 Yes    Essential hypertension [I10] 04/28/2021 Yes     Chronic    CKD (chronic kidney disease), stage III [N18.30]  Yes     Chronic      Problems Resolved During this Admission:    Diagnosis Date Noted Date Resolved POA    Chest tightness [R07.89] 05/15/2025 05/17/2025 Yes       Discharged Condition: good    Disposition: Home-Health Care c    Follow Up:   Contact information for follow-up providers       [facility] Follow up.    Why: referral sent via email. clinic to contact you with appointment date and time. if you have not heard from clinic within 48hrs of discharge please contact them to schedule.  Contact information:  [facility] [address]  [phone]                       Contact information for after-discharge care       Home Medical Care       Critical access hospital, [facility] .    Service: Home Health Services  Why: you have been accepted by this home health agency. if you have not heard from them within 24-48hrs of discharge please call them to inquire about first visit.  Contact information:  [address]  [facility] [address]  [phone]                                 Patient Instructions:      Basic metabolic panel   Standing Status: Future Standing Exp. Date: 08/16/26     Magnesium   Standing Status: Future Standing Exp. Date: 08/16/26     Ambulatory referral/consult to Gastroenterology   Standing Status: Future    Referral Priority: Routine Referral Type: Consultation   Referral Reason: Specialty Services Required   Requested Specialty: Gastroenterology   Number of Visits Requested: 1     Ambulatory referral/consult to Home Health   Standing Status: Future   Referral Priority: Routine Referral Type: Home Health   Referral Reason: Specialty Services Required   Requested Specialty: Home Health Services   Number of Visits Requested: 1     Ambulatory referral/consult to Cardiology   Standing Status: Future   Referral Priority: Routine Referral Type: Consultation   Referral Reason: Specialty Services Required   Requested Specialty: Cardiology   Number of Visits Requested: 1     Diet Cardiac     Call MD for:  temperature >100.4     Call MD for:  persistent nausea and vomiting or diarrhea     Call MD for:  severe uncontrolled pain     Call MD for:  redness, tenderness, or signs of infection (pain, swelling, redness, odor or green/yellow discharge around incision site)     Call MD for:  difficulty breathing or increased cough     Call MD for:  severe persistent headache     Call MD for:  worsening rash     Call MD for:  persistent dizziness, light-headedness, or visual disturbances     Call MD for:  increased confusion or weakness     Activity as tolerated       Significant Diagnostic Studies:     Lab Results   Component Value Date    WBC 7.44 05/18/2025    HGB 11.5 (L) 05/18/2025    HCT 35.5 (L) 05/18/2025     (H) 05/18/2025     05/18/2025       BMP  Lab Results   Component Value Date     05/18/2025    K 3.5 05/18/2025    CL 98 05/18/2025    CO2 33 (H) 05/18/2025    BUN 37 (H) 05/18/2025    CREATININE 1.5 (H) 05/18/2025    CALCIUM 9.8 05/18/2025    ANIONGAP 9 05/18/2025    EGFRNORACEVR 33 (L) 05/18/2025       X-Ray KUB  Result Date: 5/17/2025  CLINICAL HISTORY: ([id number])89 y/o  (9/23/1934) F r/o obstruction; TECHNIQUE: (A#[id number], exam time 5/17/2025 15:50) XR KUB [id number] COMPARISON: Radiograph of the chest  from 05/15/2025 FINDINGS: The lung bases appear to show trace left pleural effusion and adjacent basilar atelectasis; there is cardiomegaly present, only partially seen. There are no abnormally dilated gas filled loops of large or small bowel to suggest bowel obstruction. There is no pneumatosis or gross pneumoperitoneum. Moderate volume of stool and gas is seen in the colon. Osseous structures show degenerative changes in the spine, with levoscoliosis of the lower lumbar spine.  Calcified arterial plaques are present at the level of the iliacs and splenic artery.     Normal appearing radiograph of the abdomen with a nonobstructive bowel gas pattern. Electronically signed by: Krish Antunez Date:    05/17/2025 Time:    15:57    NM Myocardial Perfusion Spect Multi Pharmacologic  Result Date: 5/17/2025  CLINICAL HISTORY: ([id number])89 y/o  (9/23/1934) F Chest pain/anginal equiv, intermediate CAD risk, not treadmill candidate; Chest pain, unspecified TECHNIQUE: ([id number], exam time 5/17/2025 13:14) NM MYOCARDIAL PERFUSION SPECT MULTI PHARM [id number] A total of 9.7 millicuries was utilized for the rest portion of the examination with 24.9 millicuries for the stress portion of the examination following a 1 day protocol. The patient was stressed with Lexiscan, 0.4 milligrams intravenously and achieved a maximum heart rate of 99 beats per minute. COMPARISON: Radiograph from 05/15/2025 FINDINGS: There is a moderate size relative defect in the anterior, apical and adjacent apical-inferior ventricular myocardial wall seen on stress and rest imaging suggestive of a prior infarct.  No convincing mismatched defect is seen to suggest reversible ischemia. The polar map shows that this area corresponds to 32% of the overall left ventricular volume, without reversibility. There is borderline global hypokinesis with an estimated ejection fraction of 44%.     1. No scintigraphic evidence of pharmacologically induced reversible  ischemia 2.  Estimated ejection fraction of 44%, with relative global hypokinesis. 3.  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 Electronically signed by: Krish Antunez Date:    05/17/2025 Time:    13:25    Nuclear Stress Test  Result Date: 5/17/2025    The ECG portion of the study is negative for ischemia.   The patient reported no chest pain during the stress test.   There were no arrhythmias during stress.   The nuclear portion of this study will be reported separately.     Echo  Result Date: 5/16/2025    Left Ventricle: The left ventricle is mildly dilated. There is eccentric hypertrophy. Septal motion is consistent with bundle branch block. There is moderately reduced systolic function with a visually estimated ejection fraction of 30 - 40%. Grade II diastolic dysfunction.   Right Ventricle: The right ventricle is normal in size Systolic function is normal.   Left Atrium: The left atrium is severely dilated   Right Atrium: The right atrium is mildly dilated .   Aortic Valve: There is aortic valve sclerosis. There is mild aortic regurgitation.   Mitral Valve: There is bileaflet sclerosis. There is severe regurgitation with an eccentric jet.   Tricuspid Valve: There is moderate to severe regurgitation.   Pulmonary Artery: There is pulmonary hypertension. The estimated pulmonary artery systolic pressure is 89 mmHg.   IVC/SVC: Elevated venous pressure at 15 mmHg.   Pericardium: There is a small posterior effusion.     X-Ray Chest AP Portable  Result Date: 5/15/2025  CLINICAL HISTORY: ([id number])89 y/o  (9/23/1934) F CHF; TECHNIQUE: (A#[id number], exam time 5/15/2025 11:25) XR CHEST AP PORTABLE [id number] COMPARISON: Radiograph from 10/11/2024 FINDINGS: Perihilar pulmonary vascular prominence with mildly increased central hilar interstitial lung markings bilaterally.  There is blunting of both costophrenic angles consistent  with trace pleural effusions and adjacent atelectasis. No pneumothorax is identified. Moderate to severe cardiopericardial silhouette enlargement. Atheromatous calcifications are seen at the aortic arch. Osseous structures show degenerative disc disease and degenerative changes in the shoulders. The visualized upper abdomen is unremarkable.     Cardiomegaly and findings of mild interstitial pulmonary edema. Electronically signed by: Krish Antunez Date:    05/15/2025 Time:    11:28  - pulls last radiology orders      Pending Diagnostic Studies:    none        Medications:  Reconciled Home Medications:      Medication List        START taking these medications      pantoprazole 40 MG tablet  Commonly known as: PROTONIX  Take 1 tablet (40 mg total) by mouth once daily.  Start taking on: May 19, 2025     sucralfate 100 mg/mL suspension  Commonly known as: CARAFATE  Take 10 mLs (1 g total) by mouth every 6 (six) hours. for 10 days            CONTINUE taking these medications      aspirin 81 MG EC tablet  Commonly known as: ECOTRIN  Take 81 mg by mouth once daily.     betaxolol 0.5% 0.5 % Drop  Place 1 drop into both eyes 2 (two) times daily.     CO Q-10 100 mg capsule  Generic drug: coenzyme Q10  Take 100 mg by mouth once daily.     ENTRESTO 24-26 mg per tablet  Generic drug: sacubitriL-valsartan  TAKE 1 TABLET BY MOUTH TWICE DAILY     FISH OIL ORAL  Take 1 capsule by mouth Daily.     FLAXSEED OIL ORAL  Take 1 capsule by mouth Daily.     furosemide 40 MG tablet  Commonly known as: LASIX  Take 1 tablet (40 mg total) by mouth once daily.     GLUCOS-CHOND-MSM (WITH ANTIOX) ORAL  Take 1 capsule by mouth Daily.     ibuprofen 200 MG tablet  Commonly known as: ADVIL,MOTRIN  Take 200 mg by mouth every 6 (six) hours as needed for Pain.     levothyroxine 100 MCG tablet  Commonly known as: SYNTHROID  TAKE 1 TABLET BY MOUTH EVERY DAY     multivitamin per tablet  Commonly known as: THERAGRAN  Take 1 tablet by mouth once daily.      polyethylene glycol 17 gram/dose powder  Commonly known as: GLYCOLAX  Take 17 g by mouth once daily.     simethicone 125 MG chewable tablet  Commonly known as: MYLICON  Take 125 mg by mouth every 6 (six) hours as needed for Flatulence.     UNABLE TO FIND  Take 1 tablet by mouth once daily. medication name: Prevagen supplement     VITAMIN C 250 mg Chew  Generic drug: ascorbic acid (vitamin C)  Take 1 tablet by mouth Daily.     VITAMIN D ORAL  Take 1 tablet by mouth Daily.            STOP taking these medications      hydrALAZINE 50 MG tablet  Commonly known as: APRESOLINE              Indwelling Lines/Drains at time of discharge:   Lines/Drains/Airways       None                   Time spent on the discharge of patient: 35 minutes         Martin Goode MD  Department of Hospital Medicine  [facility]

## 2025-05-18 NOTE — CARE UPDATE
05/17/25 2020   Patient Assessment/Suction   Level of Consciousness (AVPU) alert   Respiratory Effort Unlabored   Expansion/Accessory Muscles/Retractions no use of accessory muscles   All Lung Fields Breath Sounds clear;equal bilaterally   Rhythm/Pattern, Respiratory no shortness of breath reported   Cough Frequency no cough   Skin Integrity   $ Wound Care Tech Time 15 min   Area Observed Left;Right;Cheek;Behind ear;Upper lip;Back of head;Nares   Skin Appearance without discoloration   PRE-TX-O2   Device (Oxygen Therapy) nasal cannula   $ Is the patient on Low Flow Oxygen? Yes   Flow (L/min) (Oxygen Therapy) 2   SpO2 96 %   Pulse Oximetry Type Intermittent   $ Pulse Oximetry - Multiple Charge Pulse Oximetry - Multiple   Pulse 88   Resp 15   Positioning   Head of Bed (HOB) Positioning HOB elevated;HOB at 30 degrees   Education   $ Education Oxygen;15 min

## 2025-05-19 ENCOUNTER — TELEPHONE (OUTPATIENT)
Facility: CLINIC | Age: OVER 89
End: 2025-05-19
Payer: MEDICARE

## 2025-05-19 ENCOUNTER — DOCUMENT SCAN (OUTPATIENT)
Dept: HOME HEALTH SERVICES | Facility: HOSPITAL | Age: OVER 89
End: 2025-05-19
Payer: MEDICARE

## 2025-05-19 NOTE — TELEPHONE ENCOUNTER
Spoke with pt's son Adarsh Benedict to see about scheduling this postdischarge  CHF pt ( 3/18) for a hospital follow up within three days of discharge, which would be 5/21 and I have one morning appt as well as one open after lunch. Pt's son agrees that he will speak to his mother (pt) and then will get back with us regarding scheduling this appt.

## 2025-05-28 ENCOUNTER — TELEPHONE (OUTPATIENT)
Dept: CARDIOLOGY | Facility: CLINIC | Age: OVER 89
End: 2025-05-28
Payer: MEDICARE

## 2025-05-28 ENCOUNTER — TELEPHONE (OUTPATIENT)
Dept: GASTROENTEROLOGY | Facility: CLINIC | Age: OVER 89
End: 2025-05-28
Payer: MEDICARE

## 2025-05-28 DIAGNOSIS — R06.02 SHORTNESS OF BREATH: Primary | ICD-10-CM

## 2025-05-28 DIAGNOSIS — I50.9 ACUTE ON CHRONIC CONGESTIVE HEART FAILURE, UNSPECIFIED HEART FAILURE TYPE: ICD-10-CM

## 2025-05-28 NOTE — TELEPHONE ENCOUNTER
Call patient no answer LVM.  ----- Message from Katharina sent at 5/28/2025 11:40 AM CDT -----  Contact: ephraim  Type:  RX Refill RequestWho [facility] Rx: refillRX Name and Strength: sucralfate (CARAFATE) 100 mg/mL suspensionHow is the patient currently taking it? (ex. 1XDay): as directedIs this a 30 day or 90 day RX: 30Preferred Pharmacy with phone number: [id number] - [place] - [address] [facility] [id number] [facility] [address]Ydvan: [phone] Fax: [phone] or Mail Order: localOrdering Provider: archie Would the patient rather a call back or a response via [facility]?  Call backBest Call Back Number: [phone] Information: pt was on in hosp, needs refillPlease adviseThanks

## 2025-05-28 NOTE — TELEPHONE ENCOUNTER
----- Message from [employer] sent at 5/28/2025 11:25 AM CDT -----  Regarding: orders  Type:  Needs Medical AdviceWho Called: soraida mireles Best Call Back Number: [phone] Information: soraida [place] pt needs an order for a wheelchair.  please call to discuss.

## 2025-05-29 NOTE — TELEPHONE ENCOUNTER
S/w HH nurse and advised that the script for wheelchair should come from PCP. She doesn't have a pcp. Advised she is seeing dr galindo next week and they can discuss it.   Also pt is having SOB and a little swelling in ankle. She was just in hospital 5/15 for chf.  Advised to get bmp & bnp before appt. Will fax order to their office

## 2025-05-29 NOTE — TELEPHONE ENCOUNTER
Copied from CRM #[id number]. Topic: General Inquiry - Patient Advice  >> May 29, 2025 11:03 CRISELDA Monterroso wrote:  Type:  Needs Medical Advice    Who Called: Deedee with Home health  Symptoms (please be specific):    How long has patient had these symptoms:    Pharmacy name and phone #:   [id number] - [place] - [address] AT [facility] [address]  [address] [address]  Phone: [phone] Fax: [phone]        Would the patient rather a call back or a response via [facility]? call  Best Call Back Number: [phone]  Additional Information: Patient needs a prescription for a wheel chair to get to doctors appointments.  Patient is also complaining of shortness of breath.  Please call Deedee to advise.  Thanks!

## 2025-06-02 ENCOUNTER — TELEPHONE (OUTPATIENT)
Dept: CARDIOLOGY | Facility: CLINIC | Age: OVER 89
End: 2025-06-02
Payer: MEDICARE

## 2025-06-03 ENCOUNTER — TELEPHONE (OUTPATIENT)
Dept: CARDIOLOGY | Facility: CLINIC | Age: OVER 89
End: 2025-06-03
Payer: MEDICARE

## 2025-06-03 ENCOUNTER — OFFICE VISIT (OUTPATIENT)
Dept: CARDIOLOGY | Facility: CLINIC | Age: OVER 89
End: 2025-06-03
Payer: MEDICARE

## 2025-06-03 VITALS
SYSTOLIC BLOOD PRESSURE: 122 MMHG | HEIGHT: 60 IN | BODY MASS INDEX: 31.73 KG/M2 | OXYGEN SATURATION: 95 % | DIASTOLIC BLOOD PRESSURE: 70 MMHG | HEART RATE: 65 BPM

## 2025-06-03 DIAGNOSIS — I50.43 ACUTE ON CHRONIC COMBINED SYSTOLIC AND DIASTOLIC HEART FAILURE: ICD-10-CM

## 2025-06-03 DIAGNOSIS — I10 ESSENTIAL HYPERTENSION: Chronic | ICD-10-CM

## 2025-06-03 DIAGNOSIS — Z86.79 HISTORY OF ATRIAL FIBRILLATION: Chronic | ICD-10-CM

## 2025-06-03 DIAGNOSIS — I27.20 PULMONARY HYPERTENSION: Primary | ICD-10-CM

## 2025-06-03 PROCEDURE — 1126F AMNT PAIN NOTED NONE PRSNT: CPT | Mod: CPTII,S$GLB,, | Performed by: INTERNAL MEDICINE

## 2025-06-03 PROCEDURE — 1159F MED LIST DOCD IN RCRD: CPT | Mod: CPTII,S$GLB,, | Performed by: INTERNAL MEDICINE

## 2025-06-03 PROCEDURE — 99999 PR PBB SHADOW E&M-EST. PATIENT-LVL IV: CPT | Mod: PBBFAC,,, | Performed by: INTERNAL MEDICINE

## 2025-06-03 PROCEDURE — 99214 OFFICE O/P EST MOD 30 MIN: CPT | Mod: S$GLB,,, | Performed by: INTERNAL MEDICINE

## 2025-06-03 PROCEDURE — 1160F RVW MEDS BY RX/DR IN RCRD: CPT | Mod: CPTII,S$GLB,, | Performed by: INTERNAL MEDICINE

## 2025-06-03 PROCEDURE — 3288F FALL RISK ASSESSMENT DOCD: CPT | Mod: CPTII,S$GLB,, | Performed by: INTERNAL MEDICINE

## 2025-06-03 PROCEDURE — 1111F DSCHRG MED/CURRENT MED MERGE: CPT | Mod: CPTII,S$GLB,, | Performed by: INTERNAL MEDICINE

## 2025-06-03 PROCEDURE — 1101F PT FALLS ASSESS-DOCD LE1/YR: CPT | Mod: CPTII,S$GLB,, | Performed by: INTERNAL MEDICINE

## 2025-06-03 RX ORDER — SUCRALFATE 1 G/10ML
1 SUSPENSION ORAL EVERY 6 HOURS
Qty: 3600 ML | Refills: 2 | Status: SHIPPED | OUTPATIENT
Start: 2025-06-03

## 2025-06-03 RX ORDER — SUCRALFATE 1 G/10ML
1 SUSPENSION ORAL EVERY 6 HOURS
COMMUNITY
End: 2025-06-03 | Stop reason: SDUPTHER

## 2025-06-04 RX ORDER — SPIRONOLACTONE 25 MG/1
25 TABLET ORAL
Qty: 12 TABLET | Refills: 11 | Status: SHIPPED | OUTPATIENT
Start: 2025-06-04 | End: 2026-06-04

## 2025-06-10 ENCOUNTER — TELEPHONE (OUTPATIENT)
Dept: CARDIOLOGY | Facility: CLINIC | Age: OVER 89
End: 2025-06-10
Payer: MEDICARE

## 2025-06-10 DIAGNOSIS — N39.0 URINARY TRACT INFECTION WITHOUT HEMATURIA, SITE UNSPECIFIED: Primary | ICD-10-CM

## 2025-06-10 NOTE — TELEPHONE ENCOUNTER
Copied from CRM #[id number]. Topic: General Inquiry - Patient Advice  >> Sudhir 10, 2025  1:17 PM Juan wrote:  Type:  Needs Medical Advice    Who Called: pt nurse Deedee     Would the patient rather a call back or a response via [facility]? Call back   Best Call Back Number: [phone]  Additional Information: pt nurse states she has some concerns that pt has a uti she needs an order for UA. States pt is really swollen.

## 2025-06-17 ENCOUNTER — TELEPHONE (OUTPATIENT)
Dept: CARDIOLOGY | Facility: CLINIC | Age: OVER 89
End: 2025-06-17
Payer: MEDICARE

## 2025-06-17 DIAGNOSIS — I27.20 PULMONARY HYPERTENSION: ICD-10-CM

## 2025-06-17 DIAGNOSIS — I50.43 ACUTE ON CHRONIC COMBINED SYSTOLIC AND DIASTOLIC HEART FAILURE: Primary | ICD-10-CM

## 2025-06-17 DIAGNOSIS — N18.32 STAGE 3B CHRONIC KIDNEY DISEASE: ICD-10-CM

## 2025-06-17 NOTE — TELEPHONE ENCOUNTER
Copied from CRM #[id number]. Topic: General Inquiry - Patient Advice  >> Jun 17, 2025  1:02 PM Jackie wrote:  Type:  Needs Medical Advice    Who Called: Deedee [facility]      Would the patient rather a call back or a response via [facility]? Call back    Best Call Back Number: [phone] Deedee    Additional Information: deedee [place] pt needs order for hospice faxed over [phone]. please call to discuss

## 2025-06-17 NOTE — TELEPHONE ENCOUNTER
Copied from CRM #[id number]. Topic: General Inquiry - Patient Advice  >> Jun 17, 2025  2:21 PM Kriss wrote:  Type:  Needs Medical Advice    Who Called: [facility]  Symptoms (please be specific):    How long has patient had these symptoms:    Pharmacy name and phone #:    Would the patient rather a call back or a response via [facility]?   Best Call Back Number: [phone]  Additional Information: Iva states she needs an order for admit to [facility].  Please fax to [phone]

## 2025-06-18 ENCOUNTER — TELEPHONE (OUTPATIENT)
Dept: CARDIOLOGY | Facility: CLINIC | Age: OVER 89
End: 2025-06-18
Payer: MEDICARE

## 2025-06-18 NOTE — TELEPHONE ENCOUNTER
Copied from CRM #[id number]. Topic: General Inquiry - Patient Advice  >> Jun 18, 2025  9:17 AM Kriss wrote:  Type:  Needs Medical Advice    Who Called: [facility]  Symptoms (please be specific):    How long has patient had these symptoms:    Pharmacy name and phone #:    Would the patient rather a call back or a response via [facility]?   Best Call Back Number: [phone] fax [phone]  Additional Information: Aida states she has not received order for hospice yet.  Also, does Dr. Morocho want to be attending physician or sign care over to Dr. Quinn.  Thanks!

## 2025-07-05 PROBLEM — E87.5 HYPERKALEMIA: Status: ACTIVE | Noted: 2025-01-01

## 2025-07-05 PROBLEM — G93.40 ACUTE ENCEPHALOPATHY: Status: ACTIVE | Noted: 2025-01-01

## 2025-07-05 PROBLEM — R57.9 SHOCK: Status: ACTIVE | Noted: 2025-01-01

## 2025-07-05 PROBLEM — N17.9 AKI (ACUTE KIDNEY INJURY): Status: ACTIVE | Noted: 2025-01-01

## 2025-07-05 PROBLEM — Z51.5 HOSPICE CARE: Status: ACTIVE | Noted: 2025-01-01

## 2025-07-05 NOTE — ED PROVIDER NOTES
Chief complaint:  Altered Mental Status (AMS since Wednesday. Normally confused, but more than normal per son. Possible UTI, on hospice)      HPI:  Andressa Benedict is a 89 y.o. female CHF (EF 3--40% 5/2025) on O2 NC, severe MR/TR, pulmonary htn, wheelchair-bound, atrial fibrillation, CKD presenting with four days of progressive altered mental status with patient baseline able to converse with some confusion now largely nonverbal today.  She was reportedly sent to the emergency department from home by her sons who are caring for her in conjunction with hospice.  History is otherwise limited, but patient has been eating and drinking less during this time.  No other focal symptoms are known.  EMS report Accu-Chek was 162.    ROS: As per HPI and below:  Limited secondary to patient mental status    Review of patient's allergies indicates:   Allergen Reactions    Penicillins Anaphylaxis       Current Discharge Medication List        CONTINUE these medications which have NOT CHANGED    Details   ascorbic acid, vitamin C, (VITAMIN C) 250 mg Chew Take 1 tablet by mouth Daily.      aspirin (ECOTRIN) 81 MG EC tablet Take 81 mg by mouth once daily.      betaxolol 0.5% (BETOPTIC-S) 0.5 % Drop Place 1 drop into both eyes 2 (two) times daily.      coenzyme Q10 (CO Q-10) 100 mg capsule Take 100 mg by mouth once daily.      docosahexaenoic acid/epa (FISH OIL ORAL) Take 1 capsule by mouth Daily.      ENTRESTO 24-26 mg per tablet TAKE 1 TABLET BY MOUTH TWICE DAILY  Qty: 180 tablet, Refills: 11      ergocalciferol, vitamin D2, (VITAMIN D ORAL) Take 1 tablet by mouth Daily.      FLAXSEED OIL ORAL Take 1 capsule by mouth Daily.      furosemide (LASIX) 40 MG tablet Take 1 tablet (40 mg total) by mouth once daily.  Qty: 90 tablet, Refills: 3      glucosam/msm/chond/njo446/hyal (GLUCOS-CHOND-MSM, WITH ANTIOX, ORAL) Take 1 capsule by mouth Daily.      ibuprofen (ADVIL,MOTRIN) 200 MG tablet Take 200 mg by mouth every 6 (six) hours as  needed for Pain.      levothyroxine (SYNTHROID) 100 MCG tablet TAKE 1 TABLET BY MOUTH EVERY DAY  Qty: 90 tablet, Refills: 3    Associated Diagnoses: Hypomagnesemia; Essential hypertension      multivitamin (THERAGRAN) per tablet Take 1 tablet by mouth once daily.      pantoprazole (PROTONIX) 40 MG tablet Take 1 tablet (40 mg total) by mouth once daily.  Qty: 90 tablet, Refills: 0      polyethylene glycol (GLYCOLAX) 17 gram/dose powder Take 17 g by mouth once daily.      simethicone (MYLICON) 125 MG chewable tablet Take 125 mg by mouth every 6 (six) hours as needed for Flatulence.      spironolactone (ALDACTONE) 25 MG tablet Take 1 tablet (25 mg total) by mouth every Mon, Wed, Fri.  Qty: 12 tablet, Refills: 11    Comments: .      sucralfate (CARAFATE) 100 mg/mL suspension Take 10 mLs (1 g total) by mouth every 6 (six) hours.  Qty: 3600 mL, Refills: 2      UNABLE TO FIND Take 1 tablet by mouth once daily. medication name: Prevagen supplement             PMH:  As per HPI and below:  Past Medical History:   Diagnosis Date    Atrial fibrillation     Bradycardia     Carotid bruit     CKD (chronic kidney disease), stage III     Hyperlipidemia     OA (osteoarthritis)     Thyroid disease      Past Surgical History:   Procedure Laterality Date    HYSTERECTOMY      KNEE SURGERY Right     SHOULDER SURGERY Right        Social History     Socioeconomic History    Marital status: Single   Tobacco Use    Smoking status: Never    Smokeless tobacco: Never   Substance and Sexual Activity    Alcohol use: Never    Drug use: Never     Social Drivers of Health     Financial Resource Strain: Patient Unable To Answer (7/5/2025)    Overall Financial Resource Strain (CARDIA)     Difficulty of Paying Living Expenses: Patient unable to answer   Food Insecurity: Patient Unable To Answer (7/5/2025)    Hunger Vital Sign     Worried About Running Out of Food in the Last Year: Patient unable to answer     Ran Out of Food in the Last Year: Patient  unable to answer   Transportation Needs: Patient Unable To Answer (7/5/2025)    PRAPARE - Transportation     Lack of Transportation (Medical): Patient unable to answer     Lack of Transportation (Non-Medical): Patient unable to answer   Physical Activity: Inactive (10/11/2024)    Exercise Vital Sign     Days of Exercise per Week: 0 days     Minutes of Exercise per Session: 0 min   Stress: Patient Unable To Answer (7/5/2025)    [employer] - Occupational Stress Questionnaire     Feeling of Stress : Patient unable to answer   Housing Stability: Patient Unable To Answer (7/5/2025)    Housing Stability Vital Sign     Unable to Pay for Housing in the Last Year: Patient unable to answer     Homeless in the Last Year: Patient unable to answer       Family History   Problem Relation Name Age of Onset    Heart disease Father         Physical Exam:    Vitals:    07/05/25 1530   BP: (!) 64/34   Pulse: (!) 117   Resp: 10   Temp: 97.4 °F (36.3 °C)     GENERAL:  No apparent distress.  Somnolent, arouses to tactile stimuli.  HEENT:  Dry mucous membranes.  Normocephalic and atraumatic.    NECK:  No swelling.  Midline trachea.   CARDIOVASCULAR:  Tachycardic with regular rhythm.  2+ radial pulses.    PULMONARY:  Scattered rhonchi bilaterally.  Otherwise clear lungs.  Mild tachypnea is present without accessory muscle use.  ABDOMEN:  Mild epigastric tenderness with no guarding.  No distention.  No palpable hernia.  EXTREMITIES:  3+ pitting edema in the lower extremities.  Upper and lower extremities distally are cool.  Capillary refill is delayed greater than 2 seconds.  NEUROLOGICAL:  Confused, groans to tactile stimuli.  No coherent speech.  Eye opening to tactile stimuli.  Moves extremities equally he has but does not follow commands.  Equal response to tactile stimulus bilaterally.  No gaze preference.  No facial asymmetry.  SKIN:  No rashes or ecchymoses.      Labs Reviewed   COMPREHENSIVE METABOLIC  PANEL - Abnormal       Result Value    Sodium 138      Potassium 6.2 (*)     Chloride 99      CO2 23      Glucose 160 (*)      (*)     Creatinine 6.6 (*)     Calcium 10.2      Protein Total 7.4      Albumin 3.9      Bilirubin Total 1.4 (*)     ALP 48 (*)     AST 42 (*)     ALT 41      Anion Gap 16      eGFR 6 (*)    URINALYSIS, REFLEX TO URINE CULTURE - Abnormal    Color, UA Yellow      Appearance, UA Clear      Spec Grav UA 1.015      pH, UA 5.0      Protein, UA Negative      Glucose, UA Negative      Ketones, UA Negative      Blood, UA Negative      Bilirubin, UA Negative      Urobilinogen, UA Negative      Nitrites, UA Negative      Leukocyte Esterase, UA 2+ (*)    TROPONIN I HIGH SENSITIVITY - Abnormal    Troponin High Sensitive 1,424.5 (*)    CBC WITH DIFFERENTIAL - Abnormal    WBC 10.02      RBC 4.60      Hgb 15.4      Hct 47.6       (*)     MCH 33.5 (*)     MCHC 32.4      RDW 16.9 (*)     Platelet Count 112 (*)     MPV 11.0      Nucleated RBC 0      Neut % 84.1 (*)     Lymph % 6.6 (*)     Mono % 8.5      Eos % 0.3      Basophil % 0.2      Imm Grans % 0.3      Neut # 8.4 (*)     Lymph # 0.66 (*)     Mono # 0.85      Eos # 0.03      Baso # 0.02      Imm Grans # 0.03     B-TYPE NATRIURETIC PEPTIDE - Abnormal    BNP 2,934 (*)    URINALYSIS MICROSCOPIC - Abnormal    RBC, UA 1      WBC, UA 19 (*)     Bacteria, UA Rare      Squamous Epithelial Cells, UA <1      Microscopic Comment       ISTAT LACTATE - Abnormal    POC Lactate 3.04 (*)     Sample VENOUS     ISTAT PROCEDURE - Abnormal    POC PH 7.152 (*)     POC PCO2 67.0 (*)     POC PO2 219 (*)     POC HCO3 23.5 (*)     POC BE -5 (*)     POC SATURATED O2 100      POC TCO2 25      Sample ARTERIAL      Site RR      Allens Test Pass      DelSys NRB      Mode SPONT      Flow 15     LIPASE - Normal    Lipase Level 27     CULTURE, BLOOD   CULTURE, BLOOD   CULTURE, URINE   CBC W/ AUTO DIFFERENTIAL    Narrative:     The following orders were created for panel  order CBC auto differential.  Procedure                               Abnormality         Status                     ---------                               -----------         ------                     CBC with Differential[[id number]]       Abnormal            Final result                 Please view results for these tests on the individual orders.   HEPATITIS C ANTIBODY   HEP C VIRUS HOLD SPECIMEN   HIV 1 / 2 ANTIBODY   HIV VIRUS CONFIRMATION HOLD SPECIMEN       Current Discharge Medication List        CONTINUE these medications which have NOT CHANGED    Details   ascorbic acid, vitamin C, (VITAMIN C) 250 mg Chew Take 1 tablet by mouth Daily.      aspirin (ECOTRIN) 81 MG EC tablet Take 81 mg by mouth once daily.      betaxolol 0.5% (BETOPTIC-S) 0.5 % Drop Place 1 drop into both eyes 2 (two) times daily.      coenzyme Q10 (CO Q-10) 100 mg capsule Take 100 mg by mouth once daily.      docosahexaenoic acid/epa (FISH OIL ORAL) Take 1 capsule by mouth Daily.      ENTRESTO 24-26 mg per tablet TAKE 1 TABLET BY MOUTH TWICE DAILY  Qty: 180 tablet, Refills: 11      ergocalciferol, vitamin D2, (VITAMIN D ORAL) Take 1 tablet by mouth Daily.      FLAXSEED OIL ORAL Take 1 capsule by mouth Daily.      furosemide (LASIX) 40 MG tablet Take 1 tablet (40 mg total) by mouth once daily.  Qty: 90 tablet, Refills: 3      glucosam/msm/chond/zcr966/hyal (GLUCOS-CHOND-MSM, WITH ANTIOX, ORAL) Take 1 capsule by mouth Daily.      ibuprofen (ADVIL,MOTRIN) 200 MG tablet Take 200 mg by mouth every 6 (six) hours as needed for Pain.      levothyroxine (SYNTHROID) 100 MCG tablet TAKE 1 TABLET BY MOUTH EVERY DAY  Qty: 90 tablet, Refills: 3    Associated Diagnoses: Hypomagnesemia; Essential hypertension      multivitamin (THERAGRAN) per tablet Take 1 tablet by mouth once daily.      pantoprazole (PROTONIX) 40 MG tablet Take 1 tablet (40 mg total) by mouth once daily.  Qty: 90 tablet, Refills: 0      polyethylene glycol (GLYCOLAX) 17 gram/dose  powder Take 17 g by mouth once daily.      simethicone (MYLICON) 125 MG chewable tablet Take 125 mg by mouth every 6 (six) hours as needed for Flatulence.      spironolactone (ALDACTONE) 25 MG tablet Take 1 tablet (25 mg total) by mouth every Mon, Wed, Fri.  Qty: 12 tablet, Refills: 11    Comments: .      sucralfate (CARAFATE) 100 mg/mL suspension Take 10 mLs (1 g total) by mouth every 6 (six) hours.  Qty: 3600 mL, Refills: 2      UNABLE TO FIND Take 1 tablet by mouth once daily. medication name: Prevagen supplement             Orders Placed This Encounter   Procedures    Critical Care    Urine culture    X-Ray Chest AP Portable    Hepatitis C Antibody    HCV Virus Hold Specimen    HIV 1/2 Ag/Ab (4th Gen)    HIV Virus Confirmation Hold Specimen    CBC auto differential    Comprehensive metabolic panel    Urinalysis, Reflex to Urine Culture Urine, Clean Catch    Lipase    Troponin I High Sensitivity    CBC with Differential    Brain natriuretic peptide    Urinalysis Microscopic    Diet NPO    L[place] to [place]    Luevano Catheter Care every 12 hours    Nurse to discontinue luevano when patient no longer meets criteria    Post Luevano Catheter Removal Protocol    Notify Physician    End of life care, comfort measures    Vital signs    DNR (Do Not Resuscitate)    Inpatient consult to Palliative Care    Oxygen Continuous    POCT ARTERIAL BLOOD GAS Blood Gas    Pulse Oximetry Q Shift    EKG 12-lead    EKG 12-lead    Saline lock IV    Admit to Inpatient    Fall precautions       Imaging Results              X-Ray Chest AP Portable (Final result)  Result time 07/05/25 10:59:32      Final result by Kaleb Jay MD (07/05/25 10:59:32)                   Impression:      1. Unchanged cardiomegaly.  2. Prominence of mediastinal contours could be due to low lung volumes.  If further imaging evaluation of such is desired, CT thorax with IV contrast can be considered.  3. Unchanged central pulmonary vascular prominence without evidence  of brittni pulmonary edema.      Electronically signed by: Kaleb Jay  Date:    07/05/2025  Time:    10:59               Narrative:    EXAMINATION:  XR CHEST AP PORTABLE    CLINICAL HISTORY:  Sepsis;    FINDINGS:  Portable chest at 1022 compared with 05/15/2025 shows unchanged enlarged cardiac silhouette size.  Mediastinal contours are prominent.    Lung volumes are low.  No confluent alveolar consolidation or pleural effusion.  No pneumothorax.  Central pulmonary vascular prominence unchanged.  Bilateral cervical ribs unchanged.  No acute osseous abnormality with degenerative changes affecting the spine.                                      ED Course as of 07/05/25 1731   Sat Jul 05, 2025   0957 Aggressive fluid resuscitation with considered sepsis not pursued as it has a high probability of precipitating cardiopulmonary compromise and collapse in this patient with CHF and pulmonary htn.  More measured small bolus fluid resuscitation to support blood pressure initiated. [MR]   1001 D/w Mr. Holguin, patient's son, who indicates patient is DNR (but not DNI), otherwise usual care.  He does not wish to continue hospice. [MR]   1007 EKG: Sinus tachycardia with primary AV block, rate of 109, NV interval prolonged at 214 milliseconds with a QRS wide at 168 milliseconds and pre-existing right bundle branch block present.  New T-wave inversions in three and AVF compared to prior with no other new ST or T-wave changes.  Right axis. (Independently interpreted by me) [MR]   1046 CXR:  Persistent bilateral interstitial prominence without new focal infiltrate compared to last prior.  Stable cardiomegaly. (Independently interpreted by me) [MR]   1055 I spoke again with son about poor oxygenation on non-rebreather on close reassessment with potential need for intubation if the goal is to extend patient's life regardless of potential suffering.  Son's primary goal from other is comfort.  He understands she may be dying and will  likely die sooner without intubation.  I will not perform intubation in accordance with patient and son's wishes.  Son is interested in inpatient hospice but feels ill equipped to continue home hospice. [MR]   1115 Patient with adequate O2 sat on non-rebreather on ABG but no clear discernible O2 sat using pulse ox.  Respiratory acidosis noted from ABG as well. [MR]      ED Course User Index  [MR] Juan Jose Palomo MD       MDM:    89 y.o. female with progressive altered mental status in the setting of hypotension with cool, poorly perfused extremities.  Shot considered, possibly infectious although cardiovascular etiologies also to be sought.  Cautious volume resuscitation initiated given patient's systolic heart failure.  Lactic acid sent to assess for end-organ hypoperfusion and possible sepsis.  Sources of infection with chest x-ray and urinalysis initiated along with other labs to assess end-organ dysfunction.  Patient tachycardic with EKG with some old and some potentially new ischemic inferior changes.    Further workup reveals marked hypoxia with likely impending cardiovascular collapse secondary to known cardiac issues.  Extensive discussion with multiple conversations held with patient's son and closest family member as decision maker as patient does not have capacity.  Ultimately son elects for DNR/DNI with inpatient hospice.  I feel this is reasonable.  I will not intubate patient prior to emphasize patient comfort to minimize suffering per my discussion with the son.  We did discuss that patient may die soon relating to her present illness.  Less likely infectious focus based on present workup and I have discussed with hospital medicine who will assume care and arrange inpatient hospice.  Multiple other abnormalities including acute renal failure with hyperkalemia noted, once again non emergently treated due to patient care goals with inpatient hospice sought.    Diagnoses:    1. Acute on chronic  hypoxic and hypercapnic respiratory failure   2.  CHF   3.  Pulmonary hypertension   4.  Shock due to cardiovascular etiology  5.  ACS   6.  Acute renal failure with hyperkalemia    Critical Care    Date/Time: 7/5/2025 5:30 PM    Performed by: Juan Jose Palomo MD  Authorized by: Miguel Pickett MD  Direct patient critical care time: 18 minutes  Additional history critical care time: 7 minutes  Ordering / reviewing critical care time: 7 minutes  Documentation critical care time: 6 minutes  Consulting other physicians critical care time: 4 minutes  Consult with family critical care time: 15 minutes  Total critical care time (exclusive of procedural time) : 57 minutes  Critical care time was exclusive of separately billable procedures and treating other patients.               Juan Jose Palomo MD  07/05/25 2807

## 2025-07-05 NOTE — SUBJECTIVE & OBJECTIVE
Past Medical History:   Diagnosis Date    Atrial fibrillation     Bradycardia     Carotid bruit     CKD (chronic kidney disease), stage III     Hyperlipidemia     OA (osteoarthritis)     Thyroid disease        Past Surgical History:   Procedure Laterality Date    HYSTERECTOMY      KNEE SURGERY Right     SHOULDER SURGERY Right        Review of patient's allergies indicates:   Allergen Reactions    Penicillins Anaphylaxis       No current facility-administered medications on file prior to encounter.     Current Outpatient Medications on File Prior to Encounter   Medication Sig    ascorbic acid, vitamin C, (VITAMIN C) 250 mg Chew Take 1 tablet by mouth Daily.    aspirin (ECOTRIN) 81 MG EC tablet Take 81 mg by mouth once daily.    betaxolol 0.5% (BETOPTIC-S) 0.5 % Drop Place 1 drop into both eyes 2 (two) times daily.    coenzyme Q10 (CO Q-10) 100 mg capsule Take 100 mg by mouth once daily.    docosahexaenoic acid/epa (FISH OIL ORAL) Take 1 capsule by mouth Daily.    ENTRESTO 24-26 mg per tablet TAKE 1 TABLET BY MOUTH TWICE DAILY    ergocalciferol, vitamin D2, (VITAMIN D ORAL) Take 1 tablet by mouth Daily.    FLAXSEED OIL ORAL Take 1 capsule by mouth Daily.    furosemide (LASIX) 40 MG tablet Take 1 tablet (40 mg total) by mouth once daily.    glucosam/msm/chond/[id number]/hyal (GLUCOS-CHOND-MSM, WITH ANTIOX, ORAL) Take 1 capsule by mouth Daily.    ibuprofen (ADVIL,MOTRIN) 200 MG tablet Take 200 mg by mouth every 6 (six) hours as needed for Pain.    levothyroxine (SYNTHROID) 100 MCG tablet TAKE 1 TABLET BY MOUTH EVERY DAY (Patient taking differently: Take 100 mcg by mouth before breakfast.)    multivitamin (THERAGRAN) per tablet Take 1 tablet by mouth once daily.    pantoprazole (PROTONIX) 40 MG tablet Take 1 tablet (40 mg total) by mouth once daily.    polyethylene glycol (GLYCOLAX) 17 gram/dose powder Take 17 g by mouth once daily.    simethicone (MYLICON) 125 MG chewable tablet Take 125 mg by mouth every 6 (six) hours as  needed for Flatulence.    spironolactone (ALDACTONE) 25 MG tablet Take 1 tablet (25 mg total) by mouth every Mon, Wed, Fri.    sucralfate (CARAFATE) 100 mg/mL suspension Take 10 mLs (1 g total) by mouth every 6 (six) hours.    UNABLE TO FIND Take 1 tablet by mouth once daily. medication name: Prevagen supplement     Family History       Problem Relation (Age of Onset)    Heart disease Father          Tobacco Use    Smoking status: Never    Smokeless tobacco: Never   Substance and Sexual Activity    Alcohol use: Never    Drug use: Never    Sexual activity: Not on file     Review of Systems   Unable to perform ROS: Mental status change     Objective:     Vital Signs (Most Recent):  Temp: 97.4 °F (36.3 °C) (07/05/25 1530)  Pulse: (!) 117 (07/05/25 1530)  Resp: 10 (07/05/25 1530)  BP: (!) 64/34 (07/05/25 1530)  SpO2: (!) 94 % (07/05/25 1530) Vital Signs (24h Range):  Temp:  [95.4 °F (35.2 °C)-97.4 °F (36.3 °C)] 97.4 °F (36.3 °C)  Pulse:  [117-129] 117  Resp:  [10-20] 10  SpO2:  [68 %-98 %] 94 %  BP: ()/(34-71) 64/34     Weight: 73.4 kg (161 lb 13.1 oz)  Body mass index is 31.6 kg/m².     Physical Exam  Vitals and nursing note reviewed.   Constitutional:       General: She is not in acute distress.     Appearance: She is ill-appearing.   HENT:      Right Ear: External ear normal.      Left Ear: External ear normal.      Nose: Nose normal.      Mouth/Throat:      Mouth: Mucous membranes are dry.   Eyes:      Extraocular Movements: Extraocular movements intact.   Cardiovascular:      Rate and Rhythm: Tachycardia present.   Pulmonary:      Effort: Pulmonary effort is normal.   Abdominal:      Palpations: Abdomen is soft.   Musculoskeletal:      Cervical back: Normal range of motion.      Right lower leg: No edema.      Left lower leg: No edema.   Skin:     General: Skin is warm.   Neurological:      Mental Status: She is disoriented.                Significant Labs: All pertinent labs within the past 24 hours have  been reviewed.  CBC:   Recent Labs   Lab 07/05/25  1016   WBC 10.02   HGB 15.4   HCT 47.6   *     CMP:   Recent Labs   Lab 07/05/25  1016      K 6.2*   CL 99   CO2 23   *   *   CREATININE 6.6*   CALCIUM 10.2   PROT 7.4   ALBUMIN 3.9   BILITOT 1.4*   ALKPHOS 48*   AST 42*   ALT 41   ANIONGAP 16       Significant Imaging: I have reviewed all pertinent imaging results/findings within the past 24 hours.

## 2025-07-05 NOTE — PLAN OF CARE
[facility]  Initial Discharge Assessment       Primary Care Provider: No, Primary Doctor    Admission Diagnosis: Hospice care [Z51.5]    Admission Date: 7/5/2025  Expected Discharge Date: TBD  SW spoke with patient's son over the phone to complete assessment. No POA, living will or advance directive. No HH, HD, or Coumadin. Patient is under the care of [facility]. Patient's son inquired about GIP. SW told patient's son to talk to patient's attending MD about this. Patient is expected to return home with home hospice. Patient   's son said that wants to resume care with [facility]. Patient's son will bring patient home when she is discharged. No needs identified.    Transition of Care Barriers: None    Payor: [employer] MANAGED MEDICARE / Plan: [employer] MEDICARE PLAN PPO / Product Type: Medicare Advantage /     Extended Emergency Contact Information  Primary Emergency Contact: EzioAdarsh  Home Phone: [phone]  Mobile Phone: [phone]  Relation: Son   needed? No    Discharge Plan A: Hospice/home (Patient's son asked about GIP.)  Discharge Plan B: Home Health      [id number] - [place], [place] - [address] AT [facility] [address]  [address] [place] [address]  Phone: [phone] Fax: [phone]      Initial Assessment (most recent)       Adult Discharge Assessment - 07/05/25 1621          Discharge Assessment    Assessment Type Discharge Planning Assessment     Confirmed/corrected address, phone number and insurance Yes     Confirmed Demographics Correct on Facesheet     Source of Information family     Communicated HEMANTH with patient/caregiver Date not available/Unable to determine     People in Home child(nicolás), adult     Name(s) of People in Home Adarsh Holguin/son [phone]     Facility Arrived From: home     Do you expect to return to your current living situation? Yes     Do you have help at home or someone to help you manage  your care at home? Yes     Who are your caregiver(s) and their phone number(s)? Adarsh Holguin/son [phone]     Prior to hospitilization cognitive status: Unable to Assess     Current cognitive status: Unable to Assess     Walking or Climbing Stairs Difficulty yes     Walking or Climbing Stairs ambulation difficulty, requires equipment;stair climbing difficulty, assistance 1 person;stair climbing difficulty, requires equipment     Mobility Management son assists, straight cane, rolling walker     Dressing/Bathing Difficulty yes     Dressing/Bathing bathing difficulty, requires equipment;bathing difficulty, assistance 1 person;dressing difficulty, assistance 1 person     Dressing/Bathing Management bath bench     Equipment Currently Used at Home bath bench;cane, straight;walker, rolling;oxygen   unsure of liter dosage, supplied by [facility]    Readmission within 30 days? No     Patient currently being followed by outpatient case management? No     Do you currently have service(s) that help you manage your care at home? Yes     Name and Contact number of agency [facility]     Is the pt/caregiver preference to resume services with current agency Yes     Do you take prescription medications? Yes     Do you have prescription coverage? Yes     Do you have any problems affording any of your prescribed medications? No     Is the patient taking medications as prescribed? no     If no, which medications is patient not taking? not sure     Who is going to help you get home at discharge? Adarsh Holguin/son [phone]     How do you get to doctors appointments? family or friend will provide     Are you on dialysis? No     Do you take coumadin? No     Discharge Plan A Hospice/home   Patient's son asked about GIP.    Discharge Plan B Home Health     DME Needed Upon Discharge  none     Discharge Plan discussed with: Adult children     Transition of Care Barriers None

## 2025-07-05 NOTE — HPI
89 year old pt getting admitted with a cute encephalopathy/Shock  Pt was admitted and discharged from this hospital on May 2025 with HH  Last 5 days pt has been confused and was not taking food/water  Finally son brought her to ER today and she was drowsy and hypotensive/tachycardic    Per Son(Mr Holguin) pt should be on IP Hospice care  while in hospital  He said pt is on Home hospice care at the moment  He expressed his wish to ER MD that pt should be only on  comfort measures in this hospital  When I called he said pt should be on hospice care  He dont want DNR for pt because of possible fracture of ribs  But he wants intubation for inpatient hospice pt  I said its not impossible to have inpatient comfort care pt to have intubation  He asked why not and would not give any specific answer regarding what he really wants us to do with pt  He keeps telling that he is not a doctor and unable to make decision for his mom  I explained its POA/family makes decision now not Doctors for pt at the moment because pt is very drowsy  I reached out to ER MD again to talk with Pts Son  As per Conversation with ER MD, Pts son finally agreed with inpatient hospice care

## 2025-07-05 NOTE — ASSESSMENT & PLAN NOTE
In the background of severe ALTON  Received Rx in ER  EKG Changes consistent with hyperkalemia  Recent Labs     07/05/25  1016   K 6.2*

## 2025-07-05 NOTE — PROGRESS NOTES
Pharmacist Renal Dose Adjustment Note    Andressa Benedict is a 89 y.o. female being treated with the medication Levofloxacin.    Patient Data:    Vital Signs (Most Recent):  Temp: 97.1 °F (36.2 °C) (07/05/25 1030)  Pulse: (!) 123 (07/05/25 1200)  Resp: 20 (07/05/25 0932)  BP: (!) 81/45 (07/05/25 1200)  SpO2: (!) 68 % (07/05/25 1200) Vital Signs (72h Range):  Temp:  [95.4 °F (35.2 °C)-97.1 °F (36.2 °C)]   Pulse:  [118-123]   Resp:  [20]   BP: ()/(45-71)   SpO2:  [68 %-98 %]      Recent Labs   Lab 07/05/25  1016   CREATININE 6.6*     Serum creatinine: 6.6 mg/dL (H) 07/05/25 1016  Estimated creatinine clearance: 5.1 mL/min (A)    Levofloxacin 250 mg IV every 24 hours will be changed to Levofloxacin 750 mg IV x 1 dose, then Levofloxacin 500 mg IV every 48 hours due to renal protocol.    Pharmacist's Name: Mariela Reid  Pharmacist's Extension: [phone]

## 2025-07-05 NOTE — H&P
[facility] - Emergency Dept  Hospital Medicine  History & Physical    Patient Name: Andressa Benedict  MRN: [id number]  Patient Class: IP- Inpatient  Admission Date: 7/5/2025  Attending Physician: Miguel Pickett MD   Primary Care Provider: Reina, Primary Doctor         Patient information was obtained from relative(s), past medical records, ER records, and ER MD .     Subjective:     Principal Problem:Shock    Chief Complaint:   Chief Complaint   Patient presents with    Altered Mental Status     AMS since Wednesday. Normally confused, but more than normal per son. Possible UTI, on hospice        HPI: 89 year old pt getting admitted with a cute encephalopathy/Shock  Pt was admitted and discharged from this hospital on May 2025 with HH  Last 5 days pt has been confused and was not taking food/water  Finally son brought her to ER today and she was drowsy and hypotensive/tachycardic    Per Son(Mr Holguin) pt should be on IP Hospice care  while in hospital  He said pt is on Home hospice care at the moment  He expressed his wish to ER MD that pt should be only on  comfort measures in this hospital  When I called he said pt should be on hospice care  He dont want DNR for pt because of possible fracture of ribs  But he wants intubation for inpatient hospice pt  I said its not impossible to have inpatient comfort care pt to have intubation  He asked why not and would not give any specific answer regarding what he really wants us to do with pt  He keeps telling that he is not a doctor and unable to make decision for his mom  I explained its POA/family makes decision now not Doctors for pt at the moment because pt is very drowsy  I reached out to ER MD again to talk with Pts Son  As per Conversation with ER MD, Pts son finally agreed with inpatient hospice care             Past Medical History:   Diagnosis Date    Atrial fibrillation     Bradycardia     Carotid bruit     CKD (chronic kidney disease), stage III      Hyperlipidemia     OA (osteoarthritis)     Thyroid disease        Past Surgical History:   Procedure Laterality Date    HYSTERECTOMY      KNEE SURGERY Right     SHOULDER SURGERY Right        Review of patient's allergies indicates:   Allergen Reactions    Penicillins Anaphylaxis       No current facility-administered medications on file prior to encounter.     Current Outpatient Medications on File Prior to Encounter   Medication Sig    ascorbic acid, vitamin C, (VITAMIN C) 250 mg Chew Take 1 tablet by mouth Daily.    aspirin (ECOTRIN) 81 MG EC tablet Take 81 mg by mouth once daily.    betaxolol 0.5% (BETOPTIC-S) 0.5 % Drop Place 1 drop into both eyes 2 (two) times daily.    coenzyme Q10 (CO Q-10) 100 mg capsule Take 100 mg by mouth once daily.    docosahexaenoic acid/epa (FISH OIL ORAL) Take 1 capsule by mouth Daily.    ENTRESTO 24-26 mg per tablet TAKE 1 TABLET BY MOUTH TWICE DAILY    ergocalciferol, vitamin D2, (VITAMIN D ORAL) Take 1 tablet by mouth Daily.    FLAXSEED OIL ORAL Take 1 capsule by mouth Daily.    furosemide (LASIX) 40 MG tablet Take 1 tablet (40 mg total) by mouth once daily.    glucosam/msm/chond/inq856/hyal (GLUCOS-CHOND-MSM, WITH ANTIOX, ORAL) Take 1 capsule by mouth Daily.    ibuprofen (ADVIL,MOTRIN) 200 MG tablet Take 200 mg by mouth every 6 (six) hours as needed for Pain.    levothyroxine (SYNTHROID) 100 MCG tablet TAKE 1 TABLET BY MOUTH EVERY DAY (Patient taking differently: Take 100 mcg by mouth before breakfast.)    multivitamin (THERAGRAN) per tablet Take 1 tablet by mouth once daily.    pantoprazole (PROTONIX) 40 MG tablet Take 1 tablet (40 mg total) by mouth once daily.    polyethylene glycol (GLYCOLAX) 17 gram/dose powder Take 17 g by mouth once daily.    simethicone (MYLICON) 125 MG chewable tablet Take 125 mg by mouth every 6 (six) hours as needed for Flatulence.    spironolactone (ALDACTONE) 25 MG tablet Take 1 tablet (25 mg total) by mouth every Mon, Wed, Fri.    sucralfate  (CARAFATE) 100 mg/mL suspension Take 10 mLs (1 g total) by mouth every 6 (six) hours.    UNABLE TO FIND Take 1 tablet by mouth once daily. medication name: Prevagen supplement     Family History       Problem Relation (Age of Onset)    Heart disease Father          Tobacco Use    Smoking status: Never    Smokeless tobacco: Never   Substance and Sexual Activity    Alcohol use: Never    Drug use: Never    Sexual activity: Not on file     Review of Systems   Unable to perform ROS: Mental status change     Objective:     Vital Signs (Most Recent):  Temp: 97.4 °F (36.3 °C) (07/05/25 1530)  Pulse: (!) 117 (07/05/25 1530)  Resp: 10 (07/05/25 1530)  BP: (!) 64/34 (07/05/25 1530)  SpO2: (!) 94 % (07/05/25 1530) Vital Signs (24h Range):  Temp:  [95.4 °F (35.2 °C)-97.4 °F (36.3 °C)] 97.4 °F (36.3 °C)  Pulse:  [117-129] 117  Resp:  [10-20] 10  SpO2:  [68 %-98 %] 94 %  BP: ()/(34-71) 64/34     Weight: 73.4 kg (161 lb 13.1 oz)  Body mass index is 31.6 kg/m².     Physical Exam  Vitals and nursing note reviewed.   Constitutional:       General: She is not in acute distress.     Appearance: She is ill-appearing.   HENT:      Right Ear: External ear normal.      Left Ear: External ear normal.      Nose: Nose normal.      Mouth/Throat:      Mouth: Mucous membranes are dry.   Eyes:      Extraocular Movements: Extraocular movements intact.   Cardiovascular:      Rate and Rhythm: Tachycardia present.   Pulmonary:      Effort: Pulmonary effort is normal.   Abdominal:      Palpations: Abdomen is soft.   Musculoskeletal:      Cervical back: Normal range of motion.      Right lower leg: No edema.      Left lower leg: No edema.   Skin:     General: Skin is warm.   Neurological:      Mental Status: She is disoriented.                Significant Labs: All pertinent labs within the past 24 hours have been reviewed.  CBC:   Recent Labs   Lab 07/05/25  1016   WBC 10.02   HGB 15.4   HCT 47.6   *     CMP:   Recent Labs   Lab  07/05/25  1016      K 6.2*   CL 99   CO2 23   *   *   CREATININE 6.6*   CALCIUM 10.2   PROT 7.4   ALBUMIN 3.9   BILITOT 1.4*   ALKPHOS 48*   AST 42*   ALT 41   ANIONGAP 16       Significant Imaging: I have reviewed all pertinent imaging results/findings within the past 24 hours.    Assessment/Plan:     Assessment & Plan  Shock  Mostly septic shock   Started on iv levofloxacin after cultures  Pt is on comfort care now  History of atrial fibrillation  Aware     (HFpEF) heart failure with preserved ejection fraction  Presently very dehydrated     Acute encephalopathy  Sepsis need to be r/o  Metabolic encephalopathy  Pt is on comfort care     Hospice care  Started on comfort measures   Consulted palliative care     ALTON (acute kidney injury)  Maintain iv fluids   Recent Labs     07/05/25  1016   CREATININE 6.6*   EGFRNORACEVR 6*       Hyperkalemia  In the background of severe ALTON  Received Rx in ER  EKG Changes consistent with hyperkalemia  Recent Labs     07/05/25  1016   K 6.2*                 VTE Risk Mitigation (From admission, onward)           Ordered     IP VTE HIGH RISK PATIENT  Once         07/05/25 1337                                           Pharmacist Renal Dose Adjustment Note    Andressa Benedict is a 89 y.o. female being treated with the medication Levofloxacin.    Patient Data:    Vital Signs (Most Recent):  Temp: 97.1 °F (36.2 °C) (07/05/25 1030)  Pulse: (!) 123 (07/05/25 1200)  Resp: 20 (07/05/25 0932)  BP: (!) 81/45 (07/05/25 1200)  SpO2: (!) 68 % (07/05/25 1200) Vital Signs (72h Range):  Temp:  [95.4 °F (35.2 °C)-97.1 °F (36.2 °C)]   Pulse:  [118-123]   Resp:  [20]   BP: ()/(45-71)   SpO2:  [68 %-98 %]      Recent Labs   Lab 07/05/25  1016   CREATININE 6.6*     Serum creatinine: 6.6 mg/dL (H) 07/05/25 1016  Estimated creatinine clearance: 5.1 mL/min (A)    Levofloxacin 250 mg IV every 24 hours will be changed to Levofloxacin 750 mg IV x 1 dose, then Levofloxacin 500 mg IV  every 48 hours due to renal protocol.    Pharmacist's Name: Mariela Reid  Pharmacist's Extension: [phone]      Miguel Pickett MD  Department of Hospital Medicine  [facility] - Emergency Dept

## 2025-07-06 NOTE — SUBJECTIVE & OBJECTIVE
Interval History:     Patient seen and examined  No meaningful conversation    Review of Systems  Objective:     Vital Signs (Most Recent):  Temp: 97 °F (36.1 °C) (07/06/25 0725)  Pulse: 76 (07/06/25 1015)  Resp: 10 (07/06/25 1015)  BP: (!) 64/51 (07/06/25 1015)  SpO2: (!) 62 % (07/06/25 1015) Vital Signs (24h Range):  Temp:  [97 °F (36.1 °C)-97.4 °F (36.3 °C)] 97 °F (36.1 °C)  Pulse:  [] 76  Resp:  [8-14] 10  SpO2:  [62 %-96 %] 62 %  BP: (64-88)/(34-64) 64/51     Weight: 73.4 kg (161 lb 13.1 oz)  Body mass index is 31.6 kg/m².    Intake/Output Summary (Last 24 hours) at 7/6/2025 1231  Last data filed at 7/6/2025 0937  Gross per 24 hour   Intake 1382.79 ml   Output 200 ml   Net 1182.79 ml         Physical Exam  Vitals and nursing note reviewed.   Constitutional:       Comments: Difficult to wake up   HENT:      Head: Normocephalic and atraumatic.   Neck:      Vascular: No JVD.   Cardiovascular:      Pulses: Normal pulses.   Pulmonary:      Effort: Pulmonary effort is normal.   Abdominal:      General: There is no distension.   Skin:     General: Skin is warm.   Neurological:      Mental Status: She is disoriented.   Psychiatric:         Mood and Affect: Mood normal.               Significant Labs: All pertinent labs within the past 24 hours have been reviewed.  CBC:   Recent Labs   Lab 07/05/25  1016   WBC 10.02   HGB 15.4   HCT 47.6   *     CMP:   Recent Labs   Lab 07/05/25  1016      K 6.2*   CL 99   CO2 23   *   *   CREATININE 6.6*   CALCIUM 10.2   PROT 7.4   ALBUMIN 3.9   BILITOT 1.4*   ALKPHOS 48*   AST 42*   ALT 41   ANIONGAP 16       Significant Imaging: I have reviewed all pertinent imaging results/findings within the past 24 hours.

## 2025-07-06 NOTE — CARE UPDATE
Pupils not reacting to light/Dilated  CVS:No S1/S2/No heart sounds  Lungs:No Breath sounds  GI:No bowel sounds  CNS:Unable to elicit reflexes    Time of death :5.20 PM

## 2025-07-06 NOTE — RESPIRATORY THERAPY
07/05/25 1913   Patient Assessment/Suction   Respiratory Effort Unlabored;Normal   Expansion/Accessory Muscles/Retractions expansion symmetric   Rhythm/Pattern, Respiratory unlabored   Cough Frequency no cough   Skin Integrity   Area Observed Left;Right;Behind ear;Cheek;Nares   Skin Appearance without discoloration   PRE-TX-O2   Device (Oxygen Therapy) nasal cannula   $ Is the patient on Low Flow Oxygen? Yes   Flow (L/min) (Oxygen Therapy) 2   SpO2 95 %   Pulse Oximetry Type Intermittent   $ Pulse Oximetry - Multiple Charge Pulse Oximetry - Multiple   Pulse (!) 112   Resp 14   Education   $ Education Oxygen;15 min

## 2025-07-06 NOTE — PROGRESS NOTES
[facility]  Progress Note    Patient Name: Andressa Benedict  MRN: [id number]  Patient Class: IP- Inpatient   Admission Date: 7/5/2025  Length of Stay: 1 days  Attending Physician: Jessee Barry MD  Primary Care Provider: Reina, Primary Doctor        Subjective     Principal Problem:Shock        HPI:  89 year old pt getting admitted with a cute encephalopathy/Shock  Pt was admitted and discharged from this hospital on May 2025 with HH  Last 5 days pt has been confused and was not taking food/water  Finally son brought her to ER today and she was drowsy and hypotensive/tachycardic    Per Son(Mr Holguin) pt should be on IP Hospice care  while in hospital  He said pt is on Home hospice care at the moment  He expressed his wish to ER MD that pt should be only on  comfort measures in this hospital  When I called he said pt should be on hospice care  He dont want DNR for pt because of possible fracture of ribs  But he wants intubation for inpatient hospice pt  I said its not impossible to have inpatient comfort care pt to have intubation  He asked why not and would not give any specific answer regarding what he really wants us to do with pt  He keeps telling that he is not a doctor and unable to make decision for his mom  I explained its POA/family makes decision now not Doctors for pt at the moment because pt is very drowsy  I reached out to ER MD again to talk with Pts Son  As per Conversation with ER MD, Pts son finally agreed with inpatient hospice care             Overview/Hospital Course:  89 year old pt was admitted with a cute encephalopathy/Shock and she was drowsy and hypotensive/tachycardic.  IV antibiotics started for possible septic shock but later patient's son changed the patient to comfort care only.  Possible inpatient hospice       Interval History:     Patient seen and examined  No meaningful conversation    Review of Systems  Objective:     Vital Signs (Most  Recent):  Temp: 97 °F (36.1 °C) (07/06/25 0725)  Pulse: 76 (07/06/25 1015)  Resp: 10 (07/06/25 1015)  BP: (!) 64/51 (07/06/25 1015)  SpO2: (!) 62 % (07/06/25 1015) Vital Signs (24h Range):  Temp:  [97 °F (36.1 °C)-97.4 °F (36.3 °C)] 97 °F (36.1 °C)  Pulse:  [] 76  Resp:  [8-14] 10  SpO2:  [62 %-96 %] 62 %  BP: (64-88)/(34-64) 64/51     Weight: 73.4 kg (161 lb 13.1 oz)  Body mass index is 31.6 kg/m².    Intake/Output Summary (Last 24 hours) at 7/6/2025 1231  Last data filed at 7/6/2025 0937  Gross per 24 hour   Intake 1382.79 ml   Output 200 ml   Net 1182.79 ml         Physical Exam  Vitals and nursing note reviewed.   Constitutional:       Comments: Difficult to wake up   HENT:      Head: Normocephalic and atraumatic.   Neck:      Vascular: No JVD.   Cardiovascular:      Pulses: Normal pulses.   Pulmonary:      Effort: Pulmonary effort is normal.   Abdominal:      General: There is no distension.   Skin:     General: Skin is warm.   Neurological:      Mental Status: She is disoriented.   Psychiatric:         Mood and Affect: Mood normal.               Significant Labs: All pertinent labs within the past 24 hours have been reviewed.  CBC:   Recent Labs   Lab 07/05/25  1016   WBC 10.02   HGB 15.4   HCT 47.6   *     CMP:   Recent Labs   Lab 07/05/25  1016      K 6.2*   CL 99   CO2 23   *   *   CREATININE 6.6*   CALCIUM 10.2   PROT 7.4   ALBUMIN 3.9   BILITOT 1.4*   ALKPHOS 48*   AST 42*   ALT 41   ANIONGAP 16       Significant Imaging: I have reviewed all pertinent imaging results/findings within the past 24 hours.      Assessment & Plan  Shock  Mostly septic shock   Started on iv levofloxacin after cultures  Pt is on comfort care now  History of atrial fibrillation  Aware     (HFpEF) heart failure with preserved ejection fraction  Presently very dehydrated   Decrease IV fluid rate    Acute encephalopathy    Metabolic encephalopathy  Pt is on comfort care     Hospice care  Started on  comfort measures   Consulted palliative care     ALTON (acute kidney injury)  Maintain iv fluids   Recent Labs     07/05/25  1016   CREATININE 6.6*   EGFRNORACEVR 6*       Hyperkalemia  In the background of severe ALTON  Received Rx in ER  EKG Changes consistent with hyperkalemia  Recent Labs     07/05/25  1016   K 6.2*               VTE Risk Mitigation (From admission, onward)           Ordered     IP VTE HIGH RISK PATIENT  Once         07/05/25 1337                    Discharge Planning   HEMANTH:      Code Status: DNR   Medical Readiness for Discharge Date:   Discharge Plan A: Hospice/home (Patient's son asked about GIP.)                        Jessee Barry MD  Department of Hospital Medicine   [facility]

## 2025-07-06 NOTE — HOSPITAL COURSE
89 year old pt was admitted with a cute encephalopathy/Shock and she was drowsy and hypotensive/tachycardic.  IV antibiotics started for possible septic shock but later patient's son changed the patient to comfort care only.  Possible inpatient hospice expected but patient  unfortunately

## 2025-07-07 ENCOUNTER — RESULTS FOLLOW-UP (OUTPATIENT)
Dept: EMERGENCY MEDICINE | Facility: HOSPITAL | Age: OVER 89
End: 2025-07-07

## 2025-07-07 LAB — BACTERIA UR CULT: NORMAL

## 2025-07-07 NOTE — DISCHARGE SUMMARY
[facility]  Discharge Summary      Patient Name: Andressa Benedict  MRN: [id number]  ELIJAH: [id number]  Patient Class: IP- Inpatient  Admission Date: 7/5/2025  Hospital Length of Stay: 1 days  Discharge Date and Time: 07/07/2025 5:15 PM  Attending Physician: Reina att. providers found   Discharging Provider: Jessee Barry MD  Primary Care Provider: Reina, Primary Doctor    Primary Care Team: Networked reference to record PCT     HPI:   89 year old pt getting admitted with a cute encephalopathy/Shock  Pt was admitted and discharged from this hospital on May 2025 with HH  Last 5 days pt has been confused and was not taking food/water  Finally son brought her to ER today and she was drowsy and hypotensive/tachycardic    Per Son(Mr Holguin) pt should be on IP Hospice care  while in hospital  He said pt is on Home hospice care at the moment  He expressed his wish to ER MD that pt should be only on  comfort measures in this hospital  When I called he said pt should be on hospice care  He dont want DNR for pt because of possible fracture of ribs  But he wants intubation for inpatient hospice pt  I said its not impossible to have inpatient comfort care pt to have intubation  He asked why not and would not give any specific answer regarding what he really wants us to do with pt  He keeps telling that he is not a doctor and unable to make decision for his mom  I explained its POA/family makes decision now not Doctors for pt at the moment because pt is very drowsy  I reached out to ER MD again to talk with Pts Son  As per Conversation with ER MD, Pts son finally agreed with inpatient hospice care             * No surgery found *      Hospital Course:   89 year old pt was admitted with a cute encephalopathy/Shock and she was drowsy and hypotensive/tachycardic.  IV antibiotics started for possible septic shock but later patient's son changed the patient to comfort care only.  Possible inpatient hospice  "expected but patient  unfortunately        Goals of Care Treatment Preferences:  Code Status: DNR         Consults:     Assessment & Plan  Shock  Mostly septic shock   Started on iv levofloxacin after cultures  Pt is on comfort care now  History of atrial fibrillation  Aware     (HFpEF) heart failure with preserved ejection fraction  Presently very dehydrated   Decrease IV fluid rate    Acute encephalopathy    Metabolic encephalopathy  Pt is on comfort care     Hospice care  Started on comfort measures   Consulted palliative care     ALTON (acute kidney injury)  Maintain iv fluids   Recent Labs     25  1016   CREATININE 6.6*   EGFRNORACEVR 6*       Hyperkalemia  In the background of severe ALTON  Received Rx in ER  EKG Changes consistent with hyperkalemia  Recent Labs     25  1016   K 6.2*               Final Active Diagnoses:    Diagnosis Date Noted POA    PRINCIPAL PROBLEM:  Shock [R57.9] 2025 Unknown    Acute encephalopathy [G93.40] 2025 Unknown    Hospice care [Z51.5] 2025 Not Applicable    ALTON (acute kidney injury) [N17.9] 2025 Unknown    Hyperkalemia [E87.5] 2025 Unknown    (HFpEF) heart failure with preserved ejection fraction [I50.30] 2023 Yes    History of atrial fibrillation [Z86.79] 2021 Not Applicable     Chronic      Problems Resolved During this Admission:       Discharged Condition: good    Disposition:     Follow Up:    Patient Instructions:   No discharge procedures on file.    Significant Diagnostic Studies: Labs: CMP No results for input(s): "NA", "K", "CL", "CO2", "GLU", "BUN", "CREATININE", "CALCIUM", "PROT", "ALBUMIN", "BILITOT", "ALKPHOS", "AST", "ALT", "ANIONGAP", "ESTGFRAFRICA", "EGFRNONAA" in the last 48 hours. and CBC No results for input(s): "WBC", "HGB", "HCT", "PLT" in the last 48 hours.    Pending Diagnostic Studies:       Procedure Component Value Units Date/Time    HCV Virus Hold Specimen [[id number]] Collected: 25 " 1016    Order Status: Sent Lab Status: In process Updated: 07/05/25 1027    Specimen: Blood     HIV Virus Confirmation Hold Specimen [[id number]] Collected: 07/05/25 1016    Order Status: Sent Lab Status: In process Updated: 07/06/25 0045    Specimen: Blood     Hepatitis C Virus Quantitative [[id number]] Collected: 07/05/25 1016    Order Status: Sent Lab Status: In process Updated: 07/05/25 2323    Specimen: Blood            Medications:  Reconciled Home Medications:      Medication List        ASK your doctor about these medications      aspirin 81 MG EC tablet  Commonly known as: ECOTRIN  Take 81 mg by mouth once daily.     betaxolol 0.5% 0.5 % Drop  Place 1 drop into both eyes 2 (two) times daily.     CO Q-10 100 mg capsule  Generic drug: coenzyme Q10  Take 100 mg by mouth once daily.     ENTRESTO 24-26 mg per tablet  Generic drug: sacubitriL-valsartan  TAKE 1 TABLET BY MOUTH TWICE DAILY     FISH OIL ORAL  Take 1 capsule by mouth Daily.     FLAXSEED OIL ORAL  Take 1 capsule by mouth Daily.     furosemide 40 MG tablet  Commonly known as: LASIX  Take 1 tablet (40 mg total) by mouth once daily.     GLUCOS-CHOND-MSM (WITH ANTIOX) ORAL  Take 1 capsule by mouth Daily.     ibuprofen 200 MG tablet  Commonly known as: ADVIL,MOTRIN  Take 200 mg by mouth every 6 (six) hours as needed for Pain.     levothyroxine 100 MCG tablet  Commonly known as: SYNTHROID  TAKE 1 TABLET BY MOUTH EVERY DAY     multivitamin per tablet  Commonly known as: THERAGRAN  Take 1 tablet by mouth once daily.     pantoprazole 40 MG tablet  Commonly known as: PROTONIX  Take 1 tablet (40 mg total) by mouth once daily.     polyethylene glycol 17 gram/dose powder  Commonly known as: GLYCOLAX  Take 17 g by mouth once daily.     simethicone 125 MG chewable tablet  Commonly known as: MYLICON  Take 125 mg by mouth every 6 (six) hours as needed for Flatulence.     spironolactone 25 MG tablet  Commonly known as: ALDACTONE  Take 1 tablet (25 mg total) by mouth  every Mon, Wed, Fri.     sucralfate 100 mg/mL suspension  Commonly known as: CARAFATE  Take 10 mLs (1 g total) by mouth every 6 (six) hours.     UNABLE TO FIND  Take 1 tablet by mouth once daily. medication name: Prevagen supplement     VITAMIN C 250 mg Chew  Generic drug: ascorbic acid (vitamin C)  Take 1 tablet by mouth Daily.     VITAMIN D ORAL  Take 1 tablet by mouth Daily.              Indwelling Lines/Drains at time of discharge:   Lines/Drains/Airways       Drain  Duration                  Urethral Catheter 07/05/25 1030 Straight-tip 16 Fr. 2 days                    Pls reference death declaration  note     Time spent on the discharge of patient: 30 minutes         Jessee Barry MD  Department of Hospital Medicine  [facility]

## 2025-07-09 NOTE — PLAN OF CARE
25 1504   Final Note   Assessment Type Final Discharge Note   Anticipated Discharge Disposition

## 2025-07-10 LAB
BACTERIA BLD CULT: NORMAL
BACTERIA BLD CULT: NORMAL
HCV RNA SERPL NAA+PROBE-ACNC: ABNORMAL IU/ML

## 2025-07-11 NOTE — PHYSICIAN QUERY
Please clarify/specify the shock diagnosis  associated with clinical findings:  Shock, Unspecified

## 2025-08-07 ENCOUNTER — DOCUMENT SCAN (OUTPATIENT)
Dept: HOME HEALTH SERVICES | Facility: HOSPITAL | Age: OVER 89
End: 2025-08-07
Payer: MEDICARE

## 2025-08-07 ENCOUNTER — EXTERNAL HOME HEALTH (OUTPATIENT)
Dept: HOME HEALTH SERVICES | Facility: HOSPITAL | Age: OVER 89
End: 2025-08-07
Payer: MEDICARE